# Patient Record
Sex: FEMALE | Race: BLACK OR AFRICAN AMERICAN | Employment: UNEMPLOYED | URBAN - METROPOLITAN AREA
[De-identification: names, ages, dates, MRNs, and addresses within clinical notes are randomized per-mention and may not be internally consistent; named-entity substitution may affect disease eponyms.]

---

## 2017-12-22 PROBLEM — F23 ACUTE PSYCHOSIS (HCC): Status: ACTIVE | Noted: 2017-12-22

## 2018-04-02 ENCOUNTER — OFFICE VISIT (OUTPATIENT)
Dept: OBGYN | Age: 43
End: 2018-04-02
Payer: MEDICAID

## 2018-04-02 VITALS
RESPIRATION RATE: 16 BRPM | TEMPERATURE: 97.9 F | HEART RATE: 94 BPM | SYSTOLIC BLOOD PRESSURE: 107 MMHG | HEIGHT: 62 IN | WEIGHT: 116 LBS | BODY MASS INDEX: 21.35 KG/M2 | DIASTOLIC BLOOD PRESSURE: 62 MMHG

## 2018-04-02 DIAGNOSIS — N92.1 MENORRHAGIA WITH IRREGULAR CYCLE: Primary | ICD-10-CM

## 2018-04-02 PROCEDURE — 99202 OFFICE O/P NEW SF 15 MIN: CPT | Performed by: OBSTETRICS & GYNECOLOGY

## 2018-04-02 PROCEDURE — 99203 OFFICE O/P NEW LOW 30 MIN: CPT | Performed by: OBSTETRICS & GYNECOLOGY

## 2018-04-17 ENCOUNTER — TELEPHONE (OUTPATIENT)
Dept: OBGYN | Age: 43
End: 2018-04-17

## 2018-05-02 ENCOUNTER — HOSPITAL ENCOUNTER (OUTPATIENT)
Dept: ULTRASOUND IMAGING | Age: 43
Discharge: HOME | End: 2018-05-04
Payer: MEDICAID

## 2018-05-02 DIAGNOSIS — N92.1 MENORRHAGIA WITH IRREGULAR CYCLE: ICD-10-CM

## 2018-05-02 PROCEDURE — 76830 TRANSVAGINAL US NON-OB: CPT

## 2018-05-02 PROCEDURE — 76856 US EXAM PELVIC COMPLETE: CPT

## 2018-05-08 ENCOUNTER — TELEPHONE (OUTPATIENT)
Dept: OBGYN | Age: 43
End: 2018-05-08

## 2018-05-22 ENCOUNTER — OFFICE VISIT (OUTPATIENT)
Dept: OBGYN | Age: 43
End: 2018-05-22
Payer: MEDICAID

## 2018-05-22 ENCOUNTER — HOSPITAL ENCOUNTER (OUTPATIENT)
Age: 43
Discharge: HOME | End: 2018-05-24
Payer: MEDICAID

## 2018-05-22 VITALS
WEIGHT: 121 LBS | DIASTOLIC BLOOD PRESSURE: 54 MMHG | HEART RATE: 100 BPM | RESPIRATION RATE: 14 BRPM | HEIGHT: 62 IN | TEMPERATURE: 98.6 F | BODY MASS INDEX: 22.26 KG/M2 | SYSTOLIC BLOOD PRESSURE: 95 MMHG

## 2018-05-22 DIAGNOSIS — D50.0 IRON DEFICIENCY ANEMIA DUE TO CHRONIC BLOOD LOSS: Primary | ICD-10-CM

## 2018-05-22 DIAGNOSIS — D25.0 INTRAMURAL AND SUBMUCOUS LEIOMYOMA OF UTERUS: ICD-10-CM

## 2018-05-22 DIAGNOSIS — D25.1 INTRAMURAL AND SUBMUCOUS LEIOMYOMA OF UTERUS: ICD-10-CM

## 2018-05-22 DIAGNOSIS — N92.1 MENORRHAGIA WITH IRREGULAR CYCLE: ICD-10-CM

## 2018-05-22 LAB
ANISOCYTOSIS: ABNORMAL
BASOPHILS ABSOLUTE: 0.01 E9/L (ref 0–0.2)
BASOPHILS RELATIVE PERCENT: 0.2 % (ref 0–2)
EOSINOPHILS ABSOLUTE: 0.09 E9/L (ref 0.05–0.5)
EOSINOPHILS RELATIVE PERCENT: 2.1 % (ref 0–6)
HCT VFR BLD CALC: 23.2 % (ref 34–48)
HEMOGLOBIN: 6.7 G/DL (ref 11.5–15.5)
HYPOCHROMIA: ABNORMAL
IMMATURE GRANULOCYTES #: 0.01 E9/L
IMMATURE GRANULOCYTES %: 0.2 % (ref 0–5)
LYMPHOCYTES ABSOLUTE: 1.8 E9/L (ref 1.5–4)
LYMPHOCYTES RELATIVE PERCENT: 41 % (ref 20–42)
MCH RBC QN AUTO: 25 PG (ref 26–35)
MCHC RBC AUTO-ENTMCNC: 28.9 % (ref 32–34.5)
MCV RBC AUTO: 86.6 FL (ref 80–99.9)
MONOCYTES ABSOLUTE: 0.4 E9/L (ref 0.1–0.95)
MONOCYTES RELATIVE PERCENT: 9.1 % (ref 2–12)
NEUTROPHILS ABSOLUTE: 2.08 E9/L (ref 1.8–7.3)
NEUTROPHILS RELATIVE PERCENT: 47.4 % (ref 43–80)
PDW BLD-RTO: 16.2 FL (ref 11.5–15)
PLATELET # BLD: 257 E9/L (ref 130–450)
PMV BLD AUTO: 11.3 FL (ref 7–12)
POIKILOCYTES: ABNORMAL
POLYCHROMASIA: ABNORMAL
RBC # BLD: 2.68 E12/L (ref 3.5–5.5)
SCHISTOCYTES: ABNORMAL
TARGET CELLS: ABNORMAL
WBC # BLD: 4.4 E9/L (ref 4.5–11.5)

## 2018-05-22 PROCEDURE — G8427 DOCREV CUR MEDS BY ELIG CLIN: HCPCS | Performed by: OBSTETRICS & GYNECOLOGY

## 2018-05-22 PROCEDURE — G8420 CALC BMI NORM PARAMETERS: HCPCS | Performed by: OBSTETRICS & GYNECOLOGY

## 2018-05-22 PROCEDURE — 36415 COLL VENOUS BLD VENIPUNCTURE: CPT

## 2018-05-22 PROCEDURE — 85025 COMPLETE CBC W/AUTO DIFF WBC: CPT

## 2018-05-22 PROCEDURE — 4004F PT TOBACCO SCREEN RCVD TLK: CPT | Performed by: OBSTETRICS & GYNECOLOGY

## 2018-05-22 PROCEDURE — 99212 OFFICE O/P EST SF 10 MIN: CPT | Performed by: OBSTETRICS & GYNECOLOGY

## 2018-05-22 RX ORDER — FERROUS SULFATE 325(65) MG
325 TABLET ORAL 2 TIMES DAILY WITH MEALS
Qty: 60 TABLET | Refills: 5 | Status: SHIPPED | OUTPATIENT
Start: 2018-05-22 | End: 2019-01-07

## 2018-06-30 ENCOUNTER — HOSPITAL ENCOUNTER (OUTPATIENT)
Dept: MRI IMAGING | Age: 43
Discharge: HOME | End: 2018-07-02
Payer: MEDICAID

## 2018-06-30 DIAGNOSIS — D25.0 INTRAMURAL AND SUBMUCOUS LEIOMYOMA OF UTERUS: ICD-10-CM

## 2018-06-30 DIAGNOSIS — D50.0 IRON DEFICIENCY ANEMIA DUE TO CHRONIC BLOOD LOSS: ICD-10-CM

## 2018-06-30 DIAGNOSIS — D25.1 INTRAMURAL AND SUBMUCOUS LEIOMYOMA OF UTERUS: ICD-10-CM

## 2018-06-30 PROCEDURE — A9579 GAD-BASE MR CONTRAST NOS,1ML: HCPCS | Performed by: RADIOLOGY

## 2018-06-30 PROCEDURE — 72197 MRI PELVIS W/O & W/DYE: CPT

## 2018-06-30 PROCEDURE — 6360000004 HC RX CONTRAST MEDICATION: Performed by: RADIOLOGY

## 2018-06-30 RX ADMIN — GADOTERIDOL 11 ML: 279.3 INJECTION, SOLUTION INTRAVENOUS at 13:15

## 2018-07-03 ENCOUNTER — TELEPHONE (OUTPATIENT)
Dept: OBGYN | Age: 43
End: 2018-07-03

## 2018-07-10 ENCOUNTER — TELEPHONE (OUTPATIENT)
Dept: OBGYN | Age: 43
End: 2018-07-10

## 2018-07-30 ENCOUNTER — OFFICE VISIT (OUTPATIENT)
Dept: OBGYN | Age: 43
End: 2018-07-30
Payer: MEDICAID

## 2018-07-30 VITALS
HEART RATE: 87 BPM | DIASTOLIC BLOOD PRESSURE: 52 MMHG | WEIGHT: 122 LBS | BODY MASS INDEX: 22.45 KG/M2 | RESPIRATION RATE: 16 BRPM | SYSTOLIC BLOOD PRESSURE: 98 MMHG | TEMPERATURE: 98.6 F | HEIGHT: 62 IN

## 2018-07-30 DIAGNOSIS — D25.1 INTRAMURAL AND SUBMUCOUS LEIOMYOMA OF UTERUS: Primary | ICD-10-CM

## 2018-07-30 DIAGNOSIS — N92.1 MENORRHAGIA WITH IRREGULAR CYCLE: ICD-10-CM

## 2018-07-30 DIAGNOSIS — D25.0 INTRAMURAL AND SUBMUCOUS LEIOMYOMA OF UTERUS: Primary | ICD-10-CM

## 2018-07-30 DIAGNOSIS — D50.0 IRON DEFICIENCY ANEMIA DUE TO CHRONIC BLOOD LOSS: ICD-10-CM

## 2018-07-30 PROCEDURE — 99212 OFFICE O/P EST SF 10 MIN: CPT | Performed by: OBSTETRICS & GYNECOLOGY

## 2018-07-30 PROCEDURE — 4004F PT TOBACCO SCREEN RCVD TLK: CPT | Performed by: OBSTETRICS & GYNECOLOGY

## 2018-07-30 PROCEDURE — G8427 DOCREV CUR MEDS BY ELIG CLIN: HCPCS | Performed by: OBSTETRICS & GYNECOLOGY

## 2018-07-30 PROCEDURE — G8420 CALC BMI NORM PARAMETERS: HCPCS | Performed by: OBSTETRICS & GYNECOLOGY

## 2018-07-30 RX ORDER — IBUPROFEN 800 MG/1
TABLET ORAL
Refills: 0 | COMMUNITY
Start: 2018-07-06 | End: 2019-04-29

## 2018-07-30 RX ORDER — CHLORHEXIDINE GLUCONATE 0.12 MG/ML
RINSE ORAL
Refills: 0 | COMMUNITY
Start: 2018-07-06 | End: 2019-04-29

## 2018-07-30 NOTE — PROGRESS NOTES
Here to review her MRI results. At least 4 fibroids were noted. I now need to refer her ti IR for possible scheduling for [place]. Discussed with the patient. Referral sent. CMP and PT (INR) ordered as well.

## 2019-01-07 ENCOUNTER — HOSPITAL ENCOUNTER (OUTPATIENT)
Age: 44
Discharge: HOME | End: 2019-01-09
Payer: MEDICAID

## 2019-01-07 ENCOUNTER — OFFICE VISIT (OUTPATIENT)
Dept: OBGYN | Age: 44
End: 2019-01-07
Payer: MEDICAID

## 2019-01-07 VITALS
WEIGHT: 127 LBS | TEMPERATURE: 98 F | HEART RATE: 84 BPM | HEIGHT: 62 IN | RESPIRATION RATE: 16 BRPM | BODY MASS INDEX: 23.37 KG/M2 | SYSTOLIC BLOOD PRESSURE: 100 MMHG | DIASTOLIC BLOOD PRESSURE: 60 MMHG

## 2019-01-07 DIAGNOSIS — D25.0 INTRAMURAL AND SUBMUCOUS LEIOMYOMA OF UTERUS: ICD-10-CM

## 2019-01-07 DIAGNOSIS — N92.1 MENORRHAGIA WITH IRREGULAR CYCLE: Primary | ICD-10-CM

## 2019-01-07 DIAGNOSIS — D50.0 IRON DEFICIENCY ANEMIA DUE TO CHRONIC BLOOD LOSS: ICD-10-CM

## 2019-01-07 DIAGNOSIS — D25.1 INTRAMURAL AND SUBMUCOUS LEIOMYOMA OF UTERUS: ICD-10-CM

## 2019-01-07 DIAGNOSIS — N92.1 MENORRHAGIA WITH IRREGULAR CYCLE: ICD-10-CM

## 2019-01-07 LAB
HCT VFR BLD CALC: 23.5 % (ref 34–48)
HEMOGLOBIN: 6.5 G/DL (ref 11.5–15.5)
MCH RBC QN AUTO: 20.5 PG (ref 26–35)
MCHC RBC AUTO-ENTMCNC: 27.7 % (ref 32–34.5)
MCV RBC AUTO: 74.1 FL (ref 80–99.9)
PDW BLD-RTO: 20.8 FL (ref 11.5–15)
PLATELET # BLD: 200 E9/L (ref 130–450)
PMV BLD AUTO: ABNORMAL FL (ref 7–12)
RBC # BLD: 3.17 E12/L (ref 3.5–5.5)
WBC # BLD: 6.2 E9/L (ref 4.5–11.5)

## 2019-01-07 PROCEDURE — 99212 OFFICE O/P EST SF 10 MIN: CPT | Performed by: OBSTETRICS & GYNECOLOGY

## 2019-01-07 PROCEDURE — G8484 FLU IMMUNIZE NO ADMIN: HCPCS | Performed by: OBSTETRICS & GYNECOLOGY

## 2019-01-07 PROCEDURE — 36415 COLL VENOUS BLD VENIPUNCTURE: CPT

## 2019-01-07 PROCEDURE — 85027 COMPLETE CBC AUTOMATED: CPT

## 2019-01-07 PROCEDURE — 99213 OFFICE O/P EST LOW 20 MIN: CPT | Performed by: OBSTETRICS & GYNECOLOGY

## 2019-01-07 PROCEDURE — G8420 CALC BMI NORM PARAMETERS: HCPCS | Performed by: OBSTETRICS & GYNECOLOGY

## 2019-01-07 PROCEDURE — 4004F PT TOBACCO SCREEN RCVD TLK: CPT | Performed by: OBSTETRICS & GYNECOLOGY

## 2019-01-07 PROCEDURE — G8427 DOCREV CUR MEDS BY ELIG CLIN: HCPCS | Performed by: OBSTETRICS & GYNECOLOGY

## 2019-01-07 RX ORDER — FERROUS SULFATE 325(65) MG
325 TABLET ORAL DAILY
Qty: 60 TABLET | Refills: 3 | Status: SHIPPED | OUTPATIENT
Start: 2019-01-07 | End: 2019-04-29 | Stop reason: SDUPTHER

## 2019-01-08 ENCOUNTER — TELEPHONE (OUTPATIENT)
Dept: OBGYN | Age: 44
End: 2019-01-08

## 2019-03-07 ENCOUNTER — HOSPITAL ENCOUNTER (EMERGENCY)
Age: 44
Discharge: HOME | End: 2019-03-07
Payer: MEDICAID

## 2019-03-07 VITALS
HEART RATE: 96 BPM | TEMPERATURE: 98 F | RESPIRATION RATE: 16 BRPM | OXYGEN SATURATION: 99 % | DIASTOLIC BLOOD PRESSURE: 75 MMHG | SYSTOLIC BLOOD PRESSURE: 108 MMHG | BODY MASS INDEX: 18.18 KG/M2 | WEIGHT: 127 LBS | HEIGHT: 70 IN

## 2019-03-07 DIAGNOSIS — Z76.0 ENCOUNTER FOR MEDICATION REFILL: Primary | ICD-10-CM

## 2019-03-07 LAB
ACETAMINOPHEN LEVEL: <5 MCG/ML (ref 10–30)
ALBUMIN SERPL-MCNC: 4.3 G/DL (ref 3.5–5.2)
ALP BLD-CCNC: 71 U/L (ref 35–104)
ALT SERPL-CCNC: 18 U/L (ref 0–32)
ANION GAP SERPL CALCULATED.3IONS-SCNC: 13 MMOL/L (ref 7–16)
AST SERPL-CCNC: 20 U/L (ref 0–31)
BACTERIA: ABNORMAL /HPF
BILIRUB SERPL-MCNC: <0.2 MG/DL (ref 0–1.2)
BILIRUBIN URINE: NEGATIVE
BLOOD, URINE: ABNORMAL
BUN BLDV-MCNC: 13 MG/DL (ref 6–20)
CALCIUM SERPL-MCNC: 9.5 MG/DL (ref 8.6–10.2)
CHLORIDE BLD-SCNC: 103 MMOL/L (ref 98–107)
CLARITY: CLEAR
CO2: 23 MMOL/L (ref 22–29)
COLOR: YELLOW
CREAT SERPL-MCNC: 0.9 MG/DL (ref 0.5–1)
EPITHELIAL CELLS, UA: ABNORMAL /HPF
ETHANOL: <10 MG/DL (ref 0–0.08)
GFR AFRICAN AMERICAN: >60
GFR NON-AFRICAN AMERICAN: >60 ML/MIN/1.73
GLUCOSE BLD-MCNC: 116 MG/DL (ref 74–99)
GLUCOSE URINE: NEGATIVE MG/DL
HCG, URINE, POC: NEGATIVE
HCT VFR BLD CALC: 32.2 % (ref 34–48)
HEMOGLOBIN: 9.6 G/DL (ref 11.5–15.5)
KETONES, URINE: NEGATIVE MG/DL
LEUKOCYTE ESTERASE, URINE: NEGATIVE
Lab: NORMAL
MCH RBC QN AUTO: 26 PG (ref 26–35)
MCHC RBC AUTO-ENTMCNC: 29.8 % (ref 32–34.5)
MCV RBC AUTO: 87.3 FL (ref 80–99.9)
NEGATIVE QC PASS/FAIL: NORMAL
NITRITE, URINE: NEGATIVE
PDW BLD-RTO: 21.9 FL (ref 11.5–15)
PH UA: 6 (ref 5–9)
PLATELET # BLD: 200 E9/L (ref 130–450)
PMV BLD AUTO: 10.9 FL (ref 7–12)
POSITIVE QC PASS/FAIL: NORMAL
POTASSIUM SERPL-SCNC: 3.7 MMOL/L (ref 3.5–5)
PROTEIN UA: ABNORMAL MG/DL
RBC # BLD: 3.69 E12/L (ref 3.5–5.5)
RBC UA: ABNORMAL /HPF (ref 0–2)
SALICYLATE, SERUM: <0.3 MG/DL (ref 0–30)
SODIUM BLD-SCNC: 139 MMOL/L (ref 132–146)
SPECIFIC GRAVITY UA: >=1.03 (ref 1–1.03)
T4 TOTAL: 5.1 MCG/DL (ref 4.5–11.7)
TOTAL PROTEIN: 7.4 G/DL (ref 6.4–8.3)
TRICYCLIC ANTIDEPRESSANTS SCREEN SERUM: NEGATIVE NG/ML
TSH SERPL DL<=0.05 MIU/L-ACNC: 1.77 UIU/ML (ref 0.27–4.2)
UROBILINOGEN, URINE: 0.2 E.U./DL
WBC # BLD: 5.2 E9/L (ref 4.5–11.5)
WBC UA: ABNORMAL /HPF (ref 0–5)

## 2019-03-07 PROCEDURE — 84443 ASSAY THYROID STIM HORMONE: CPT

## 2019-03-07 PROCEDURE — 81001 URINALYSIS AUTO W/SCOPE: CPT

## 2019-03-07 PROCEDURE — G0480 DRUG TEST DEF 1-7 CLASSES: HCPCS

## 2019-03-07 PROCEDURE — 80307 DRUG TEST PRSMV CHEM ANLYZR: CPT

## 2019-03-07 PROCEDURE — 99284 EMERGENCY DEPT VISIT MOD MDM: CPT

## 2019-03-07 PROCEDURE — 36415 COLL VENOUS BLD VENIPUNCTURE: CPT

## 2019-03-07 PROCEDURE — 80053 COMPREHEN METABOLIC PANEL: CPT

## 2019-03-07 PROCEDURE — 84436 ASSAY OF TOTAL THYROXINE: CPT

## 2019-03-07 PROCEDURE — 85027 COMPLETE CBC AUTOMATED: CPT

## 2019-03-20 ENCOUNTER — HOSPITAL ENCOUNTER (OUTPATIENT)
Dept: ULTRASOUND IMAGING | Age: 44
Discharge: HOME | End: 2019-03-22
Payer: MEDICAID

## 2019-03-20 DIAGNOSIS — D50.0 IRON DEFICIENCY ANEMIA DUE TO CHRONIC BLOOD LOSS: ICD-10-CM

## 2019-03-20 DIAGNOSIS — N92.1 MENORRHAGIA WITH IRREGULAR CYCLE: ICD-10-CM

## 2019-03-20 PROCEDURE — 76830 TRANSVAGINAL US NON-OB: CPT

## 2019-04-29 ENCOUNTER — TELEPHONE (OUTPATIENT)
Dept: OBGYN | Age: 44
End: 2019-04-29

## 2019-04-29 ENCOUNTER — HOSPITAL ENCOUNTER (EMERGENCY)
Age: 44
Discharge: HOME | End: 2019-04-29
Attending: EMERGENCY MEDICINE
Payer: MEDICAID

## 2019-04-29 VITALS
BODY MASS INDEX: 22.82 KG/M2 | HEART RATE: 60 BPM | RESPIRATION RATE: 20 BRPM | HEIGHT: 62 IN | OXYGEN SATURATION: 99 % | TEMPERATURE: 97.2 F | DIASTOLIC BLOOD PRESSURE: 60 MMHG | SYSTOLIC BLOOD PRESSURE: 92 MMHG | WEIGHT: 124 LBS

## 2019-04-29 DIAGNOSIS — N93.9 VAGINAL BLEEDING: Primary | ICD-10-CM

## 2019-04-29 DIAGNOSIS — N93.9 ABNORMAL VAGINAL BLEEDING: ICD-10-CM

## 2019-04-29 DIAGNOSIS — D50.0 IRON DEFICIENCY ANEMIA DUE TO CHRONIC BLOOD LOSS: ICD-10-CM

## 2019-04-29 DIAGNOSIS — N92.1 MENORRHAGIA WITH IRREGULAR CYCLE: ICD-10-CM

## 2019-04-29 PROBLEM — D25.1 INTRAMURAL LEIOMYOMA OF UTERUS: Chronic | Status: ACTIVE | Noted: 2019-04-29

## 2019-04-29 PROBLEM — D25.1 INTRAMURAL LEIOMYOMA OF UTERUS: Status: ACTIVE | Noted: 2019-04-29

## 2019-04-29 PROBLEM — D50.9 IRON DEFICIENCY ANEMIA: Status: ACTIVE | Noted: 2019-04-29

## 2019-04-29 PROBLEM — D64.9 ACUTE ON CHRONIC ANEMIA: Status: ACTIVE | Noted: 2019-04-29

## 2019-04-29 PROBLEM — D64.9 ACUTE ON CHRONIC ANEMIA: Chronic | Status: ACTIVE | Noted: 2019-04-29

## 2019-04-29 LAB
ANION GAP SERPL CALCULATED.3IONS-SCNC: 7 MMOL/L (ref 7–16)
APTT: 29 SEC (ref 24.5–35.1)
BACTERIA: NORMAL /HPF
BASOPHILS ABSOLUTE: 0.02 E9/L (ref 0–0.2)
BASOPHILS RELATIVE PERCENT: 0.6 % (ref 0–2)
BILIRUBIN URINE: NEGATIVE
BLOOD, URINE: ABNORMAL
BUN BLDV-MCNC: 11 MG/DL (ref 6–20)
CALCIUM SERPL-MCNC: 8.1 MG/DL (ref 8.6–10.2)
CHLORIDE BLD-SCNC: 105 MMOL/L (ref 98–107)
CLARITY: CLEAR
CO2: 26 MMOL/L (ref 22–29)
COLOR: YELLOW
CREAT SERPL-MCNC: 0.8 MG/DL (ref 0.5–1)
EOSINOPHILS ABSOLUTE: 0.16 E9/L (ref 0.05–0.5)
EOSINOPHILS RELATIVE PERCENT: 4.5 % (ref 0–6)
GFR AFRICAN AMERICAN: >60
GFR NON-AFRICAN AMERICAN: >60 ML/MIN/1.73
GLUCOSE BLD-MCNC: 99 MG/DL (ref 74–99)
GLUCOSE URINE: NEGATIVE MG/DL
HCG(URINE) PREGNANCY TEST: NEGATIVE
HCT VFR BLD CALC: 31.4 % (ref 34–48)
HEMOGLOBIN: 9.6 G/DL (ref 11.5–15.5)
IMMATURE GRANULOCYTES #: 0.01 E9/L
IMMATURE GRANULOCYTES %: 0.3 % (ref 0–5)
INR BLD: 1
KETONES, URINE: 15 MG/DL
LEUKOCYTE ESTERASE, URINE: ABNORMAL
LYMPHOCYTES ABSOLUTE: 1.64 E9/L (ref 1.5–4)
LYMPHOCYTES RELATIVE PERCENT: 45.8 % (ref 20–42)
MCH RBC QN AUTO: 28.2 PG (ref 26–35)
MCHC RBC AUTO-ENTMCNC: 30.6 % (ref 32–34.5)
MCV RBC AUTO: 92.4 FL (ref 80–99.9)
MONOCYTES ABSOLUTE: 0.34 E9/L (ref 0.1–0.95)
MONOCYTES RELATIVE PERCENT: 9.5 % (ref 2–12)
NEUTROPHILS ABSOLUTE: 1.41 E9/L (ref 1.8–7.3)
NEUTROPHILS RELATIVE PERCENT: 39.3 % (ref 43–80)
NITRITE, URINE: POSITIVE
PDW BLD-RTO: 15.5 FL (ref 11.5–15)
PH UA: 7 (ref 5–9)
PLATELET # BLD: 153 E9/L (ref 130–450)
PMV BLD AUTO: 11.7 FL (ref 7–12)
POTASSIUM REFLEX MAGNESIUM: 3.7 MMOL/L (ref 3.5–5)
PROTEIN UA: >=300 MG/DL
PROTHROMBIN TIME: 11.8 SEC (ref 9.3–12.4)
RBC # BLD: 3.4 E12/L (ref 3.5–5.5)
RBC UA: NORMAL /HPF (ref 0–2)
SODIUM BLD-SCNC: 138 MMOL/L (ref 132–146)
SPECIFIC GRAVITY UA: 1.02 (ref 1–1.03)
UROBILINOGEN, URINE: 4 E.U./DL
WBC # BLD: 3.6 E9/L (ref 4.5–11.5)
WBC UA: NORMAL /HPF (ref 0–5)

## 2019-04-29 PROCEDURE — 2580000003 HC RX 258: Performed by: EMERGENCY MEDICINE

## 2019-04-29 PROCEDURE — 80048 BASIC METABOLIC PNL TOTAL CA: CPT

## 2019-04-29 PROCEDURE — 99284 EMERGENCY DEPT VISIT MOD MDM: CPT

## 2019-04-29 PROCEDURE — 81001 URINALYSIS AUTO W/SCOPE: CPT

## 2019-04-29 PROCEDURE — 93005 ELECTROCARDIOGRAM TRACING: CPT | Performed by: INTERNAL MEDICINE

## 2019-04-29 PROCEDURE — 85730 THROMBOPLASTIN TIME PARTIAL: CPT

## 2019-04-29 PROCEDURE — 36415 COLL VENOUS BLD VENIPUNCTURE: CPT

## 2019-04-29 PROCEDURE — 81025 URINE PREGNANCY TEST: CPT

## 2019-04-29 PROCEDURE — 85610 PROTHROMBIN TIME: CPT

## 2019-04-29 PROCEDURE — 85025 COMPLETE CBC W/AUTO DIFF WBC: CPT

## 2019-04-29 RX ORDER — 0.9 % SODIUM CHLORIDE 0.9 %
1000 INTRAVENOUS SOLUTION INTRAVENOUS ONCE
Status: COMPLETED | OUTPATIENT
Start: 2019-04-29 | End: 2019-04-29

## 2019-04-29 RX ORDER — FERROUS SULFATE 325(65) MG
325 TABLET ORAL DAILY
Qty: 60 TABLET | Refills: 3 | Status: SHIPPED | OUTPATIENT
Start: 2019-04-29 | End: 2019-05-16 | Stop reason: SDUPTHER

## 2019-04-29 RX ADMIN — SODIUM CHLORIDE 1000 ML: 9 INJECTION, SOLUTION INTRAVENOUS at 15:18

## 2019-04-29 ASSESSMENT — ENCOUNTER SYMPTOMS
VOMITING: 0
COUGH: 0
NAUSEA: 0
WHEEZING: 1
EYES NEGATIVE: 1
BACK PAIN: 0
ABDOMINAL PAIN: 0
DIARRHEA: 0
CONSTIPATION: 0

## 2019-04-29 NOTE — ED PROVIDER NOTES
Mrs. Gale Michel is a 39year old female with PMH of    Past Medical History:  No date: Anxiety  No date: Asthma  No date: Bipolar disorder (HCC)  No date: Bronchitis  No date: COPD (chronic obstructive pulmonary disease) (HCC)  No date: Depression  No date: Iron deficiency anemia  No date: PTSD (post-traumatic stress disorder)  No date: Substance abuse ([facility]      Comment:  quit 7/2011  was [place] user     She came in for a C/c of vaginal bleeding. She had a prolonged history of uterine fibroids with MRI results (June 2018) showing 2 of the largest lesions one of  which measures 5.1 x 5 cm a second of which measures 4.8 x 4.7 cm. At  least 2 other smaller masses are seen compatible with fibroids. She followed Dr. Slime Hermosillo and currently being evaluated for uterine artery embolization. She was seen in Jan and supposed to follow back within 2 weeks for labs review. However, she lost to follow. She continues to complains of vaginal bleeding, moderate in severity, 8-10 pads / day, daily. . Menstrual periods are irregular. She denies losing weight. But feel easily tired and dyspnea during walking for short distances. She complains of slow memory. Review of Systems   Constitutional: Positive for activity change and fatigue. Negative for appetite change, chills and fever. HENT: Negative. Eyes: Negative. Respiratory: Positive for wheezing. Negative for cough. Cardiovascular: Negative for chest pain, palpitations and leg swelling. Gastrointestinal: Negative for abdominal pain, constipation, diarrhea, nausea and vomiting. Endocrine: Negative for polyuria. Genitourinary: Positive for menstrual problem and vaginal bleeding. Negative for difficulty urinating and dysuria. Musculoskeletal: Negative for back pain. Skin: Negative. Neurological: Positive for weakness. Negative for syncope, light-headedness and headaches. Psychiatric/Behavioral: Positive for decreased concentration. tenderness. [JA]   1557 EKG: This EKG is signed and interpreted by me.     Rate: 57  Rhythm: Sinus  Interpretation: no acute changes  Comparison: stable as compared to patient's most recent EKG      [JA]   1613 Pregnancy, Urine [TL]      ED Course User Index  [JA] Satya Junior MD  [TL] Lyudmila Holland MD       Labs  Results for orders placed or performed during the hospital encounter of 04/29/19   Pregnancy, Urine   Result Value Ref Range    HCG(Urine) Pregnancy Test NEGATIVE NEGATIVE   Urinalysis, reflex to microscopic   Result Value Ref Range    Color, UA Yellow Straw/Yellow    Clarity, UA Clear Clear    Glucose, Ur Negative Negative mg/dL    Bilirubin Urine Negative Negative    Ketones, Urine 15 (A) Negative mg/dL    Specific Gravity, UA 1.020 1.005 - 1.030    Blood, Urine LARGE (A) Negative    pH, UA 7.0 5.0 - 9.0    Protein, UA >=300 (A) Negative mg/dL    Urobilinogen, Urine 4.0 (A) <2.0 E.U./dL    Nitrite, Urine POSITIVE (A) Negative    Leukocyte Esterase, Urine MODERATE (A) Negative   CBC Auto Differential   Result Value Ref Range    WBC 3.6 (L) 4.5 - 11.5 E9/L    RBC 3.40 (L) 3.50 - 5.50 E12/L    Hemoglobin 9.6 (L) 11.5 - 15.5 g/dL    Hematocrit 31.4 (L) 34.0 - 48.0 %    MCV 92.4 80.0 - 99.9 fL    MCH 28.2 26.0 - 35.0 pg    MCHC 30.6 (L) 32.0 - 34.5 %    RDW 15.5 (H) 11.5 - 15.0 fL    Platelets 107 821 - 558 E9/L    MPV 11.7 7.0 - 12.0 fL    Neutrophils % 39.3 (L) 43.0 - 80.0 %    Immature Granulocytes % 0.3 0.0 - 5.0 %    Lymphocytes % 45.8 (H) 20.0 - 42.0 %    Monocytes % 9.5 2.0 - 12.0 %    Eosinophils % 4.5 0.0 - 6.0 %    Basophils % 0.6 0.0 - 2.0 %    Neutrophils # 1.41 (L) 1.80 - 7.30 E9/L    Immature Granulocytes # 0.01 E9/L    Lymphocytes # 1.64 1.50 - 4.00 E9/L    Monocytes # 0.34 0.10 - 0.95 E9/L    Eosinophils # 0.16 0.05 - 0.50 E9/L    Basophils # 0.02 0.00 - 0.20 K2/N   Basic Metabolic Panel w/ Reflex to MG   Result Value Ref Range    Sodium 138 132 - 146 mmol/L    Potassium reflex Magnesium 3.7 3.5 - 5.0 mmol/L    Chloride 105 98 - 107 mmol/L    CO2 26 22 - 29 mmol/L    Anion Gap 7 7 - 16 mmol/L    Glucose 99 74 - 99 mg/dL    BUN 11 6 - 20 mg/dL    CREATININE 0.8 0.5 - 1.0 mg/dL    GFR Non-African American >60 >=60 mL/min/1.73    GFR African American >60     Calcium 8.1 (L) 8.6 - 10.2 mg/dL   Protime-INR   Result Value Ref Range    Protime 11.8 9.3 - 12.4 sec    INR 1.0    APTT   Result Value Ref Range    aPTT 29.0 24.5 - 35.1 sec   Microscopic Urinalysis   Result Value Ref Range    WBC, UA 0-1 0 - 5 /HPF    RBC, UA PACKED 0 - 2 /HPF    Bacteria, UA NONE /HPF       Radiology  No results found.    --------------------------------------------- PAST HISTORY ---------------------------------------------  Past Medical History:  has a past medical history of Anxiety, Asthma, Bipolar disorder ([facility] Bronchitis, COPD (chronic obstructive pulmonary disease) ([facility]), Depression, Iron deficiency anemia, PTSD (post-traumatic stress disorder), and Substance abuse ([facility] Past Surgical History:  has a past surgical history that includes Tonsillectomy. Social History:  reports that she has been smoking cigarettes. She has a 8.00 pack-year smoking history. She has never used smokeless tobacco. She reports that she does not drink alcohol or use drugs. Family History: family history includes Diabetes in her father and mother; High Blood Pressure in her mother; Kidney Disease in her mother; Other in her mother. The patients home medications have been reviewed. Allergies: Patient has no known allergies.     -------------------------------------------------- RESULTS -------------------------------------------------  Labs:  Results for orders placed or performed during the hospital encounter of 04/29/19   Pregnancy, Urine   Result Value Ref Range    HCG(Urine) Pregnancy Test NEGATIVE NEGATIVE   Urinalysis, reflex to microscopic   Result Value Ref Range    Color, UA Yellow Straw/Yellow    Clarity, UA Clear Clear    Glucose, Ur Negative Negative mg/dL    Bilirubin Urine Negative Negative    Ketones, Urine 15 (A) Negative mg/dL    Specific Gravity, UA 1.020 1.005 - 1.030    Blood, Urine LARGE (A) Negative    pH, UA 7.0 5.0 - 9.0    Protein, UA >=300 (A) Negative mg/dL    Urobilinogen, Urine 4.0 (A) <2.0 E.U./dL    Nitrite, Urine POSITIVE (A) Negative    Leukocyte Esterase, Urine MODERATE (A) Negative   CBC Auto Differential   Result Value Ref Range    WBC 3.6 (L) 4.5 - 11.5 E9/L    RBC 3.40 (L) 3.50 - 5.50 E12/L    Hemoglobin 9.6 (L) 11.5 - 15.5 g/dL    Hematocrit 31.4 (L) 34.0 - 48.0 %    MCV 92.4 80.0 - 99.9 fL    MCH 28.2 26.0 - 35.0 pg    MCHC 30.6 (L) 32.0 - 34.5 %    RDW 15.5 (H) 11.5 - 15.0 fL    Platelets 692 376 - 018 E9/L    MPV 11.7 7.0 - 12.0 fL    Neutrophils % 39.3 (L) 43.0 - 80.0 %    Immature Granulocytes % 0.3 0.0 - 5.0 %    Lymphocytes % 45.8 (H) 20.0 - 42.0 %    Monocytes % 9.5 2.0 - 12.0 %    Eosinophils % 4.5 0.0 - 6.0 %    Basophils % 0.6 0.0 - 2.0 %    Neutrophils # 1.41 (L) 1.80 - 7.30 E9/L    Immature Granulocytes # 0.01 E9/L    Lymphocytes # 1.64 1.50 - 4.00 E9/L    Monocytes # 0.34 0.10 - 0.95 E9/L    Eosinophils # 0.16 0.05 - 0.50 E9/L    Basophils # 0.02 0.00 - 0.20 U1/A   Basic Metabolic Panel w/ Reflex to MG   Result Value Ref Range    Sodium 138 132 - 146 mmol/L    Potassium reflex Magnesium 3.7 3.5 - 5.0 mmol/L    Chloride 105 98 - 107 mmol/L    CO2 26 22 - 29 mmol/L    Anion Gap 7 7 - 16 mmol/L    Glucose 99 74 - 99 mg/dL    BUN 11 6 - 20 mg/dL    CREATININE 0.8 0.5 - 1.0 mg/dL    GFR Non-African American >60 >=60 mL/min/1.73    GFR African American >60     Calcium 8.1 (L) 8.6 - 10.2 mg/dL   Protime-INR   Result Value Ref Range    Protime 11.8 9.3 - 12.4 sec    INR 1.0    APTT   Result Value Ref Range    aPTT 29.0 24.5 - 35.1 sec   Microscopic Urinalysis   Result Value Ref Range    WBC, UA 0-1 0 - 5 /HPF    RBC, UA PACKED 0 - 2 /HPF    Bacteria, UA NONE /HPF       Radiology:  No orders to display     EKG: This EKG is signed and interpreted by me. Rate:57 bpm  Rhythm: Sinus bradycardia  Interpretation: no acute changes  Comparison: stable as compared to patient's most recent EKG    ------------------------- NURSING NOTES AND VITALS REVIEWED ---------------------------  Date / Time Roomed:  4/29/2019  1:54 PM  ED Bed Assignment:  13/13    The nursing notes within the ED encounter and vital signs as below have been reviewed. BP 98/62   Pulse 98   Temp 97.2 °F (36.2 °C) (Infrared)   Resp 20   Ht 5' 2\" (1.575 m)   Wt 124 lb (56.2 kg)   LMP  (LMP Unknown)   SpO2 97%   BMI 22.68 kg/m²   Oxygen Saturation Interpretation: Normal    ------------------------------------------ PROGRESS NOTES ------------------------------------------  2:33 PM  I have spoken with the patient and discussed todays results, in addition to providing specific details for the plan of care and counseling regarding the diagnosis and prognosis. Their questions are answered at this time and they are agreeable with the plan. I discussed at length with them reasons for immediate return here for re evaluation. They will followup with their obstetrician and primary care physician by calling their office within 1 week.      --------------------------------- ADDITIONAL PROVIDER NOTES ---------------------------------  At this time the patient is without objective evidence of an acute process requiring hospitalization or inpatient management. They have remained hemodynamically stable throughout their entire ED visit and are stable for discharge with outpatient follow-up. The plan has been discussed in detail and they are aware of the specific conditions for emergent return, as well as the importance of follow-up. Call Dr. Celina Stone. Hafsa's office, talking with the nurse in charge, no providers present at the time, but they know they patient and will schedule to see her on April 30 th, 2019 at 10 am in the

## 2019-04-29 NOTE — ED NOTES
Bed: 13  Expected date:   Expected time:   Means of arrival:   Comments:  ems     Babar Aguilra, RN  04/29/19 [id number]

## 2019-04-29 NOTE — TELEPHONE ENCOUNTER
Patient left voicemail statingshe was having excessive vaginal bleeding and wanted to know if she should go to the hospital. Attempted to call patient back, no answer. Left message to have patient call the office.

## 2019-04-30 ENCOUNTER — TELEPHONE (OUTPATIENT)
Dept: OBGYN | Age: 44
End: 2019-04-30

## 2019-04-30 LAB
EKG ATRIAL RATE: 57 BPM
EKG P AXIS: 79 DEGREES
EKG P-R INTERVAL: 128 MS
EKG Q-T INTERVAL: 470 MS
EKG QRS DURATION: 84 MS
EKG QTC CALCULATION (BAZETT): 457 MS
EKG R AXIS: 79 DEGREES
EKG T AXIS: 71 DEGREES
EKG VENTRICULAR RATE: 57 BPM

## 2019-04-30 PROCEDURE — 93010 ELECTROCARDIOGRAM REPORT: CPT | Performed by: INTERNAL MEDICINE

## 2019-04-30 NOTE — TELEPHONE ENCOUNTER
Patient called back stating she missed her appointment due to her being extremely tired and that she had a left a message on the office voicemail. There were no messages on either voicemail from the patient today. I explained to the patient that I could reschedule her but she absolutely had to keep her appointments as she is at risk for being discharged due to non-compliance.  Pt. Understood, rescheduled her for 5/8/19 @ 0930.    -cBranaristeo, 4/30/19

## 2019-04-30 NOTE — TELEPHONE ENCOUNTER
Received a call yesterday from the ED doctor regarding making an appointment for this patient at our earliest convenience. Per the ED DrTracy Patient is having abnormal bleeding and a low Hgb. Made an appointment for the patient for today, 4/30/19 @ 10am. I instructed the  To make sure patient keeps her appointment as she has meredith non-compliant. Dr. Adal Morales relayed message to the patient while still on the phone and confirmed appointment. Called patient about her no-show this morning, no answer, left a message on the voicemail.     -Arslan, 4/30/19

## 2019-04-30 NOTE — ED PROVIDER NOTES
HPI:  4/30/19, Time: 6:53 AM         Shree Callejas is a 39 y.o. female presenting to the ED for vaginal bleeding, beginning in February. Patient reports she has been using several pads per day, worsening over the last 3-4 days. She recently underwent a pelvic ultrasound last month showing uterine fibroids. She also reports some mild dizziness and sob. She denies chest pain, abdominal pain, fevers, abnormal vaginal discharge, nausea, and vomiting. She is not taking birth control or hormones. She follows with Dr. Duncan Omer. Review of Systems:   Pertinent positives and negatives are stated within HPI, all other systems reviewed and are negative.          --------------------------------------------- PAST HISTORY ---------------------------------------------  Past Medical History:  has a past medical history of Anxiety, Asthma, [facility] Bronchitis, COPD (chronic obstructive pulmonary disease) ([facility] Depression, Iron deficiency anemia, PTSD (post-traumatic stress disorder), and Substance abuse ([facility] Past Surgical History:  has a past surgical history that includes Tonsillectomy. Social History:  reports that she has been smoking cigarettes. She has a 8.00 pack-year smoking history. She has never used smokeless tobacco. She reports that she does not drink alcohol or use drugs. Family History: family history includes Diabetes in her father and mother; High Blood Pressure in her mother; Kidney Disease in her mother; Other in her mother. The patients home medications have been reviewed. Allergies: Patient has no known allergies.     -------------------------------------------------- RESULTS -------------------------------------------------  All laboratory and radiology results have been personally reviewed by myself   LABS:  Results for orders placed or performed during the hospital encounter of 04/29/19   Pregnancy, Urine   Result Value Ref Range    HCG(Urine) Pregnancy Test NEGATIVE NEGATIVE   Urinalysis, reflex to microscopic   Result Value Ref Range    Color, UA Yellow Straw/Yellow    Clarity, UA Clear Clear    Glucose, Ur Negative Negative mg/dL    Bilirubin Urine Negative Negative    Ketones, Urine 15 (A) Negative mg/dL    Specific Gravity, UA 1.020 1.005 - 1.030    Blood, Urine LARGE (A) Negative    pH, UA 7.0 5.0 - 9.0    Protein, UA >=300 (A) Negative mg/dL    Urobilinogen, Urine 4.0 (A) <2.0 E.U./dL    Nitrite, Urine POSITIVE (A) Negative    Leukocyte Esterase, Urine MODERATE (A) Negative   CBC Auto Differential   Result Value Ref Range    WBC 3.6 (L) 4.5 - 11.5 E9/L    RBC 3.40 (L) 3.50 - 5.50 E12/L    Hemoglobin 9.6 (L) 11.5 - 15.5 g/dL    Hematocrit 31.4 (L) 34.0 - 48.0 %    MCV 92.4 80.0 - 99.9 fL    MCH 28.2 26.0 - 35.0 pg    MCHC 30.6 (L) 32.0 - 34.5 %    RDW 15.5 (H) 11.5 - 15.0 fL    Platelets 206 707 - 076 E9/L    MPV 11.7 7.0 - 12.0 fL    Neutrophils % 39.3 (L) 43.0 - 80.0 %    Immature Granulocytes % 0.3 0.0 - 5.0 %    Lymphocytes % 45.8 (H) 20.0 - 42.0 %    Monocytes % 9.5 2.0 - 12.0 %    Eosinophils % 4.5 0.0 - 6.0 %    Basophils % 0.6 0.0 - 2.0 %    Neutrophils # 1.41 (L) 1.80 - 7.30 E9/L    Immature Granulocytes # 0.01 E9/L    Lymphocytes # 1.64 1.50 - 4.00 E9/L    Monocytes # 0.34 0.10 - 0.95 E9/L    Eosinophils # 0.16 0.05 - 0.50 E9/L    Basophils # 0.02 0.00 - 0.20 T2/Q   Basic Metabolic Panel w/ Reflex to MG   Result Value Ref Range    Sodium 138 132 - 146 mmol/L    Potassium reflex Magnesium 3.7 3.5 - 5.0 mmol/L    Chloride 105 98 - 107 mmol/L    CO2 26 22 - 29 mmol/L    Anion Gap 7 7 - 16 mmol/L    Glucose 99 74 - 99 mg/dL    BUN 11 6 - 20 mg/dL    CREATININE 0.8 0.5 - 1.0 mg/dL    GFR Non-African American >60 >=60 mL/min/1.73    GFR African American >60     Calcium 8.1 (L) 8.6 - 10.2 mg/dL   Protime-INR   Result Value Ref Range    Protime 11.8 9.3 - 12.4 sec    INR 1.0    APTT   Result Value Ref Range    aPTT 29.0 24.5 - 35.1 sec   Microscopic Urinalysis   Result Value Ref Range    WBC, UA 0-1 0 - 5 /HPF    RBC, UA PACKED 0 - 2 /HPF    Bacteria, UA NONE /HPF   EKG 12 Lead   Result Value Ref Range    Ventricular Rate 57 BPM    Atrial Rate 57 BPM    P-R Interval 128 ms    QRS Duration 84 ms    Q-T Interval 470 ms    QTc Calculation (Bazett) 457 ms    P Axis 79 degrees    R Axis 79 degrees    T Axis 71 degrees       RADIOLOGY:  Interpreted by Radiologist.  No orders to display       ------------------------- NURSING NOTES AND VITALS REVIEWED ---------------------------   The nursing notes within the ED encounter and vital signs as below have been reviewed. BP 92/60   Pulse 60   Temp 97.2 °F (36.2 °C) (Infrared)   Resp 20   Ht 5' 2\" (1.575 m)   Wt 124 lb (56.2 kg)   LMP  (LMP Unknown)   SpO2 99%   BMI 22.68 kg/m²   Oxygen Saturation Interpretation: Normal      ---------------------------------------------------PHYSICAL EXAM--------------------------------------      Constitutional/General: Alert and oriented x3, well appearing, non toxic in NAD  Head: Normocephalic and atraumatic  Eyes: PERRL, EOMI  Mouth: Oropharynx clear, handling secretions, no trismus  Neck: Supple, full ROM,   Pulmonary: Lungs clear to auscultation bilaterally, no wheezes, rales, or rhonchi. Not in respiratory distress  Cardiovascular:  Regular rate and rhythm, no murmurs, gallops, or rubs. 2+ distal pulses  Abdomen: Soft, mild suprapubic tenderness, non distended,   Pelvic: RN chaperone and resident physician at bedside. Moderate amount of blood in vaginal vault, no active bleeding, cervical os closed, no CMT or adnexal tenderness. Extremities: Moves all extremities x 4.  Warm and well perfused  Skin: warm and dry without rash  Neurologic: GCS 15,  Psych: Normal Affect      ------------------------------ ED COURSE/MEDICAL DECISION MAKING----------------------  Medications   0.9 % sodium chloride bolus (0 mLs Intravenous Stopped 4/29/19 8458)         ED COURSE:  ED Course as of Apr 30 0653   [facility] Apr 29, 2019   1542 Hemoglobin Quant(!): 9.6 [TL]   1546 Patient has benign abdomen. Mild suprapubic tenderness. Moderate bright blood in vaginal vault, no active bleeding, cervical os closed. No vaginal discharge. No CMT or adnexal tenderness. [JA]   1557 EKG: This EKG is signed and interpreted by me. Rate: 57  Rhythm: Sinus  Interpretation: no acute changes  Comparison: stable as compared to patient's most recent EKG      [JA]   1613 Pregnancy, Urine [TL]      ED Course User Index  [JA] Carmen Reeves MD  [TL] Karen Marcus MD       Medical Decision Making:    Patient is a 58-year-old female with known uterine fibroids presenting with vaginal bleeding. She was HDS and afebrile in the ED. She was nontoxic on exam. She had a benign abdomen. She had blood in her vaginal vault but no active bleeding or hemorrhage. Hemoglobin was stable. Coags were normal. Pregnancy was negative. Patient is having dysfunctional uterine bleeding likely secondary to her known fibroids. Plan for resident physician to call Dr. Kevon Cintron to establish follow up and discharge home. Counseling: The emergency provider has spoken with the patient and discussed todays results, in addition to providing specific details for the plan of care and counseling regarding the diagnosis and prognosis. Questions are answered at this time and they are agreeable with the plan.      --------------------------------- IMPRESSION AND DISPOSITION ---------------------------------    IMPRESSION  1. Vaginal bleeding    2. Abnormal vaginal bleeding    3. Menorrhagia with irregular cycle    4. Iron deficiency anemia due to chronic blood loss        DISPOSITION  Disposition: Discharge to home  Patient condition is stable      NOTE: This report was transcribed using voice recognition software.  Every effort was made to ensure accuracy; however, inadvertent computerized transcription errors may be present        Carmen Reeves MD  04/30/19 [id number]

## 2019-05-08 ENCOUNTER — TELEPHONE (OUTPATIENT)
Dept: OBGYN | Age: 44
End: 2019-05-08

## 2019-05-16 ENCOUNTER — OFFICE VISIT (OUTPATIENT)
Dept: OBGYN | Age: 44
End: 2019-05-16
Payer: MEDICAID

## 2019-05-16 ENCOUNTER — HOSPITAL ENCOUNTER (OUTPATIENT)
Age: 44
Discharge: HOME | End: 2019-05-18
Payer: MEDICAID

## 2019-05-16 VITALS
SYSTOLIC BLOOD PRESSURE: 97 MMHG | RESPIRATION RATE: 16 BRPM | DIASTOLIC BLOOD PRESSURE: 64 MMHG | BODY MASS INDEX: 22.45 KG/M2 | WEIGHT: 122 LBS | HEIGHT: 62 IN | HEART RATE: 97 BPM | TEMPERATURE: 98.1 F

## 2019-05-16 DIAGNOSIS — D25.0 INTRAMURAL AND SUBMUCOUS LEIOMYOMA OF UTERUS: ICD-10-CM

## 2019-05-16 DIAGNOSIS — N92.1 MENORRHAGIA WITH IRREGULAR CYCLE: Primary | ICD-10-CM

## 2019-05-16 DIAGNOSIS — D50.0 IRON DEFICIENCY ANEMIA DUE TO CHRONIC BLOOD LOSS: ICD-10-CM

## 2019-05-16 DIAGNOSIS — Z12.4 ENCOUNTER FOR PAPANICOLAOU SMEAR OF CERVIX: ICD-10-CM

## 2019-05-16 DIAGNOSIS — D25.1 INTRAMURAL AND SUBMUCOUS LEIOMYOMA OF UTERUS: ICD-10-CM

## 2019-05-16 PROCEDURE — G8427 DOCREV CUR MEDS BY ELIG CLIN: HCPCS | Performed by: OBSTETRICS & GYNECOLOGY

## 2019-05-16 PROCEDURE — 4004F PT TOBACCO SCREEN RCVD TLK: CPT | Performed by: OBSTETRICS & GYNECOLOGY

## 2019-05-16 PROCEDURE — G8420 CALC BMI NORM PARAMETERS: HCPCS | Performed by: OBSTETRICS & GYNECOLOGY

## 2019-05-16 PROCEDURE — 99213 OFFICE O/P EST LOW 20 MIN: CPT | Performed by: OBSTETRICS & GYNECOLOGY

## 2019-05-16 PROCEDURE — 88175 CYTOPATH C/V AUTO FLUID REDO: CPT

## 2019-05-16 PROCEDURE — 99212 OFFICE O/P EST SF 10 MIN: CPT | Performed by: OBSTETRICS & GYNECOLOGY

## 2019-05-16 RX ORDER — HYDROXYZINE PAMOATE 50 MG/1
50 CAPSULE ORAL NIGHTLY PRN
Status: ON HOLD | COMMUNITY
Start: 2019-02-21 | End: 2020-07-06 | Stop reason: HOSPADM

## 2019-05-16 RX ORDER — FERROUS SULFATE 325(65) MG
325 TABLET ORAL DAILY
Qty: 60 TABLET | Refills: 5 | Status: ON HOLD | OUTPATIENT
Start: 2019-05-16 | End: 2019-09-26 | Stop reason: HOSPADM

## 2019-05-16 RX ORDER — DIVALPROEX SODIUM 250 MG/1
TABLET, DELAYED RELEASE ORAL
Status: ON HOLD | COMMUNITY
Start: 2019-05-06 | End: 2019-09-26 | Stop reason: HOSPADM

## 2019-05-16 NOTE — PROGRESS NOTES
Here again today for bleeding from her fibroids. Was supposed to have a uterine artery embolization last year but did not keep that appt. Bleeding again. Even had the MRI done for IR but never kept appt. Discussed that she nneds to follow thru with IR for a [place] procedure to stop her bleeding from the fibroids. Pelvic:Cx: appears clear, TPP done, difficult to reach as it is very posterior. Uterus is irregular and I would say about 14 week size. IMP: Fibroids, Menorrhagia, anemia    Plan Referral to IR for possible [place]. MRI on the chart.

## 2019-05-16 NOTE — PROGRESS NOTES
Assisted Dr. Richardson Vitale with pelvic exam, 1 specimen collected for pap hand delivered to lab. Pt tolerated well. POC explained to patient and discharge instructions given to patient by Dr. Richardson Vitale.  Pt verbalized understanding

## 2019-05-23 ENCOUNTER — TELEPHONE (OUTPATIENT)
Dept: OBGYN | Age: 44
End: 2019-05-23

## 2019-05-23 ENCOUNTER — HOSPITAL ENCOUNTER (OUTPATIENT)
Dept: INTERVENTIONAL RADIOLOGY/VASCULAR | Age: 44
Discharge: HOME | End: 2019-05-25
Payer: MEDICAID

## 2019-05-23 DIAGNOSIS — N92.1 MENORRHAGIA WITH IRREGULAR CYCLE: ICD-10-CM

## 2019-05-23 NOTE — TELEPHONE ENCOUNTER
Received a call from Interventional Radiology regarding this patient wanting to move ahead with the procedure. Called patient to schedule an appt. With Dr. Duncan Omer for H&P on 5/31/19 2 10:45a.  Patient confirmed.    -Tasneem, 5/23/19

## 2019-05-23 NOTE — PROGRESS NOTES
Patient arrived for UFE consult with Dr. Lynn Ramehs. Patient stated the following :   [facility] [place] Feb 2018 bleeding since then  G 0 P 0  # pads on heaviest flow day: 4-5  Cycle length 5-7days    Dr Lynn Ramesh explained risks/ benefits of Embolization vs Hysterectomy. He explained UFE pain control methods and 23hr observation admission post procedure.      Patient is agreeable to proceed with Embolization and is scheduled for June 5th @ 10am.

## 2019-06-03 ENCOUNTER — OFFICE VISIT (OUTPATIENT)
Dept: OBGYN | Age: 44
End: 2019-06-03
Payer: MEDICAID

## 2019-06-03 ENCOUNTER — TELEPHONE (OUTPATIENT)
Dept: OBGYN | Age: 44
End: 2019-06-03

## 2019-06-03 VITALS
TEMPERATURE: 98.2 F | SYSTOLIC BLOOD PRESSURE: 100 MMHG | WEIGHT: 120 LBS | DIASTOLIC BLOOD PRESSURE: 60 MMHG | HEIGHT: 62 IN | HEART RATE: 80 BPM | BODY MASS INDEX: 22.08 KG/M2

## 2019-06-03 DIAGNOSIS — N92.1 MENORRHAGIA WITH IRREGULAR CYCLE: Primary | ICD-10-CM

## 2019-06-03 DIAGNOSIS — D25.0 INTRAMURAL AND SUBMUCOUS LEIOMYOMA OF UTERUS: ICD-10-CM

## 2019-06-03 DIAGNOSIS — D25.1 INTRAMURAL AND SUBMUCOUS LEIOMYOMA OF UTERUS: ICD-10-CM

## 2019-06-03 PROCEDURE — 4004F PT TOBACCO SCREEN RCVD TLK: CPT | Performed by: OBSTETRICS & GYNECOLOGY

## 2019-06-03 PROCEDURE — G8427 DOCREV CUR MEDS BY ELIG CLIN: HCPCS | Performed by: OBSTETRICS & GYNECOLOGY

## 2019-06-03 PROCEDURE — G8420 CALC BMI NORM PARAMETERS: HCPCS | Performed by: OBSTETRICS & GYNECOLOGY

## 2019-06-03 PROCEDURE — 99212 OFFICE O/P EST SF 10 MIN: CPT | Performed by: OBSTETRICS & GYNECOLOGY

## 2019-06-03 NOTE — PROGRESS NOTES
Here today for pre-op physical for her Uterine Artery embolization scheduled for Wednesday AM.       Has no complaints. Procedure reviewed with her and will have her admitted as an outpatient overnight for pain control. All questions answered for her. Upper PE is wnl all regions systems and areas. Proceed with [place] on 6/5 2019.

## 2019-06-03 NOTE — TELEPHONE ENCOUNTER
jaylin from interventional radiology called and states patient needs order placed by dr Kate Galvez for the actual procedure for the [place] in. States only has order for the referral not the procedure. Patient is scheduled for this coming wed. For the procedure. thank you.

## 2019-06-04 DIAGNOSIS — D21.9 FIBROIDS: Primary | ICD-10-CM

## 2019-06-04 RX ORDER — ONDANSETRON 2 MG/ML
4 INJECTION INTRAMUSCULAR; INTRAVENOUS EVERY 4 HOURS PRN
Status: CANCELLED | OUTPATIENT
Start: 2019-06-05

## 2019-06-04 RX ORDER — MORPHINE SULFATE 2 MG/ML
2 INJECTION, SOLUTION INTRAMUSCULAR; INTRAVENOUS
Status: CANCELLED | OUTPATIENT
Start: 2019-06-06

## 2019-06-04 RX ORDER — CEFAZOLIN SODIUM 1 G/50ML
1 SOLUTION INTRAVENOUS ONCE
Status: CANCELLED | OUTPATIENT
Start: 2019-06-05

## 2019-06-04 RX ORDER — NALOXONE HYDROCHLORIDE 0.4 MG/ML
0.4 INJECTION, SOLUTION INTRAMUSCULAR; INTRAVENOUS; SUBCUTANEOUS PRN
Status: CANCELLED | OUTPATIENT
Start: 2019-06-05

## 2019-06-04 RX ORDER — ONDANSETRON 2 MG/ML
4 INJECTION INTRAMUSCULAR; INTRAVENOUS EVERY 4 HOURS
Status: CANCELLED | OUTPATIENT
Start: 2019-06-04

## 2019-06-04 RX ORDER — MORPHINE SULFATE/0.9% NACL/PF 1 MG/ML
SYRINGE (ML) INJECTION CONTINUOUS
Status: CANCELLED | OUTPATIENT
Start: 2019-06-05

## 2019-06-04 RX ORDER — KETOROLAC TROMETHAMINE 30 MG/ML
30 INJECTION, SOLUTION INTRAMUSCULAR; INTRAVENOUS ONCE
Status: CANCELLED | OUTPATIENT
Start: 2019-06-05 | End: 2019-06-10

## 2019-06-04 RX ORDER — SODIUM CHLORIDE 0.9 % (FLUSH) 0.9 %
10 SYRINGE (ML) INJECTION PRN
Status: CANCELLED | OUTPATIENT
Start: 2019-06-05

## 2019-06-04 RX ORDER — SODIUM CHLORIDE 9 MG/ML
INJECTION, SOLUTION INTRAVENOUS CONTINUOUS
Status: CANCELLED | OUTPATIENT
Start: 2019-06-05

## 2019-06-05 ENCOUNTER — HOSPITAL ENCOUNTER (OUTPATIENT)
Dept: INTERVENTIONAL RADIOLOGY/VASCULAR | Age: 44
Setting detail: OBSERVATION
Discharge: HOME | End: 2019-06-06
Attending: OBSTETRICS & GYNECOLOGY | Admitting: OBSTETRICS & GYNECOLOGY
Payer: MEDICAID

## 2019-06-05 ENCOUNTER — ANESTHESIA (OUTPATIENT)
Dept: INTERVENTIONAL RADIOLOGY/VASCULAR | Age: 44
End: 2019-06-05

## 2019-06-05 ENCOUNTER — ANESTHESIA EVENT (OUTPATIENT)
Dept: INTERVENTIONAL RADIOLOGY/VASCULAR | Age: 44
End: 2019-06-05

## 2019-06-05 VITALS
DIASTOLIC BLOOD PRESSURE: 63 MMHG | RESPIRATION RATE: 10 BRPM | OXYGEN SATURATION: 100 % | SYSTOLIC BLOOD PRESSURE: 83 MMHG

## 2019-06-05 DIAGNOSIS — D21.9 FIBROIDS: ICD-10-CM

## 2019-06-05 PROBLEM — D25.9 UTERINE FIBROID: Status: ACTIVE | Noted: 2019-06-05

## 2019-06-05 LAB — HCG(URINE) PREGNANCY TEST: NEGATIVE

## 2019-06-05 PROCEDURE — 94770 HC ETCO2 MONITOR DAILY: CPT

## 2019-06-05 PROCEDURE — 3700000000 HC ANESTHESIA ATTENDED CARE

## 2019-06-05 PROCEDURE — 2580000003 HC RX 258: Performed by: RADIOLOGY

## 2019-06-05 PROCEDURE — 2500000003 HC RX 250 WO HCPCS: Performed by: RADIOLOGY

## 2019-06-05 PROCEDURE — 7100000001 HC PACU RECOVERY - ADDTL 15 MIN

## 2019-06-05 PROCEDURE — 36247 INS CATH ABD/L-EXT ART 3RD: CPT

## 2019-06-05 PROCEDURE — 6360000002 HC RX W HCPCS: Performed by: RADIOLOGY

## 2019-06-05 PROCEDURE — 6360000002 HC RX W HCPCS: Performed by: NURSE ANESTHETIST, CERTIFIED REGISTERED

## 2019-06-05 PROCEDURE — G0378 HOSPITAL OBSERVATION PER HR: HCPCS

## 2019-06-05 PROCEDURE — C1894 INTRO/SHEATH, NON-LASER: HCPCS

## 2019-06-05 PROCEDURE — 37243 VASC EMBOLIZE/OCCLUDE ORGAN: CPT

## 2019-06-05 PROCEDURE — 2700000000 HC OXYGEN THERAPY PER DAY

## 2019-06-05 PROCEDURE — 7100000000 HC PACU RECOVERY - FIRST 15 MIN

## 2019-06-05 PROCEDURE — 2500000003 HC RX 250 WO HCPCS: Performed by: NURSE ANESTHETIST, CERTIFIED REGISTERED

## 2019-06-05 PROCEDURE — 3700000001 HC ADD 15 MINUTES (ANESTHESIA)

## 2019-06-05 PROCEDURE — 6360000004 HC RX CONTRAST MEDICATION: Performed by: RADIOLOGY

## 2019-06-05 PROCEDURE — 81025 URINE PREGNANCY TEST: CPT

## 2019-06-05 RX ORDER — KETAMINE HYDROCHLORIDE 10 MG/ML
INJECTION, SOLUTION INTRAMUSCULAR; INTRAVENOUS PRN
Status: DISCONTINUED | OUTPATIENT
Start: 2019-06-05 | End: 2019-06-05 | Stop reason: SDUPTHER

## 2019-06-05 RX ORDER — MIDAZOLAM HYDROCHLORIDE 1 MG/ML
INJECTION INTRAMUSCULAR; INTRAVENOUS PRN
Status: DISCONTINUED | OUTPATIENT
Start: 2019-06-05 | End: 2019-06-05 | Stop reason: SDUPTHER

## 2019-06-05 RX ORDER — FENTANYL CITRATE 50 UG/ML
INJECTION, SOLUTION INTRAMUSCULAR; INTRAVENOUS PRN
Status: DISCONTINUED | OUTPATIENT
Start: 2019-06-05 | End: 2019-06-05 | Stop reason: SDUPTHER

## 2019-06-05 RX ORDER — HEPARIN SODIUM 10000 [USP'U]/ML
5000 INJECTION, SOLUTION INTRAVENOUS; SUBCUTANEOUS EVERY 5 MIN PRN
Status: COMPLETED | OUTPATIENT
Start: 2019-06-05 | End: 2019-06-05

## 2019-06-05 RX ORDER — MORPHINE SULFATE 2 MG/ML
2 INJECTION, SOLUTION INTRAMUSCULAR; INTRAVENOUS
Status: DISCONTINUED | OUTPATIENT
Start: 2019-06-06 | End: 2019-06-06 | Stop reason: HOSPADM

## 2019-06-05 RX ORDER — ONDANSETRON 2 MG/ML
4 INJECTION INTRAMUSCULAR; INTRAVENOUS EVERY 4 HOURS PRN
Status: DISCONTINUED | OUTPATIENT
Start: 2019-06-05 | End: 2019-06-06 | Stop reason: HOSPADM

## 2019-06-05 RX ORDER — ONDANSETRON 2 MG/ML
4 INJECTION INTRAMUSCULAR; INTRAVENOUS EVERY 4 HOURS
Status: DISCONTINUED | OUTPATIENT
Start: 2019-06-05 | End: 2019-06-05 | Stop reason: SDUPTHER

## 2019-06-05 RX ORDER — CEFAZOLIN SODIUM 1 G/50ML
1 SOLUTION INTRAVENOUS ONCE
Status: COMPLETED | OUTPATIENT
Start: 2019-06-05 | End: 2019-06-05

## 2019-06-05 RX ORDER — KETOROLAC TROMETHAMINE 30 MG/ML
30 INJECTION, SOLUTION INTRAMUSCULAR; INTRAVENOUS ONCE
Status: COMPLETED | OUTPATIENT
Start: 2019-06-05 | End: 2019-06-05

## 2019-06-05 RX ORDER — SODIUM CHLORIDE 9 MG/ML
INJECTION, SOLUTION INTRAVENOUS CONTINUOUS
Status: DISCONTINUED | OUTPATIENT
Start: 2019-06-05 | End: 2019-06-06 | Stop reason: HOSPADM

## 2019-06-05 RX ORDER — MORPHINE SULFATE/0.9% NACL/PF 1 MG/ML
SYRINGE (ML) INJECTION CONTINUOUS
Status: DISCONTINUED | OUTPATIENT
Start: 2019-06-05 | End: 2019-06-06 | Stop reason: ALTCHOICE

## 2019-06-05 RX ORDER — LIDOCAINE HYDROCHLORIDE 20 MG/ML
7 INJECTION, SOLUTION INFILTRATION; PERINEURAL ONCE
Status: COMPLETED | OUTPATIENT
Start: 2019-06-05 | End: 2019-06-05

## 2019-06-05 RX ORDER — PROPOFOL 10 MG/ML
INJECTION, EMULSION INTRAVENOUS CONTINUOUS PRN
Status: DISCONTINUED | OUTPATIENT
Start: 2019-06-05 | End: 2019-06-05 | Stop reason: SDUPTHER

## 2019-06-05 RX ORDER — SODIUM CHLORIDE 0.9 % (FLUSH) 0.9 %
10 SYRINGE (ML) INJECTION PRN
Status: DISCONTINUED | OUTPATIENT
Start: 2019-06-05 | End: 2019-06-06 | Stop reason: HOSPADM

## 2019-06-05 RX ORDER — NALOXONE HYDROCHLORIDE 0.4 MG/ML
0.4 INJECTION, SOLUTION INTRAMUSCULAR; INTRAVENOUS; SUBCUTANEOUS PRN
Status: DISCONTINUED | OUTPATIENT
Start: 2019-06-05 | End: 2019-06-06 | Stop reason: HOSPADM

## 2019-06-05 RX ADMIN — KETOROLAC TROMETHAMINE 30 MG: 30 INJECTION INTRAMUSCULAR; INTRAVENOUS at 16:28

## 2019-06-05 RX ADMIN — Medication 5000 UNITS: at 16:15

## 2019-06-05 RX ADMIN — Medication 5000 UNITS: at 15:55

## 2019-06-05 RX ADMIN — SODIUM CHLORIDE 75 ML/HR: 9 INJECTION, SOLUTION INTRAVENOUS at 17:00

## 2019-06-05 RX ADMIN — SODIUM CHLORIDE: 9 INJECTION, SOLUTION INTRAVENOUS at 16:08

## 2019-06-05 RX ADMIN — ONDANSETRON 4 MG: 2 INJECTION INTRAMUSCULAR; INTRAVENOUS at 18:09

## 2019-06-05 RX ADMIN — CEFAZOLIN SODIUM 1 G: 1 SOLUTION INTRAVENOUS at 15:28

## 2019-06-05 RX ADMIN — MIDAZOLAM HYDROCHLORIDE 2 MG: 1 INJECTION, SOLUTION INTRAMUSCULAR; INTRAVENOUS at 15:09

## 2019-06-05 RX ADMIN — SODIUM CHLORIDE: 9 INJECTION, SOLUTION INTRAVENOUS at 08:57

## 2019-06-05 RX ADMIN — Medication 5000 UNITS: at 16:20

## 2019-06-05 RX ADMIN — Medication 5000 UNITS: at 16:05

## 2019-06-05 RX ADMIN — Medication 5000 UNITS: at 16:00

## 2019-06-05 RX ADMIN — Medication 5000 UNITS: at 16:10

## 2019-06-05 RX ADMIN — KETAMINE HYDROCHLORIDE 20 MG: 10 INJECTION, SOLUTION INTRAMUSCULAR; INTRAVENOUS at 15:12

## 2019-06-05 RX ADMIN — IOVERSOL 80 ML: 678 INJECTION INTRA-ARTERIAL; INTRAVENOUS at 16:25

## 2019-06-05 RX ADMIN — KETAMINE HYDROCHLORIDE 30 MG: 10 INJECTION, SOLUTION INTRAMUSCULAR; INTRAVENOUS at 15:52

## 2019-06-05 RX ADMIN — MORPHINE SULFATE 30 MG: 1 INJECTION INTRAVENOUS at 18:01

## 2019-06-05 RX ADMIN — PROPOFOL 100 MCG/KG/MIN: 10 INJECTION, EMULSION INTRAVENOUS at 15:12

## 2019-06-05 RX ADMIN — FENTANYL CITRATE 50 MCG: 50 INJECTION, SOLUTION INTRAMUSCULAR; INTRAVENOUS at 15:47

## 2019-06-05 RX ADMIN — FENTANYL CITRATE 50 MCG: 50 INJECTION, SOLUTION INTRAMUSCULAR; INTRAVENOUS at 15:19

## 2019-06-05 RX ADMIN — LIDOCAINE HYDROCHLORIDE 7 ML: 20 INJECTION, SOLUTION EPIDURAL; INFILTRATION; INTRACAUDAL; PERINEURAL at 15:52

## 2019-06-05 ASSESSMENT — PULMONARY FUNCTION TESTS
PIF_VALUE: 0

## 2019-06-05 ASSESSMENT — PAIN DESCRIPTION - ONSET
ONSET: ON-GOING
ONSET: ON-GOING

## 2019-06-05 ASSESSMENT — PAIN SCALES - GENERAL
PAINLEVEL_OUTOF10: 0
PAINLEVEL_OUTOF10: 5
PAINLEVEL_OUTOF10: 4
PAINLEVEL_OUTOF10: 5

## 2019-06-05 ASSESSMENT — PAIN DESCRIPTION - ORIENTATION
ORIENTATION: MID;RIGHT
ORIENTATION: MID;RIGHT

## 2019-06-05 ASSESSMENT — PAIN DESCRIPTION - PAIN TYPE
TYPE: SURGICAL PAIN
TYPE: SURGICAL PAIN

## 2019-06-05 ASSESSMENT — PAIN DESCRIPTION - LOCATION
LOCATION: ABDOMEN
LOCATION: ABDOMEN

## 2019-06-05 ASSESSMENT — PAIN DESCRIPTION - DESCRIPTORS
DESCRIPTORS: DISCOMFORT;NAGGING
DESCRIPTORS: DISCOMFORT;NAGGING

## 2019-06-05 ASSESSMENT — PAIN DESCRIPTION - FREQUENCY
FREQUENCY: CONTINUOUS
FREQUENCY: CONTINUOUS

## 2019-06-05 ASSESSMENT — PAIN - FUNCTIONAL ASSESSMENT: PAIN_FUNCTIONAL_ASSESSMENT: 0-10

## 2019-06-05 NOTE — ANESTHESIA PRE PROCEDURE
Department of Anesthesiology  Preprocedure Note       Name:  Greg Rodriguez   Age:  39 y.o.  :  1975                                          MRN:  [id number]         Date:  2019      Surgeon: * No surgeons listed *    Procedure: IR TRANSCATH EMBOLIZATION NON CNS    Medications prior to admission:   Prior to Admission medications    Medication Sig Start Date End Date Taking?  Authorizing Provider   divalproex (DEPAKOTE) 250 MG DR tablet  19  Yes Historical Provider, MD   hydrOXYzine (VISTARIL) 25 MG capsule  19  Yes Historical Provider, MD   ferrous sulfate (MARIOLA-FRANKLYN) 325 (65 Fe) MG tablet Take 1 tablet by mouth daily 5/16/19 5/10/20 Yes Laura Roach MD   lurasidone (LATUDA) 60 MG TABS tablet Take 1 tablet by mouth nightly for 7 days  Patient taking differently: Take 120 mg by mouth nightly  3/7/19 6/5/19 Yes ERIKA Martinez   PARoxetine (PAXIL) 20 MG tablet Take 1 tablet by mouth daily  Patient taking differently: Take 40 mg by mouth daily  18  Yes Philipp Senior MD       Current medications:    Current Outpatient Medications   Medication Sig Dispense Refill    divalproex (DEPAKOTE) 250 MG DR tablet       hydrOXYzine (VISTARIL) 25 MG capsule       ferrous sulfate (MARIOLA-FRANKLYN) 325 (65 Fe) MG tablet Take 1 tablet by mouth daily 60 tablet 5    lurasidone (LATUDA) 60 MG TABS tablet Take 1 tablet by mouth nightly for 7 days (Patient taking differently: Take 120 mg by mouth nightly ) 7 tablet 0    PARoxetine (PAXIL) 20 MG tablet Take 1 tablet by mouth daily (Patient taking differently: Take 40 mg by mouth daily ) 30 tablet 0     Current Facility-Administered Medications   Medication Dose Route Frequency Provider Last Rate Last Dose    sodium chloride flush 0.9 % injection 10 mL  10 mL Intravenous PRN Tim Alvarez II, MD        0.9 % sodium chloride infusion   Intravenous Continuous Tim Alvarez II, MD 75 mL/hr at 19 0857      ceFAZolin (ANCEF) 1 g in dextrose 4 % 50 mL IVPB (duplex  1 g Intravenous Once Tim Alvarez II, MD        ondansetron [facility]) injection 4 mg  4 mg Intravenous Q4H PRN Tim Alvarez II, MD           Allergies:  No Known Allergies    Problem List:    Patient Active Problem List   Diagnosis Code    Obstructive chronic bronchitis with exacerbation (HCC) J44.1    Acute psychosis ([facility] F23    Major depressive disorder, recurrent, severe with psychotic features ([facility] F33.3    Acute on chronic anemia D64.9    Intramural leiomyoma of uterus D25.1    Abnormal vaginal bleeding N93.9    Iron deficiency anemia D50.9    Uterine fibroid D25.9       Past Medical History:        Diagnosis Date    Anxiety     Asthma     Bipolar disorder ([facility]     Bronchitis     [facility] disease) [facility]     Depression     Iron deficiency anemia     PTSD (post-traumatic stress disorder)     Schizo affective schizophrenia ([facility]     Substance abuse ([facility]     quit 7/2011  was mar[place] user        Past Surgical History:        Procedure Laterality Date    TONSILLECTOMY         Social History:    Social History     Tobacco Use    Smoking status: Current Every Day Smoker     Packs/day: 0.40     Years: 20.00     Pack years: 8.00     Types: Cigarettes    Smokeless tobacco: Never Used   Substance Use Topics    Alcohol use: No     Comment: Rarely, about once or twice a year. Quit drinking in 2011.                                  Ready to quit: Not Answered  Counseling given: Not Answered      Vital Signs (Current):   Vitals:    06/05/19 0830   BP: (!) 103/57   Pulse: 79   Resp: 16   Temp: 98.2 °F (36.8 °C)   TempSrc: Oral   SpO2: 98%   Weight: 120 lb (54.4 kg)   Height: 5' 2\" (1.575 m)                                              BP Readings from Last 3 Encounters:   06/05/19 (!) 103/57   06/03/19 100/60   05/16/19 97/64       NPO Status:   NPO since 6 am on 6/5/2019                                                                               BMI:   Wt Readings from Last 3 Encounters:   06/05/19 120 lb (54.4 kg)   06/03/19 120 lb (54.4 kg)   05/16/19 122 lb (55.3 kg)     Body mass index is 21.95 kg/m². CBC:   Lab Results   Component Value Date    WBC 3.6 04/29/2019    RBC 3.40 04/29/2019    HGB 9.6 04/29/2019    HCT 31.4 04/29/2019    MCV 92.4 04/29/2019    RDW 15.5 04/29/2019     04/29/2019       CMP:   Lab Results   Component Value Date     04/29/2019    K 3.7 04/29/2019     04/29/2019    CO2 26 04/29/2019    BUN 11 04/29/2019    CREATININE 0.8 04/29/2019    GFRAA >60 04/29/2019    LABGLOM >60 04/29/2019    GLUCOSE 99 04/29/2019    PROT 7.4 03/07/2019    CALCIUM 8.1 04/29/2019    BILITOT <0.2 03/07/2019    ALKPHOS 71 03/07/2019    AST 20 03/07/2019    ALT 18 03/07/2019       POC Tests: No results for input(s): POCGLU, POCNA, POCK, POCCL, POCBUN, POCHEMO, POCHCT in the last 72 hours.     Coags:   Lab Results   Component Value Date    PROTIME 11.8 04/29/2019    INR 1.0 04/29/2019    APTT 29.0 04/29/2019       HCG (If Applicable):   Lab Results   Component Value Date    PREGTESTUR NEGATIVE 06/05/2019        ABGs: No results found for: PHART, PO2ART, BMK4PTY, IMR8CAX, BEART, M1MJUXMJ     Type & Screen (If Applicable):  No results found for: [facility]    Anesthesia Evaluation  Patient summary reviewed and Nursing notes reviewed no history of anesthetic complications:   Airway: Mallampati: III  TM distance: <3 FB   Neck ROM: full  Mouth opening: < 3 FB Dental:          Pulmonary:   (+) COPD:  decreased breath sounds,  asthma (pt states she uses an inhaler once every couple weeks):                            Cardiovascular:Negative CV ROS        (-) past MI, dysrhythmias and  angina      Rhythm: regular  Rate: normal                    Neuro/Psych:   (+) psychiatric history:             ROS comment:   Acute psychosis (HCC)  Major depressive disorder, recurrent, severe with psychotic features ([facility]  PTSD (post-traumatic stress disorder)  Bipolar disorder ([facility]   Schizo affective schizophrenia ([facility]      GI/Hepatic/Renal: Neg GI/Hepatic/Renal ROS            Endo/Other:    (+) blood dyscrasia: anemia:., .                 Abdominal:           Vascular: negative vascular ROS. Anesthesia Plan      MAC     ASA 3     (Dr. Lennie Mccoy made aware of pt NPO status, procedure moved to 2pm today.)  Induction: intravenous. Anesthetic plan and risks discussed with patient. Use of blood products discussed with patient whom consented to blood products. Plan discussed with CRNA and attending.                   Sarah Hill DO   6/5/2019

## 2019-06-05 NOTE — PROGRESS NOTES
1450 - Patient waiting in Radiology department for UFE procedure. Allergies, home medications, H&P and fasting instructions reviewed. 1505 - Patient placed on angio table with O2 and monitoring devices, right femoral and right pedal pulses palpable and marked, care turned over to anesthesia. 1523 - Right groin prepped and draped. 1551 - Timeout done. Right femoral artery accessed and angiogram initiated by Dr Stanton Lopez.   [facility] Heart deployed. 1603 - Coils deployed x 4.  1619 - Embospheres deployed. 1620 - Coils deployed x 4.  1621 - Embospheres deployed. 1623 - Coils deployed x 4.  1625 - Completed. Sheath pulled and angio seal used to close arterial puncture. Puncture site cleansed and dry dressing applied. No bleeding, swelling or complications noted, no change in pulses. 1645 - Transported to room and report given to [employer]. Instructed to bedrest and no bending of RLE.

## 2019-06-05 NOTE — BRIEF OP NOTE
Brief Postoperative Note    Ingrid Adames  YOB: 1975  [id number]    Pre-operative Diagnosis and Procedure: utfe    Post-operative Diagnosis: Same    Anesthesia: Local    Estimated Blood Loss: < 10 cc    Surgeon: Robyn SEGURA     Complications: none    Specimen obtained: none     Findings: none     Chuck Figueredo II   6/5/2019 3:48 PM

## 2019-06-05 NOTE — PROGRESS NOTES
Nursing summary called to [facility]. Updated on morphine PCA  Patient reinstructed numerous times  To remain flat in bed with right leg straight.

## 2019-06-05 NOTE — H&P
H&P Update    Patient's History and Physical  was reviewed. The patient appears likely to able to tolerate the procedure. Risk and benefits discussed including ultimate complications, possibly death and consent obtained.     Rohit López, II

## 2019-06-06 VITALS
BODY MASS INDEX: 22.08 KG/M2 | WEIGHT: 120 LBS | DIASTOLIC BLOOD PRESSURE: 66 MMHG | HEART RATE: 61 BPM | RESPIRATION RATE: 14 BRPM | TEMPERATURE: 97.6 F | OXYGEN SATURATION: 95 % | HEIGHT: 62 IN | SYSTOLIC BLOOD PRESSURE: 128 MMHG

## 2019-06-06 PROBLEM — D25.1 FIBROIDS, INTRAMURAL: Status: ACTIVE | Noted: 2019-06-06

## 2019-06-06 PROCEDURE — 2580000003 HC RX 258: Performed by: RADIOLOGY

## 2019-06-06 PROCEDURE — 96374 THER/PROPH/DIAG INJ IV PUSH: CPT

## 2019-06-06 PROCEDURE — G0378 HOSPITAL OBSERVATION PER HR: HCPCS

## 2019-06-06 PROCEDURE — 6360000002 HC RX W HCPCS: Performed by: RADIOLOGY

## 2019-06-06 PROCEDURE — 2700000000 HC OXYGEN THERAPY PER DAY

## 2019-06-06 RX ORDER — KETOROLAC TROMETHAMINE 30 MG/ML
30 INJECTION, SOLUTION INTRAMUSCULAR; INTRAVENOUS ONCE
Status: COMPLETED | OUTPATIENT
Start: 2019-06-06 | End: 2019-06-06

## 2019-06-06 RX ORDER — IBUPROFEN 600 MG/1
600 TABLET ORAL EVERY 6 HOURS PRN
Qty: 90 TABLET | Refills: 0 | Status: ON HOLD | OUTPATIENT
Start: 2019-06-06 | End: 2019-11-21 | Stop reason: HOSPADM

## 2019-06-06 RX ADMIN — Medication 10 ML: at 10:39

## 2019-06-06 RX ADMIN — SODIUM CHLORIDE: 9 INJECTION, SOLUTION INTRAVENOUS at 06:09

## 2019-06-06 RX ADMIN — MORPHINE SULFATE 2 MG: 2 INJECTION, SOLUTION INTRAMUSCULAR; INTRAVENOUS at 10:39

## 2019-06-06 RX ADMIN — KETOROLAC TROMETHAMINE 30 MG: 30 INJECTION, SOLUTION INTRAMUSCULAR; INTRAVENOUS at 14:19

## 2019-06-06 ASSESSMENT — PAIN DESCRIPTION - ONSET: ONSET: ON-GOING

## 2019-06-06 ASSESSMENT — PAIN DESCRIPTION - PAIN TYPE: TYPE: SURGICAL PAIN

## 2019-06-06 ASSESSMENT — PAIN DESCRIPTION - FREQUENCY: FREQUENCY: CONTINUOUS

## 2019-06-06 ASSESSMENT — PAIN SCALES - GENERAL
PAINLEVEL_OUTOF10: 3
PAINLEVEL_OUTOF10: 8
PAINLEVEL_OUTOF10: 2
PAINLEVEL_OUTOF10: 4
PAINLEVEL_OUTOF10: 7

## 2019-06-06 ASSESSMENT — PAIN DESCRIPTION - ORIENTATION: ORIENTATION: MID;RIGHT

## 2019-06-06 ASSESSMENT — PAIN DESCRIPTION - DESCRIPTORS: DESCRIPTORS: DISCOMFORT

## 2019-06-06 ASSESSMENT — PAIN DESCRIPTION - LOCATION: LOCATION: ABDOMEN

## 2019-06-06 NOTE — CARE COORDINATION
Discharge order noted. Patient is POD#1 IR EMBOLIZATION TUMOR / ORGAN;  IR PATY CATH ABD PELV LOWER EXT INIT 3RD. Met with patient in room to explain role and discuss transition of care. Patient said that she lives alone in an apartment but has friends that can assist. Per ob/gyn note today, Plan discharge midday after being seen by IR. Patient said that she has no transportation home. She said her friends do not have cars. Patient said she lives very close by. Per google map, she lives a 9 minute walking distance away. Charge nurse and/or RN to ask IR if patient is able to walk home today.   Joseph Norris RN CM

## 2019-06-06 NOTE — PROGRESS NOTES
Patient ambulated in the will and stated she is ready to leave now. Explained to her I need to do her discharge paperwork and take her IV out and she can go.

## 2019-06-06 NOTE — PLAN OF CARE
Problem: Falls - Risk of:  Goal: Will remain free from falls  Description  Will remain free from falls  6/6/2019 1443 by Casi Wong RN  Outcome: Met This Shift     Problem: Falls - Risk of:  Goal: Absence of physical injury  Description  Absence of physical injury  6/6/2019 1443 by Casi Wong RN  Outcome: Met This Shift     Problem: Pain:  Goal: Pain level will decrease  Description  Pain level will decrease  6/6/2019 1443 by Casi Wong RN  Outcome: Met This Shift

## 2019-06-06 NOTE — PROGRESS NOTES
Patient states she no longer wants to go home today. After her friend came to visit her, she stated she does not feel that she is ready to leave. Explained to her that nothing medically keeps her or holds the discharge and she states she still would like to stay. Will notify Dr. Jennings Service.

## 2019-06-06 NOTE — PROGRESS NOTES
POD #1        Gyn note. S/P [facility] per Ir yseterday. Doing well. Will d/c silvestre and iv's/  Regular diet. Plan discharge midday after being seen by IR. Has a follow up appt with me in 3 weeks. Ayde Hernández

## 2019-06-07 NOTE — DISCHARGE SUMMARY
[address]                  [address] [facility], [address]                               DISCHARGE SUMMARY    PATIENT NAME: Loy Garcia                     :        1975  MED REC NO:   [id number]                            ROOM:       8403  ACCOUNT NO:   [de-identified]                           ADMIT DATE: 2019  PROVIDER:     Rodri Briscoe MD                  DISCHARGE DATE:  2019    REASON FOR ADMISSION:  Fibroids for uterine artery embolization per  Interventional Radiology. NOTE:  The patient is a 60-year-old with multiple fibroids, menorrhagia,  and anemia admitted after uterine artery embolization by Interventional  Radiology on 2019 for observation and pain control. Pain was  easily controlled and the patient tolerated well. Last pain scale was 2  to 3 out of 10. She was seen by me and Interventional Radiology. They  discharged to home in stable condition on Motrin 600 mg q.6 hours p.r.n.  pain. She is to be followed in the gynecology clinic at [facility]  in approximately three weeks. She is given the usual written and oral  discharge instructions. Motrin prescription, ibuprofen 600 was sent to  [address] [place] [address]. FINAL DIAGNOSIS:  Uterine fibroids treated with uterine artery  embolization by Interventional Radiology. CONDITION ON DISCHARGE:  Stable at this time. She is to call with any problems or concerns. She also was seen prior  to discharge by Interventional Radiology.         Parminder Troy MD    D: 2019 15:12:44       T: 2019 18:50:41     [email or web address]  Job#: [id number]     Doc#: [id number]    CC:

## 2019-06-13 ENCOUNTER — POST-OP TELEPHONE (OUTPATIENT)
Dept: INTERVENTIONAL RADIOLOGY/VASCULAR | Age: 44
End: 2019-06-13

## 2019-06-17 ENCOUNTER — OFFICE VISIT (OUTPATIENT)
Dept: OBGYN | Age: 44
End: 2019-06-17
Payer: MEDICAID

## 2019-06-17 VITALS
SYSTOLIC BLOOD PRESSURE: 102 MMHG | DIASTOLIC BLOOD PRESSURE: 60 MMHG | TEMPERATURE: 98.2 F | HEART RATE: 86 BPM | BODY MASS INDEX: 22.08 KG/M2 | RESPIRATION RATE: 16 BRPM | WEIGHT: 120 LBS | HEIGHT: 62 IN

## 2019-06-17 DIAGNOSIS — D21.9 FIBROIDS: Primary | ICD-10-CM

## 2019-06-17 PROCEDURE — G8427 DOCREV CUR MEDS BY ELIG CLIN: HCPCS | Performed by: OBSTETRICS & GYNECOLOGY

## 2019-06-17 PROCEDURE — 99212 OFFICE O/P EST SF 10 MIN: CPT | Performed by: OBSTETRICS & GYNECOLOGY

## 2019-06-17 PROCEDURE — G8420 CALC BMI NORM PARAMETERS: HCPCS | Performed by: OBSTETRICS & GYNECOLOGY

## 2019-06-17 PROCEDURE — 4004F PT TOBACCO SCREEN RCVD TLK: CPT | Performed by: OBSTETRICS & GYNECOLOGY

## 2019-06-17 NOTE — PROGRESS NOTES
This is a follow up visit after [facility] per IR at [facility]. Doing well, bowels are fine and she voices no complaints. Has not had any menses as of yet. Exam, groin looks fine, completely healed. IMP: Satisfactory PO course. PLAN: RTC in about 3 months. Please keep track of menses, onset, flows and duration.

## 2019-06-17 NOTE — PROGRESS NOTES
Follow up today from [facility]  Doing well no c/o  RTC in 3 months she is to keep track of her periods.

## 2019-09-19 ENCOUNTER — HOSPITAL ENCOUNTER (INPATIENT)
Age: 44
LOS: 7 days | Discharge: HOME | DRG: 751 | End: 2019-09-26
Attending: EMERGENCY MEDICINE | Admitting: PSYCHIATRY & NEUROLOGY
Payer: MEDICAID

## 2019-09-19 DIAGNOSIS — R45.89 THREATENING TO OTHERS: Primary | ICD-10-CM

## 2019-09-19 PROBLEM — F33.2 MAJOR DEPRESSIVE DISORDER, RECURRENT, SEVERE WITHOUT PSYCHOTIC FEATURES (HCC): Status: ACTIVE | Noted: 2019-09-19

## 2019-09-19 LAB
ACETAMINOPHEN LEVEL: <5 MCG/ML (ref 10–30)
ALBUMIN SERPL-MCNC: 4.7 G/DL (ref 3.5–5.2)
ALP BLD-CCNC: 81 U/L (ref 35–104)
ALT SERPL-CCNC: 9 U/L (ref 0–32)
AMPHETAMINE SCREEN, URINE: NOT DETECTED
ANION GAP SERPL CALCULATED.3IONS-SCNC: 15 MMOL/L (ref 7–16)
AST SERPL-CCNC: 18 U/L (ref 0–31)
BARBITURATE SCREEN URINE: NOT DETECTED
BENZODIAZEPINE SCREEN, URINE: NOT DETECTED
BILIRUB SERPL-MCNC: 0.4 MG/DL (ref 0–1.2)
BUN BLDV-MCNC: 8 MG/DL (ref 6–20)
CALCIUM SERPL-MCNC: 9.6 MG/DL (ref 8.6–10.2)
CANNABINOID SCREEN URINE: POSITIVE
CHLORIDE BLD-SCNC: 101 MMOL/L (ref 98–107)
CO2: 22 MMOL/L (ref 22–29)
COCAINE METABOLITE SCREEN URINE: NOT DETECTED
CREAT SERPL-MCNC: 0.7 MG/DL (ref 0.5–1)
ETHANOL: <10 MG/DL (ref 0–0.08)
GFR AFRICAN AMERICAN: >60
GFR NON-AFRICAN AMERICAN: >60 ML/MIN/1.73
GLUCOSE BLD-MCNC: 92 MG/DL (ref 74–99)
HCG, URINE, POC: NEGATIVE
HCT VFR BLD CALC: 36 % (ref 34–48)
HEMOGLOBIN: 11 G/DL (ref 11.5–15.5)
Lab: ABNORMAL
Lab: NORMAL
MCH RBC QN AUTO: 26.3 PG (ref 26–35)
MCHC RBC AUTO-ENTMCNC: 30.6 % (ref 32–34.5)
MCV RBC AUTO: 85.9 FL (ref 80–99.9)
METHADONE SCREEN, URINE: NOT DETECTED
NEGATIVE QC PASS/FAIL: NORMAL
OPIATE SCREEN URINE: NOT DETECTED
PDW BLD-RTO: 17.5 FL (ref 11.5–15)
PHENCYCLIDINE SCREEN URINE: NOT DETECTED
PLATELET # BLD: 333 E9/L (ref 130–450)
PMV BLD AUTO: 10.6 FL (ref 7–12)
POSITIVE QC PASS/FAIL: NORMAL
POTASSIUM SERPL-SCNC: 3.8 MMOL/L (ref 3.5–5)
PROPOXYPHENE SCREEN: NOT DETECTED
RBC # BLD: 4.19 E12/L (ref 3.5–5.5)
SALICYLATE, SERUM: <0.3 MG/DL (ref 0–30)
SODIUM BLD-SCNC: 138 MMOL/L (ref 132–146)
TOTAL PROTEIN: 8.4 G/DL (ref 6.4–8.3)
TRICYCLIC ANTIDEPRESSANTS SCREEN SERUM: NEGATIVE NG/ML
WBC # BLD: 6.2 E9/L (ref 4.5–11.5)

## 2019-09-19 PROCEDURE — 1240000000 HC EMOTIONAL WELLNESS R&B

## 2019-09-19 PROCEDURE — 36415 COLL VENOUS BLD VENIPUNCTURE: CPT

## 2019-09-19 PROCEDURE — 85027 COMPLETE CBC AUTOMATED: CPT

## 2019-09-19 PROCEDURE — G0480 DRUG TEST DEF 1-7 CLASSES: HCPCS

## 2019-09-19 PROCEDURE — 80307 DRUG TEST PRSMV CHEM ANLYZR: CPT

## 2019-09-19 PROCEDURE — 6370000000 HC RX 637 (ALT 250 FOR IP): Performed by: NURSE PRACTITIONER

## 2019-09-19 PROCEDURE — 99284 EMERGENCY DEPT VISIT MOD MDM: CPT

## 2019-09-19 PROCEDURE — 80053 COMPREHEN METABOLIC PANEL: CPT

## 2019-09-19 RX ORDER — NICOTINE 21 MG/24HR
1 PATCH, TRANSDERMAL 24 HOURS TRANSDERMAL DAILY
Status: DISCONTINUED | OUTPATIENT
Start: 2019-09-19 | End: 2019-09-19

## 2019-09-19 RX ORDER — BENZTROPINE MESYLATE 1 MG/ML
2 INJECTION INTRAMUSCULAR; INTRAVENOUS 2 TIMES DAILY PRN
Status: DISCONTINUED | OUTPATIENT
Start: 2019-09-19 | End: 2019-09-26 | Stop reason: HOSPADM

## 2019-09-19 RX ORDER — MAGNESIUM HYDROXIDE/ALUMINUM HYDROXICE/SIMETHICONE 120; 1200; 1200 MG/30ML; MG/30ML; MG/30ML
30 SUSPENSION ORAL PRN
Status: DISCONTINUED | OUTPATIENT
Start: 2019-09-19 | End: 2019-09-26 | Stop reason: HOSPADM

## 2019-09-19 RX ORDER — HYDROXYZINE PAMOATE 50 MG/1
50 CAPSULE ORAL 3 TIMES DAILY PRN
Status: DISCONTINUED | OUTPATIENT
Start: 2019-09-19 | End: 2019-09-26 | Stop reason: HOSPADM

## 2019-09-19 RX ORDER — OLANZAPINE 5 MG/1
5 TABLET ORAL EVERY 4 HOURS PRN
Status: DISCONTINUED | OUTPATIENT
Start: 2019-09-19 | End: 2019-09-26 | Stop reason: HOSPADM

## 2019-09-19 RX ORDER — OLANZAPINE 10 MG/1
10 INJECTION, POWDER, LYOPHILIZED, FOR SOLUTION INTRAMUSCULAR EVERY 4 HOURS PRN
Status: DISCONTINUED | OUTPATIENT
Start: 2019-09-19 | End: 2019-09-26 | Stop reason: HOSPADM

## 2019-09-19 RX ORDER — TRAZODONE HYDROCHLORIDE 50 MG/1
50 TABLET ORAL NIGHTLY PRN
Status: DISCONTINUED | OUTPATIENT
Start: 2019-09-19 | End: 2019-09-26 | Stop reason: HOSPADM

## 2019-09-19 RX ORDER — ACETAMINOPHEN 325 MG/1
650 TABLET ORAL EVERY 4 HOURS PRN
Status: DISCONTINUED | OUTPATIENT
Start: 2019-09-19 | End: 2019-09-26 | Stop reason: HOSPADM

## 2019-09-19 RX ADMIN — NICOTINE POLACRILEX 4 MG: 2 GUM, CHEWING BUCCAL at 21:02

## 2019-09-19 SDOH — HEALTH STABILITY: MENTAL HEALTH: HOW OFTEN DO YOU HAVE A DRINK CONTAINING ALCOHOL?: NEVER

## 2019-09-19 ASSESSMENT — SLEEP AND FATIGUE QUESTIONNAIRES
DIFFICULTY STAYING ASLEEP: YES
DIFFICULTY ARISING: NO
RESTFUL SLEEP: NO
DIFFICULTY FALLING ASLEEP: YES
DO YOU USE A SLEEP AID: YES
DO YOU HAVE DIFFICULTY SLEEPING: YES
AVERAGE NUMBER OF SLEEP HOURS: 2

## 2019-09-19 ASSESSMENT — PAIN SCALES - GENERAL: PAINLEVEL_OUTOF10: 0

## 2019-09-19 ASSESSMENT — PAIN - FUNCTIONAL ASSESSMENT: PAIN_FUNCTIONAL_ASSESSMENT: 0-10

## 2019-09-19 NOTE — ED PROVIDER NOTES
HPI:  9/19/19, Time: 1:12 PM         Earl Engle is a 39 y.o. female presenting to the ED for medication refill. Patient states that she has been without her medication for several months, stating she has been without her Depakote, Paxil and Latuda. She states that she is feeling more depressed. She denies any suicidal ideation. She states that she is having thoughts of molesting children, stating that these thoughts improve if she is on her medication. Review of Systems:   Pertinent positives and negatives are stated within HPI, all other systems reviewed and are negative.          --------------------------------------------- PAST HISTORY ---------------------------------------------  Past Medical History:  has a past medical history of Anxiety, Asthma, Bipolar disorder ([facility] Bronchitis, COPD [facility]chronic obstructive pulmonary disease) [facility] Depression, Iron deficiency anemia, PTSD (post-traumatic stress disorder), Schizo affective schizophrenia ([facility] and Substance abuse ([facility] Past Surgical History:  has a past surgical history that includes Tonsillectomy. Social History:  reports that she has been smoking cigarettes. She has a 8.00 pack-year smoking history. She has never used smokeless tobacco. She reports that she has current or past drug history. Drug: Marijuana. She reports that she does not drink alcohol. Family History: family history includes Diabetes in her father and mother; High Blood Pressure in her mother; Kidney Disease in her mother; Other in her mother. The patients home medications have been reviewed. Allergies: Patient has no known allergies.     -------------------------------------------------- RESULTS -------------------------------------------------  All laboratory and radiology results have been personally reviewed by myself   LABS:  Results for orders placed or performed during the hospital encounter of 09/19/19   CBC   Result Value Ref Range    WBC 6.2 4.5 -

## 2019-09-20 LAB
CHOLESTEROL, TOTAL: 235 MG/DL (ref 0–199)
HBA1C MFR BLD: 5.5 % (ref 4–5.6)
HDLC SERPL-MCNC: 59 MG/DL
LDL CHOLESTEROL CALCULATED: 163 MG/DL (ref 0–99)
TRIGL SERPL-MCNC: 65 MG/DL (ref 0–149)
VLDLC SERPL CALC-MCNC: 13 MG/DL

## 2019-09-20 PROCEDURE — 36415 COLL VENOUS BLD VENIPUNCTURE: CPT

## 2019-09-20 PROCEDURE — 99222 1ST HOSP IP/OBS MODERATE 55: CPT | Performed by: NURSE PRACTITIONER

## 2019-09-20 PROCEDURE — 1240000000 HC EMOTIONAL WELLNESS R&B

## 2019-09-20 PROCEDURE — 80061 LIPID PANEL: CPT

## 2019-09-20 PROCEDURE — 83036 HEMOGLOBIN GLYCOSYLATED A1C: CPT

## 2019-09-20 PROCEDURE — 6370000000 HC RX 637 (ALT 250 FOR IP): Performed by: NURSE PRACTITIONER

## 2019-09-20 RX ORDER — IBUPROFEN 600 MG/1
600 TABLET ORAL EVERY 6 HOURS PRN
Status: DISCONTINUED | OUTPATIENT
Start: 2019-09-20 | End: 2019-09-26 | Stop reason: HOSPADM

## 2019-09-20 RX ORDER — DIVALPROEX SODIUM 250 MG/1
250 TABLET, DELAYED RELEASE ORAL EVERY 12 HOURS SCHEDULED
Status: DISCONTINUED | OUTPATIENT
Start: 2019-09-20 | End: 2019-09-21

## 2019-09-20 RX ORDER — NICOTINE 21 MG/24HR
1 PATCH, TRANSDERMAL 24 HOURS TRANSDERMAL DAILY
Status: DISCONTINUED | OUTPATIENT
Start: 2019-09-20 | End: 2019-09-26 | Stop reason: HOSPADM

## 2019-09-20 RX ORDER — VENLAFAXINE HYDROCHLORIDE 37.5 MG/1
37.5 CAPSULE, EXTENDED RELEASE ORAL
Status: DISCONTINUED | OUTPATIENT
Start: 2019-09-20 | End: 2019-09-22

## 2019-09-20 RX ADMIN — DIVALPROEX SODIUM 250 MG: 250 TABLET, DELAYED RELEASE ORAL at 09:32

## 2019-09-20 RX ADMIN — VENLAFAXINE HYDROCHLORIDE 37.5 MG: 37.5 CAPSULE, EXTENDED RELEASE ORAL at 09:31

## 2019-09-20 RX ADMIN — LURASIDONE HYDROCHLORIDE 40 MG: 20 TABLET, FILM COATED ORAL at 20:15

## 2019-09-20 RX ADMIN — DIVALPROEX SODIUM 250 MG: 250 TABLET, DELAYED RELEASE ORAL at 20:14

## 2019-09-20 RX ADMIN — TRAZODONE HYDROCHLORIDE 50 MG: 50 TABLET ORAL at 20:15

## 2019-09-20 ASSESSMENT — SLEEP AND FATIGUE QUESTIONNAIRES
RESTFUL SLEEP: NO
DIFFICULTY STAYING ASLEEP: YES
AVERAGE NUMBER OF SLEEP HOURS: 2
DIFFICULTY ARISING: NO
DIFFICULTY FALLING ASLEEP: YES
DO YOU USE A SLEEP AID: YES
DO YOU HAVE DIFFICULTY SLEEPING: YES

## 2019-09-20 ASSESSMENT — LIFESTYLE VARIABLES
HISTORY_ALCOHOL_USE: YES
HISTORY_ALCOHOL_USE: YES

## 2019-09-20 ASSESSMENT — PAIN SCALES - GENERAL
PAINLEVEL_OUTOF10: 0
PAINLEVEL_OUTOF10: 0

## 2019-09-20 NOTE — H&P
PSYCHIATRIC EVALUATION  (HISTORY & PHYSICAL)     CHIEF COMPLAINT:   [x] Mood Problems [x] Anxiety Problems [x] Psychosis                    [x] Suicidal/Homicidal   [] Aggression  [] Other    HISTORY OF PRESENT ILLNESS: Rere Altman  is a 39 y.o. female who has a previous psychiatric history of depression, anxiety, and schizoaffective bipolar type presents for admission with psychosis with command AH telling patient to hurt others. Symptoms onset was years ago and is becoming severe for the last month. This presentation associates with SI, helplessness, hopelessness, poor sleep, and increased AH commanding to hurt other people. Symptoms are sporadic and usually is worsened by medication noncompliance. UDS positive for marijuana. Patient states that she has been smoking every day for the last month. Precipitating factor:  Patient has been off her medications for a couple of months and when the voices came back, she knew she needed to be back on them. Patient went to [facility] to get medications and was told she had been off of them for too long and needed hospitalization.        Precipitating Factors:     [] Family Stress   [] Recent loss/grief Stress   [] Health Stress   [] Relationship Stress    [] Legal Stress   [x] Environmental Stress    [] Occupational Stress   [] Financial Stress   [x] Substance Abuse [] Other      PAST PSYCHIATRIC HISTORY:     History of psychiatric Hospitalization:    [] Denies    [x] yes  [] Days ago     []  Weeks Ago    [] Months ago  [x] Years ago              [x] [facility]  [] [address] [place] [address]  [x] Other: [facility]       [] Once  [x] More than once    Outpatient treatment:  [x] [facility]  [] [facility]  [] [facility]              [x] [facility]  [] [employer] [facility]      [] [facility] [] [facility]      [] [facility] [] CSN  [] [facility] [] [facility]  [] Other               [] currently  [] in the past  [x] Non-Compliant    [] Denies    Previous suicide attempt: [x]Denies                [] yes  [] OD  [] Cutting  [] Hanging  [] Gun  [] Other    Previous psych medications:  [x] Was prescribed               [] Currently Taking       [] Never taken medications      PAST MEDICAL HISTORY:       Diagnosis Date    Anxiety     Asthma     Bipolar disorder ([facility]     Bronchitis     COPD (chronic obstructive pulmonary disease) (HCC)     Depression     Iron deficiency anemia     PTSD (post-traumatic stress disorder)     Schizo affective schizophrenia ([facility]     Substance abuse ([facility]     quit 7/2011  was [place] user        FAMILY PSYCHIATRIC HISTORY:  Family History   Problem Relation Age of Onset    Diabetes Mother     High Blood Pressure Mother     Kidney Disease Mother     Other Mother         mesothelioma    Diabetes Father         [] Denies      [x] Endorses    [x] Father     [] Depression  [] Anxiety  [] Bipolar  [x] Psychosis  []  Other      [x] Mother    [x] Depression  [x] Anxiety  [] Bipolar  [] Psychosis  []  Other      [] Sibling    [] Depression  [] Anxiety  [] Bipolar  [] Psychosis  []  Other      [] Grandparent    [] Depression  [] Anxiety  [] Bipolar  [] Psychosis  []  Other      SOCIAL HISTORY:     1. Living Situation:[x] Private Residence alone [] Homeless [] [address]             [] Assisted Living [] [address]  [] Shelter [] Other   2. Employment:  [] Yes  [x] No   [] Disability   [] Retired  3. Legal History: [x] No Arrest [] Arrest  [] Theft  []  Assault  [] Substances   4. History of Trama/ Abuse: [] Denies  [x] Emotional [x] Physical [] Sexual   5. Spirituality: [x] Spiritual [] Not Spiritual   6. Substance Abuse: [] Denies  [x] Drug of choice      [] Amphetamines [x] Marijuana [] Cocaine      [] Opioids  [] Alcohol  [] Benzodiazepines   [] Other: For further SH review SW note. Risk Assessment:  1. Risk Factors:   [x] Depression  [x] Anxiety  [x] Psychosis   [x] Suicidal/Homicidal Thoughts [] Suicide Attempt [x] Substance Abuse     2.  Protective Factors: [x] Controlled Environment         [x] Supportive Family []         [] [place] Support     3. Level of Risk: [] Mild [] Moderate [x] Severe      Strengths & Weaknesses:    1. Strengths: [x] Ability to communicate feelings     [x] Independent ADL's     [x] Supportive Family    [x] Current Health Status     2. Weaknesses: [x] Emotional          [] Motivational     MEDICATIONS: Current Facility-Administered Medications: acetaminophen (TYLENOL) tablet 650 mg, 650 mg, Oral, Q4H PRN  hydrOXYzine (VISTARIL) capsule 50 mg, 50 mg, Oral, TID PRN  OLANZapine (ZYPREXA) tablet 5 mg, 5 mg, Oral, Q4H PRN **OR** OLANZapine (ZYPREXA) injection 10 mg, 10 mg, Intramuscular, Q4H PRN  sterile water injection 2.1 mL, 2.1 mL, Intramuscular, Q4H PRN  traZODone (DESYREL) tablet 50 mg, 50 mg, Oral, Nightly PRN  benztropine mesylate (COGENTIN) injection 2 mg, 2 mg, Intramuscular, BID PRN  magnesium hydroxide (MILK OF MAGNESIA) 400 MG/5ML suspension 30 mL, 30 mL, Oral, Daily PRN  aluminum & magnesium hydroxide-simethicone (MAALOX) 200-200-20 MG/5ML suspension 30 mL, 30 mL, Oral, PRN  nicotine polacrilex (NICORETTE) gum 4 mg, 4 mg, Oral, PRN    Medical Review of Systems:     All other than marked systmes have been reviewed and are all negative.     Constitutional Symptoms: []  fever []  Chills  Skin Symptoms: [] rash []  Pruritus   Eye Symptoms: [] Vision unchanged []  recent vision problems[] blurred vision   Respiratory Symptoms:[] shortness of breath [] cough  Cardiovascular Symptoms:  [] chest pain   [] palpitations   Gastrointestinal Symptoms: []  abdominal pain []  nausea []  vomiting []  diarrhea  Genitourinary Symptoms: []  dysuria  []  hematuria   Musculoskeletal Symptoms: []  back pain []  muscle pain []  joint pain  Neurologic Symptoms: []  headache []  dizziness  Hematolymphoid Symptoms: [] Adenopathy [x] Bruises   [] Schimosis       VITALS: BP 95/62   Pulse 83   Temp 97.7 °F (36.5 °C) (Oral)   Resp 14   Ht 5' 2\" (1.575 m)

## 2019-09-20 NOTE — PROGRESS NOTES
4:00- 4:30 pm  Attended afternoon meet and greet. Enjoyed leisure activity. Pleasant and social with peers. Was 1 of 7 in attendance.

## 2019-09-21 PROCEDURE — 6370000000 HC RX 637 (ALT 250 FOR IP): Performed by: NURSE PRACTITIONER

## 2019-09-21 PROCEDURE — 1240000000 HC EMOTIONAL WELLNESS R&B

## 2019-09-21 PROCEDURE — 99232 SBSQ HOSP IP/OBS MODERATE 35: CPT | Performed by: NURSE PRACTITIONER

## 2019-09-21 RX ORDER — DIVALPROEX SODIUM 500 MG/1
500 TABLET, DELAYED RELEASE ORAL EVERY 12 HOURS SCHEDULED
Status: DISCONTINUED | OUTPATIENT
Start: 2019-09-21 | End: 2019-09-25

## 2019-09-21 RX ADMIN — DIVALPROEX SODIUM 250 MG: 250 TABLET, DELAYED RELEASE ORAL at 08:16

## 2019-09-21 RX ADMIN — VENLAFAXINE HYDROCHLORIDE 37.5 MG: 37.5 CAPSULE, EXTENDED RELEASE ORAL at 08:16

## 2019-09-21 RX ADMIN — DIVALPROEX SODIUM 500 MG: 500 TABLET, DELAYED RELEASE ORAL at 20:08

## 2019-09-21 RX ADMIN — LURASIDONE HYDROCHLORIDE 60 MG: 20 TABLET, FILM COATED ORAL at 20:08

## 2019-09-21 ASSESSMENT — PAIN SCALES - GENERAL
PAINLEVEL_OUTOF10: 0
PAINLEVEL_OUTOF10: 0

## 2019-09-21 NOTE — PROGRESS NOTES
[address] [place] [address]  Day 3 Interdisciplinary Treatment Plan NOTE    Review Date & Time: 09/21/19 3072    Patient was in treatment team    Admission Type:   Admission Type: Involuntary    Reason for admission:  Reason for Admission: \"voices telling me to hurt children, this scared me so i came in, this never happened before\"  Estimated Length of Stay Update:  3-5 days  Estimated Discharge Date Update: 5-8 days    PATIENT STRENGTHS:  Patient Strengths Strengths: No significant Physical Illness, Communication  Patient Strengths and Limitations:Limitations: Tendency to isolate self  Addictive Behavior:Addictive Behavior  In the past 3 months, have you felt or has someone told you that you have a problem with:  : None  Do you have a history of Chemical Use?: No  Do you have a history of Alcohol Use?: Yes  Do you have a history of Street Drug Abuse?: Yes  Histroy of Prescripton Drug Abuse?: No  Medical Problems:  Past Medical History:   Diagnosis Date    Anxiety     Asthma     B[facility] disorder ([facility]     Bronchitis     [facility]     Depression     Iron deficiency anemia     PTSD (post-traumatic stress disorder)     Schizo affective schizophrenia [facility]     Substance abuse ([facility]     quit 7/2011  was [place]ajuanna user        Risk:  Fall RiskTotal: 53  Sacha Scale Sacha Scale Score: 22  BVC Total: 0  Change in scores no.  Changes to plan of Care none    Status EXAM:   Status and Exam  Normal: (pt asleep)  Facial Expression: Sad, Flat, Worried  Affect: Blunt  Level of Consciousness: Alert  Mood:Normal: No  Mood: Depressed, Anxious  Motor Activity:Normal: Yes  Interview Behavior: Evasive  Preception: [place] to Person, [place]el Salazar to Time, [place] to Place, [place] to Situation  Attention:Normal: Yes  Attention: (c/o intrusive thoughts at times.  )  Thought Processes: Circumstantial  Thought Content:Normal: No  Thought Content: Poverty of Content  Hallucinations: None  Delusions:

## 2019-09-22 PROCEDURE — 6370000000 HC RX 637 (ALT 250 FOR IP): Performed by: NURSE PRACTITIONER

## 2019-09-22 PROCEDURE — 1240000000 HC EMOTIONAL WELLNESS R&B

## 2019-09-22 PROCEDURE — 99232 SBSQ HOSP IP/OBS MODERATE 35: CPT | Performed by: NURSE PRACTITIONER

## 2019-09-22 RX ORDER — PAROXETINE HYDROCHLORIDE 20 MG/1
20 TABLET, FILM COATED ORAL DAILY
Status: DISCONTINUED | OUTPATIENT
Start: 2019-09-22 | End: 2019-09-23

## 2019-09-22 RX ADMIN — DIVALPROEX SODIUM 500 MG: 500 TABLET, DELAYED RELEASE ORAL at 22:17

## 2019-09-22 RX ADMIN — PAROXETINE 20 MG: 20 TABLET, FILM COATED ORAL at 11:57

## 2019-09-22 RX ADMIN — DIVALPROEX SODIUM 500 MG: 500 TABLET, DELAYED RELEASE ORAL at 08:33

## 2019-09-22 RX ADMIN — LURASIDONE HYDROCHLORIDE 60 MG: 20 TABLET, FILM COATED ORAL at 22:17

## 2019-09-22 RX ADMIN — VENLAFAXINE HYDROCHLORIDE 37.5 MG: 37.5 CAPSULE, EXTENDED RELEASE ORAL at 08:33

## 2019-09-22 RX ADMIN — TRAZODONE HYDROCHLORIDE 50 MG: 50 TABLET ORAL at 22:17

## 2019-09-22 ASSESSMENT — PAIN SCALES - GENERAL: PAINLEVEL_OUTOF10: 0

## 2019-09-22 NOTE — GROUP NOTE
Group Therapy Note    Date: September 21    Group Start Time: 2000  Group End Time: 2030  Group Topic: Wrap-Up    [facility] ACUTE [facility] 1    Tom Zapata RN        Group Therapy Note    Attended and participated in Wrap-up/Goals Group     Attendees: 23           Signature:  Tom Zapata RN

## 2019-09-22 NOTE — PROGRESS NOTES
DATE OF SERVICE:     9/22/2019    Vira Rosario seen today for the purpose of continuation of care. Nursing, social work reports, laboratory studies and vital signs are reviewed. Patient chief complaint today is:             [x] Depression      [x] Anxiety        [x] Psychosis         [] Suicidal/Homicidal                         [] Delusions           [] Aggression          Subjective: Today patient states that she feels she needs her Paxil back. Still having intrusive thoughts. Depressed and flat. Slept well. Med compliant and going to groups. Sleep:  [x] Good [] Fair  [] Poor  Appetite:  [x] Good [] Fair  [] Poor    Depression:  [] Mild [] Moderate [x] Severe                [x] Constant [] Sporadic     Anxiety: [] Mild [] Moderate [x] Severe    [x] Constant [] Sporadic     Delusions: [] Mild [] Moderate [] Severe     [] Constant [] Sporadic     [] Paranoid [] Somatic [] Grandiose     Hallucinations: [] Mild [] Moderate [x] Severe     [] Constant [x] Sporadic    [x] Auditory  [] Visual [] Tactile       Suicidal: [] Constant [x] Sporadic  Homicidal: [] Constant [] Sporadic    Unscheduled Medications     [] Patient Receiving Emergency Medications \" Chemical Restraint\"   [] Requesting PRN medications for anxiety    Medical Review of Systems:     All other than marked systmes have been reviewed and are all negative.     Constitutional Symptoms: []  fever []  Chills  Skin Symptoms: [] rash []  Pruritus   Eye Symptoms: [] Vision unchanged []  recent vision problems[] blurred vision   Respiratory Symptoms:[] shortness of breath [] cough  Cardiovascular Symptoms:  [] chest pain   [] palpitations   Gastrointestinal Symptoms: []  abdominal pain []  nausea []  vomiting []  diarrhea  Genitourinary Symptoms: []  dysuria  []  hematuria   Musculoskeletal Symptoms: []  back pain []  muscle pain []  joint pain  Neurologic Symptoms: []  headache []  dizziness  Hematolymphoid Symptoms: [] Adenopathy [] Bruises   [] Flat  [x] Bizarre                     [] Heightened    [] Exaggerated       Thought Process and Association:  [] Logical [] Illogical                                        [x] Linear and Goal Directed  [] Tangential  [] Circumstantial      Thought Content:  [] Denies [x] Endorses [x] Suicidal [x] Homicidal  [] Delusional                                       [] Paranoid  [] Somatic  [] Grandiose     Perception: []  None  [x] Auditory   [] Visual  [] tactile   [] olfactory  [] Illusions          Insight: [] Intact  [x] Fair  [] Limited    Judgement:  [] Intact  [x] Fair  [] Limited       Assessment/Plan:        Patient Active Problem List   Diagnosis Code    Obstructive chronic bronchitis with exacerbation ([facility] [address] J44.1    Acute psychosis ([facility] [address] F23    Schizoaffective disorder, bipolar type ([facility] [address] F25.0    Acute on chronic anemia D64.9    Intramural leiomyoma of uterus D25.1    Abnormal vaginal bleeding N93.9    Iron deficiency anemia D50.9    Uterine fibroid D25.9    Fibroids, intramural D25.1         Plan:    []  Patient is refusing medications  [] Improving as expected   [x] Not improving as expected   [] Worsening    []  At Baseline     D/c Effexor  Restart Paxil      Reason for more than one antipsychotic:  [x] N/A  [] 3 failed monotherapy(drugs tried):  [] Cross over to a new antipsychotic  [] Taper to monotherapy from polypharmacy  [] Augmentation of Clozapine therapy due to treatment resistance to single therapy      Signed:  Ingrid Fajardo  9/22/2019  9:46 AM

## 2019-09-22 NOTE — GROUP NOTE
Group Therapy Note    Date: September 22    Group Start Time: 0745  Group End Time: 0800  Group Topic: Community Meeting    [facility] [address], RN        Group Therapy Note    Attendees: 18/24    Patient attended community meeting

## 2019-09-23 LAB
CANNABINOIDS CONF, URINE: >500 NG/ML
VALPROIC ACID LEVEL: 99 MCG/ML (ref 50–100)

## 2019-09-23 PROCEDURE — 99232 SBSQ HOSP IP/OBS MODERATE 35: CPT | Performed by: NURSE PRACTITIONER

## 2019-09-23 PROCEDURE — 6370000000 HC RX 637 (ALT 250 FOR IP): Performed by: PSYCHIATRY & NEUROLOGY

## 2019-09-23 PROCEDURE — 36415 COLL VENOUS BLD VENIPUNCTURE: CPT

## 2019-09-23 PROCEDURE — 6370000000 HC RX 637 (ALT 250 FOR IP): Performed by: NURSE PRACTITIONER

## 2019-09-23 PROCEDURE — 1240000000 HC EMOTIONAL WELLNESS R&B

## 2019-09-23 PROCEDURE — 80164 ASSAY DIPROPYLACETIC ACD TOT: CPT

## 2019-09-23 RX ADMIN — LURASIDONE HYDROCHLORIDE 60 MG: 20 TABLET, FILM COATED ORAL at 20:04

## 2019-09-23 RX ADMIN — DIVALPROEX SODIUM 500 MG: 500 TABLET, DELAYED RELEASE ORAL at 11:07

## 2019-09-23 RX ADMIN — DIVALPROEX SODIUM 500 MG: 500 TABLET, DELAYED RELEASE ORAL at 20:04

## 2019-09-23 RX ADMIN — TRAZODONE HYDROCHLORIDE 50 MG: 50 TABLET ORAL at 20:04

## 2019-09-23 RX ADMIN — LURASIDONE HYDROCHLORIDE 60 MG: 20 TABLET, FILM COATED ORAL at 11:06

## 2019-09-23 ASSESSMENT — PAIN SCALES - GENERAL
PAINLEVEL_OUTOF10: 0
PAINLEVEL_OUTOF10: 0

## 2019-09-23 NOTE — GROUP NOTE
Group Therapy Note    Date: September 23    Group Start Time: 1020  Group End Time: 1100  Group Topic: Psychoeducation    [facility]  [address], [place]        Group Therapy Note    Attendees: [address] Demi Dykes Information  Module Name: coping skills   Patient's objective: patient will be able to id positive coping skills to use on a daily basis. Status After Intervention:  Improved  Participation Level: Active Listener and Interactive  Participation Quality: Appropriate, Attentive, Sharing and Supportive  Speech:  normal   Thought Process/Content: Logical  Affective Functioning: Congruent  Mood: euthymic  Level of consciousness:  Alert, Oriented x4 and Attentive  Response to Learning: Able to verbalize/acknowledge new learning, Able to retain information and Progressing to goal  Endings: None Reported  Modes of Intervention: Education, Support, Socialization and Problem-solving  Discipline Responsible: Psychoeducational Specialist  Signature:  Tavo Meza        notes:  Quiet and reserved in group, would share when prompted.

## 2019-09-24 PROCEDURE — 1240000000 HC EMOTIONAL WELLNESS R&B

## 2019-09-24 PROCEDURE — 6370000000 HC RX 637 (ALT 250 FOR IP): Performed by: PSYCHIATRY & NEUROLOGY

## 2019-09-24 PROCEDURE — 99231 SBSQ HOSP IP/OBS SF/LOW 25: CPT | Performed by: NURSE PRACTITIONER

## 2019-09-24 PROCEDURE — 6370000000 HC RX 637 (ALT 250 FOR IP): Performed by: NURSE PRACTITIONER

## 2019-09-24 RX ADMIN — LURASIDONE HYDROCHLORIDE 60 MG: 20 TABLET, FILM COATED ORAL at 20:18

## 2019-09-24 RX ADMIN — DIVALPROEX SODIUM 500 MG: 500 TABLET, DELAYED RELEASE ORAL at 09:13

## 2019-09-24 RX ADMIN — DIVALPROEX SODIUM 500 MG: 500 TABLET, DELAYED RELEASE ORAL at 20:19

## 2019-09-24 RX ADMIN — LURASIDONE HYDROCHLORIDE 60 MG: 20 TABLET, FILM COATED ORAL at 09:13

## 2019-09-24 ASSESSMENT — PAIN SCALES - GENERAL
PAINLEVEL_OUTOF10: 0
PAINLEVEL_OUTOF10: 0

## 2019-09-24 NOTE — CARE COORDINATION
SW met with patient, she declines to have SW call any family or friend. She said she has a new  whom she hasn't met yet, she will follow at [place].

## 2019-09-25 PROCEDURE — 99232 SBSQ HOSP IP/OBS MODERATE 35: CPT | Performed by: NURSE PRACTITIONER

## 2019-09-25 PROCEDURE — 6370000000 HC RX 637 (ALT 250 FOR IP): Performed by: PSYCHIATRY & NEUROLOGY

## 2019-09-25 PROCEDURE — 6370000000 HC RX 637 (ALT 250 FOR IP): Performed by: NURSE PRACTITIONER

## 2019-09-25 PROCEDURE — 1240000000 HC EMOTIONAL WELLNESS R&B

## 2019-09-25 RX ORDER — DIVALPROEX SODIUM 500 MG/1
500 TABLET, DELAYED RELEASE ORAL 3 TIMES DAILY
Status: DISCONTINUED | OUTPATIENT
Start: 2019-09-25 | End: 2019-09-26 | Stop reason: HOSPADM

## 2019-09-25 RX ADMIN — TRAZODONE HYDROCHLORIDE 50 MG: 50 TABLET ORAL at 22:25

## 2019-09-25 RX ADMIN — LURASIDONE HYDROCHLORIDE 60 MG: 20 TABLET, FILM COATED ORAL at 09:11

## 2019-09-25 RX ADMIN — DIVALPROEX SODIUM 500 MG: 500 TABLET, DELAYED RELEASE ORAL at 22:25

## 2019-09-25 RX ADMIN — DIVALPROEX SODIUM 500 MG: 500 TABLET, DELAYED RELEASE ORAL at 09:10

## 2019-09-25 RX ADMIN — LURASIDONE HYDROCHLORIDE 60 MG: 20 TABLET, FILM COATED ORAL at 22:24

## 2019-09-25 RX ADMIN — DIVALPROEX SODIUM 500 MG: 500 TABLET, DELAYED RELEASE ORAL at 13:17

## 2019-09-25 ASSESSMENT — PAIN SCALES - GENERAL
PAINLEVEL_OUTOF10: 0
PAINLEVEL_OUTOF10: 0

## 2019-09-25 NOTE — CARE COORDINATION
Patient was out on the unit , attended group, but continues with a flat affect and sad mood. She has sparse interactions with others, as well as poor eye contact.

## 2019-09-25 NOTE — PROGRESS NOTES
Paranoid  [] Somatic  [] Grandiose    Perception: []  None  [x] Auditory   [] Visual  [] tactile   [] olfactory  [] Illusions         Insight: [] Intact  [] Fair  [x] Limited    Judgement:  [] Intact  [] Fair  [x] Limited      Assessment/Plan:        Patient Active Problem List   Diagnosis Code    Obstructive chronic bronchitis with exacerbation ([facility] [address] J44.1    Acute psychosis ([facility] [address] F23    Schizoaffective disorder, bipolar type ([facility] [address] F25.0    Acute on chronic anemia D64.9    Intramural leiomyoma of uterus D25.1    Abnormal vaginal bleeding N93.9    Iron deficiency anemia D50.9    Uterine fibroid D25.9    Fibroids, intramural D25.1         Plan:    []  Patient is refusing medications  [x] Improving as expected   [] Not improving as expected   [] Worsening    []  At Baseline     Will increase depakote to 500 mg TID    Reason for more than one antipsychotic:  [x] N/A  [] 3 failed monotherapy(drugs tried):  [] Cross over to a new antipsychotic  [] Taper to monotherapy from polypharmacy  [] Augmentation of Clozapine therapy due to treatment resistance to single therapy      Signed:  Linda Jiang  9/25/2019  8:45 AM

## 2019-09-26 VITALS
WEIGHT: 110 LBS | SYSTOLIC BLOOD PRESSURE: 92 MMHG | RESPIRATION RATE: 12 BRPM | HEART RATE: 78 BPM | BODY MASS INDEX: 20.24 KG/M2 | DIASTOLIC BLOOD PRESSURE: 60 MMHG | HEIGHT: 62 IN | TEMPERATURE: 97.6 F | OXYGEN SATURATION: 100 %

## 2019-09-26 PROCEDURE — 6370000000 HC RX 637 (ALT 250 FOR IP): Performed by: PSYCHIATRY & NEUROLOGY

## 2019-09-26 PROCEDURE — 6370000000 HC RX 637 (ALT 250 FOR IP): Performed by: NURSE PRACTITIONER

## 2019-09-26 PROCEDURE — 99238 HOSP IP/OBS DSCHRG MGMT 30/<: CPT | Performed by: NURSE PRACTITIONER

## 2019-09-26 RX ORDER — NICOTINE 21 MG/24HR
1 PATCH, TRANSDERMAL 24 HOURS TRANSDERMAL DAILY
Qty: 30 PATCH | Refills: 0 | Status: SHIPPED | OUTPATIENT
Start: 2019-09-27 | End: 2019-11-15 | Stop reason: ALTCHOICE

## 2019-09-26 RX ORDER — DIVALPROEX SODIUM 500 MG/1
500 TABLET, DELAYED RELEASE ORAL 3 TIMES DAILY
Qty: 90 TABLET | Refills: 0 | Status: ON HOLD | OUTPATIENT
Start: 2019-09-26 | End: 2019-11-21 | Stop reason: HOSPADM

## 2019-09-26 RX ADMIN — DIVALPROEX SODIUM 500 MG: 500 TABLET, DELAYED RELEASE ORAL at 08:45

## 2019-09-26 RX ADMIN — LURASIDONE HYDROCHLORIDE 60 MG: 20 TABLET, FILM COATED ORAL at 08:45

## 2019-09-26 ASSESSMENT — PAIN SCALES - GENERAL: PAINLEVEL_OUTOF10: 0

## 2019-09-26 NOTE — GROUP NOTE
Group Therapy Note    Date: September 25    Group Start Time: 2000  Group End Time: 2030  Group Topic: Wrap-Up    [facility]    Dorita Hollins RN        Group Therapy Note    Patient attended [facility] group.     Attendees: 17           Signature:  Dorita Hollins RN

## 2019-09-26 NOTE — GROUP NOTE
Group Therapy Note    Date: September 26    Group Start Time: 7772  Group End Time: 2153  Group Topic: Psychoeducation    [facility]  [address], [address]        Group Therapy Note    Attendees:8                                                   Wellness Binder Information  Module Name:  positive mindset   patient's objective:  patient will be able to id benefits to a positive attitude and ways to increase his/her own attitude. Status After Intervention:  Improved  Participation Level: Active Listener and Interactive  Participation Quality: Appropriate, Attentive and Sharing  Speech:normal   Thought Process/Content: Logical  Affective Functioning: Congruent  Mood: euthymic  Level of consciousness:  Alert, Oriented x4 and Attentive  Response to Learning: Able to verbalize/acknowledge new learning, Able to retain information and Progressing to goal  Endings: None Reported  Modes of Intervention: Education, Support, Socialization, Exploration and Problem-solving  Discipline Responsible: Psychoeducational Specialist  Signature:  MEGAN Alexander        Notes:  Pleasant and sharing in group.

## 2019-09-26 NOTE — PROGRESS NOTES
[facility]  Discharge Note    Pt discharged with followings belongings:   Dentures: None  Vision - Corrective Lenses: None  Hearing Aid: None  Jewelry: None  Body Piercings Removed: N/A  Clothing: Footwear, Pants, Shirt(1 pair shoes, 1 pair pants, 1 shirt)  Were All Patient Medications Collected?: Yes  Other Valuables: Money (Comment), Purse, Mike Si, Other (Comment)(1 purse, usb drive, 1 keys, SS card, [place] ID, birth certificate, $0.62, insurance card, [place] Direction card, misc items in purse)   Valuables sent home with yes. Valuables retrieved from safe, Security envelope number:   and returned to patient. Patient education on aftercare instructions: yes  Information faxed to n/a by n/a Patient verbalize understanding of AVS:  yes. Status EXAM upon discharge:  Status and Exam  Normal: No  Facial Expression: Avoids Gaze, Flat, Sad  Affect: Congruent  Level of Consciousness: Alert  Mood:Normal: No  Mood: Sad, Depressed  Motor Activity:Normal: No  Motor Activity: Decreased  Interview Behavior: Cooperative, Evasive  Preception: [place] to Person, Earney Pouch to Time, [place] to Place, [place] to Situation  Attention:Normal: No  Attention: Distractible  Thought Processes: (impaired)  Thought Content:Normal: No  Thought Content: Poverty of Content  Hallucinations: None  Delusions: No  Delusions:  Other(See Comment)(paranoia)  Memory:Normal: No  Memory: Poor Recent  Insight and Judgment: No  Insight and Judgment: Poor Judgment, Poor Insight  Present Suicidal Ideation: No  Present Homicidal Ideation: No      Metabolic Screening:    Lab Results   Component Value Date    LABA1C 5.5 09/20/2019       Lab Results   Component Value Date    CHOL 235 (H) 09/20/2019     Lab Results   Component Value Date    TRIG 65 09/20/2019     Lab Results   Component Value Date    HDL 59 09/20/2019     No components found for: [facility]ALUATION AND TREAT[facility] [place]  Lab Results   Component Value Date    LABVLDL 13 09/20/2019       Moustapha Burris RN

## 2019-09-26 NOTE — PLAN OF CARE
Problem: Depressive Behavior With or Without Suicide Precautions:  Goal: Able to verbalize acceptance of life and situations over which he or she has no control  Description  Able to verbalize acceptance of life and situations over which he or she has no control  9/25/2019 2013 by Alan Harrell RN  Outcome: Ongoing     Problem: Depressive Behavior With or Without Suicide Precautions:  Goal: Able to verbalize and/or display a decrease in depressive symptoms  Description  Able to verbalize and/or display a decrease in depressive symptoms  9/25/2019 2013 by Alan Harrell RN  Outcome: Ongoing     Patient isolates to room. Makes poor eye contact. Poverty of content. Presents as sad and depressed. Denies SI, HI, and hallucinations at this time. Purposeful rounding continued.

## 2019-09-26 NOTE — GROUP NOTE
Group Therapy Note    Date: September 25    Group Start Time: 1900  Group End Time: 2000  Group Topic: Healthy Living/Wellness    [facility]    Harpreet Marcos RN        Group Therapy Note    Patient attended and participated in Wellness Group.      Attendees: 17           Signature:  Harpreet Marcos RN

## 2019-09-26 NOTE — PROGRESS NOTES
CLINICAL PHARMACY NOTE: MEDS TO [address] Select Patient?: No  Total # of Prescriptions Filled: 3   The following medications were delivered to the patient:  · Latuda 60  · Nicotine 21 Patch  · Depakote   Total # of Interventions Completed: 2  Time Spent (min): 15    Additional Documentation:

## 2019-09-26 NOTE — DISCHARGE SUMMARY
Physician Discharge Summary      Physical Examination   VS/Measurements:     BP 92/60   Pulse 78   Temp 97.6 °F (36.4 °C) (Temporal)   Resp 12   Ht 5' 2\" (1.575 m)   Wt 110 lb (49.9 kg)   LMP 09/16/2019   SpO2 100%   BMI 20.12 kg/m²         Discharge Medications:              [facility]   Home Medication Instructions [id number]    Printed on:09/26/19 7919   Medication Information                      divalproex (DEPAKOTE) 500 MG DR tablet  Take 1 tablet by mouth 3 times daily             hydrOXYzine (VISTARIL) 25 MG capsule  Take 50 mg by mouth nightly as needed              ibuprofen (ADVIL;MOTRIN) 600 MG tablet  Take 1 tablet by mouth every 6 hours as needed for Pain             lurasidone (LATUDA) 60 MG TABS tablet  Take 1 tablet by mouth 2 times daily             nicotine (NICODERM CQ) 21 MG/24HR  Place 1 patch onto the skin daily                     Psychiatric: Mental Status Examination:    Cognition:      [x] Alert  [x] Awake  [x] Oriented  [x] Person  [x] Place [x] Time    [] drowsy  [] tired  [] lethargic  [] distractable       Attention/Concentration:   [x] Attentive  [] Distracted        Memory Recent and Remote: [x] Intact   [] Impaired [] Partially Impaired     Language: [] Able to recognize and name objects          [] Unable to recognize and name Objects    Fund of Knowledge:  [] Poor []  Fair  [] Good    Speech: [x] Normal  [] Soft  [] Slow  [] Fast [] Pressured            [] Loud [] Dysarthria  [] Incoherent       Appearance: [] Well Groomed  [x] Casual Dressed  [] Unkept  [] Disheveled          [] Normal weight[] Thin  [] Overweight  [] Obese           Attitude: [] Positive  [] Hostile  [] Demanding  [] Guarded  [] Defensive         [x] Cooperative  []  Uncooperative      Behavior:  [x] Normal Gait  [] Walks with Assistance  [] Xochitl Chair    [] Walks with Azul Kruger  [] In Hospital Bed  [] Sitting in Chair    Muscle-Skeletal:  [x] Normal Muscle Tone [] Muscle Atrophy       [] tried):  [] Cross over to a new antipsychotic  [] Taper to monotherapy from polypharmacy  [] Augmentation of Clozapine therapy due to treatment resistance to single therapy      Diagnosis:        Patient Active Problem List   Diagnosis Code    Obstructive chronic bronchitis with exacerbation ([facility] [address] J44.1    Acute psychosis ([facility] [address] F23    Schizoaffective disorder, bipolar type ([facility] [address] F25.0    Acute on chronic anemia D64.9    Intramural leiomyoma of uterus D25.1    Abnormal vaginal bleeding N93.9    Iron deficiency anemia D50.9    Uterine fibroid D25.9    Fibroids, intramural D25.1       Education and Follow-up:  Counseled:  [x] Patient     [] Family    [] Guardian      Signed:   Silver Atkinson   9/26/2019   9:18 AM

## 2019-11-15 ENCOUNTER — HOSPITAL ENCOUNTER (INPATIENT)
Age: 44
LOS: 6 days | Discharge: HOME | DRG: 751 | End: 2019-11-21
Attending: EMERGENCY MEDICINE | Admitting: PSYCHIATRY & NEUROLOGY
Payer: MEDICAID

## 2019-11-15 DIAGNOSIS — F32.3 CURRENT SEVERE EPISODE OF MAJOR DEPRESSIVE DISORDER WITH PSYCHOTIC FEATURES WITHOUT PRIOR EPISODE (HCC): Primary | ICD-10-CM

## 2019-11-15 PROBLEM — F32.9 MDD (MAJOR DEPRESSIVE DISORDER), SINGLE EPISODE: Status: ACTIVE | Noted: 2019-11-15

## 2019-11-15 LAB
ACETAMINOPHEN LEVEL: <5 MCG/ML (ref 10–30)
ALBUMIN SERPL-MCNC: 3.8 G/DL (ref 3.5–5.2)
ALP BLD-CCNC: 59 U/L (ref 35–104)
ALT SERPL-CCNC: 7 U/L (ref 0–32)
AMPHETAMINE SCREEN, URINE: NOT DETECTED
ANION GAP SERPL CALCULATED.3IONS-SCNC: 11 MMOL/L (ref 7–16)
AST SERPL-CCNC: 14 U/L (ref 0–31)
BACTERIA: ABNORMAL /HPF
BARBITURATE SCREEN URINE: NOT DETECTED
BASOPHILS ABSOLUTE: 0.03 E9/L (ref 0–0.2)
BASOPHILS RELATIVE PERCENT: 0.9 % (ref 0–2)
BENZODIAZEPINE SCREEN, URINE: NOT DETECTED
BILIRUB SERPL-MCNC: <0.2 MG/DL (ref 0–1.2)
BILIRUBIN URINE: ABNORMAL
BLOOD, URINE: ABNORMAL
BUN BLDV-MCNC: 16 MG/DL (ref 6–20)
CALCIUM SERPL-MCNC: 9.1 MG/DL (ref 8.6–10.2)
CANNABINOID SCREEN URINE: POSITIVE
CHLORIDE BLD-SCNC: 107 MMOL/L (ref 98–107)
CLARITY: CLEAR
CO2: 21 MMOL/L (ref 22–29)
COCAINE METABOLITE SCREEN URINE: NOT DETECTED
COLOR: ABNORMAL
CREAT SERPL-MCNC: 1 MG/DL (ref 0.5–1)
EOSINOPHILS ABSOLUTE: 0.1 E9/L (ref 0.05–0.5)
EOSINOPHILS RELATIVE PERCENT: 3 % (ref 0–6)
EPITHELIAL CELLS, UA: ABNORMAL /HPF
ETHANOL: <10 MG/DL (ref 0–0.08)
FENTANYL SCREEN, URINE: NOT DETECTED
GFR AFRICAN AMERICAN: >60
GFR NON-AFRICAN AMERICAN: >60 ML/MIN/1.73
GLUCOSE BLD-MCNC: 89 MG/DL (ref 74–99)
GLUCOSE URINE: NEGATIVE MG/DL
HCG, URINE, POC: NEGATIVE
HCT VFR BLD CALC: 35.4 % (ref 34–48)
HEMOGLOBIN: 11 G/DL (ref 11.5–15.5)
IMMATURE GRANULOCYTES #: 0 E9/L
IMMATURE GRANULOCYTES %: 0 % (ref 0–5)
KETONES, URINE: 40 MG/DL
LEUKOCYTE ESTERASE, URINE: ABNORMAL
LYMPHOCYTES ABSOLUTE: 1.43 E9/L (ref 1.5–4)
LYMPHOCYTES RELATIVE PERCENT: 43.3 % (ref 20–42)
Lab: ABNORMAL
Lab: NORMAL
MCH RBC QN AUTO: 26.6 PG (ref 26–35)
MCHC RBC AUTO-ENTMCNC: 31.1 % (ref 32–34.5)
MCV RBC AUTO: 85.5 FL (ref 80–99.9)
METHADONE SCREEN, URINE: NOT DETECTED
MONOCYTES ABSOLUTE: 0.57 E9/L (ref 0.1–0.95)
MONOCYTES RELATIVE PERCENT: 17.3 % (ref 2–12)
NEGATIVE QC PASS/FAIL: NORMAL
NEUTROPHILS ABSOLUTE: 1.17 E9/L (ref 1.8–7.3)
NEUTROPHILS RELATIVE PERCENT: 35.5 % (ref 43–80)
NITRITE, URINE: NEGATIVE
OPIATE SCREEN URINE: NOT DETECTED
OXYCODONE URINE: NOT DETECTED
PDW BLD-RTO: 18.1 FL (ref 11.5–15)
PH UA: 5.5 (ref 5–9)
PHENCYCLIDINE SCREEN URINE: NOT DETECTED
PLATELET # BLD: 178 E9/L (ref 130–450)
PMV BLD AUTO: 12 FL (ref 7–12)
POSITIVE QC PASS/FAIL: NORMAL
POTASSIUM SERPL-SCNC: 3.9 MMOL/L (ref 3.5–5)
PROTEIN UA: 100 MG/DL
RBC # BLD: 4.14 E12/L (ref 3.5–5.5)
RBC UA: ABNORMAL /HPF (ref 0–2)
SALICYLATE, SERUM: <0.3 MG/DL (ref 0–30)
SODIUM BLD-SCNC: 139 MMOL/L (ref 132–146)
SPECIFIC GRAVITY UA: >=1.03 (ref 1–1.03)
TOTAL PROTEIN: 7 G/DL (ref 6.4–8.3)
TRICYCLIC ANTIDEPRESSANTS SCREEN SERUM: NEGATIVE NG/ML
UROBILINOGEN, URINE: 1 E.U./DL
WBC # BLD: 3.3 E9/L (ref 4.5–11.5)
WBC UA: ABNORMAL /HPF (ref 0–5)

## 2019-11-15 PROCEDURE — G0480 DRUG TEST DEF 1-7 CLASSES: HCPCS

## 2019-11-15 PROCEDURE — 36415 COLL VENOUS BLD VENIPUNCTURE: CPT

## 2019-11-15 PROCEDURE — 80053 COMPREHEN METABOLIC PANEL: CPT

## 2019-11-15 PROCEDURE — 1240000000 HC EMOTIONAL WELLNESS R&B

## 2019-11-15 PROCEDURE — 81001 URINALYSIS AUTO W/SCOPE: CPT

## 2019-11-15 PROCEDURE — 6370000000 HC RX 637 (ALT 250 FOR IP): Performed by: PSYCHIATRY & NEUROLOGY

## 2019-11-15 PROCEDURE — 80307 DRUG TEST PRSMV CHEM ANLYZR: CPT

## 2019-11-15 PROCEDURE — 85025 COMPLETE CBC W/AUTO DIFF WBC: CPT

## 2019-11-15 PROCEDURE — 99285 EMERGENCY DEPT VISIT HI MDM: CPT

## 2019-11-15 RX ORDER — OLANZAPINE 10 MG/1
10 INJECTION, POWDER, LYOPHILIZED, FOR SOLUTION INTRAMUSCULAR EVERY 4 HOURS PRN
Status: DISCONTINUED | OUTPATIENT
Start: 2019-11-15 | End: 2019-11-21 | Stop reason: HOSPADM

## 2019-11-15 RX ORDER — TRAZODONE HYDROCHLORIDE 50 MG/1
50 TABLET ORAL NIGHTLY
Status: ON HOLD | COMMUNITY
End: 2019-11-21 | Stop reason: HOSPADM

## 2019-11-15 RX ORDER — OLANZAPINE 5 MG/1
5 TABLET ORAL EVERY 4 HOURS PRN
Status: DISCONTINUED | OUTPATIENT
Start: 2019-11-15 | End: 2019-11-21 | Stop reason: HOSPADM

## 2019-11-15 RX ORDER — MAGNESIUM HYDROXIDE/ALUMINUM HYDROXICE/SIMETHICONE 120; 1200; 1200 MG/30ML; MG/30ML; MG/30ML
30 SUSPENSION ORAL PRN
Status: DISCONTINUED | OUTPATIENT
Start: 2019-11-15 | End: 2019-11-21 | Stop reason: HOSPADM

## 2019-11-15 RX ORDER — NICOTINE 21 MG/24HR
1 PATCH, TRANSDERMAL 24 HOURS TRANSDERMAL DAILY
Status: DISCONTINUED | OUTPATIENT
Start: 2019-11-15 | End: 2019-11-21 | Stop reason: HOSPADM

## 2019-11-15 RX ORDER — ACETAMINOPHEN 325 MG/1
650 TABLET ORAL EVERY 4 HOURS PRN
Status: DISCONTINUED | OUTPATIENT
Start: 2019-11-15 | End: 2019-11-21 | Stop reason: HOSPADM

## 2019-11-15 RX ORDER — BENZTROPINE MESYLATE 1 MG/ML
2 INJECTION INTRAMUSCULAR; INTRAVENOUS 2 TIMES DAILY PRN
Status: DISCONTINUED | OUTPATIENT
Start: 2019-11-15 | End: 2019-11-21 | Stop reason: HOSPADM

## 2019-11-15 RX ORDER — HYDROXYZINE PAMOATE 50 MG/1
50 CAPSULE ORAL 3 TIMES DAILY PRN
Status: DISCONTINUED | OUTPATIENT
Start: 2019-11-15 | End: 2019-11-21 | Stop reason: HOSPADM

## 2019-11-15 RX ORDER — TRAZODONE HYDROCHLORIDE 50 MG/1
50 TABLET ORAL NIGHTLY PRN
Status: DISCONTINUED | OUTPATIENT
Start: 2019-11-15 | End: 2019-11-18

## 2019-11-15 RX ADMIN — TRAZODONE HYDROCHLORIDE 50 MG: 50 TABLET ORAL at 22:18

## 2019-11-15 ASSESSMENT — SLEEP AND FATIGUE QUESTIONNAIRES
DO YOU USE A SLEEP AID: YES
AVERAGE NUMBER OF SLEEP HOURS: 12
DIFFICULTY ARISING: YES
DIFFICULTY FALLING ASLEEP: YES
DIFFICULTY STAYING ASLEEP: YES
RESTFUL SLEEP: YES
SLEEP PATTERN: DIFFICULTY FALLING ASLEEP;RESTLESSNESS;EARLY AWAKENING
DO YOU HAVE DIFFICULTY SLEEPING: YES

## 2019-11-15 ASSESSMENT — LIFESTYLE VARIABLES: HISTORY_ALCOHOL_USE: NO

## 2019-11-15 ASSESSMENT — PATIENT HEALTH QUESTIONNAIRE - PHQ9: SUM OF ALL RESPONSES TO PHQ QUESTIONS 1-9: 23

## 2019-11-16 PROBLEM — F33.2 SEVERE EPISODE OF RECURRENT MAJOR DEPRESSIVE DISORDER, WITHOUT PSYCHOTIC FEATURES (HCC): Status: ACTIVE | Noted: 2019-11-16

## 2019-11-16 LAB — VALPROIC ACID LEVEL: 30 MCG/ML (ref 50–100)

## 2019-11-16 PROCEDURE — 1240000000 HC EMOTIONAL WELLNESS R&B

## 2019-11-16 PROCEDURE — 6370000000 HC RX 637 (ALT 250 FOR IP): Performed by: NURSE PRACTITIONER

## 2019-11-16 PROCEDURE — 80164 ASSAY DIPROPYLACETIC ACD TOT: CPT

## 2019-11-16 PROCEDURE — 36415 COLL VENOUS BLD VENIPUNCTURE: CPT

## 2019-11-16 PROCEDURE — 6370000000 HC RX 637 (ALT 250 FOR IP): Performed by: PSYCHIATRY & NEUROLOGY

## 2019-11-16 PROCEDURE — 99221 1ST HOSP IP/OBS SF/LOW 40: CPT | Performed by: NURSE PRACTITIONER

## 2019-11-16 RX ORDER — DIVALPROEX SODIUM 500 MG/1
500 TABLET, DELAYED RELEASE ORAL 3 TIMES DAILY
Status: DISCONTINUED | OUTPATIENT
Start: 2019-11-16 | End: 2019-11-21 | Stop reason: HOSPADM

## 2019-11-16 RX ORDER — IBUPROFEN 600 MG/1
600 TABLET ORAL EVERY 6 HOURS PRN
Status: DISCONTINUED | OUTPATIENT
Start: 2019-11-16 | End: 2019-11-21 | Stop reason: HOSPADM

## 2019-11-16 RX ORDER — TRAZODONE HYDROCHLORIDE 50 MG/1
50 TABLET ORAL NIGHTLY
Status: DISCONTINUED | OUTPATIENT
Start: 2019-11-16 | End: 2019-11-18

## 2019-11-16 RX ADMIN — DIVALPROEX SODIUM 500 MG: 500 TABLET, DELAYED RELEASE ORAL at 21:16

## 2019-11-16 RX ADMIN — TRAZODONE HYDROCHLORIDE 50 MG: 50 TABLET ORAL at 20:44

## 2019-11-16 RX ADMIN — DIVALPROEX SODIUM 500 MG: 500 TABLET, DELAYED RELEASE ORAL at 12:38

## 2019-11-16 RX ADMIN — LURASIDONE HYDROCHLORIDE 120 MG: 80 TABLET, FILM COATED ORAL at 20:43

## 2019-11-16 RX ADMIN — HYDROXYZINE PAMOATE 50 MG: 50 CAPSULE ORAL at 20:44

## 2019-11-16 RX ADMIN — DIVALPROEX SODIUM 500 MG: 500 TABLET, DELAYED RELEASE ORAL at 16:48

## 2019-11-16 ASSESSMENT — PAIN SCALES - GENERAL: PAINLEVEL_OUTOF10: 0

## 2019-11-16 ASSESSMENT — SLEEP AND FATIGUE QUESTIONNAIRES
DIFFICULTY ARISING: NO
DIFFICULTY STAYING ASLEEP: NO
SLEEP PATTERN: NORMAL
DIFFICULTY FALLING ASLEEP: NO
DO YOU HAVE DIFFICULTY SLEEPING: NO
AVERAGE NUMBER OF SLEEP HOURS: 12
DO YOU USE A SLEEP AID: NO
RESTFUL SLEEP: YES

## 2019-11-16 ASSESSMENT — PATIENT HEALTH QUESTIONNAIRE - PHQ9: SUM OF ALL RESPONSES TO PHQ QUESTIONS 1-9: 26

## 2019-11-16 ASSESSMENT — LIFESTYLE VARIABLES: HISTORY_ALCOHOL_USE: NO

## 2019-11-17 PROCEDURE — 99231 SBSQ HOSP IP/OBS SF/LOW 25: CPT | Performed by: NURSE PRACTITIONER

## 2019-11-17 PROCEDURE — 6370000000 HC RX 637 (ALT 250 FOR IP): Performed by: PSYCHIATRY & NEUROLOGY

## 2019-11-17 PROCEDURE — 1240000000 HC EMOTIONAL WELLNESS R&B

## 2019-11-17 PROCEDURE — 6370000000 HC RX 637 (ALT 250 FOR IP): Performed by: NURSE PRACTITIONER

## 2019-11-17 RX ADMIN — TRAZODONE HYDROCHLORIDE 50 MG: 50 TABLET ORAL at 20:42

## 2019-11-17 RX ADMIN — DIVALPROEX SODIUM 500 MG: 500 TABLET, DELAYED RELEASE ORAL at 14:35

## 2019-11-17 RX ADMIN — DIVALPROEX SODIUM 500 MG: 500 TABLET, DELAYED RELEASE ORAL at 20:42

## 2019-11-17 RX ADMIN — HYDROXYZINE PAMOATE 50 MG: 50 CAPSULE ORAL at 20:42

## 2019-11-17 RX ADMIN — LURASIDONE HYDROCHLORIDE 120 MG: 80 TABLET, FILM COATED ORAL at 08:57

## 2019-11-17 RX ADMIN — DIVALPROEX SODIUM 500 MG: 500 TABLET, DELAYED RELEASE ORAL at 08:57

## 2019-11-17 ASSESSMENT — PAIN SCALES - GENERAL: PAINLEVEL_OUTOF10: 0

## 2019-11-18 PROCEDURE — 6370000000 HC RX 637 (ALT 250 FOR IP): Performed by: NURSE PRACTITIONER

## 2019-11-18 PROCEDURE — 1240000000 HC EMOTIONAL WELLNESS R&B

## 2019-11-18 PROCEDURE — 6370000000 HC RX 637 (ALT 250 FOR IP): Performed by: PSYCHIATRY & NEUROLOGY

## 2019-11-18 PROCEDURE — 99232 SBSQ HOSP IP/OBS MODERATE 35: CPT | Performed by: NURSE PRACTITIONER

## 2019-11-18 RX ORDER — VENLAFAXINE HYDROCHLORIDE 37.5 MG/1
37.5 CAPSULE, EXTENDED RELEASE ORAL
Status: DISCONTINUED | OUTPATIENT
Start: 2019-11-18 | End: 2019-11-19

## 2019-11-18 RX ADMIN — HYDROXYZINE PAMOATE 50 MG: 50 CAPSULE ORAL at 12:56

## 2019-11-18 RX ADMIN — LURASIDONE HYDROCHLORIDE 120 MG: 80 TABLET, FILM COATED ORAL at 20:27

## 2019-11-18 RX ADMIN — DIVALPROEX SODIUM 500 MG: 500 TABLET, DELAYED RELEASE ORAL at 08:57

## 2019-11-18 RX ADMIN — DIVALPROEX SODIUM 500 MG: 500 TABLET, DELAYED RELEASE ORAL at 14:03

## 2019-11-18 RX ADMIN — OLANZAPINE 5 MG: 5 TABLET, FILM COATED ORAL at 21:25

## 2019-11-18 RX ADMIN — DIVALPROEX SODIUM 500 MG: 500 TABLET, DELAYED RELEASE ORAL at 20:28

## 2019-11-18 RX ADMIN — HYDROXYZINE PAMOATE 50 MG: 50 CAPSULE ORAL at 20:27

## 2019-11-18 ASSESSMENT — PAIN SCALES - GENERAL: PAINLEVEL_OUTOF10: 0

## 2019-11-19 PROCEDURE — 6370000000 HC RX 637 (ALT 250 FOR IP): Performed by: PSYCHIATRY & NEUROLOGY

## 2019-11-19 PROCEDURE — 6370000000 HC RX 637 (ALT 250 FOR IP): Performed by: NURSE PRACTITIONER

## 2019-11-19 PROCEDURE — 1240000000 HC EMOTIONAL WELLNESS R&B

## 2019-11-19 PROCEDURE — 99232 SBSQ HOSP IP/OBS MODERATE 35: CPT | Performed by: NURSE PRACTITIONER

## 2019-11-19 RX ORDER — PAROXETINE 10 MG/1
10 TABLET, FILM COATED ORAL DAILY
Status: DISCONTINUED | OUTPATIENT
Start: 2019-11-19 | End: 2019-11-20

## 2019-11-19 RX ADMIN — DIVALPROEX SODIUM 500 MG: 500 TABLET, DELAYED RELEASE ORAL at 08:23

## 2019-11-19 RX ADMIN — HYDROXYZINE PAMOATE 50 MG: 50 CAPSULE ORAL at 09:48

## 2019-11-19 RX ADMIN — LURASIDONE HYDROCHLORIDE 120 MG: 80 TABLET, FILM COATED ORAL at 20:42

## 2019-11-19 RX ADMIN — DIVALPROEX SODIUM 500 MG: 500 TABLET, DELAYED RELEASE ORAL at 14:08

## 2019-11-19 RX ADMIN — DIVALPROEX SODIUM 500 MG: 500 TABLET, DELAYED RELEASE ORAL at 20:42

## 2019-11-19 RX ADMIN — PAROXETINE 10 MG: 10 TABLET, FILM COATED ORAL at 09:48

## 2019-11-19 ASSESSMENT — PAIN SCALES - GENERAL
PAINLEVEL_OUTOF10: 0

## 2019-11-20 PROCEDURE — 99232 SBSQ HOSP IP/OBS MODERATE 35: CPT | Performed by: PSYCHIATRY & NEUROLOGY

## 2019-11-20 PROCEDURE — 6370000000 HC RX 637 (ALT 250 FOR IP): Performed by: PSYCHIATRY & NEUROLOGY

## 2019-11-20 PROCEDURE — 6370000000 HC RX 637 (ALT 250 FOR IP): Performed by: NURSE PRACTITIONER

## 2019-11-20 PROCEDURE — 1240000000 HC EMOTIONAL WELLNESS R&B

## 2019-11-20 RX ORDER — PAROXETINE HYDROCHLORIDE 20 MG/1
20 TABLET, FILM COATED ORAL DAILY
Status: DISCONTINUED | OUTPATIENT
Start: 2019-11-21 | End: 2019-11-21 | Stop reason: HOSPADM

## 2019-11-20 RX ADMIN — DIVALPROEX SODIUM 500 MG: 500 TABLET, DELAYED RELEASE ORAL at 21:16

## 2019-11-20 RX ADMIN — HYDROXYZINE PAMOATE 50 MG: 50 CAPSULE ORAL at 08:24

## 2019-11-20 RX ADMIN — DIVALPROEX SODIUM 500 MG: 500 TABLET, DELAYED RELEASE ORAL at 08:24

## 2019-11-20 RX ADMIN — LURASIDONE HYDROCHLORIDE 120 MG: 80 TABLET, FILM COATED ORAL at 21:16

## 2019-11-20 RX ADMIN — DIVALPROEX SODIUM 500 MG: 500 TABLET, DELAYED RELEASE ORAL at 14:21

## 2019-11-20 RX ADMIN — HYDROXYZINE PAMOATE 50 MG: 50 CAPSULE ORAL at 21:16

## 2019-11-20 RX ADMIN — PAROXETINE 10 MG: 10 TABLET, FILM COATED ORAL at 08:24

## 2019-11-20 ASSESSMENT — PAIN SCALES - GENERAL
PAINLEVEL_OUTOF10: 0

## 2019-11-21 VITALS
TEMPERATURE: 98.3 F | WEIGHT: 120 LBS | DIASTOLIC BLOOD PRESSURE: 72 MMHG | HEART RATE: 66 BPM | SYSTOLIC BLOOD PRESSURE: 109 MMHG | RESPIRATION RATE: 15 BRPM | OXYGEN SATURATION: 100 % | HEIGHT: 62 IN | BODY MASS INDEX: 22.08 KG/M2

## 2019-11-21 LAB — VALPROIC ACID LEVEL: 132 MCG/ML (ref 50–100)

## 2019-11-21 PROCEDURE — 80164 ASSAY DIPROPYLACETIC ACD TOT: CPT

## 2019-11-21 PROCEDURE — 36415 COLL VENOUS BLD VENIPUNCTURE: CPT

## 2019-11-21 PROCEDURE — 6370000000 HC RX 637 (ALT 250 FOR IP): Performed by: PSYCHIATRY & NEUROLOGY

## 2019-11-21 PROCEDURE — 99238 HOSP IP/OBS DSCHRG MGMT 30/<: CPT | Performed by: NURSE PRACTITIONER

## 2019-11-21 PROCEDURE — 6370000000 HC RX 637 (ALT 250 FOR IP): Performed by: NURSE PRACTITIONER

## 2019-11-21 RX ORDER — DIVALPROEX SODIUM 500 MG/1
500 TABLET, DELAYED RELEASE ORAL 3 TIMES DAILY
Qty: 90 TABLET | Refills: 0 | Status: ON HOLD
Start: 2019-11-21 | End: 2020-07-06 | Stop reason: HOSPADM

## 2019-11-21 RX ORDER — PAROXETINE HYDROCHLORIDE 20 MG/1
20 TABLET, FILM COATED ORAL DAILY
Qty: 30 TABLET | Refills: 0 | Status: ON HOLD
Start: 2019-11-21 | End: 2020-07-06 | Stop reason: HOSPADM

## 2019-11-21 RX ADMIN — DIVALPROEX SODIUM 500 MG: 500 TABLET, DELAYED RELEASE ORAL at 11:09

## 2019-11-21 RX ADMIN — PAROXETINE HYDROCHLORIDE 20 MG: 20 TABLET, FILM COATED ORAL at 11:09

## 2019-11-21 ASSESSMENT — PAIN SCALES - GENERAL: PAINLEVEL_OUTOF10: 0

## 2020-06-23 ENCOUNTER — HOSPITAL ENCOUNTER (INPATIENT)
Age: 45
LOS: 13 days | Discharge: HOME | DRG: 753 | End: 2020-07-06
Attending: EMERGENCY MEDICINE | Admitting: PSYCHIATRY & NEUROLOGY
Payer: MEDICAID

## 2020-06-23 PROBLEM — F32.9 MAJOR DEPRESSIVE DISORDER, SINGLE EPISODE, UNSPECIFIED: Status: ACTIVE | Noted: 2020-06-23

## 2020-06-23 LAB
ACETAMINOPHEN LEVEL: <5 MCG/ML (ref 10–30)
ALBUMIN SERPL-MCNC: 4.5 G/DL (ref 3.5–5.2)
ALP BLD-CCNC: 66 U/L (ref 35–104)
ALT SERPL-CCNC: 15 U/L (ref 0–32)
AMPHETAMINE SCREEN, URINE: NOT DETECTED
ANION GAP SERPL CALCULATED.3IONS-SCNC: 14 MMOL/L (ref 7–16)
AST SERPL-CCNC: 20 U/L (ref 0–31)
BACTERIA: ABNORMAL /HPF
BARBITURATE SCREEN URINE: NOT DETECTED
BASOPHILS ABSOLUTE: 0.03 E9/L (ref 0–0.2)
BASOPHILS RELATIVE PERCENT: 0.5 % (ref 0–2)
BENZODIAZEPINE SCREEN, URINE: NOT DETECTED
BILIRUB SERPL-MCNC: 0.3 MG/DL (ref 0–1.2)
BILIRUBIN URINE: NEGATIVE
BLOOD, URINE: ABNORMAL
BUN BLDV-MCNC: 8 MG/DL (ref 6–20)
CALCIUM SERPL-MCNC: 9.7 MG/DL (ref 8.6–10.2)
CANNABINOID SCREEN URINE: POSITIVE
CHLORIDE BLD-SCNC: 104 MMOL/L (ref 98–107)
CLARITY: ABNORMAL
CO2: 20 MMOL/L (ref 22–29)
COCAINE METABOLITE SCREEN URINE: NOT DETECTED
COLOR: YELLOW
CREAT SERPL-MCNC: 0.8 MG/DL (ref 0.5–1)
EOSINOPHILS ABSOLUTE: 0.15 E9/L (ref 0.05–0.5)
EOSINOPHILS RELATIVE PERCENT: 2.5 % (ref 0–6)
EPITHELIAL CELLS, UA: ABNORMAL /HPF
ETHANOL: <10 MG/DL (ref 0–0.08)
FENTANYL SCREEN, URINE: NOT DETECTED
GFR AFRICAN AMERICAN: >60
GFR NON-AFRICAN AMERICAN: >60 ML/MIN/1.73
GLUCOSE BLD-MCNC: 98 MG/DL (ref 74–99)
GLUCOSE URINE: NEGATIVE MG/DL
HCG, URINE, POC: NEGATIVE
HCT VFR BLD CALC: 41.4 % (ref 34–48)
HEMOGLOBIN: 13.2 G/DL (ref 11.5–15.5)
IMMATURE GRANULOCYTES #: 0.02 E9/L
IMMATURE GRANULOCYTES %: 0.3 % (ref 0–5)
KETONES, URINE: NEGATIVE MG/DL
LEUKOCYTE ESTERASE, URINE: NEGATIVE
LYMPHOCYTES ABSOLUTE: 2.67 E9/L (ref 1.5–4)
LYMPHOCYTES RELATIVE PERCENT: 43.8 % (ref 20–42)
Lab: ABNORMAL
Lab: NORMAL
MCH RBC QN AUTO: 29.3 PG (ref 26–35)
MCHC RBC AUTO-ENTMCNC: 31.9 % (ref 32–34.5)
MCV RBC AUTO: 92 FL (ref 80–99.9)
METHADONE SCREEN, URINE: NOT DETECTED
MONOCYTES ABSOLUTE: 0.54 E9/L (ref 0.1–0.95)
MONOCYTES RELATIVE PERCENT: 8.9 % (ref 2–12)
NEGATIVE QC PASS/FAIL: NORMAL
NEUTROPHILS ABSOLUTE: 2.69 E9/L (ref 1.8–7.3)
NEUTROPHILS RELATIVE PERCENT: 44 % (ref 43–80)
NITRITE, URINE: NEGATIVE
OPIATE SCREEN URINE: NOT DETECTED
OXYCODONE URINE: NOT DETECTED
PDW BLD-RTO: 17 FL (ref 11.5–15)
PH UA: 6.5 (ref 5–9)
PHENCYCLIDINE SCREEN URINE: NOT DETECTED
PLATELET # BLD: 187 E9/L (ref 130–450)
PMV BLD AUTO: 11.4 FL (ref 7–12)
POSITIVE QC PASS/FAIL: NORMAL
POTASSIUM SERPL-SCNC: 4.2 MMOL/L (ref 3.5–5)
PROTEIN UA: NEGATIVE MG/DL
RBC # BLD: 4.5 E12/L (ref 3.5–5.5)
RBC UA: >20 /HPF (ref 0–2)
SALICYLATE, SERUM: <0.3 MG/DL (ref 0–30)
SARS-COV-2, NAAT: NOT DETECTED
SODIUM BLD-SCNC: 138 MMOL/L (ref 132–146)
SPECIFIC GRAVITY UA: 1.02 (ref 1–1.03)
TOTAL PROTEIN: 8.3 G/DL (ref 6.4–8.3)
TRICYCLIC ANTIDEPRESSANTS SCREEN SERUM: NEGATIVE NG/ML
UROBILINOGEN, URINE: 0.2 E.U./DL
VALPROIC ACID LEVEL: 66 MCG/ML (ref 50–100)
WBC # BLD: 6.1 E9/L (ref 4.5–11.5)
WBC UA: ABNORMAL /HPF (ref 0–5)

## 2020-06-23 PROCEDURE — 6370000000 HC RX 637 (ALT 250 FOR IP): Performed by: PSYCHIATRY & NEUROLOGY

## 2020-06-23 PROCEDURE — 85025 COMPLETE CBC W/AUTO DIFF WBC: CPT

## 2020-06-23 PROCEDURE — 80053 COMPREHEN METABOLIC PANEL: CPT

## 2020-06-23 PROCEDURE — 80307 DRUG TEST PRSMV CHEM ANLYZR: CPT

## 2020-06-23 PROCEDURE — U0002 COVID-19 LAB TEST NON-CDC: HCPCS

## 2020-06-23 PROCEDURE — G0480 DRUG TEST DEF 1-7 CLASSES: HCPCS

## 2020-06-23 PROCEDURE — 81001 URINALYSIS AUTO W/SCOPE: CPT

## 2020-06-23 PROCEDURE — 80164 ASSAY DIPROPYLACETIC ACD TOT: CPT

## 2020-06-23 PROCEDURE — 99284 EMERGENCY DEPT VISIT MOD MDM: CPT

## 2020-06-23 PROCEDURE — 1240000000 HC EMOTIONAL WELLNESS R&B

## 2020-06-23 PROCEDURE — 6370000000 HC RX 637 (ALT 250 FOR IP): Performed by: EMERGENCY MEDICINE

## 2020-06-23 RX ORDER — TRAZODONE HYDROCHLORIDE 50 MG/1
50 TABLET ORAL NIGHTLY PRN
Status: DISCONTINUED | OUTPATIENT
Start: 2020-06-23 | End: 2020-07-06 | Stop reason: HOSPADM

## 2020-06-23 RX ORDER — ACETAMINOPHEN 325 MG/1
650 TABLET ORAL EVERY 6 HOURS PRN
Status: DISCONTINUED | OUTPATIENT
Start: 2020-06-23 | End: 2020-07-06 | Stop reason: HOSPADM

## 2020-06-23 RX ORDER — LORAZEPAM 1 MG/1
2 TABLET ORAL ONCE
Status: COMPLETED | OUTPATIENT
Start: 2020-06-23 | End: 2020-06-23

## 2020-06-23 RX ORDER — HALOPERIDOL 5 MG/ML
5 INJECTION INTRAMUSCULAR EVERY 6 HOURS PRN
Status: DISCONTINUED | OUTPATIENT
Start: 2020-06-23 | End: 2020-07-06 | Stop reason: HOSPADM

## 2020-06-23 RX ORDER — HALOPERIDOL 5 MG
5 TABLET ORAL EVERY 6 HOURS PRN
Status: DISCONTINUED | OUTPATIENT
Start: 2020-06-23 | End: 2020-07-06 | Stop reason: HOSPADM

## 2020-06-23 RX ORDER — HYDROXYZINE PAMOATE 50 MG/1
50 CAPSULE ORAL 3 TIMES DAILY PRN
Status: DISCONTINUED | OUTPATIENT
Start: 2020-06-23 | End: 2020-07-06 | Stop reason: HOSPADM

## 2020-06-23 RX ORDER — MAGNESIUM HYDROXIDE/ALUMINUM HYDROXICE/SIMETHICONE 120; 1200; 1200 MG/30ML; MG/30ML; MG/30ML
30 SUSPENSION ORAL PRN
Status: DISCONTINUED | OUTPATIENT
Start: 2020-06-23 | End: 2020-07-06 | Stop reason: HOSPADM

## 2020-06-23 RX ORDER — NICOTINE 21 MG/24HR
1 PATCH, TRANSDERMAL 24 HOURS TRANSDERMAL DAILY
Status: DISCONTINUED | OUTPATIENT
Start: 2020-06-23 | End: 2020-07-06 | Stop reason: HOSPADM

## 2020-06-23 RX ADMIN — TRAZODONE HYDROCHLORIDE 50 MG: 50 TABLET ORAL at 20:55

## 2020-06-23 RX ADMIN — HYDROXYZINE PAMOATE 50 MG: 50 CAPSULE ORAL at 20:55

## 2020-06-23 RX ADMIN — LORAZEPAM 2 MG: 1 TABLET ORAL at 15:11

## 2020-06-23 ASSESSMENT — SLEEP AND FATIGUE QUESTIONNAIRES
RESTFUL SLEEP: NO
DO YOU HAVE DIFFICULTY SLEEPING: YES
AVERAGE NUMBER OF SLEEP HOURS: 3
DO YOU USE A SLEEP AID: NO
DIFFICULTY STAYING ASLEEP: YES
SLEEP PATTERN: INSOMNIA
DIFFICULTY ARISING: YES
DIFFICULTY FALLING ASLEEP: YES

## 2020-06-23 ASSESSMENT — LIFESTYLE VARIABLES: HISTORY_ALCOHOL_USE: COMMENT

## 2020-06-23 ASSESSMENT — PATIENT HEALTH QUESTIONNAIRE - PHQ9: SUM OF ALL RESPONSES TO PHQ QUESTIONS 1-9: 11

## 2020-06-23 ASSESSMENT — PAIN SCALES - GENERAL: PAINLEVEL_OUTOF10: 0

## 2020-06-23 NOTE — ED PROVIDER NOTES
-------------------------------------------------  All laboratory and imaging studies were reviewed by myself. LABS: reviewed and interpreted by me  No results found for this visit on 06/23/20. RADIOLOGY:  Interpreted by Radiologist.  No orders to display              ------------------------- NURSING NOTES AND VITALS REVIEWED ---------------------------   The nursing notes within the ED encounter and vital signs as below have been reviewed. /65   Pulse 75   Temp 98.2 °F (36.8 °C) (Oral)   Resp 20   Ht 5' 4\" (1.626 m)   Wt 120 lb (54.4 kg)   SpO2 98%   BMI 20.60 kg/m²   Oxygen Saturation Interpretation: Normal            ---------------------------------------------------PHYSICAL EXAM--------------------------------------      Constitutional/General: Alert and oriented x3   Head: Normocephalic, atraumatic  Eyes: PERRL, EOMI  Mouth: Oropharynx clear, handling secretions, no trismus  Neck: Supple, full ROM, no meningeal signs  Pulmonary: Lungs clear to auscultation bilaterally, no wheezes, rales, or rhonchi. Not in respiratory distress  Cardiovascular:  Regular rate and rhythm, no murmurs, gallops, or rubs. 2+ distal pulses  Abdomen: Soft, non tender, non distended, +BS, no rebound, guarding, or rigidity. No pulsatile masses appreciated  Extremities: Moves all extremities x 4. Warm and well perfused, no clubbing, cyanosis, or edema. Capillary refill <3 seconds  Skin: warm and dry without rash  Neurologic: GCS 15, no focal deficits  Psych: anxious Affect      ------------------------------------------ PROGRESS NOTES ------------------------------------------     Medical decision making:  Patient is medically stable for mental health evaluation    Consultations:   Behavioral Health    Re-Evaluations:        Counseling:    The emergency provider has spoken with thepatient and discussed todays results, in addition to providing specific details for the plan of care and counseling regarding the diagnosis and prognosis. Questions are answered at this time and they are agreeable with the plan.         --------------------------------- IMPRESSION AND DISPOSITION ---------------------------------    IMPRESSION  1. Anxiety state    2.  Panic attack        DISPOSITION  Disposition: as per consultant  Patient condition is stable           Manuel Vaughn MD  06/23/20 9575

## 2020-06-23 NOTE — ED NOTES
Pt arrived to the ER by EMS from [facility]. Pt reports that she has been off of her meds and now has anxiety. I met with pt who reports that she stopped taking her psych meds 2 weeks ago because she started drinking beer and smoking marijuana. Pt denies SI, was hesitant about denying HI, and denies hallucinations. Pt appears to be anxious, somewhat paranoid, mood is stable, calm,cooperative, alert, oriented x's 3, shares avoidant eye contact, has clear speech, thoughts appear to be unstable and distant. [place] services reports that pt has anxiety, depression and bi-polar. Pt is having issues with her neighbor and believes that the neighbor is listening to her talk, knows what is in her head and what she is thinking. She believes that the neighbors can hear her thoughts. She reported that she is depressed, fatigued, has anxiety, trouble concentrating, feeling hopeless, with low self esteem. Pt is only sleeping 4-5 hours a night. Pt is in need of [place] [address] services, to get back on her meds, to get her paranoid delusional thoughts controlled .      [place], [place]  06/23/20 2204

## 2020-06-23 NOTE — ED NOTES
Called lab for COVID swab status. ED tech transport swab to lab approx 1521. Tech states swab is not in process yet.      Shani Carney RN  06/23/20 0771

## 2020-06-24 PROBLEM — F31.64 BIPOLAR AFFECTIVE DISORDER, MIXED, SEVERE, WITH PSYCHOTIC BEHAVIOR (HCC): Status: ACTIVE | Noted: 2020-06-24

## 2020-06-24 PROBLEM — F12.10 CANNABIS ABUSE: Status: ACTIVE | Noted: 2020-06-24

## 2020-06-24 PROCEDURE — 6370000000 HC RX 637 (ALT 250 FOR IP): Performed by: NURSE PRACTITIONER

## 2020-06-24 PROCEDURE — 6370000000 HC RX 637 (ALT 250 FOR IP): Performed by: PSYCHIATRY & NEUROLOGY

## 2020-06-24 PROCEDURE — 1240000000 HC EMOTIONAL WELLNESS R&B

## 2020-06-24 PROCEDURE — 99222 1ST HOSP IP/OBS MODERATE 55: CPT | Performed by: NURSE PRACTITIONER

## 2020-06-24 RX ORDER — DIVALPROEX SODIUM 500 MG/1
500 TABLET, DELAYED RELEASE ORAL 3 TIMES DAILY
Status: DISCONTINUED | OUTPATIENT
Start: 2020-06-24 | End: 2020-06-30

## 2020-06-24 RX ORDER — TRAZODONE HYDROCHLORIDE 50 MG/1
50 TABLET ORAL NIGHTLY PRN
COMMUNITY
End: 2020-11-11 | Stop reason: SDUPTHER

## 2020-06-24 RX ORDER — RISPERIDONE 1 MG/1
1 TABLET, FILM COATED ORAL 2 TIMES DAILY
Status: DISCONTINUED | OUTPATIENT
Start: 2020-06-24 | End: 2020-06-25

## 2020-06-24 RX ADMIN — DIVALPROEX SODIUM 500 MG: 250 TABLET, DELAYED RELEASE ORAL at 14:01

## 2020-06-24 RX ADMIN — RISPERIDONE 1 MG: 1 TABLET, FILM COATED ORAL at 21:01

## 2020-06-24 RX ADMIN — DIVALPROEX SODIUM 500 MG: 250 TABLET, DELAYED RELEASE ORAL at 21:01

## 2020-06-24 RX ADMIN — TRAZODONE HYDROCHLORIDE 50 MG: 50 TABLET ORAL at 21:01

## 2020-06-24 ASSESSMENT — PAIN SCALES - GENERAL: PAINLEVEL_OUTOF10: 0

## 2020-06-24 ASSESSMENT — SLEEP AND FATIGUE QUESTIONNAIRES
RESTFUL SLEEP: NO
DIFFICULTY FALLING ASLEEP: YES
DIFFICULTY STAYING ASLEEP: YES
DIFFICULTY ARISING: YES
DO YOU USE A SLEEP AID: NO
DO YOU HAVE DIFFICULTY SLEEPING: YES
SLEEP PATTERN: INSOMNIA
AVERAGE NUMBER OF SLEEP HOURS: 3

## 2020-06-24 NOTE — GROUP NOTE
Group Therapy Note    Date: 6/24/2020    Group Start Time: 0115  Group End Time: 0155  Group Topic: Cognitive Skills    [facility] [place]        Group Therapy Note    Attendees: 9       Patient's Goal:  To identify grounding techniques and ways to use coping skills    Notes:  Pt participated and made positive connections during group    Status After Intervention:  Improved    Participation Level:  Active Listener and Interactive    Participation Quality: Appropriate, Attentive and Sharing      Speech:  normal      Thought Process/Content: Logical      Affective Functioning: Congruent      Mood: euthymic      Level of consciousness:  Alert and Oriented x4      Response to Learning: Able to verbalize current knowledge/experience and Able to verbalize/acknowledge new learning      Endings: None Reported    Modes of Intervention: Support, Socialization and Exploration      Discipline Responsible: /Counselor      Signature:  ABIODUN Aburto

## 2020-06-24 NOTE — H&P
Department of Psychiatry  History and Physical - Adult     CHIEF COMPLAINT:  \" My neighbors are listening to me all the time 24 hours a day. \"    Patient was seen after discussing with the treatment team and reviewing the chart    CIRCUMSTANCES OF ADMISSION: Presented to the ED after being sent in by her outpatient agency who reports the patient believes that her neighbor is listening to her talk and knows what is in her head what she is thinking. HISTORY OF PRESENT ILLNESS:      The patient is a 38 y.o. female with significant past history of asthma and COPD presented to the ED after patient was sent in to be evaluated by her outpatient agency as patient was making paranoid statements about her neighbors. In the ED her urine tox was positive for cannabis her hCG was negative she was medically cleared admitted to [address] [place] [address] psychiatric and for further psychiatric assessment stabilization treatment. I went to evaluate the patient along with Dr. Tiara Collado today. Patient is lying in her bed she is very difficult to arouse and not able to obtain a full history from the patient. She does state that she has not been taking her Latuda for several weeks. She states that she has neighbors who are listening to her 24 hours a day. She admits to paranoia about these neighbors believing that they can hear her thoughts. She denies SI/HI intent or plan. She is very drowsy she is guarded not offering much conversation at this time. She reports that she has a history with noncompliance with medication and is agreeable to start Risperdal and plan for the Risperdal Consta injection. Unable to obtain any history at this time patient was last psychiatrically hospitalized here at [facility] and prescribed Depakote and Latuda.       Past Medical History:        Diagnosis Date    Anxiety     Asthma     [facility]     Bronchitis     COPD [facility]chronic obstructive pulmonary disease) [facility]     Depression     Iron deficiency anemia     PTSD (post-traumatic stress disorder)     Schizo affective schizophrenia ([facility]     [facility]     quit 7/2011  was marajuanna user        Medications Prior to Admission:   Medications Prior to Admission: divalproex (DEPAKOTE) 500 MG DR tablet, Take 1 tablet by mouth 3 times daily  PARoxetine (PAXIL) 20 MG tablet, Take 1 tablet by mouth daily  lurasidone (LATUDA) 120 MG tablet, Take 1 tablet by mouth daily  hydrOXYzine (VISTARIL) 50 MG capsule, Take 50 mg by mouth nightly as needed     Past Surgical History:        Procedure Laterality Date    TONSILLECTOMY         Allergies:   Patient has no known allergies. Family History  Family History   Problem Relation Age of Onset    Diabetes Mother     High Blood Pressure Mother     Kidney Disease Mother     Other Mother         mesothelioma    Diabetes Father              EXAMINATION:    REVIEW OF SYSTEMS:    ROS:  [x] All negative/unchanged except if checked.  Explain positive(checked items) below:  [] Constitutional  [] Eyes  [] Ear/Nose/Mouth/Throat  [] Respiratory  [] CV  [] GI  []   [] Musculoskeletal  [] Skin/Breast  [] Neurological  [] Endocrine  [] Heme/Lymph  [] Allergic/Immunologic    Explanation:     Vitals:  /72   Pulse 73   Temp 98.2 °F (36.8 °C)   Resp 16   Ht 5' 4\" (1.626 m)   Wt 130 lb (59 kg)   SpO2 98%   BMI 22.31 kg/m²      Physical Examination:   Head: x  Atraumatic: x normocephalic  Skin and Mucosa        Moist x  Dry   Pale  x Normal   Neck:  Thyroid  Palpable   x  Not palpable   venus distention   adenopathy   Chest: x Clear   Rhonchi     Wheezing   CV:  xS1   xS2    xNo murmer   Abdomen:  x  Soft    Tender    Viceromegaly   Extremities:  x No Edema     Edema     Cranial Nerves Examination:   CN II:   xPupils are reactive to light  Pupils are non reactive to light  CN III, IV, VI:  xNo eye deviation    No diplopia or ptosis   CN V:    xFacial Sensation is intact     Facial Sensation is not patient's questions were answered to the patient's satisfaction. I discussed with the patient the risk, benefit, alternative and common side effects for the proposed medication treatment. The patient is consenting to this treatment. Collateral Information:  Will obtain collateral information from the family or friends. Will obtain medical records as appropriate from out patient providers  Will consult the hospitalist for a physical exam to rule out any co-morbid physical condition. Home medication Reconciled   Patient seen and plan discussed with Dr. Gissel Martin we will continue the Depakote 5 mg 3 times daily for mood stabilization  We will start Risperdal 1 mg twice daily for psychosis and plan for the Risperdal Consta injection    New Medications started during this admission :        Prn Haldol 5mg and Vistaril 50mg q6hr for extreme agitation. Trazodone as ordered for insomnia  Vistaril as ordered for anxiety      Psychotherapy:   Encourage participation in milieu and group therapy  Individual therapy as needed        Behavioral Services  Medicare Certification      Admission Day 1  I certify that this patient's inpatient psychiatric hospital admission is medically necessary for:     (1) treatment which could reasonably be expected to improve this patient's condition, or     (2) diagnostic study or its equivalent.        Electronically signed by CHARO Rabago CNP on 7/14/6577 at 7:53 AM

## 2020-06-24 NOTE — CARE COORDINATION
Biopsychosocial Assessment Note    Social work met with patient to complete the biopsychosocial assessment and CSSR-S. Pt was cooperative and friendly throughout the assessment process    Mental Status Exam: Pt was alert and oriented x4     Chief Complaint: SW presented to the ED after hearing voices and feeling like her neighbors can hear what she is thinking    Patient Report: SW met with pt to complete assessment pt was lethargic throughout the assessment process but was able to answer questions. Pt reported challenges with depression and reports she lives alone. Pt did not identify additional family support and reports she does not have family contact. Pt currently denies HI, denies SI, and denies hallucinations. Gender  [] Male [x] Female [] Transgender  [] Other    Sexual Orientation    [x] Heterosexual [] Homosexual [] Bisexual [] Other    Suicidal Ideation  [] Reports [x] Denies    Homicidal Ideation  [] Reports [x] Denies      Hallucinations/Delusions (Specify type)  [] Reports [x] Denies     Substance Use/Alcohol Use/Addiction  [] Reports [x] Denies     Trauma History  [] Reports [x] Denies     Collateral Contact (MYKEL signed)  Name:  Pt did not identify anyone for collateral  Relationship:  Number:     Collateral Information:     Access to Weapons:     Follow up provider: Pt treats with [facility]    Plan for discharge (where they live can they return):

## 2020-06-24 NOTE — PLAN OF CARE
Problem: Altered Mood, Psychotic Behavior:  Goal: Able to verbalize reality based thinking  Description: Able to verbalize reality based thinking  Outcome: Ongoing  Goal: Ability to interact with others will improve  Description: Ability to interact with others will improve  Outcome: Ongoing              Patient has been sleeping mos to f morning. Awaken for lunch. Flat and depressed. Reports she has been off her medication for 2 weeks and \"I have been going thru some stuff\". Hilaria Jyothi were saying I wanted to through a brick at someone\". Flat and depressed. Denies paranoia. Denies suicidal and homicidal thoughts. Denies hallucinations. Will continue to observe and support.

## 2020-06-24 NOTE — PLAN OF CARE
[facility]  Initial Interdisciplinary Treatment Plan NOTE    Review Date & Time:  6/24/2020    [address]    Patient was not in treatment team    Admission Type:   Admission Type: Voluntary    Reason for admission:  Reason for Admission: \"to get back on my meds\"      Estimated Length of Stay Update:   5 to 7 days  Estimated Discharge Date Update:  6/26/2020    PATIENT STRENGTHS:  Patient Strengths Strengths: Communication  Patient Strengths and Limitations:Limitations: Difficult relationships / poor social skills, Difficulty problem solving/relies on others to help solve problems, Tendency to isolate self  Addictive Behavior:Addictive Behavior  In the past 3 months, have you felt or has someone told you that you have a problem with:  : None  Do you have a history of Chemical Use?: No  Do you have a history of Alcohol Use?: Comment(occasional 24 oz beer )  Do you have a history of Street Drug Abuse?: Yes(smoking cannabis 2 joints day past 2wks)  Histroy of Prescripton Drug Abuse?: No  Medical Problems:  Past Medical History:   Diagnosis Date    Anxiety     Asthma     [facility]     Bronchitis     COPD (chronic obstructive pulmonary disease) ([facility]     Depression     Iron deficiency anemia     PTSD (post-traumatic stress disorder)     Schizo affective schizophrenia ([facility]     Substance abuse ([facility]     quit 7/2011  was [place] user        EDUCATION:   Learner Progress Toward Treatment Goals: Reviewed results and recommendations of this team    Method: Individual    Outcome: Needs reinforcement    PATIENT GOALS:  none    PLAN/TREATMENT RECOMMENDATIONS UPDATE: encourage daily goals and groups    GOALS UPDATE:   Time frame for Short-Term Goals:  By discharge     Tuan Quarles RN

## 2020-06-24 NOTE — PROGRESS NOTES
`[facility] [place]  Admission Note   Admitted 38 y/oA/A female a/o x4 stopped her meds 2 weeks ago and was using cannabis daily and occasional 24 oz beer having paranoid thoughts of her neighbor reading her mind and she came here to get back on her meds denies any suicidal thoughts or plans attends [facility] services for psych meds oriented to unit and room support given     Admission Type:   Admission Type: Voluntary    Reason for admission:  Reason for Admission: \"to get back on my meds\"    PATIENT STRENGTHS:  Strengths: Communication    Patient Strengths and Limitations:  Limitations: Difficult relationships / poor social skills, Difficulty problem solving/relies on others to help solve problems, Tendency to isolate self    Addictive Behavior:   Addictive Behavior  In the past 3 months, have you felt or has someone told you that you have a problem with:  : None  Do you have a history of Chemical Use?: No  Do you have a history of Alcohol Use?: Comment(occasional 24 oz beer )  Do you have a history of Street Drug Abuse?: Yes(smoking cannabis 2 joints day past 2wks)  Histroy of Prescripton Drug Abuse?: No    Medical Problems:   Past Medical History:   Diagnosis Date    Anxiety     Asthma     B[facility] disorder ([facility]     Bronchitis     COPD (chronic obstructive pulmonary disease) ([facility]     Depression     Iron deficiency anemia     PTSD (post-traumatic stress disorder)     Schizo affective schizophrenia ([facility]     Substance abuse ([facility]     quit 7/2011  was [place] user        Status EXAM:  Status and Exam  Normal: Yes  Facial Expression: Avoids Gaze  Affect: Appropriate  Level of Consciousness: Alert  Mood:Normal: No  Mood: Depressed, Anxious, Sad  Motor Activity:Normal: Yes  Interview Behavior: Cooperative  Attention:Normal: Yes  Thought Processes: Blocking  Thought Content:Normal: No  Thought Content: Paranoia, Delusions  Hallucinations: None  Delusions: Yes(thoughts that neighbor can hear her

## 2020-06-25 PROCEDURE — 1240000000 HC EMOTIONAL WELLNESS R&B

## 2020-06-25 PROCEDURE — 99232 SBSQ HOSP IP/OBS MODERATE 35: CPT | Performed by: NURSE PRACTITIONER

## 2020-06-25 PROCEDURE — 6370000000 HC RX 637 (ALT 250 FOR IP): Performed by: PSYCHIATRY & NEUROLOGY

## 2020-06-25 PROCEDURE — 6370000000 HC RX 637 (ALT 250 FOR IP): Performed by: NURSE PRACTITIONER

## 2020-06-25 RX ORDER — RISPERIDONE 2 MG/1
2 TABLET, FILM COATED ORAL NIGHTLY
Status: DISCONTINUED | OUTPATIENT
Start: 2020-06-25 | End: 2020-06-27

## 2020-06-25 RX ORDER — RISPERIDONE 1 MG/1
1 TABLET, FILM COATED ORAL DAILY
Status: DISCONTINUED | OUTPATIENT
Start: 2020-06-26 | End: 2020-07-06 | Stop reason: HOSPADM

## 2020-06-25 RX ADMIN — DIVALPROEX SODIUM 500 MG: 250 TABLET, DELAYED RELEASE ORAL at 20:17

## 2020-06-25 RX ADMIN — DIVALPROEX SODIUM 500 MG: 250 TABLET, DELAYED RELEASE ORAL at 14:28

## 2020-06-25 RX ADMIN — TRAZODONE HYDROCHLORIDE 50 MG: 50 TABLET ORAL at 20:17

## 2020-06-25 RX ADMIN — HYDROXYZINE PAMOATE 50 MG: 50 CAPSULE ORAL at 20:17

## 2020-06-25 RX ADMIN — RISPERIDONE 1 MG: 1 TABLET, FILM COATED ORAL at 08:52

## 2020-06-25 RX ADMIN — DIVALPROEX SODIUM 500 MG: 250 TABLET, DELAYED RELEASE ORAL at 08:52

## 2020-06-25 ASSESSMENT — PAIN SCALES - GENERAL
PAINLEVEL_OUTOF10: 0
PAINLEVEL_OUTOF10: 0

## 2020-06-25 NOTE — PROGRESS NOTES
Awake in room denies any paranoid thoughts affect constricted isolating compliant with meds support given

## 2020-06-25 NOTE — PLAN OF CARE
Problem: Altered Mood, Psychotic Behavior:  Goal: Able to verbalize reality based thinking  Description: Able to verbalize reality based thinking  Outcome: Ongoing  Goal: Ability to interact with others will improve  Description: Ability to interact with others will improve  Outcome: Ongoing           Pleasant and cooperative. Flat and depressed. Attending groups and medication compliant. Denies suicidal and homicidal thoughts. Denies hallucinations. Will continue to observe and support.

## 2020-06-25 NOTE — PROGRESS NOTES
Every Day Smoker     Packs/day: 0.50     Years: 20.00     Pack years: 10.00     Types: Cigarettes    Smokeless tobacco: Never Used   Substance and Sexual Activity    Alcohol use: Yes     Frequency: Never     Comment: a beer once or twice a month    Drug use: Yes     Types: Marijuana     Comment: 2-3x/month    Sexual activity: Not Currently   Lifestyle    Physical activity     Days per week: Not on file     Minutes per session: Not on file    Stress: Not on file   Relationships    Social connections     Talks on phone: Not on file     Gets together: Not on file     Attends Caodaism service: Not on file     Active member of club or organization: Not on file     Attends meetings of clubs or organizations: Not on file     Relationship status: Not on file    Intimate partner violence     Fear of current or ex partner: Not on file     Emotionally abused: Not on file     Physically abused: Not on file     Forced sexual activity: Not on file   Other Topics Concern    Not on file   Social History Narrative    Not on file           ROS:  [x] All negative/unchanged except if checked.  Explain positive(checked items) below:  [] Constitutional  [] Eyes  [] Ear/Nose/Mouth/Throat  [] Respiratory  [] CV  [] GI  []   [] Musculoskeletal  [] Skin/Breast  [] Neurological  [] Endocrine  [] Heme/Lymph  [] Allergic/Immunologic    Explanation:     MEDICATIONS:    Current Facility-Administered Medications:     divalproex (DEPAKOTE) DR tablet 500 mg, 500 mg, Oral, TID, CHARO Acharya CNP, 950 mg at 06/25/20 1428    risperiDONE (RISPERDAL) tablet 1 mg, 1 mg, Oral, BID, CHARO Acharya CNP, 1 mg at 06/25/20 1956    acetaminophen (TYLENOL) tablet 650 mg, 650 mg, Oral, Q6H PRN, Lamont Rosen MD    hydrOXYzine (VISTARIL) capsule 50 mg, 50 mg, Oral, TID PRN, Lamont Rosen MD, 50 mg at 06/23/20 2055    haloperidol lactate (HALDOL) injection 5 mg, 5 mg, Intramuscular, Q6H PRN **OR** haloperidol (HALDOL) tablet 5 mg, 5 mg, Oral, Q6H PRN, Lamont Rosen MD    traZODone (DESYREL) tablet 50 mg, 50 mg, Oral, Nightly PRN, Lamont Rosen MD, 50 mg at 06/24/20 2101    magnesium hydroxide (MILK OF MAGNESIA) 400 MG/5ML suspension 30 mL, 30 mL, Oral, Daily PRN, Lamont Rosen MD    aluminum & magnesium hydroxide-simethicone (MAALOX) 200-200-20 MG/5ML suspension 30 mL, 30 mL, Oral, PRN, Lamont Rosen MD    nicotine (NICODERM CQ) 21 MG/24HR 1 patch, 1 patch, Transdermal, Daily, Lamont Rosen MD, 1 patch at 06/25/20 8374      Examination:  BP 99/64   Pulse 99   Temp 98.4 °F (36.9 °C) (Oral)   Resp 16   Ht 5' 4\" (1.626 m)   Wt 130 lb (59 kg)   SpO2 98%   BMI 22.31 kg/m²   Gait - steady  Medication side effects(SE): Denies    Mental Status Examination:    Level of consciousness:  within normal limits   Appearance:  fair grooming and fair hygiene  Behavior/Motor:  no abnormalities noted  Attitude toward examiner:  cooperative  Speech:  spontaneous, normal rate and normal volume   Mood: euthymic   Affect: Flat and blunted  Thought processes:  linear   Thought content: Paranoid delusions, auditory hallucinations  Cognition:  oriented to person, place, and time   Concentration intact  Insight Limited  Judgement Limited    ASSESSMENT:   Patient symptoms are:  [] Well controlled  [] Improving  [] Worsening  [x] No change      Diagnosis:   Principal Problem:    Bipolar affective disorder, mixed, severe, with psychotic behavior ([facility]  Active Problems:    Cannabis abuse  Resolved Problems:    * No resolved hospital problems.  *      LABS:    Recent Labs     06/23/20  1503   WBC 6.1   HGB 13.2        Recent Labs     06/23/20  1503      K 4.2      CO2 20*   BUN 8   CREATININE 0.8   GLUCOSE 98     Recent Labs     06/23/20  1503   BILITOT 0.3   ALKPHOS 66   AST 20   ALT 15     Lab Results   Component Value Date    LABAMPH NOT DETECTED 06/23/2020    BARBSCNU NOT DETECTED 06/23/2020    LABBENZ NOT DETECTED 06/23/2020    LABMETH NOT DETECTED 06/23/2020    OPIATESCREENURINE NOT DETECTED 06/23/2020    PHENCYCLIDINESCREENURINE NOT DETECTED 06/23/2020    ETOH <10 06/23/2020     Lab Results   Component Value Date    TSH 1.770 03/07/2019     No results found for: LITHIUM  Lab Results   Component Value Date    VALPROATE 66 06/23/2020           Treatment Plan:  Reviewed current Medications with the patient. Risks, benefits, side effects, drug-to-drug interactions and alternatives to treatment were discussed. Collateral information:   CD evaluation  Encourage patient to attend group and other milieu activities.   Discharge planning discussed with the patient and treatment team.    Continue Depakote 500 mg twice daily for mood stabilization  Increase Risperdal 1 mg daily and 2 mg at bedtime for psychosis    PSYCHOTHERAPY/COUNSELING:  [x] Therapeutic interview  [x] Supportive  [] CBT  [] Ongoing  [] Other    [x] Patient continues to need, on a daily basis, active treatment furnished directly by or requiring the supervision of inpatient psychiatric personnel      Anticipated Length of stay: 5 to 7 days based on stability            Electronically signed by CHARO Schreiber CNP on 4/89/1991 at 6:20 PM

## 2020-06-25 NOTE — CARE COORDINATION
JERZY spoke to CHRISTAL who reported pt does not always take her medication as prescribed    She recommends pt be put on an injection    Sabrina Diaz reports no safety concerns and reports no access to weapons.  Sabrina Diaz reports she has been increasingly paranoid and when that subsides she reports no additional concerns

## 2020-06-26 PROCEDURE — 6370000000 HC RX 637 (ALT 250 FOR IP): Performed by: NURSE PRACTITIONER

## 2020-06-26 PROCEDURE — 6370000000 HC RX 637 (ALT 250 FOR IP): Performed by: PSYCHIATRY & NEUROLOGY

## 2020-06-26 PROCEDURE — 99232 SBSQ HOSP IP/OBS MODERATE 35: CPT | Performed by: NURSE PRACTITIONER

## 2020-06-26 PROCEDURE — 1240000000 HC EMOTIONAL WELLNESS R&B

## 2020-06-26 RX ADMIN — RISPERIDONE 2 MG: 2 TABLET, FILM COATED ORAL at 21:22

## 2020-06-26 RX ADMIN — DIVALPROEX SODIUM 500 MG: 250 TABLET, DELAYED RELEASE ORAL at 21:22

## 2020-06-26 RX ADMIN — DIVALPROEX SODIUM 500 MG: 250 TABLET, DELAYED RELEASE ORAL at 14:12

## 2020-06-26 RX ADMIN — DIVALPROEX SODIUM 500 MG: 250 TABLET, DELAYED RELEASE ORAL at 09:17

## 2020-06-26 RX ADMIN — TRAZODONE HYDROCHLORIDE 50 MG: 50 TABLET ORAL at 21:22

## 2020-06-26 ASSESSMENT — PAIN SCALES - GENERAL
PAINLEVEL_OUTOF10: 0

## 2020-06-26 NOTE — PROGRESS NOTES
Denies SI/HI/AV hallucinations. Admits to \"intrusive\" thoughts, and stated that the risperdal is making it worse. Emotional support provided. Pleasant and cooperative. Social with peers. Will continue to monitor and provide support.

## 2020-06-26 NOTE — PROGRESS NOTES
BEHAVIORAL HEALTH FOLLOW-UP NOTE     6/26/2020     Patient was seen and examined in person, Chart reviewed   Patient's case discussed with staff/team    Chief Complaint: \" I do not need to be here I need the police. \"    Interim History: Patient seen in treatment team continues to believe that her neighbors talking to her all night and reports an intrusive thoughts last night of \"molesting her friends grandchildren. \"  She is flat and blunted. Believe the police need to be involved due to her neighbors continuously harassing her. Limited insight and judgment to hospitalization need for treatment.       Appetite:   [x] Normal/Unchanged  [] Increased  [] Decreased      Sleep:       [x] Normal/Unchanged  [] Fair       [] Poor              Energy:    [x] Normal/Unchanged  [] Increased  [] Decreased        SI [] Present  [x] Absent    HI  []Present  [x] Absent     Aggression:  [] yes  [x] no    Patient is [x] able  [] unable to CONTRACT FOR SAFETY     PAST MEDICAL/PSYCHIATRIC HISTORY:   Past Medical History:   Diagnosis Date    Anxiety     Asthma     [facility]     Bronchitis     [facility]     Depression     Iron deficiency anemia     PTSD (post-traumatic stress disorder)     Schizo affective schizophrenia [facility]     Substance abuse ([facility]     quit 7/2011  was [place] user        FAMILY/SOCIAL HISTORY:  Family History   Problem Relation Age of Onset    Diabetes Mother     High Blood Pressure Mother     Kidney Disease Mother     Other Mother         mesothelioma    Diabetes Father      Social History     Socioeconomic History    Marital status: Single     Spouse name: Not on file    Number of children: 0    Years of education: GED    Highest education level: Not on file   Occupational History    Not on file   Social Needs    Financial resource strain: Not on file    Food insecurity     Worry: Not on file     Inability: Not on file   Bills Transportation needs     Medical: Not on file     Non-medical: Not on file   Tobacco Use    Smoking status: Current Every Day Smoker     Packs/day: 0.50     Years: 20.00     Pack years: 10.00     Types: Cigarettes    Smokeless tobacco: Never Used   Substance and Sexual Activity    Alcohol use: Yes     Frequency: Never     Comment: a beer once or twice a month    Drug use: Yes     Types: Marijuana     Comment: 2-3x/month    Sexual activity: Not Currently   Lifestyle    Physical activity     Days per week: Not on file     Minutes per session: Not on file    Stress: Not on file   Relationships    Social connections     Talks on phone: Not on file     Gets together: Not on file     Attends Anglican service: Not on file     Active member of club or organization: Not on file     Attends meetings of clubs or organizations: Not on file     Relationship status: Not on file    Intimate partner violence     Fear of current or ex partner: Not on file     Emotionally abused: Not on file     Physically abused: Not on file     Forced sexual activity: Not on file   Other Topics Concern    Not on file   Social History Narrative    Not on file           ROS:  [x] All negative/unchanged except if checked.  Explain positive(checked items) below:  [] Constitutional  [] Eyes  [] Ear/Nose/Mouth/Throat  [] Respiratory  [] CV  [] GI  []   [] Musculoskeletal  [] Skin/Breast  [] Neurological  [] Endocrine  [] Heme/Lymph  [] Allergic/Immunologic    Explanation:     MEDICATIONS:    Current Facility-Administered Medications:     risperiDONE (RISPERDAL) tablet 1 mg, 1 mg, Oral, Daily, CHARO Elam CNP    risperiDONE (RISPERDAL) tablet 2 mg, 2 mg, Oral, Nightly, CHARO Elam CNP    divalproex (DEPAKOTE) DR tablet 500 mg, 500 mg, Oral, TID, CHARO Motta CNP, 542 mg at 06/26/20 0917    acetaminophen (TYLENOL) tablet 650 mg, 650 mg, Oral, Q6H PRN, Lonnie Reza MD    hydrOXYzine (VISTARIL) capsule 50 mg, 50 mg, Oral, TID PRN, Richa Todd MD, 50 mg at 06/25/20 2017    haloperidol lactate (HALDOL) injection 5 mg, 5 mg, Intramuscular, Q6H PRN **OR** haloperidol (HALDOL) tablet 5 mg, 5 mg, Oral, Q6H PRN, Richa Todd MD    traZODone (DESYREL) tablet 50 mg, 50 mg, Oral, Nightly PRN, Richa Todd MD, 50 mg at 06/25/20 2017    magnesium hydroxide (MILK OF MAGNESIA) 400 MG/5ML suspension 30 mL, 30 mL, Oral, Daily PRN, Richa Todd MD    aluminum & magnesium hydroxide-simethicone (MAALOX) 200-200-20 MG/5ML suspension 30 mL, 30 mL, Oral, PRN, Richa Todd MD    nicotine (NICODERM CQ) 21 MG/24HR 1 patch, 1 patch, Transdermal, Daily, Richa Todd MD, 1 patch at 06/26/20 9201      Examination:  BP 91/60   Pulse 77   Temp 98.7 °F (37.1 °C) (Oral)   Resp 14   Ht 5' 4\" (1.626 m)   Wt 130 lb (59 kg)   SpO2 98%   BMI 22.31 kg/m²   Gait - steady  Medication side effects(SE): Denies    Mental Status Examination:    Level of consciousness:  within normal limits   Appearance:  fair grooming and fair hygiene  Behavior/Motor:  no abnormalities noted  Attitude toward examiner:  cooperative  Speech:  spontaneous, normal rate and normal volume   Mood: euthymic   Affect: Flat and blunted  Thought processes:  linear   Thought content: Paranoid delusions, auditory hallucinations  Cognition:  oriented to person, place, and time   Concentration intact  Insight Limited  Judgement Limited    ASSESSMENT:   Patient symptoms are:  [] Well controlled  [] Improving  [] Worsening  [x] No change      Diagnosis:   Principal Problem:    Bipolar affective disorder, mixed, severe, with psychotic behavior ([facility]  Active Problems:    Cannabis abuse  Resolved Problems:    * No resolved hospital problems.  *      LABS:    Recent Labs     06/23/20  1503   WBC 6.1   HGB 13.2        Recent Labs     06/23/20  1503      K 4.2      CO2 20*   BUN 8   CREATININE 0.8   GLUCOSE 98     Recent Labs 06/23/20  1503   BILITOT 0.3   ALKPHOS 66   AST 20   ALT 15     Lab Results   Component Value Date    LABAMPH NOT DETECTED 06/23/2020    BARBSCNU NOT DETECTED 06/23/2020    LABBENZ NOT DETECTED 06/23/2020    LABMETH NOT DETECTED 06/23/2020    OPIATESCREENURINE NOT DETECTED 06/23/2020    PHENCYCLIDINESCREENURINE NOT DETECTED 06/23/2020    ETOH <10 06/23/2020     Lab Results   Component Value Date    TSH 1.770 03/07/2019     No results found for: LITHIUM  Lab Results   Component Value Date    VALPROATE 66 06/23/2020           Treatment Plan:  Reviewed current Medications with the patient. Risks, benefits, side effects, drug-to-drug interactions and alternatives to treatment were discussed. Collateral information:   CD evaluation  Encourage patient to attend group and other milieu activities.   Discharge planning discussed with the patient and treatment team.    Continue Depakote 500 mg twice daily for mood stabilization  Increase Risperdal 1 mg daily and 3 mg at bedtime for psychosis    PSYCHOTHERAPY/COUNSELING:  [x] Therapeutic interview  [x] Supportive  [] CBT  [] Ongoing  [] Other    [x] Patient continues to need, on a daily basis, active treatment furnished directly by or requiring the supervision of inpatient psychiatric personnel      Anticipated Length of stay: 5 to 7 days based on stability            Electronically signed by CHARO Thornton CNP on 6/01/0250 at 9:31 AM

## 2020-06-26 NOTE — PROGRESS NOTES
Pt attended community meeting. Attempted to have patient identify goal for the day but patient declined to share gaol not acknowledging when staff was speaking to her.

## 2020-06-26 NOTE — PLAN OF CARE
Problem: Altered Mood, Psychotic Behavior:  Goal: Able to verbalize reality based thinking  Description: Able to verbalize reality based thinking  Outcome: Ongoing  Goal: Ability to interact with others will improve  Description: Ability to interact with others will improve  Outcome: Ongoing   pt denies si/hi. Pt c/o auditory hallucinations of her neighbors talking to her all night pt states they kept her up all night talking to her. Pt states she does not need to be in the hospital what she needs is to call the police and get them involved with her neighbors harassing her. Pt c/o intrusive thoughts of \"molesting her friends grandchildren. Pt has a flat affect with delayed responses. Pt takes med's and attends select groups.

## 2020-06-26 NOTE — PROGRESS NOTES
Other(See Comment)  Memory:Normal: Yes  Insight and Judgment: No  Insight and Judgment: Poor Judgment, Poor Insight  Present Suicidal Ideation: No  Present Homicidal Ideation: No    Daily Assessment Last Entry:   Daily Sleep (WDL): Within Defined Limits         Patient Currently in Pain: Other (comment)(YOUNG pt. sleeping)  Daily Nutrition (WDL): Within Defined Limits    Patient Monitoring:  Frequency of Checks: 4 times per hour, close    Psychiatric Symptoms:   Depression Symptoms  Depression Symptoms: Impaired concentration, Sleep disturbance  Anxiety Symptoms  Anxiety Symptoms: Generalized  Ksenia Symptoms  Ksenia Symptoms: Poor judgment, Less need to sleep     Psychosis Symptoms  Hallucination Type: No problems reported or observed. Delusion Type: No problems reported or observed.   Describe Delusion: (the neighbor can read her thoughts)    Suicide Risk CSSR-S:  1) Within the past month, have you wished you were dead or wished you could go to sleep and not wake up? : No  2) Have you actually had any thoughts of killing yourself? : No  6) Have you ever done anything, started to do anything, or prepared to do anything to end your life?: No  Change in Result none Change in Plan of care none      EDUCATION:   Learner Progress Toward Treatment Goals: Reviewed group plan and strategies    Method: Small group    Outcome: Needs reinforcement    PATIENT GOALS: no goal    PLAN/TREATMENT RECOMMENDATIONS UPDATE: 06/29/2020    GOALS UPDATE:   Time frame for Short-Term Goals: 3-5 days        Lucretia Diaz RN

## 2020-06-26 NOTE — GROUP NOTE
Group Therapy Note    Date: 6/25/2020    Group Start Time: 2030  Group End Time: 2110  Group Topic: Healthy Living/Wellness    [facility] [address], RN        Group Therapy Note    Attendees: 11/20

## 2020-06-27 PROCEDURE — 1240000000 HC EMOTIONAL WELLNESS R&B

## 2020-06-27 PROCEDURE — 6370000000 HC RX 637 (ALT 250 FOR IP): Performed by: NURSE PRACTITIONER

## 2020-06-27 PROCEDURE — 6370000000 HC RX 637 (ALT 250 FOR IP): Performed by: PSYCHIATRY & NEUROLOGY

## 2020-06-27 PROCEDURE — 99232 SBSQ HOSP IP/OBS MODERATE 35: CPT | Performed by: NURSE PRACTITIONER

## 2020-06-27 RX ADMIN — RISPERIDONE 3 MG: 2 TABLET, FILM COATED ORAL at 20:39

## 2020-06-27 RX ADMIN — DIVALPROEX SODIUM 500 MG: 250 TABLET, DELAYED RELEASE ORAL at 20:39

## 2020-06-27 RX ADMIN — DIVALPROEX SODIUM 500 MG: 250 TABLET, DELAYED RELEASE ORAL at 13:57

## 2020-06-27 RX ADMIN — TRAZODONE HYDROCHLORIDE 50 MG: 50 TABLET ORAL at 20:39

## 2020-06-27 RX ADMIN — DIVALPROEX SODIUM 500 MG: 250 TABLET, DELAYED RELEASE ORAL at 08:31

## 2020-06-27 RX ADMIN — RISPERIDONE 1 MG: 1 TABLET, FILM COATED ORAL at 08:31

## 2020-06-27 RX ADMIN — HYDROXYZINE PAMOATE 50 MG: 50 CAPSULE ORAL at 20:39

## 2020-06-27 ASSESSMENT — PAIN SCALES - GENERAL
PAINLEVEL_OUTOF10: 0

## 2020-06-27 NOTE — PLAN OF CARE
Patient at beginning of shift was in her room,alert,awake,oriented. Denies thoughts of harm to self or others, denies hearing voices or seeing shadows . Verbalizes anxiety is 8 out of 10 do to hellava night last night, \"I kept thinking about the people that bother me outside of here and I didn't have my nicotine patch. \" My depression is 6 out of 10 being back here and not at home ,negative thoughts and negative energy. \"I just need the doctor to put me back on my regular medications (Paxil,Depakote,latuda, vistaril and trazodone) so I can get out of here. Sleep is not good and do not feel rested upon rising. After discharge I'm going to stay on my medications and follow up at [facility]) and build my self esteem. Patient compliant with all medications except refused am Risperdal attends groups. Safety rounds continue.

## 2020-06-27 NOTE — PROGRESS NOTES
Patient resting quiet to self at this time, respirations are even and unlabored, no signs or symptoms of distress or discomfort. PRN medications given this shift for sleep. Staff will continue to conduct environmental rounds to ensure the safety of everyone on the unit. Staff will provide support and interventions as requested or required.

## 2020-06-27 NOTE — PLAN OF CARE
Problem: Altered Mood, Psychotic Behavior:  Goal: Able to verbalize reality based thinking  Description: Able to verbalize reality based thinking  Outcome: Ongoing  Goal: Ability to interact with others will improve  Description: Ability to interact with others will improve  Outcome: Ongoing               Patient constricted and flat. Depressed. Believes that the voices are real with the neighbors. Denies suicidal and homicidal thoughts. Will continue to observe and support.

## 2020-06-27 NOTE — PROGRESS NOTES
Not on file   Occupational History    Not on file   Social Needs    Financial resource strain: Not on file    Food insecurity     Worry: Not on file     Inability: Not on file    Transportation needs     Medical: Not on file     Non-medical: Not on file   Tobacco Use    Smoking status: Current Every Day Smoker     Packs/day: 0.50     Years: 20.00     Pack years: 10.00     Types: Cigarettes    Smokeless tobacco: Never Used   Substance and Sexual Activity    Alcohol use: Yes     Frequency: Never     Comment: a beer once or twice a month    Drug use: Yes     Types: Marijuana     Comment: 2-3x/month    Sexual activity: Not Currently   Lifestyle    Physical activity     Days per week: Not on file     Minutes per session: Not on file    Stress: Not on file   Relationships    Social connections     Talks on phone: Not on file     Gets together: Not on file     Attends Druze service: Not on file     Active member of club or organization: Not on file     Attends meetings of clubs or organizations: Not on file     Relationship status: Not on file    Intimate partner violence     Fear of current or ex partner: Not on file     Emotionally abused: Not on file     Physically abused: Not on file     Forced sexual activity: Not on file   Other Topics Concern    Not on file   Social History Narrative    Not on file           ROS:  [x] All negative/unchanged except if checked.  Explain positive(checked items) below:  [] Constitutional  [] Eyes  [] Ear/Nose/Mouth/Throat  [] Respiratory  [] CV  [] GI  []   [] Musculoskeletal  [] Skin/Breast  [] Neurological  [] Endocrine  [] Heme/Lymph  [] Allergic/Immunologic    Explanation:     MEDICATIONS:    Current Facility-Administered Medications:     risperiDONE (RISPERDAL) tablet 3 mg, 3 mg, Oral, Nightly, CHARO Elam CNP    risperiDONE (RISPERDAL) tablet 1 mg, 1 mg, Oral, Daily, CHARO Motta CNP, 1 mg at 06/27/20 0831    divalproex (DEPAKOTE)  tablet 500 mg, 500 mg, Oral, TID, Isa Middleton, APRN - CNP, 446 mg at 06/27/20 1357    acetaminophen (TYLENOL) tablet 650 mg, 650 mg, Oral, Q6H PRN, Lisa Magana MD    hydrOXYzine (VISTARIL) capsule 50 mg, 50 mg, Oral, TID PRN, Lisa Magana MD, 50 mg at 06/25/20 2017    haloperidol lactate (HALDOL) injection 5 mg, 5 mg, Intramuscular, Q6H PRN **OR** haloperidol (HALDOL) tablet 5 mg, 5 mg, Oral, Q6H PRN, Lisa Magana MD    traZODone (DESYREL) tablet 50 mg, 50 mg, Oral, Nightly PRN, Lisa Magana MD, 50 mg at 06/26/20 2122    magnesium hydroxide (MILK OF MAGNESIA) 400 MG/5ML suspension 30 mL, 30 mL, Oral, Daily PRN, Lisa Magana MD    aluminum & magnesium hydroxide-simethicone (MAALOX) 200-200-20 MG/5ML suspension 30 mL, 30 mL, Oral, PRN, Lisa Magana MD    nicotine (NICODERM CQ) 21 MG/24HR 1 patch, 1 patch, Transdermal, Daily, Lisa Magana MD, 1 patch at 06/27/20 0578      Examination:  BP (!) 98/57   Pulse 73   Temp 98.8 °F (37.1 °C)   Resp 15   Ht 5' 4\" (1.626 m)   Wt 130 lb (59 kg)   SpO2 98%   BMI 22.31 kg/m²   Gait - steady  Medication side effects(SE): Denies    Mental Status Examination:    Level of consciousness:  within normal limits   Appearance:  fair grooming and fair hygiene  Behavior/Motor:  no abnormalities noted  Attitude toward examiner:  cooperative  Speech:  spontaneous, normal rate and normal volume   Mood: euthymic   Affect: Flat and blunted  Thought processes:  linear   Thought content: Paranoid delusions, auditory hallucinations  Cognition:  oriented to person, place, and time   Concentration intact  Insight Limited  Judgement Limited    ASSESSMENT:   Patient symptoms are:  [] Well controlled  [] Improving  [] Worsening  [x] No change      Diagnosis:   Principal Problem:    Bipolar affective disorder, mixed, severe, with psychotic behavior ([facility]  Active Problems:    Cannabis abuse  Resolved Problems:    * No resolved hospital problems.

## 2020-06-28 PROCEDURE — 6370000000 HC RX 637 (ALT 250 FOR IP): Performed by: PSYCHIATRY & NEUROLOGY

## 2020-06-28 PROCEDURE — 1240000000 HC EMOTIONAL WELLNESS R&B

## 2020-06-28 PROCEDURE — 99232 SBSQ HOSP IP/OBS MODERATE 35: CPT | Performed by: NURSE PRACTITIONER

## 2020-06-28 PROCEDURE — 6370000000 HC RX 637 (ALT 250 FOR IP): Performed by: NURSE PRACTITIONER

## 2020-06-28 RX ADMIN — DIVALPROEX SODIUM 500 MG: 250 TABLET, DELAYED RELEASE ORAL at 21:03

## 2020-06-28 RX ADMIN — DIVALPROEX SODIUM 500 MG: 250 TABLET, DELAYED RELEASE ORAL at 14:09

## 2020-06-28 RX ADMIN — RISPERIDONE 3 MG: 2 TABLET, FILM COATED ORAL at 21:03

## 2020-06-28 RX ADMIN — RISPERIDONE 1 MG: 1 TABLET, FILM COATED ORAL at 08:41

## 2020-06-28 RX ADMIN — TRAZODONE HYDROCHLORIDE 50 MG: 50 TABLET ORAL at 21:03

## 2020-06-28 RX ADMIN — DIVALPROEX SODIUM 500 MG: 250 TABLET, DELAYED RELEASE ORAL at 08:41

## 2020-06-28 RX ADMIN — HYDROXYZINE PAMOATE 50 MG: 50 CAPSULE ORAL at 21:03

## 2020-06-28 ASSESSMENT — PAIN SCALES - GENERAL
PAINLEVEL_OUTOF10: 0

## 2020-06-28 NOTE — PLAN OF CARE
Isolative to room with exception of meals. Denies thoughts of harm to self or others. Denies hearing voices or seeing shadows . Verbalizes that her anxiety is 7 to 8 out 10 do to thoughts going through my mind good and bad. I decided to get them out of my head and detox & not be bothered. Depression 4-5 out of 10 verbalizes the desire to go home and have a cigarette. .  States her appetite has improved since yesterday. States sleeps like a baby. States after discharge plans to catch up on everything that's been left go . Compliant with medications and attends groups. Safety rounds continues. Safety rounds continue.

## 2020-06-28 NOTE — PROGRESS NOTES
BEHAVIORAL HEALTH FOLLOW-UP NOTE     6/28/2020     Patient was seen and examined in person, Chart reviewed   Patient's case discussed with staff/team    Chief Complaint: Bizarre intrusive thoughts    Interim History: Patient presents with poor eye contact and a blunt affect. Patient is taking her medications and going to some groups. Patient did not discuss any of her intrusive thoughts regarding her father Blessing Kras vidal and doing homosexual acts \". Patient did however report that she is hearing her neighbors voice. Some individual name Arlen Sotelo is reading her mind telepathically. Patient looked at me and stated \"do you know Monster Packer is talking \"which seem to be in reference to a person or place. When I informed her that I did not know what she was talking about she was completely appalled. Patient then stated that Arlen Sotelo has been harassing her\" she is in my head she knows what I am thinking before I think it which is causing me to have poor sleep \". Patient also states \"other patients are talking to her as well \". Patient states that the reason why Arlen Sotelo is talking to the other patients telepathically is because she is admitted here and that when she is discharged Arlen Sotelo will start talking to her neighbors telepathically through her.     Appetite:   [x] Normal/Unchanged  [] Increased  [] Decreased      Sleep:       [x] Normal/Unchanged  [] Fair       [] Poor              Energy:    [x] Normal/Unchanged  [] Increased  [] Decreased        SI [] Present  [x] Absent    HI  []Present  [x] Absent     Aggression:  [] yes  [x] no    Patient is [x] able  [] unable to CONTRACT FOR SAFETY     PAST MEDICAL/PSYCHIATRIC HISTORY:   Past Medical History:   Diagnosis Date    Anxiety     Asthma     [facility]     Bronchitis     COPD (chronic obstructive pulmonary disease) (H[facility])     Depression     Iron deficiency anemia     PTSD (post-traumatic stress disorder)     [facility]izo affective schizophrenia ([facility]     [facility]     quit 7/2011  was [place] user        FAMILY/SOCIAL HISTORY:  Family History   Problem Relation Age of Onset    Diabetes Mother     High Blood Pressure Mother     Kidney Disease Mother     Other Mother         mesothelioma    Diabetes Father      Social History     Socioeconomic History    Marital status: Single     Spouse name: Not on file    Number of children: 0    Years of education: GED    Highest education level: Not on file   Occupational History    Not on file   Social Needs    Financial resource strain: Not on file    Food insecurity     Worry: Not on file     Inability: Not on file   [employer] needs     Medical: Not on file     Non-medical: Not on file   Tobacco Use    Smoking status: Current Every Day Smoker     Packs/day: 0.50     Years: 20.00     Pack years: 10.00     Types: Cigarettes    Smokeless tobacco: Never Used   Substance and Sexual Activity    Alcohol use: Yes     Frequency: Never     Comment: a beer once or twice a month    Drug use: Yes     Types: Marijuana     Comment: 2-3x/month    Sexual activity: Not Currently   Lifestyle    Physical activity     Days per week: Not on file     Minutes per session: Not on file    Stress: Not on file   Relationships    Social connections     Talks on phone: Not on file     Gets together: Not on file     Attends Mormonism service: Not on file     Active member of club or organization: Not on file     Attends meetings of clubs or organizations: Not on file     Relationship status: Not on file    Intimate partner violence     Fear of current or ex partner: Not on file     Emotionally abused: Not on file     Physically abused: Not on file     Forced sexual activity: Not on file   Other Topics Concern    Not on file   Social History Narrative    Not on file           ROS:  [x] All negative/unchanged except if checked.  Explain positive(checked items) below:  [] Constitutional  [] Eyes  [] Ear/Nose/Mouth/Throat  [] Respiratory  [] CV  [] GI  []   [] Musculoskeletal  [] Skin/Breast  [] Neurological  [] Endocrine  [] Heme/Lymph  [] Allergic/Immunologic    Explanation:     MEDICATIONS:    Current Facility-Administered Medications:     risperiDONE (RISPERDAL) tablet 3 mg, 3 mg, Oral, Nightly, Isa Middleton, APRN - CNP, 3 mg at 06/27/20 2039    risperiDONE (RISPERDAL) tablet 1 mg, 1 mg, Oral, Daily, Rollene Day, APRN - CNP, 1 mg at 06/28/20 0841    divalproex (DEPAKOTE) DR tablet 500 mg, 500 mg, Oral, TID, Rollene Day, APRN - CNP, 683 mg at 06/28/20 0841    acetaminophen (TYLENOL) tablet 650 mg, 650 mg, Oral, Q6H PRN, Lisa Magana MD    hydrOXYzine (VISTARIL) capsule 50 mg, 50 mg, Oral, TID PRN, Lisa Magana MD, 50 mg at 06/27/20 2039    haloperidol lactate (HALDOL) injection 5 mg, 5 mg, Intramuscular, Q6H PRN **OR** haloperidol (HALDOL) tablet 5 mg, 5 mg, Oral, Q6H PRN, Lisa Magana MD    traZODone (DESYREL) tablet 50 mg, 50 mg, Oral, Nightly PRN, Lisa Magana MD, 50 mg at 06/27/20 2039    magnesium hydroxide (MILK OF MAGNESIA) 400 MG/5ML suspension 30 mL, 30 mL, Oral, Daily PRN, Lisa Magana MD    aluminum & magnesium hydroxide-simethicone (MAALOX) 200-200-20 MG/5ML suspension 30 mL, 30 mL, Oral, PRN, Lisa Magana MD    nicotine (NICODERM CQ) 21 MG/24HR 1 patch, 1 patch, Transdermal, Daily, Lisa Magana MD, 1 patch at 06/28/20 2036      Examination:  /63   Pulse 68   Temp 98.4 °F (36.9 °C)   Resp 16   Ht 5' 4\" (1.626 m)   Wt 130 lb (59 kg)   SpO2 98%   BMI 22.31 kg/m²   Gait - steady  Medication side effects(SE): Denies    Mental Status Examination:    Level of consciousness:  within normal limits   Appearance:  fair grooming and fair hygiene  Behavior/Motor:  no abnormalities noted  Attitude toward examiner:  cooperative  Speech:  spontaneous, normal rate and normal volume   Mood: euthymic   Affect: Flat and blunted  Thought processes: stay: 5 to 7 days based on stability            Electronically signed by CHARO Montilla CNP on 6/28/2020 at 12:54 PM

## 2020-06-29 PROCEDURE — 6370000000 HC RX 637 (ALT 250 FOR IP): Performed by: NURSE PRACTITIONER

## 2020-06-29 PROCEDURE — 1240000000 HC EMOTIONAL WELLNESS R&B

## 2020-06-29 PROCEDURE — 6370000000 HC RX 637 (ALT 250 FOR IP): Performed by: PSYCHIATRY & NEUROLOGY

## 2020-06-29 PROCEDURE — 99232 SBSQ HOSP IP/OBS MODERATE 35: CPT | Performed by: NURSE PRACTITIONER

## 2020-06-29 RX ADMIN — DIVALPROEX SODIUM 500 MG: 250 TABLET, DELAYED RELEASE ORAL at 13:58

## 2020-06-29 RX ADMIN — DIVALPROEX SODIUM 500 MG: 250 TABLET, DELAYED RELEASE ORAL at 20:39

## 2020-06-29 RX ADMIN — RISPERIDONE 3 MG: 2 TABLET, FILM COATED ORAL at 20:33

## 2020-06-29 RX ADMIN — HYDROXYZINE PAMOATE 50 MG: 50 CAPSULE ORAL at 20:39

## 2020-06-29 RX ADMIN — TRAZODONE HYDROCHLORIDE 50 MG: 50 TABLET ORAL at 20:33

## 2020-06-29 RX ADMIN — DIVALPROEX SODIUM 500 MG: 250 TABLET, DELAYED RELEASE ORAL at 08:57

## 2020-06-29 RX ADMIN — RISPERIDONE 1 MG: 1 TABLET, FILM COATED ORAL at 08:57

## 2020-06-29 ASSESSMENT — PAIN SCALES - GENERAL
PAINLEVEL_OUTOF10: 0
PAINLEVEL_OUTOF10: 0

## 2020-06-29 NOTE — PLAN OF CARE
Problem: Altered Mood, Psychotic Behavior:  Goal: Able to verbalize reality based thinking  Description: Able to verbalize reality based thinking  Outcome: Ongoing  Goal: Ability to interact with others will improve  Description: Ability to interact with others will improve  Outcome: Ongoing     Dede Enriquez denies SI, HI, or any Hallucinations at this time. Patient states she had a bad night of sleep and blamed it on drinking coffee States Trazodone did not help last night. Her Goal is to be at peace. Patient takes her meds and goes to group. Will continue to monitor.

## 2020-06-29 NOTE — PROGRESS NOTES
Patient resting quietly with eyes closed, respirations easy no distress noted or observed. Safety rounds continue.

## 2020-06-29 NOTE — PROGRESS NOTES
BEHAVIORAL HEALTH FOLLOW-UP NOTE     6/29/2020     Patient was seen and examined in person, Chart reviewed   Patient's case discussed with staff/team    Chief Complaint: \" I need to go to the police. \"    Interim History: Patient seen in her room continues to believe that her neighbors are harassing her and performing defamation of character. \"  She continues to report hearing the voices. She states she feels tired and anxious about being in the hospital.  Explained to her that we want to continue adjust the medication to help the voices go away she reports the voices are \"real.\"  She states she plans to go the police when she leaves here because her neighbors continue to harass her and her doing defamation of character.   She does deny SI/HI intent or plan      Appetite:   [x] Normal/Unchanged  [] Increased  [] Decreased      Sleep:       [x] Normal/Unchanged  [] Fair       [] Poor              Energy:    [x] Normal/Unchanged  [] Increased  [] Decreased        SI [] Present  [x] Absent    HI  []Present  [x] Absent     Aggression:  [] yes  [x] no    Patient is [x] able  [] unable to CONTRACT FOR SAFETY     PAST MEDICAL/PSYCHIATRIC HISTORY:   Past Medical History:   Diagnosis Date    Anxiety     Asthma     [facility] disorder ([facility]     Bronchitis     COPD (chronic obstructive pulmonary disease) (HCC)     Depression     Iron deficiency anemia     PTSD (post-traumatic stress disorder)     Schizo affective schizophrenia ([facility]     [facility]     quit 7/2011  was [place] user        FAMILY/SOCIAL HISTORY:  Family History   Problem Relation Age of Onset    Diabetes Mother     High Blood Pressure Mother     Kidney Disease Mother     Other Mother         mesothelioma    Diabetes Father      Social History     Socioeconomic History    Marital status: Single     Spouse name: Not on file    Number of children: 0    Years of education: GED    Highest education level: Not on file   Occupational History results for input(s): WBC, HGB, PLT in the last 72 hours. No results for input(s): NA, K, CL, CO2, BUN, CREATININE, GLUCOSE in the last 72 hours. No results for input(s): BILITOT, ALKPHOS, AST, ALT in the last 72 hours. Lab Results   Component Value Date    LABAMPH NOT DETECTED 06/23/2020    BARBSCNU NOT DETECTED 06/23/2020    LABBENZ NOT DETECTED 06/23/2020    LABMETH NOT DETECTED 06/23/2020    OPIATESCREENURINE NOT DETECTED 06/23/2020    PHENCYCLIDINESCREENURINE NOT DETECTED 06/23/2020    ETOH <10 06/23/2020     Lab Results   Component Value Date    TSH 1.770 03/07/2019     No results found for: LITHIUM  Lab Results   Component Value Date    VALPROATE 66 06/23/2020           Treatment Plan:  Reviewed current Medications with the patient. Risks, benefits, side effects, drug-to-drug interactions and alternatives to treatment were discussed. Collateral information:   CD evaluation  Encourage patient to attend group and other milieu activities.   Discharge planning discussed with the patient and treatment team.    Continue Depakote 500 mg twice daily for mood stabilization VPA level 6/30/2020  Continue Risperdal 1 mg daily and 3 mg at bedtime for psychosis    PSYCHOTHERAPY/COUNSELING:  [x] Therapeutic interview  [x] Supportive  [] CBT  [] Ongoing  [] Other    [x] Patient continues to need, on a daily basis, active treatment furnished directly by or requiring the supervision of inpatient psychiatric personnel      Anticipated Length of stay: 5 to 7 days based on stability            Electronically signed by CHARO Ramos CNP on 4/30/4714 at 5:11 PM

## 2020-06-29 NOTE — PLAN OF CARE
Interacting well w peers and staff. Guarded somewhat in how much she discloses. Staying to herself much of the afternoon in her room. Speaks of hearing \"neighbors' voices\" which are real to her saying derogatory things intending \"defamation of character. \"  Ate well for supper. Interacting somewhat w peers during meal time and dinner movie. Declined attending the afternoon group offering though.

## 2020-06-29 NOTE — PROGRESS NOTES
Patient resting quiet to self at this time, respirations are even and unlabored, no signs or symptoms of distress or discomfort. PRN medications given this shift to help relax and sleep. Staff will continue to conduct environmental rounds to ensure the safety of everyone on the unit. Staff will provide support and interventions as requested or required.

## 2020-06-29 NOTE — PLAN OF CARE
Patient was isolative to her room. Denies thoughts of harm to her self or others, not seeing shadows. Verbalizes still hears her neighbor Raza Cooney telling the people here that she like to suck big d-cks. Anxiety is 4-5 out of 10 do to just wanting to go home. Denies depression. Her appetite is good. States that her sleep last night was not good at all. States that she feels medications are working. Compliant with medications, but did not attend groups . Safety rounds continue.

## 2020-06-30 LAB
AMMONIA: 84 UMOL/L (ref 11–51)
VALPROIC ACID LEVEL: 126 MCG/ML (ref 50–100)

## 2020-06-30 PROCEDURE — 36415 COLL VENOUS BLD VENIPUNCTURE: CPT

## 2020-06-30 PROCEDURE — 80164 ASSAY DIPROPYLACETIC ACD TOT: CPT

## 2020-06-30 PROCEDURE — 6370000000 HC RX 637 (ALT 250 FOR IP): Performed by: PSYCHIATRY & NEUROLOGY

## 2020-06-30 PROCEDURE — 6370000000 HC RX 637 (ALT 250 FOR IP): Performed by: NURSE PRACTITIONER

## 2020-06-30 PROCEDURE — 82140 ASSAY OF AMMONIA: CPT

## 2020-06-30 PROCEDURE — 99232 SBSQ HOSP IP/OBS MODERATE 35: CPT | Performed by: NURSE PRACTITIONER

## 2020-06-30 PROCEDURE — 1240000000 HC EMOTIONAL WELLNESS R&B

## 2020-06-30 RX ORDER — DIVALPROEX SODIUM 250 MG/1
250 TABLET, DELAYED RELEASE ORAL EVERY 12 HOURS SCHEDULED
Status: DISCONTINUED | OUTPATIENT
Start: 2020-07-01 | End: 2020-07-06 | Stop reason: HOSPADM

## 2020-06-30 RX ORDER — DIVALPROEX SODIUM 250 MG/1
500 TABLET, DELAYED RELEASE ORAL EVERY 12 HOURS SCHEDULED
Status: DISCONTINUED | OUTPATIENT
Start: 2020-07-01 | End: 2020-06-30

## 2020-06-30 RX ADMIN — RISPERIDONE 1 MG: 1 TABLET, FILM COATED ORAL at 08:40

## 2020-06-30 RX ADMIN — DIVALPROEX SODIUM 500 MG: 250 TABLET, DELAYED RELEASE ORAL at 15:16

## 2020-06-30 RX ADMIN — HYDROXYZINE PAMOATE 50 MG: 50 CAPSULE ORAL at 20:47

## 2020-06-30 RX ADMIN — RISPERIDONE 3 MG: 2 TABLET, FILM COATED ORAL at 20:47

## 2020-06-30 RX ADMIN — DIVALPROEX SODIUM 500 MG: 250 TABLET, DELAYED RELEASE ORAL at 08:39

## 2020-06-30 RX ADMIN — TRAZODONE HYDROCHLORIDE 50 MG: 50 TABLET ORAL at 20:47

## 2020-06-30 ASSESSMENT — PAIN SCALES - GENERAL: PAINLEVEL_OUTOF10: 0

## 2020-06-30 NOTE — CARE COORDINATION
Pt continues to be friendly when interacting with this . Pt out on the unit being friendly with other pts.  will continue to monitor this pt's behavior and mood.

## 2020-06-30 NOTE — PROGRESS NOTES
UP FOR MEALS,RETURNS TO ROOM. QUIET AND KEEPS TO SELF. TAKES MEDICATIONS. GROUPS ENCOURAGED. DENIES THOUGHTS OF HARM TO SELF OR OTHERS. DENIES HALLUCINATIONS. PT. VERBALIZED IS GOING TO REPORT NEIGHBORS\"AS SOON AS I GET HOME\".

## 2020-06-30 NOTE — PLAN OF CARE
No  Thought Content: Preoccupations, Delusions  Hallucinations: None  Delusions: Yes  Delusions: Obsessions, Other(See Comment)(PARANOIA)  Memory:Normal: No  Memory: (IMPAIRED)  Insight and Judgment: No  Insight and Judgment: Poor Judgment, Poor Insight  Present Suicidal Ideation: No  Present Homicidal Ideation: No    Daily Assessment Last Entry:   Daily Sleep (WDL): Within Defined Limits         Patient Currently in Pain: No  Daily Nutrition (WDL): Within Defined Limits    Patient Monitoring:  Frequency of Checks: 4 times per hour, close    Psychiatric Symptoms:   Depression Symptoms  Depression Symptoms: Impaired concentration, Sleep disturbance, Feelings of helplessness  Anxiety Symptoms  Anxiety Symptoms: Generalized  Ksenia Symptoms  Ksenia Symptoms: Poor judgment     Psychosis Symptoms  Hallucination Type: No problems reported or observed. Delusion Type: Paranoid, Persecutory  Describe Delusion: (the neighbor can read her thoughts)    Suicide Risk CSSR-S:  1) Within the past month, have you wished you were dead or wished you could go to sleep and not wake up? : No  2) Have you actually had any thoughts of killing yourself? : No  6) Have you ever done anything, started to do anything, or prepared to do anything to end your life?: No  Change in Result: PT. DENIES SUICIDAL IDEATION, DENIES THOUGHTS OF SELF HARM.  Change in Plan of care: CONTINUE TO ASSESS FOR ANY INCREASE IN RISK OF SELF HARM OR PRESENCE OF SUICIDAL IDEATION    EDUCATION:   Learner Progress Toward Treatment Goals: Reviewed results and recommendations of this team    Method: Small group    Outcome: Needs reinforcement    PATIENT GOALS: \"REPORT WHAT'S GOING ON AT HOME\"    PLAN/TREATMENT RECOMMENDATIONS UPDATE:  ASSESS FOR PSYCHOSIS, SUPPORTIVE CARE, ASSESS FOR COMPLIANCE AND RESPONSE TO MEDICATIONS, ENCOURAGE GROUPS AND ASSESS FOR COMPLIANCE TO SAME AND ABILITY TO STAY ON TRACK, DISCHARGE PLANNING AND FOLLOW UP    GOALS UPDATE:  Time frame for Short-Term Goals: 10 DAYS      Leyda Robin RN

## 2020-06-30 NOTE — PROGRESS NOTES
BEHAVIORAL HEALTH FOLLOW-UP NOTE     6/30/2020     Patient was seen and examined in person, Chart reviewed   Patient's case discussed with staff/team    Chief Complaint: \" My neighbors mostly me alone\"    Interim History: Patient is paranoid about her neighbors believing that they are harassing her and are derogatory. She does not offer much conversation. Continues report that she is going to report the neighbor's the police were constantly harassing her even while she is here on the unit. She states that she hears them harassing her at night and all during the day.       Appetite:   [x] Normal/Unchanged  [] Increased  [] Decreased      Sleep:       [x] Normal/Unchanged  [] Fair       [] Poor              Energy:    [x] Normal/Unchanged  [] Increased  [] Decreased        SI [] Present  [x] Absent    HI  []Present  [x] Absent     Aggression:  [] yes  [x] no    Patient is [x] able  [] unable to CONTRACT FOR SAFETY     PAST MEDICAL/PSYCHIATRIC HISTORY:   Past Medical History:   Diagnosis Date    Anxiety     Asthma     [facility] disorder ([facility]     Bronchitis     [facility]     Depression     Iron deficiency anemia     PTSD (post-traumatic stress disorder)     Schizo affective schizophrenia ([facility]     [facility] ([facility]     quit 7/2011  was [place] user        FAMILY/SOCIAL HISTORY:  Family History   Problem Relation Age of Onset    Diabetes Mother     High Blood Pressure Mother     Kidney Disease Mother     Other Mother         mesothelioma    Diabetes Father      Social History     Socioeconomic History    Marital status: Single     Spouse name: Not on file    Number of children: 0    Years of education: GED    Highest education level: Not on file   Occupational History    Not on file   Social Needs    Financial resource strain: Not on file    Food insecurity     Worry: Not on file     Inability: Not on file    Transportation needs     Medical: Not on file     Non-medical: Not on file   Tobacco Use    Smoking status: Current Every Day Smoker     Packs/day: 0.50     Years: 20.00     Pack years: 10.00     Types: Cigarettes    Smokeless tobacco: Never Used   Substance and Sexual Activity    Alcohol use: Yes     Frequency: Never     Comment: a beer once or twice a month    Drug use: Yes     Types: Marijuana     Comment: 2-3x/month    Sexual activity: Not Currently   Lifestyle    Physical activity     Days per week: Not on file     Minutes per session: Not on file    Stress: Not on file   Relationships    Social connections     Talks on phone: Not on file     Gets together: Not on file     Attends [employer] service: Not on file     Active member of club or organization: Not on file     Attends meetings of clubs or organizations: Not on file     Relationship status: Not on file    Intimate partner violence     Fear of current or ex partner: Not on file     Emotionally abused: Not on file     Physically abused: Not on file     Forced sexual activity: Not on file   Other Topics Concern    Not on file   Social History Narrative    Not on file           ROS:  [x] All negative/unchanged except if checked.  Explain positive(checked items) below:  [] Constitutional  [] Eyes  [] Ear/Nose/Mouth/Throat  [] Respiratory  [] CV  [] GI  []   [] Musculoskeletal  [] Skin/Breast  [] Neurological  [] Endocrine  [] Heme/Lymph  [] Allergic/Immunologic    Explanation:     MEDICATIONS:    Current Facility-Administered Medications:     risperiDONE (RISPERDAL) tablet 3 mg, 3 mg, Oral, Nightly, CHARO Elam CNP, 3 mg at 06/29/20 2033    risperiDONE (RISPERDAL) tablet 1 mg, 1 mg, Oral, Daily, CHARO Bateman CNP, 1 mg at 06/30/20 0840    divalproex (DEPAKOTE) DR tablet 500 mg, 500 mg, Oral, TID, CHARO Bateman - CNP, 598 mg at 06/30/20 0839    acetaminophen (TYLENOL) tablet 650 mg, 650 mg, Oral, Q6H PRN, Teddy Lacy MD    hydrOXYzine (VISTARIL) capsule 50 mg, 50 mg, Oral, TID PRN, Christiano Esparza MD, 50 mg at 06/29/20 2039    haloperidol lactate (HALDOL) injection 5 mg, 5 mg, Intramuscular, Q6H PRN **OR** haloperidol (HALDOL) tablet 5 mg, 5 mg, Oral, Q6H PRN, Christiano Esparza MD    traZODone (DESYREL) tablet 50 mg, 50 mg, Oral, Nightly PRN, Christiano Esparza MD, 50 mg at 06/29/20 2033    magnesium hydroxide (MILK OF MAGNESIA) 400 MG/5ML suspension 30 mL, 30 mL, Oral, Daily PRN, Christiano Esparza MD    aluminum & magnesium hydroxide-simethicone (MAALOX) 200-200-20 MG/5ML suspension 30 mL, 30 mL, Oral, PRN, Christiano Esparza MD    nicotine (NICODERM CQ) 21 MG/24HR 1 patch, 1 patch, Transdermal, Daily, Christiano Esparza MD, 1 patch at 06/30/20 0840      Examination:  BP (!) 93/55   Pulse 70   Temp 96.6 °F (35.9 °C)   Resp 16   Ht 5' 4\" (1.626 m)   Wt 130 lb (59 kg)   SpO2 99%   BMI 22.31 kg/m²   Gait - steady  Medication side effects(SE): Denies    Mental Status Examination:    Level of consciousness:  within normal limits   Appearance:  fair grooming and fair hygiene  Behavior/Motor:  no abnormalities noted  Attitude toward examiner:  cooperative  Speech:  spontaneous, normal rate and normal volume   Mood: euthymic   Affect: Flat and blunted  Thought processes:  linear   Thought content: Paranoid delusions, auditory hallucinations  Cognition:  oriented to person, place, and time   Concentration intact  Insight Limited  Judgement Limited    ASSESSMENT:   Patient symptoms are:  [] Well controlled  [] Improving  [] Worsening  [x] No change      Diagnosis:   Principal Problem:    Bipolar affective disorder, mixed, severe, with psychotic behavior ([facility]  Active Problems:    Cannabis abuse  Resolved Problems:    * No resolved hospital problems. *      LABS:    No results for input(s): WBC, HGB, PLT in the last 72 hours. No results for input(s): NA, K, CL, CO2, BUN, CREATININE, GLUCOSE in the last 72 hours.   No results for input(s): BILITOT, ALKPHOS, AST, ALT in the last 72 hours. Lab Results   Component Value Date    LABAMPH NOT DETECTED 06/23/2020    BARBSCNU NOT DETECTED 06/23/2020    LABBENZ NOT DETECTED 06/23/2020    LABMETH NOT DETECTED 06/23/2020    OPIATESCREENURINE NOT DETECTED 06/23/2020    PHENCYCLIDINESCREENURINE NOT DETECTED 06/23/2020    ETOH <10 06/23/2020     Lab Results   Component Value Date    TSH 1.770 03/07/2019     No results found for: LITHIUM  Lab Results   Component Value Date    VALPROATE 126 (H) 06/30/2020           Treatment Plan:  Reviewed current Medications with the patient. Risks, benefits, side effects, drug-to-drug interactions and alternatives to treatment were discussed. Collateral information:   CD evaluation  Encourage patient to attend group and other milieu activities.   Discharge planning discussed with the patient and treatment team.    Discussed with Dr. Sara Cruz  VPA level 126  Lower Depakote 250 mg twice daily for mood stabilization starting tomorrow   Repeat VPA level tomorrow  Ammonia level today  continue Risperdal 1 mg daily and 3 mg at bedtime for psychosis    PSYCHOTHERAPY/COUNSELING:  [x] Therapeutic interview  [x] Supportive  [] CBT  [] Ongoing  [] Other    [x] Patient continues to need, on a daily basis, active treatment furnished directly by or requiring the supervision of inpatient psychiatric personnel      Anticipated Length of stay: 5 to 7 days based on stability            Electronically signed by CHARO Lemus CNP on 6/90/6895 at 11:12 AM

## 2020-06-30 NOTE — PLAN OF CARE
Problem: Altered Mood, Psychotic Behavior:  Goal: Ability to interact with others will improve  Description: Ability to interact with others will improve  6/70/[id number] by Tanmay Ervin RN  Outcome: Not Met This Shift  6/29/2020 1810 by Irma Mendoza RN  Outcome: Ongoing  6/29/2020 1708 by Viktoria Valle RN  Outcome: Ongoing   Patient currently in bed at this time . Patient denies SI,HI and hallucinations. Patient rates anxiety 8/10 states that she wants to go home. Patient is states that she is not getting enough sleep states her trazodone needs to be increased. Patient voices no other concerns at this time denies any pain .  Will continue to monitor and observe

## 2020-07-01 LAB — VALPROIC ACID LEVEL: 82 MCG/ML (ref 50–100)

## 2020-07-01 PROCEDURE — 6370000000 HC RX 637 (ALT 250 FOR IP): Performed by: PSYCHIATRY & NEUROLOGY

## 2020-07-01 PROCEDURE — 6370000000 HC RX 637 (ALT 250 FOR IP): Performed by: NURSE PRACTITIONER

## 2020-07-01 PROCEDURE — 36415 COLL VENOUS BLD VENIPUNCTURE: CPT

## 2020-07-01 PROCEDURE — 80164 ASSAY DIPROPYLACETIC ACD TOT: CPT

## 2020-07-01 PROCEDURE — 1240000000 HC EMOTIONAL WELLNESS R&B

## 2020-07-01 PROCEDURE — 99232 SBSQ HOSP IP/OBS MODERATE 35: CPT | Performed by: NURSE PRACTITIONER

## 2020-07-01 RX ORDER — LACTULOSE 10 G/15ML
20 SOLUTION ORAL 3 TIMES DAILY
Status: DISCONTINUED | OUTPATIENT
Start: 2020-07-01 | End: 2020-07-02

## 2020-07-01 RX ADMIN — RISPERIDONE 3 MG: 2 TABLET, FILM COATED ORAL at 20:53

## 2020-07-01 RX ADMIN — LACTULOSE 20 G: 20 SOLUTION ORAL at 08:40

## 2020-07-01 RX ADMIN — RISPERIDONE 1 MG: 1 TABLET, FILM COATED ORAL at 08:40

## 2020-07-01 RX ADMIN — DIVALPROEX SODIUM 250 MG: 250 TABLET, DELAYED RELEASE ORAL at 20:53

## 2020-07-01 RX ADMIN — DIVALPROEX SODIUM 250 MG: 250 TABLET, DELAYED RELEASE ORAL at 11:33

## 2020-07-01 RX ADMIN — LACTULOSE 20 G: 20 SOLUTION ORAL at 20:53

## 2020-07-01 RX ADMIN — HYDROXYZINE PAMOATE 50 MG: 50 CAPSULE ORAL at 20:53

## 2020-07-01 RX ADMIN — TRAZODONE HYDROCHLORIDE 50 MG: 50 TABLET ORAL at 20:53

## 2020-07-01 RX ADMIN — LACTULOSE 20 G: 20 SOLUTION ORAL at 14:22

## 2020-07-01 ASSESSMENT — PAIN SCALES - GENERAL
PAINLEVEL_OUTOF10: 0
PAINLEVEL_OUTOF10: 0

## 2020-07-01 NOTE — PROGRESS NOTES
file     Inability: Not on file    Transportation needs     Medical: Not on file     Non-medical: Not on file   Tobacco Use    Smoking status: Current Every Day Smoker     Packs/day: 0.50     Years: 20.00     Pack years: 10.00     Types: Cigarettes    Smokeless tobacco: Never Used   Substance and Sexual Activity    Alcohol use: Yes     Frequency: Never     Comment: a beer once or twice a month    Drug use: Yes     Types: Marijuana     Comment: 2-3x/month    Sexual activity: Not Currently   Lifestyle    Physical activity     Days per week: Not on file     Minutes per session: Not on file    Stress: Not on file   Relationships    Social connections     Talks on phone: Not on file     Gets together: Not on file     Attends [employer] service: Not on file     Active member of club or organization: Not on file     Attends meetings of clubs or organizations: Not on file     Relationship status: Not on file    Intimate partner violence     Fear of current or ex partner: Not on file     Emotionally abused: Not on file     Physically abused: Not on file     Forced sexual activity: Not on file   Other Topics Concern    Not on file   Social History Narrative    Not on file           ROS:  [x] All negative/unchanged except if checked.  Explain positive(checked items) below:  [] Constitutional  [] Eyes  [] Ear/Nose/Mouth/Throat  [] Respiratory  [] CV  [] GI  []   [] Musculoskeletal  [] Skin/Breast  [] Neurological  [] Endocrine  [] Heme/Lymph  [] Allergic/Immunologic    Explanation:     MEDICATIONS:    Current Facility-Administered Medications:     lactulose (CHRONULAC) 10 GM/15ML solution 20 g, 20 g, Oral, TID, HCARO Elam CNP, 20 g at 07/01/20 0840    divalproex (DEPAKOTE) DR tablet 250 mg, 250 mg, Oral, 2 times per day, Clemetine CHARO Thomas CNP, Stopped at 07/01/20 0804    risperiDONE (RISPERDAL) tablet 3 mg, 3 mg, Oral, Nightly, CHARO Coronado CNP, 3 mg at 06/30/20 2047    risperiDONE (RISPERDAL) tablet 1 mg, 1 mg, Oral, Daily, Cloteal Nate Middleton, APRN - CNP, 1 mg at 07/01/20 0840    acetaminophen (TYLENOL) tablet 650 mg, 650 mg, Oral, Q6H PRN, Satish Dorsey MD    hydrOXYzine (VISTARIL) capsule 50 mg, 50 mg, Oral, TID PRN, Satish Dorsey MD, 50 mg at 06/30/20 2047    haloperidol lactate (HALDOL) injection 5 mg, 5 mg, Intramuscular, Q6H PRN **OR** haloperidol (HALDOL) tablet 5 mg, 5 mg, Oral, Q6H PRN, Satish Dorsey MD    traZODone (DESYREL) tablet 50 mg, 50 mg, Oral, Nightly PRN, Satish Dorsey MD, 50 mg at 06/30/20 2047    magnesium hydroxide (MILK OF MAGNESIA) 400 MG/5ML suspension 30 mL, 30 mL, Oral, Daily PRN, Satish Dorsey MD    aluminum & magnesium hydroxide-simethicone (MAALOX) 200-200-20 MG/5ML suspension 30 mL, 30 mL, Oral, PRN, Satish Dorsey MD    nicotine (NICODERM CQ) 21 MG/24HR 1 patch, 1 patch, Transdermal, Daily, Satish Dorsey MD, 1 patch at 07/01/20 0841      Examination:  /61   Pulse 86   Temp 97.3 °F (36.3 °C) (Temporal)   Resp 14   Ht 5' 4\" (1.626 m)   Wt 130 lb (59 kg)   SpO2 99%   BMI 22.31 kg/m²   Gait - steady  Medication side effects(SE): Denies    Mental Status Examination:    Level of consciousness:  within normal limits   Appearance:  fair grooming and fair hygiene  Behavior/Motor:  no abnormalities noted  Attitude toward examiner:  cooperative  Speech:  spontaneous, normal rate and normal volume   Mood: euthymic   Affect: Flat and blunted  Thought processes:  linear   Thought content: Paranoid delusions, auditory hallucinations  Cognition:  oriented to person, place, and time   Concentration intact  Insight Limited  Judgement Limited    ASSESSMENT:   Patient symptoms are:  [] Well controlled  [] Improving  [] Worsening  [x] No change      Diagnosis:   Principal Problem:    Bipolar affective disorder, mixed, severe, with psychotic behavior ([facility]  Active Problems:    Cannabis abuse  Resolved Problems:    * No resolved hospital problems. *      LABS:    No results for input(s): WBC, HGB, PLT in the last 72 hours. No results for input(s): NA, K, CL, CO2, BUN, CREATININE, GLUCOSE in the last 72 hours. No results for input(s): BILITOT, ALKPHOS, AST, ALT in the last 72 hours. Lab Results   Component Value Date    LABAMPH NOT DETECTED 06/23/2020    BARBSCNU NOT DETECTED 06/23/2020    LABBENZ NOT DETECTED 06/23/2020    LABMETH NOT DETECTED 06/23/2020    OPIATESCREENURINE NOT DETECTED 06/23/2020    PHENCYCLIDINESCREENURINE NOT DETECTED 06/23/2020    ETOH <10 06/23/2020     Lab Results   Component Value Date    TSH 1.770 03/07/2019     No results found for: LITHIUM  Lab Results   Component Value Date    VALPROATE 82 07/01/2020           Treatment Plan:  Reviewed current Medications with the patient. Risks, benefits, side effects, drug-to-drug interactions and alternatives to treatment were discussed. Collateral information:   CD evaluation  Encourage patient to attend group and other milieu activities.   Discharge planning discussed with the patient and treatment team.    Discussed with Dr. Jacquelin Omer  Repeat VPA level 82  Continue r Depakote 250 mg twice daily for mood stabilization starting tomorrow Start lactulose 20 mg 3 times daily for hyperammonemia  continue Risperdal 1 mg daily and 3 mg at bedtime for psychosis    PSYCHOTHERAPY/COUNSELING:  [x] Therapeutic interview  [x] Supportive  [] CBT  [] Ongoing  [] Other    [x] Patient continues to need, on a daily basis, active treatment furnished directly by or requiring the supervision of inpatient psychiatric personnel      Anticipated Length of stay: 5 to 7 days based on stability            Electronically signed by CHARO Fairchild CNP on 4/5/5307 at 11:11 AM

## 2020-07-01 NOTE — PROGRESS NOTES
Group Therapy Note     Date: 7/1/2020     Group Start Time: 0115  Group End Time: 0155  Group Topic: Cognitive Skills     [facility]    JEFRY Washington LSW           Group Therapy Note     Attendees: 15           Patient's Goal:  Pt will be able to identify negative communication patterns they engage in and want to eliminate and be able to identify positive characteristics of communication involving \"I\" statements     Notes: Pt active in class discussion.      Status After Intervention:  Improved     Participation Level:  Active Listener and Interactive     Participation Quality: Appropriate, Attentive, Sharing and Supportive        Speech:  normal        Thought Process/Content: Logical        Affective Functioning: Blunted        Mood: depressed        Level of consciousness:  Alert, Oriented x4 and Attentive        Response to Learning: Able to verbalize current knowledge/experience, Able to verbalize/acknowledge new learning and Progressing to goal        Endings: None Reported     Modes of Intervention: Education, Support, Socialization and Problem-solving        Discipline Responsible: /Counselor        Signature:  JEFRY Washington LSW

## 2020-07-01 NOTE — PLAN OF CARE
Problem: Altered Mood, Psychotic Behavior:  Goal: Ability to interact with others will improve  Description: Ability to interact with others will improve  7/1/2020 1722 by Jaylyn Florez RN  Outcome: Ongoing     Problem: Altered Mood, Psychotic Behavior:  Goal: Able to verbalize reality based thinking  Description: Able to verbalize reality based thinking  7/1/2020 1722 by Jaylyn Florez RN  Outcome: Not Met This Shift

## 2020-07-01 NOTE — PROGRESS NOTES
PT. REPORTS HEARS VOICES THAT \"TELL ME THAT MY NEIGHBORS ARE TALKING ABOUT ME, DEMEANING ME\". \"I JUST HEARD THEM RIGHT BEFORE YOU  SAT DOWN\". PT. REPORTS HAS BEEN HAVING THESE SYMPTOMS FOR 5 YEARS, \" AS LONG AS THEY HAVE BEEN HARASSING ME\". PT. REPORTS ONE PERSON IS A NEIGHBOR IN PT.'S APARTMENT AND THE OTHER IS A WOMEN  THIS PT. SHARED HOUSING WITH AT [facility] 5 YEARS AGO. \"I MAY HAVE TO REPORT IT, OR MAYBE IT WILL JUST STOP\". PT. DENIES SUICIDAL IDEATION AND HOMICIDAL IDEATIONS. PT IS PLEASANT ON APPROACH,DENIES FEELING PARANOID AROUND PEERS. ATTENDED SELCT GROUPS AND TAKES MEDICATIONS.

## 2020-07-01 NOTE — PLAN OF CARE
Problem: Altered Mood, Psychotic Behavior:  Goal: Able to verbalize reality based thinking  Description: Able to verbalize reality based thinking  Outcome: Ongoing  NO VOICED DELUSIONS ON A.M. ASSESSMENT. Goal: Ability to interact with others will improve  Description: Ability to interact with others will improve  Outcome: Ongoing  UP FOR MEALS, RETURNS TO ROOM. GROUPS ENCOURAGED.

## 2020-07-01 NOTE — GROUP NOTE
Date: 7/1/2020    Group Start Time: 1005  Group End Time: 4178  Group Topic: Psychoeducation    [facility]  [address], [address]                                                                             Group Therapy Note    Date: 7/1/2020  Type of Group: Psychoeducation    Wellness Binder Information  Module Name:  Self Care     Patient's Goal:  patient will be able to identify key components of what his/her self looks like. Notes: pleasant and sharing in group able to participate appropriately. Status After Intervention:  Improved    Participation Level:  Active Listener and Interactive    Participation Quality: Appropriate, Attentive, Sharing, and Supportive      Speech:  normal     Thought Process/Content: Logical      Affective Functioning: Congruent      Mood: euthymic      Level of consciousness:  Alert, Oriented x4, and Attentive      Response to Learning: Able to verbalize/acknowledge new learning, Able to retain information, and Progressing to goal      Endings: None Reported    Modes of Intervention: Education, Support, Socialization, Exploration, and Problem-solving      Discipline Responsible: Psychoeducational Specialist      Signature:  Kayode Ruiz

## 2020-07-01 NOTE — PROGRESS NOTES
Isolative to room except for meals. States she still hears voices of Nicky Gannon and Karen Brandon . Denies voices telling her to harm self or others.

## 2020-07-01 NOTE — GROUP NOTE
Group Therapy Note    Date: 7/1/2020    Group Start Time: 1120  Group End Time: 0523  Group Topic: Cognitive Skills    [facility]    SKYLAR Grossman        Group Therapy Note    Attendees: 17         Patient's Goal:  Pt will be able to identify negative communication patterns they engage in and want to eliminate and be able to identify positive characteristics of communication re: listening and self expression skills. Notes: Pt active in class discussion. Status After Intervention:  Improved    Participation Level:  Active Listener and Interactive    Participation Quality: Appropriate, Attentive, Sharing and Supportive      Speech:  normal      Thought Process/Content: Logical      Affective Functioning: Blunted      Mood: depressed      Level of consciousness:  Alert, Oriented x4 and Attentive      Response to Learning: Able to verbalize current knowledge/experience, Able to verbalize/acknowledge new learning and Progressing to goal      Endings: None Reported    Modes of Intervention: Education, Support, Socialization and Problem-solving      Discipline Responsible: /Counselor      Signature:  SKYLAR Graham

## 2020-07-02 LAB — AMMONIA: 29 UMOL/L (ref 11–51)

## 2020-07-02 PROCEDURE — 36415 COLL VENOUS BLD VENIPUNCTURE: CPT

## 2020-07-02 PROCEDURE — 1240000000 HC EMOTIONAL WELLNESS R&B

## 2020-07-02 PROCEDURE — 6360000002 HC RX W HCPCS: Performed by: NURSE PRACTITIONER

## 2020-07-02 PROCEDURE — 99232 SBSQ HOSP IP/OBS MODERATE 35: CPT | Performed by: NURSE PRACTITIONER

## 2020-07-02 PROCEDURE — 6370000000 HC RX 637 (ALT 250 FOR IP): Performed by: NURSE PRACTITIONER

## 2020-07-02 PROCEDURE — 82140 ASSAY OF AMMONIA: CPT

## 2020-07-02 PROCEDURE — 6370000000 HC RX 637 (ALT 250 FOR IP): Performed by: PSYCHIATRY & NEUROLOGY

## 2020-07-02 RX ADMIN — DIVALPROEX SODIUM 250 MG: 250 TABLET, DELAYED RELEASE ORAL at 21:01

## 2020-07-02 RX ADMIN — HYDROXYZINE PAMOATE 50 MG: 50 CAPSULE ORAL at 21:01

## 2020-07-02 RX ADMIN — LACTULOSE 20 G: 20 SOLUTION ORAL at 09:21

## 2020-07-02 RX ADMIN — LACTULOSE 20 G: 20 SOLUTION ORAL at 13:38

## 2020-07-02 RX ADMIN — RISPERIDONE 3 MG: 2 TABLET, FILM COATED ORAL at 21:01

## 2020-07-02 RX ADMIN — RISPERIDONE 1 MG: 1 TABLET, FILM COATED ORAL at 09:21

## 2020-07-02 RX ADMIN — DIVALPROEX SODIUM 250 MG: 250 TABLET, DELAYED RELEASE ORAL at 09:21

## 2020-07-02 RX ADMIN — TRAZODONE HYDROCHLORIDE 50 MG: 50 TABLET ORAL at 21:01

## 2020-07-02 RX ADMIN — RISPERIDONE 12.5 MG: KIT at 17:06

## 2020-07-02 ASSESSMENT — PAIN SCALES - GENERAL
PAINLEVEL_OUTOF10: 0

## 2020-07-02 NOTE — CARE COORDINATION
Sally called Usama to see if she could speak with this patient's . Line continues to ring, and does not  to a voicemail.

## 2020-07-02 NOTE — PLAN OF CARE
Problem: Altered Mood, Psychotic Behavior:  Goal: Able to verbalize reality based thinking  Description: Able to verbalize reality based thinking  Outcome: Ongoing   NO VOICED DELUSIONS ON A.M. ASSESSMENT. Goal: Ability to interact with others will improve  Description: Ability to interact with others will improve  Outcome: Ongoing  QUIET, KEEPS TO SELF. UP ON UNIT,THEN RETURNED TO BED.

## 2020-07-02 NOTE — PROGRESS NOTES
Up on unit for meals, returns to bed. Pt. remains medication compliant and has been in good control. No unit problems. Pt. Is for potential discharge tomorrow. Groups were encouraged.

## 2020-07-03 LAB
EKG ATRIAL RATE: 81 BPM
EKG P AXIS: 73 DEGREES
EKG P-R INTERVAL: 118 MS
EKG Q-T INTERVAL: 368 MS
EKG QRS DURATION: 82 MS
EKG QTC CALCULATION (BAZETT): 427 MS
EKG R AXIS: 82 DEGREES
EKG T AXIS: 58 DEGREES
EKG VENTRICULAR RATE: 81 BPM

## 2020-07-03 PROCEDURE — 1240000000 HC EMOTIONAL WELLNESS R&B

## 2020-07-03 PROCEDURE — 6370000000 HC RX 637 (ALT 250 FOR IP): Performed by: PSYCHIATRY & NEUROLOGY

## 2020-07-03 PROCEDURE — 6370000000 HC RX 637 (ALT 250 FOR IP): Performed by: NURSE PRACTITIONER

## 2020-07-03 PROCEDURE — 99232 SBSQ HOSP IP/OBS MODERATE 35: CPT | Performed by: NURSE PRACTITIONER

## 2020-07-03 PROCEDURE — 93005 ELECTROCARDIOGRAM TRACING: CPT | Performed by: NURSE PRACTITIONER

## 2020-07-03 PROCEDURE — 93010 ELECTROCARDIOGRAM REPORT: CPT | Performed by: INTERNAL MEDICINE

## 2020-07-03 RX ADMIN — DIVALPROEX SODIUM 250 MG: 250 TABLET, DELAYED RELEASE ORAL at 09:31

## 2020-07-03 RX ADMIN — RISPERIDONE 3 MG: 2 TABLET, FILM COATED ORAL at 20:59

## 2020-07-03 RX ADMIN — HYDROXYZINE PAMOATE 50 MG: 50 CAPSULE ORAL at 21:00

## 2020-07-03 RX ADMIN — DIVALPROEX SODIUM 250 MG: 250 TABLET, DELAYED RELEASE ORAL at 21:00

## 2020-07-03 RX ADMIN — RISPERIDONE 1 MG: 1 TABLET, FILM COATED ORAL at 09:31

## 2020-07-03 RX ADMIN — TRAZODONE HYDROCHLORIDE 50 MG: 50 TABLET ORAL at 21:00

## 2020-07-03 ASSESSMENT — PAIN SCALES - GENERAL: PAINLEVEL_OUTOF10: 0

## 2020-07-03 NOTE — PLAN OF CARE
Problem: Altered Mood, Psychotic Behavior:  Goal: Able to verbalize reality based thinking  Description: Able to verbalize reality based thinking  7/2/2020 2007 by Irma Dover RN  Outcome: Ongoing     Problem: Altered Mood, Psychotic Behavior:  Goal: Ability to interact with others will improve  Description: Ability to interact with others will improve  7/2/2020 2007 by Irma Dover RN  Outcome: Ongoing

## 2020-07-03 NOTE — CARE COORDINATION
SW spoke with pt about benefits of Crisis Unit and pt agreed to discharge to crisis unit. SW spoke with staff at crisis unit and they requested an EKG and recent progress notes on pt. SW faxed the requested additional information.

## 2020-07-03 NOTE — PROGRESS NOTES
BEHAVIORAL HEALTH FOLLOW-UP NOTE     7/2/2020     Patient was seen and examined in person, Chart reviewed   Patient's case discussed with staff/team    Chief Complaint: \" My neighbors are still harassing me\"    Interim History:   Pt seen in her room. Her affect is brighter today. She continues to report she hears her neighbors harassing her and that this nev er goes away. Denies SI/HI intent or plan.    Appetite:   [x] Normal/Unchanged  [] Increased  [] Decreased      Sleep:       [x] Normal/Unchanged  [] Fair       [] Poor              Energy:    [x] Normal/Unchanged  [] Increased  [] Decreased        SI [] Present  [x] Absent    HI  []Present  [x] Absent     Aggression:  [] yes  [x] no    Patient is [x] able  [] unable to CONTRACT FOR SAFETY     PAST MEDICAL/PSYCHIATRIC HISTORY:   Past Medical History:   Diagnosis Date    Anxiety     Asthma     [facility]     Bronchitis     [facility]     Depression     Iron deficiency anemia     PTSD (post-traumatic stress disorder)     Schizo affective schizophrenia ([facility]     Substance abuse ([facility]     quit 7/2011  was [place] user        FAMILY/SOCIAL HISTORY:  Family History   Problem Relation Age of Onset    Diabetes Mother     High Blood Pressure Mother     Kidney Disease Mother     Other Mother         mesothelioma    Diabetes Father      Social History     Socioeconomic History    Marital status: Single     Spouse name: Not on file    Number of children: 0    Years of education: GED    Highest education level: Not on file   Occupational History    Not on file   Social Needs    Financial resource strain: Not on file    Food insecurity     Worry: Not on file     Inability: Not on file   [employer] needs     Medical: Not on file     Non-medical: Not on file   Tobacco Use    Smoking status: Current Every Day Smoker     Packs/day: 0.50     Years: 20.00     Pack years: 10.00     Types: Cigarettes  Smokeless tobacco: Never Used   Substance and Sexual Activity    Alcohol use: Yes     Frequency: Never     Comment: a beer once or twice a month    Drug use: Yes     Types: Marijuana     Comment: 2-3x/month    Sexual activity: Not Currently   Lifestyle    Physical activity     Days per week: Not on file     Minutes per session: Not on file    Stress: Not on file   Relationships    Social connections     Talks on phone: Not on file     Gets together: Not on file     Attends Pentecostalism service: Not on file     Active member of club or organization: Not on file     Attends meetings of clubs or organizations: Not on file     Relationship status: Not on file    Intimate partner violence     Fear of current or ex partner: Not on file     Emotionally abused: Not on file     Physically abused: Not on file     Forced sexual activity: Not on file   Other Topics Concern    Not on file   Social History Narrative    Not on file           ROS:  [x] All negative/unchanged except if checked.  Explain positive(checked items) below:  [] Constitutional  [] Eyes  [] Ear/Nose/Mouth/Throat  [] Respiratory  [] CV  [] GI  []   [] Musculoskeletal  [] Skin/Breast  [] Neurological  [] Endocrine  [] Heme/Lymph  [] Allergic/Immunologic    Explanation:     MEDICATIONS:    Current Facility-Administered Medications:     risperiDONE microspheres ER (RISPERDAL CONSTA) injection 12.5 mg, 12.5 mg, Intramuscular, Q14 Days, CHARO Elam - CNP, 77.9 mg at 07/02/20 1706    divalproex (DEPAKOTE) DR tablet 250 mg, 250 mg, Oral, 2 times per day, CHARO Borja - CNP, 601 mg at 07/02/20 2101    risperiDONE (RISPERDAL) tablet 3 mg, 3 mg, Oral, Nightly, CHARO Marie - CNP, 3 mg at 07/02/20 2101    risperiDONE (RISPERDAL) tablet 1 mg, 1 mg, Oral, Daily, CHARO Marie - CNP, 1 mg at 07/02/20 5174    acetaminophen (TYLENOL) tablet 650 mg, 650 mg, Oral, Q6H PRN, Satish Dorsey MD    hydrOXYzine (VISTARIL) capsule 50 mg, 50 mg, Oral, TID PRN, Janiya Calzada MD, 50 mg at 07/02/20 2101    haloperidol lactate (HALDOL) injection 5 mg, 5 mg, Intramuscular, Q6H PRN **OR** haloperidol (HALDOL) tablet 5 mg, 5 mg, Oral, Q6H PRN, Janiya Calzada MD    traZODone (DESYREL) tablet 50 mg, 50 mg, Oral, Nightly PRN, Janiya Calzada MD, 50 mg at 07/02/20 2101    magnesium hydroxide (MILK OF MAGNESIA) 400 MG/5ML suspension 30 mL, 30 mL, Oral, Daily PRN, Janiya Calzada MD    aluminum & magnesium hydroxide-simethicone (MAALOX) 200-200-20 MG/5ML suspension 30 mL, 30 mL, Oral, PRN, Janiya Calzada MD    nicotine (NICODERM CQ) 21 MG/24HR 1 patch, 1 patch, Transdermal, Daily, Janiya Calzada MD, 1 patch at 07/02/20 4847      Examination:  BP (!) 91/52   Pulse 89   Temp 98.9 °F (37.2 °C) (Temporal)   Resp 16   Ht 5' 4\" (1.626 m)   Wt 130 lb (59 kg)   SpO2 100%   BMI 22.31 kg/m²   Gait - steady  Medication side effects(SE): Denies    Mental Status Examination:    Level of consciousness:  within normal limits   Appearance:  fair grooming and fair hygiene  Behavior/Motor:  no abnormalities noted  Attitude toward examiner:  cooperative  Speech:  spontaneous, normal rate and normal volume   Mood: euthymic   Affect: Flat and blunted  Thought processes:  linear   Thought content: Paranoid delusions, auditory hallucinations  Cognition:  oriented to person, place, and time   Concentration intact  Insight Limited  Judgement Limited    ASSESSMENT:   Patient symptoms are:  [] Well controlled  [] Improving  [] Worsening  [x] No change      Diagnosis:   Principal Problem:    Bipolar affective disorder, mixed, severe, with psychotic behavior ([facility]  Active Problems:    Cannabis abuse  Resolved Problems:    * No resolved hospital problems. *      LABS:    No results for input(s): WBC, HGB, PLT in the last 72 hours. No results for input(s): NA, K, CL, CO2, BUN, CREATININE, GLUCOSE in the last 72 hours.   No results for input(s): BILITOT, ALKPHOS, AST, ALT in the last 72 hours. Lab Results   Component Value Date    LABAMPH NOT DETECTED 06/23/2020    BARBSCNU NOT DETECTED 06/23/2020    LABBENZ NOT DETECTED 06/23/2020    LABMETH NOT DETECTED 06/23/2020    OPIATESCREENURINE NOT DETECTED 06/23/2020    PHENCYCLIDINESCREENURINE NOT DETECTED 06/23/2020    ETOH <10 06/23/2020     Lab Results   Component Value Date    TSH 1.770 03/07/2019     No results found for: LITHIUM  Lab Results   Component Value Date    VALPROATE 82 07/01/2020           Treatment Plan:  Reviewed current Medications with the patient. Risks, benefits, side effects, drug-to-drug interactions and alternatives to treatment were discussed. Collateral information:   CD evaluation  Encourage patient to attend group and other milieu activities.   Discharge planning discussed with the patient and treatment team.    Discussed with Dr. Escobar Saeed  Repeat VPA level 82  Continue  Depakote 250 mg twice daily for mood stabilization starting tomorrow  Risperdal 1 mg daily and 3 mg at bedtime for psychosis  Risperdal Consta 12.5 mg IM q 2 weeks     PSYCHOTHERAPY/COUNSELING:  [x] Therapeutic interview  [x] Supportive  [] CBT  [] Ongoing  [] Other    [x] Patient continues to need, on a daily basis, active treatment furnished directly by or requiring the supervision of inpatient psychiatric personnel      Anticipated Length of stay: 5 to 7 days based on stability            Electronically signed by CHARO Jamil CNP on 6/3/7360 at 10:07 PM

## 2020-07-03 NOTE — GROUP NOTE
Date: 7/3/2020    Group Start Time: 1020  Group End Time: 1100  Group Topic: Psychoeducation    [facility]    [place], [address]                                                                        Group Therapy Note    Date: 7/3/2020  Type of Group: Psychoeducation    Wellness Binder Information  Module Name: unhealthy vs healthy relationships     Patient's Goal:  patient will be able to identify hartman concepts to healthy relationships. Notes: pleasant and sharing in group able to participate appropriately. Status After Intervention:  Improved    Participation Level:  Active Listener and Interactive    Participation Quality: Appropriate, Attentive, and Sharing      Speech:  normal       Thought Process/Content: Logical      Affective Functioning: Congruent      Mood: euthymic      Level of consciousness:  Alert and Oriented x4      Response to Learning: Able to verbalize/acknowledge new learning, Able to retain information, and Progressing to goal      Endings: None Reported    Modes of Intervention: Education, Support, Socialization, Exploration, and Problem-solving      Discipline Responsible: Psychoeducational Specialist      Signature:  Jevon Fajardo

## 2020-07-03 NOTE — PROGRESS NOTES
Pt unable to discharge to CRU due to the pharmacy is closed and unable to provide discharge medications   S.W. and N.P. aware

## 2020-07-03 NOTE — PLAN OF CARE
Denies SI/HI   denies hallucinations  mood is irritable  out on the unit but stays to self  States she is discharged today when asked if she was willing to go the Crisis Unit she states \"I'm not going\"  Refused any further conversation   Cooperative with meds   Will continue to monitor

## 2020-07-03 NOTE — GROUP NOTE
Group Therapy Note    Date: 7/3/2020    Group Start Time: 1320  Group End Time: 6234  Group Topic: Recovery    [facility]    SKYLAR Grossman        Group Therapy Note    Attendees: 12         Patient's Goal:  Pt will be able to identify unhealthy coping skills they engage in and consequences of using them. Pt will be able to identify healthy coping skills they will use and address any barriers to using healthy coping strategies. Notes:  Pt came to group but sat alone away from others and was not engaged in group discussion . Status After Intervention:  Improved    Participation Level:  Active Listener and Interactive    Participation Quality: Appropriate and Attentive      Speech:  normal      Thought Process/Content: Logical      Affective Functioning: Blunted      Mood: depressed      Level of consciousness:  Alert, Oriented x4 and Attentive      Response to Learning: Able to verbalize current knowledge/experience, Able to verbalize/acknowledge new learning and Progressing to goal      Endings: None Reported    Modes of Intervention: Education, Support, Socialization and Problem-solving      Discipline Responsible: /Counselor      Signature:  SKYLAR Calderon

## 2020-07-03 NOTE — PROGRESS NOTES
BEHAVIORAL HEALTH FOLLOW-UP NOTE     7/3/2020     Patient was seen and examined in person, Chart reviewed   Patient's case discussed with staff/team    Chief Complaint: \" My neighbors are still harassing me\"    Interim History:   Pt seen in her room. Her affect is brighter today. She continues to report she hears her neighbors harassing her and that this nev er goes away. Denies SI/HI intent or plan. Patient assessed by myself and Dr. Ashlyn Kimball today and was explained that if she was willing to go to the crisis center for further stabilization we would try to facilitate that for her. Patient vehemently denied wanting to go and shows poor insight and judgment.   Appetite:   [x] Normal/Unchanged  [] Increased  [] Decreased      Sleep:       [x] Normal/Unchanged  [] Fair       [] Poor              Energy:    [x] Normal/Unchanged  [] Increased  [] Decreased        SI [] Present  [x] Absent    HI  []Present  [x] Absent     Aggression:  [] yes  [x] no    Patient is [x] able  [] unable to CONTRACT FOR SAFETY     PAST MEDICAL/PSYCHIATRIC HISTORY:   Past Medical History:   Diagnosis Date    Anxiety     Asthma     [facility] ([facility]     Bronchitis     [facility]) ([facility])     Depression     Iron deficiency anemia     PTSD (post-traumatic stress disorder)     [facility]izo affective [facility]     [facility]     quit 7/2011  was [place] user        FAMILY/SOCIAL HISTORY:  Family History   Problem Relation Age of Onset    Diabetes Mother     High Blood Pressure Mother     Kidney Disease Mother     Other Mother         mesothelioma    Diabetes Father      Social History     Socioeconomic History    Marital status: Single     Spouse name: Not on file    Number of children: 0    Years of education: GED    Highest education level: Not on file   Occupational History    Not on file   Social Needs    Financial resource strain: Not on file    Food insecurity Worry: Not on file     Inability: Not on file    Transportation needs     Medical: Not on file     Non-medical: Not on file   Tobacco Use    Smoking status: Current Every Day Smoker     Packs/day: 0.50     Years: 20.00     Pack years: 10.00     Types: Cigarettes    Smokeless tobacco: Never Used   Substance and Sexual Activity    Alcohol use: Yes     Frequency: Never     Comment: a beer once or twice a month    Drug use: Yes     Types: Marijuana     Comment: 2-3x/month    Sexual activity: Not Currently   Lifestyle    Physical activity     Days per week: Not on file     Minutes per session: Not on file    Stress: Not on file   Relationships    Social connections     Talks on phone: Not on file     Gets together: Not on file     Attends Jewish service: Not on file     Active member of club or organization: Not on file     Attends meetings of clubs or organizations: Not on file     Relationship status: Not on file    Intimate partner violence     Fear of current or ex partner: Not on file     Emotionally abused: Not on file     Physically abused: Not on file     Forced sexual activity: Not on file   Other Topics Concern    Not on file   Social History Narrative    Not on file           ROS:  [x] All negative/unchanged except if checked.  Explain positive(checked items) below:  [] Constitutional  [] Eyes  [] Ear/Nose/Mouth/Throat  [] Respiratory  [] CV  [] GI  []   [] Musculoskeletal  [] Skin/Breast  [] Neurological  [] Endocrine  [] Heme/Lymph  [] Allergic/Immunologic    Explanation:     MEDICATIONS:    Current Facility-Administered Medications:     risperiDONE microspheres ER (RISPERDAL CONSTA) injection 12.5 mg, 12.5 mg, Intramuscular, Q14 Days, CHARO Elam CNP, 18.4 mg at 07/02/20 1706    divalproex (DEPAKOTE) DR tablet 250 mg, 250 mg, Oral, 2 times per day, Cle[place]ine CHARO Thomas CNP, 974 mg at 07/03/20 0931    risperiDONE (RISPERDAL) tablet 3 mg, 3 mg, Oral, Nightly, Betzy Middleton abuse  Resolved Problems:    * No resolved hospital problems. *      LABS:    No results for input(s): WBC, HGB, PLT in the last 72 hours. No results for input(s): NA, K, CL, CO2, BUN, CREATININE, GLUCOSE in the last 72 hours. No results for input(s): BILITOT, ALKPHOS, AST, ALT in the last 72 hours. Lab Results   Component Value Date    LABAMPH NOT DETECTED 06/23/2020    BARBSCNU NOT DETECTED 06/23/2020    LABBENZ NOT DETECTED 06/23/2020    LABMETH NOT DETECTED 06/23/2020    OPIATESCREENURINE NOT DETECTED 06/23/2020    PHENCYCLIDINESCREENURINE NOT DETECTED 06/23/2020    ETOH <10 06/23/2020     Lab Results   Component Value Date    TSH 1.770 03/07/2019     No results found for: LITHIUM  Lab Results   Component Value Date    VALPROATE 82 07/01/2020           Treatment Plan:  Reviewed current Medications with the patient. Risks, benefits, side effects, drug-to-drug interactions and alternatives to treatment were discussed. Collateral information:   CD evaluation  Encourage patient to attend group and other milieu activities.   Discharge planning discussed with the patient and treatment team.    Discussed with Dr. Janie Nathan  Repeat VPA level 82  Continue  Depakote 250 mg twice daily for mood stabilization starting tomorrow  Risperdal 1 mg daily and 3 mg at bedtime for psychosis  Risperdal Consta 12.5 mg IM q 2 weeks     PSYCHOTHERAPY/COUNSELING:  [x] Therapeutic interview  [x] Supportive  [] CBT  [] Ongoing  [] Other    [x] Patient continues to need, on a daily basis, active treatment furnished directly by or requiring the supervision of inpatient psychiatric personnel      Anticipated Length of stay: 5 to 7 days based on stability            Electronically signed by CHARO Stafford CNP on 7/3/2020 at 3:45 PM

## 2020-07-04 PROCEDURE — 6370000000 HC RX 637 (ALT 250 FOR IP): Performed by: PSYCHIATRY & NEUROLOGY

## 2020-07-04 PROCEDURE — 99232 SBSQ HOSP IP/OBS MODERATE 35: CPT | Performed by: NURSE PRACTITIONER

## 2020-07-04 PROCEDURE — 6370000000 HC RX 637 (ALT 250 FOR IP): Performed by: NURSE PRACTITIONER

## 2020-07-04 PROCEDURE — 1240000000 HC EMOTIONAL WELLNESS R&B

## 2020-07-04 RX ADMIN — RISPERIDONE 1 MG: 1 TABLET, FILM COATED ORAL at 08:42

## 2020-07-04 RX ADMIN — TRAZODONE HYDROCHLORIDE 50 MG: 50 TABLET ORAL at 20:29

## 2020-07-04 RX ADMIN — DIVALPROEX SODIUM 250 MG: 250 TABLET, DELAYED RELEASE ORAL at 08:42

## 2020-07-04 RX ADMIN — DIVALPROEX SODIUM 250 MG: 250 TABLET, DELAYED RELEASE ORAL at 20:29

## 2020-07-04 RX ADMIN — RISPERIDONE 3 MG: 2 TABLET, FILM COATED ORAL at 20:29

## 2020-07-04 ASSESSMENT — PAIN SCALES - GENERAL
PAINLEVEL_OUTOF10: 0

## 2020-07-04 NOTE — PROGRESS NOTES
abuse  Resolved Problems:    * No resolved hospital problems. *      LABS:    No results for input(s): WBC, HGB, PLT in the last 72 hours. No results for input(s): NA, K, CL, CO2, BUN, CREATININE, GLUCOSE in the last 72 hours. No results for input(s): BILITOT, ALKPHOS, AST, ALT in the last 72 hours. Lab Results   Component Value Date    LABAMPH NOT DETECTED 06/23/2020    BARBSCNU NOT DETECTED 06/23/2020    LABBENZ NOT DETECTED 06/23/2020    LABMETH NOT DETECTED 06/23/2020    OPIATESCREENURINE NOT DETECTED 06/23/2020    PHENCYCLIDINESCREENURINE NOT DETECTED 06/23/2020    ETOH <10 06/23/2020     Lab Results   Component Value Date    TSH 1.770 03/07/2019     No results found for: LITHIUM  Lab Results   Component Value Date    VALPROATE 82 07/01/2020           Treatment Plan:  Reviewed current Medications with the patient. Risks, benefits, side effects, drug-to-drug interactions and alternatives to treatment were discussed. Collateral information:   CD evaluation  Encourage patient to attend group and other milieu activities.   Discharge planning discussed with the patient and treatment team.    Discussed with Dr. Rohtih Conroy  Repeat VPA level 82  Continue  Depakote 250 mg twice daily for mood stabilization starting tomorrow    Risperdal 1 mg daily and 3 mg at bedtime for psychosis  Risperdal Consta 12.5 mg IM q 2 weeks     PSYCHOTHERAPY/COUNSELING:  [x] Therapeutic interview  [x] Supportive  [] CBT  [] Ongoing  [] Other    [x] Patient continues to need, on a daily basis, active treatment furnished directly by or requiring the supervision of inpatient psychiatric personnel      Anticipated Length of stay: 5 to 7 days based on stability            Electronically signed by CHARO Polanco CNP on 3/6/5424 at 2:23 PM

## 2020-07-04 NOTE — PROGRESS NOTES
Pt. Denies SI, HI, and hallucinations this shift. Pt. Denies physical complaints currently. Pt. Is isolative with depressed mood and affect, pt. Is isolative to room and evasive when questioned.

## 2020-07-04 NOTE — PLAN OF CARE
Patient was out on unit, and socialized a little with select peers. Denies thoughts of harm to self or others,also denies seeing shadows and at first denies hearing voices. Later stating \"I still hear my neighbor Carlita Russo) talking to various people being critical of me, but can't tell exactly what she is saying. States I was suppose to go crisis unit today but they didn't have her medications ready for her. So will be discharged tomorrow. States feels medications are working and slept good last night. \"My appetite is good. \"  Compliant with medications and attends groups. Safety rounds continue.

## 2020-07-05 PROCEDURE — 99232 SBSQ HOSP IP/OBS MODERATE 35: CPT | Performed by: NURSE PRACTITIONER

## 2020-07-05 PROCEDURE — 6370000000 HC RX 637 (ALT 250 FOR IP): Performed by: NURSE PRACTITIONER

## 2020-07-05 PROCEDURE — 6370000000 HC RX 637 (ALT 250 FOR IP): Performed by: PSYCHIATRY & NEUROLOGY

## 2020-07-05 PROCEDURE — 1240000000 HC EMOTIONAL WELLNESS R&B

## 2020-07-05 RX ADMIN — TRAZODONE HYDROCHLORIDE 50 MG: 50 TABLET ORAL at 20:08

## 2020-07-05 RX ADMIN — RISPERIDONE 3 MG: 2 TABLET, FILM COATED ORAL at 20:08

## 2020-07-05 RX ADMIN — DIVALPROEX SODIUM 250 MG: 250 TABLET, DELAYED RELEASE ORAL at 20:08

## 2020-07-05 RX ADMIN — DIVALPROEX SODIUM 250 MG: 250 TABLET, DELAYED RELEASE ORAL at 09:03

## 2020-07-05 RX ADMIN — RISPERIDONE 1 MG: 1 TABLET, FILM COATED ORAL at 09:03

## 2020-07-05 ASSESSMENT — PAIN SCALES - GENERAL: PAINLEVEL_OUTOF10: 0

## 2020-07-05 NOTE — PROGRESS NOTES
Patient flat, sad, and depressed. Patient denies suicidal ideation, denies hallucinations, denies homicidal ideation. Patient is compliant with medications and was encouraged to attend groups. Patient appetite is good and behavior is in control.

## 2020-07-05 NOTE — PLAN OF CARE
Patient resting quietly with eyes closed,respirations easy no distress noted. Safety rounds continue.

## 2020-07-05 NOTE — PLAN OF CARE
Patient was isolative to room. Did approach nurse desk and explained to this nurse that she had to many cups of coffee last night (5) and did not make to bathroom. Was to embarrassed to tell daylight. This nurse Clorox mattress complete linen change and patient got a shower. Patient denies thoughts of harm to self and others . Denies hearing neighbors voice today or seeing any shadows. \"Medications have helped voices subside. \" No depression or anxiety. Verbalizes appetite is good and sleep is also good. Compliant with medications and attends select groups. Safety rounds continue.

## 2020-07-05 NOTE — PROGRESS NOTES
BEHAVIORAL HEALTH FOLLOW-UP NOTE     7/5/2020     Patient was seen and examined in person, Chart reviewed   Patient's case discussed with staff/team    Chief Complaint: \" I do not hear my neighbors today\"    Interim History:   Patient in her room affect remains blunted but she states her mood is good. Staff reports her appetite is been good she vehemently denies suicidal homicidal ideations intent or plan she denies any auditory or visual hallucinations. She eats well and sleeping well there is no neurovegetative signs of depression. There are no overt or covert signs of psychosis. She is a medication compliant.     Appetite:   [x] Normal/Unchanged  [] Increased  [] Decreased      Sleep:       [x] Normal/Unchanged  [] Fair       [] Poor              Energy:    [x] Normal/Unchanged  [] Increased  [] Decreased        SI [] Present  [x] Absent    HI  []Present  [x] Absent     Aggression:  [] yes  [x] no    Patient is [x] able  [] unable to CONTRACT FOR SAFETY     PAST MEDICAL/PSYCHIATRIC HISTORY:   Past Medical History:   Diagnosis Date    Anxiety     Asthma     [facility]     Bronchitis     [facility]chronic obstructive [facility]) [facility]     Depression     Iron deficiency anemia     PTSD (post-traumatic stress disorder)     [facility] affective [facility]     Substance abuse ([facility]     quit 7/2011  was [place] user        FAMILY/SOCIAL HISTORY:  Family History   Problem Relation Age of Onset    Diabetes Mother     High Blood Pressure Mother     Kidney Disease Mother     Other Mother         mesothelioma    Diabetes Father      Social History     Socioeconomic History    Marital status: Single     Spouse name: Not on file    Number of children: 0    Years of education: GED    Highest education level: Not on file   Occupational History    Not on file   Social Needs    Financial resource strain: Not on file    Food insecurity     Worry: Not on file     Inability: Not on file  Transportation needs     Medical: Not on file     Non-medical: Not on file   Tobacco Use    Smoking status: Current Every Day Smoker     Packs/day: 0.50     Years: 20.00     Pack years: 10.00     Types: Cigarettes    Smokeless tobacco: Never Used   Substance and Sexual Activity    Alcohol use: Yes     Frequency: Never     Comment: a beer once or twice a month    Drug use: Yes     Types: Marijuana     Comment: 2-3x/month    Sexual activity: Not Currently   Lifestyle    Physical activity     Days per week: Not on file     Minutes per session: Not on file    Stress: Not on file   Relationships    Social connections     Talks on phone: Not on file     Gets together: Not on file     Attends [employer] service: Not on file     Active member of club or organization: Not on file     Attends meetings of clubs or organizations: Not on file     Relationship status: Not on file    Intimate partner violence     Fear of current or ex partner: Not on file     Emotionally abused: Not on file     Physically abused: Not on file     Forced sexual activity: Not on file   Other Topics Concern    Not on file   Social History Narrative    Not on file           ROS:  [x] All negative/unchanged except if checked.  Explain positive(checked items) below:  [] Constitutional  [] Eyes  [] Ear/Nose/Mouth/Throat  [] Respiratory  [] CV  [] GI  []   [] Musculoskeletal  [] Skin/Breast  [] Neurological  [] Endocrine  [] Heme/Lymph  [] Allergic/Immunologic    Explanation:     MEDICATIONS:    Current Facility-Administered Medications:     risperiDONE microspheres ER (RISPERDAL CONSTA) injection 12.5 mg, 12.5 mg, Intramuscular, Q14 Days, CHARO Elam CNP, 07.1 mg at 07/02/20 1706    divalproex (DEPAKOTE) DR tablet 250 mg, 250 mg, Oral, 2 times per day, CHARO Hernandez CNP, 898 mg at 07/05/20 3107    risperiDONE (RISPERDAL) tablet 3 mg, 3 mg, Oral, Nightly, CHARO Gaston CNP, 3 mg at 07/04/20 2029   Cannabis abuse  Resolved Problems:    * No resolved hospital problems. *      LABS:    No results for input(s): WBC, HGB, PLT in the last 72 hours. No results for input(s): NA, K, CL, CO2, BUN, CREATININE, GLUCOSE in the last 72 hours. No results for input(s): BILITOT, ALKPHOS, AST, ALT in the last 72 hours. Lab Results   Component Value Date    LABAMPH NOT DETECTED 06/23/2020    BARBSCNU NOT DETECTED 06/23/2020    LABBENZ NOT DETECTED 06/23/2020    LABMETH NOT DETECTED 06/23/2020    OPIATESCREENURINE NOT DETECTED 06/23/2020    PHENCYCLIDINESCREENURINE NOT DETECTED 06/23/2020    ETOH <10 06/23/2020     Lab Results   Component Value Date    TSH 1.770 03/07/2019     No results found for: LITHIUM  Lab Results   Component Value Date    VALPROATE 82 07/01/2020           Treatment Plan:  Reviewed current Medications with the patient. Risks, benefits, side effects, drug-to-drug interactions and alternatives to treatment were discussed. Collateral information:   CD evaluation  Encourage patient to attend group and other milieu activities.   Discharge planning discussed with the patient and treatment team.    Discussed with Dr. Janie Nathan  Repeat VPA level 82  Continue  Depakote 250 mg twice daily for mood stabilization starting tomorrow    Risperdal 1 mg daily and 3 mg at bedtime for psychosis  Risperdal Consta 12.5 mg IM q 2 weeks     PSYCHOTHERAPY/COUNSELING:  [x] Therapeutic interview  [x] Supportive  [] CBT  [] Ongoing  [] Other    [x] Patient continues to need, on a daily basis, active treatment furnished directly by or requiring the supervision of inpatient psychiatric personnel      Anticipated Length of stay: 5 to 7 days based on stability            Electronically signed by CHARO Benton CNP on 3/6/5508 at 3:04 PM

## 2020-07-06 VITALS
TEMPERATURE: 98.2 F | RESPIRATION RATE: 15 BRPM | HEIGHT: 64 IN | HEART RATE: 72 BPM | DIASTOLIC BLOOD PRESSURE: 58 MMHG | BODY MASS INDEX: 22.2 KG/M2 | OXYGEN SATURATION: 99 % | SYSTOLIC BLOOD PRESSURE: 115 MMHG | WEIGHT: 130 LBS

## 2020-07-06 PROCEDURE — 99239 HOSP IP/OBS DSCHRG MGMT >30: CPT | Performed by: NURSE PRACTITIONER

## 2020-07-06 PROCEDURE — 6370000000 HC RX 637 (ALT 250 FOR IP): Performed by: PSYCHIATRY & NEUROLOGY

## 2020-07-06 PROCEDURE — 6370000000 HC RX 637 (ALT 250 FOR IP): Performed by: NURSE PRACTITIONER

## 2020-07-06 RX ORDER — DIVALPROEX SODIUM 250 MG/1
250 TABLET, DELAYED RELEASE ORAL EVERY 12 HOURS SCHEDULED
Qty: 28 TABLET | Refills: 0 | Status: ON HOLD | OUTPATIENT
Start: 2020-07-06 | End: 2021-04-06 | Stop reason: HOSPADM

## 2020-07-06 RX ORDER — RISPERIDONE 1 MG/1
1 TABLET, FILM COATED ORAL DAILY
Qty: 14 TABLET | Refills: 0 | Status: SHIPPED | OUTPATIENT
Start: 2020-07-07 | End: 2020-11-11 | Stop reason: SDUPTHER

## 2020-07-06 RX ORDER — RISPERIDONE 3 MG/1
3 TABLET, FILM COATED ORAL NIGHTLY
Qty: 14 TABLET | Refills: 0 | Status: SHIPPED | OUTPATIENT
Start: 2020-07-06 | End: 2020-11-11

## 2020-07-06 RX ADMIN — RISPERIDONE 1 MG: 1 TABLET, FILM COATED ORAL at 08:59

## 2020-07-06 RX ADMIN — DIVALPROEX SODIUM 250 MG: 250 TABLET, DELAYED RELEASE ORAL at 08:59

## 2020-07-06 ASSESSMENT — PAIN SCALES - GENERAL: PAINLEVEL_OUTOF10: 0

## 2020-07-06 NOTE — BH NOTE
[facility]  Discharge Note    Pt discharged with followings belongings:   Dentures: None  Vision - Corrective Lenses: None  Hearing Aid: None  Jewelry: None  Body Piercings Removed: N/A  Clothing: Footwear, Pants, Shirt, Sweater, Other (Comment)(sweater, scarf, jeans, t shirt, pr slippers)  Were All Patient Medications Collected?: Not Applicable  Other Valuables: Purse, Cell phone, Other (Comment)(purse, cellphone, cord, ID, CCard)   Valuables sent home with. Patient education on aftercare instructions: yes. Patient verbalize understanding of AVS:  Yes.   Status EXAM upon discharge:  Status and Exam  Normal: Yes  Facial Expression: Worried, Flat  Affect: Appropriate  Level of Consciousness: Alert  Mood:Normal: No  Mood: Anxious  Motor Activity:Normal: No  Motor Activity: Decreased  Interview Behavior: Cooperative  Preception: [place] to Person, Nohemy Marrow to Time, [place] to Place, [place] to Situation  Attention:Normal: No  Attention: Distractible  Thought Processes: Circumstantial  Thought Content:Normal: Yes  Thought Content: Poverty of Content  Hallucinations: None  Delusions: No  Delusions: Obsessions  Memory:Normal: Yes  Memory: Poor Recent  Insight and Judgment: No  Insight and Judgment: Poor Insight, Unmotivated  Present Suicidal Ideation: No  Present Homicidal Ideation: No      Metabolic Screening:    Lab Results   Component Value Date    LABA1C 5.5 09/20/2019       Lab Results   Component Value Date    CHOL 235 (H) 09/20/2019     Lab Results   Component Value Date    TRIG 65 09/20/2019     Lab Results   Component Value Date    HDL 59 09/20/2019     No components found for: [facility] [place]  Lab Results   Component Value Date    LABVLDL 13 09/20/2019       Arsen Fisher RN

## 2020-07-06 NOTE — BH NOTE
Pt is stable and alert. Pt denies suicidal or homicidal ideations. Pt denies hallucinations. Pt thought processes are clear. Pt cooperative. No other complications.

## 2020-07-06 NOTE — GROUP NOTE
Date: 7/6/2020    Group Start Time: 1010  Group End Time: 1050  Group Topic: Psychoeducation    [facility]  [address] [address], [address]                                                                        Group Therapy Note    Date: 7/6/2020    Type of Group: Psychoeducation    Wellness Binder Information  Module Name:  self esteem   Patient's Goal:  patient will be able to id what activities one can do to increase his/her self esteem and self image. Notes: pleasant and engaged in group, willing to share when prompted. Status After Intervention:  Improved  Participation Level:  Active Listener and Interactive  Participation Quality: Appropriate, Attentive, Sharing, and Supportive  Speech: normal   Thought Process/Content: Logical  Affective Functioning: Congruent  Mood: euthymic  Level of consciousness:  Alert, Oriented x4, and Attentive  Response to Learning: Able to verbalize/acknowledge new learning, Able to retain information, and Progressing to goal  Endings: None Reported  Modes of Intervention: Education, Support, Socialization, Exploration, and Problem-solving  Discipline Responsible: Psychoeducational Specialist  Signature:  Zoila Ruvalcaba

## 2020-07-06 NOTE — GROUP NOTE
Group Therapy Note    Date: 7/6/2020    Group Start Time: 1120  Group End Time: 1200  Group Topic: Cognitive Skills    [facility]    SKYLAR Grossman        Group Therapy Note    Attendees: 18         Patient's Goal: Pt will be able to identify components of effective communication. Notes:  Pt active listener. Status After Intervention:  Improved    Participation Level:  Active listener and minimal    Participation Quality: Appropriate, Attentive and Supportive      Speech:  normal      Thought Process/Content: Logical      Affective Functioning: Blunted      Mood: depressed      Level of consciousness:  Alert, Oriented x4 and Attentive      Response to Learning: Able to verbalize current knowledge/experience, Able to verbalize/acknowledge new learning and Progressing to goal      Endings: None Reported    Modes of Intervention: Education, Support, Socialization and Problem-solving      Discipline Responsible: /Counselor      Signature:  SKYLAR Franco

## 2020-07-06 NOTE — PROGRESS NOTES
CLINICAL PHARMACY NOTE: MEDS TO [address] Select Patient?: No  Total # of Prescriptions Filled: 3   The following medications were delivered to the patient:  · Risperidone 1  · Depakote   · Risperidone 3  Total # of Interventions Completed: 2  Time Spent (min): 30    Additional Documentation:

## 2020-07-06 NOTE — BH NOTE
Pt is stable and cooperative, pleasant in demeanor. Pt denies suicidal / homicidal ideations. Pt denies hallucinations. Pt reports goal, \"to relax'. will follow and monitor.

## 2020-07-08 NOTE — DISCHARGE SUMMARY
DISCHARGE SUMMARY      Patient ID:  Meseret Woods  [id number]  11 y.o.  1975    Admit date: 6/23/2020    Discharge date and time: 7/6/20    Admitting Physician: Mayda Hernandez MD     Discharge Physician: Dr Charlene Jamil MD    Admission Diagnoses: Major depressive disorder, single episode, unspecified [F32.9]    Admission Condition: poor    Discharged Condition: stable    Admission Circumstance: Presented to the ED after being sent in by her outpatient agency who reports the patient believes that her neighbor is listening to her talk and knows what is in her head what she is thinking.     PAST MEDICAL/PSYCHIATRIC HISTORY:   Past Medical History:   Diagnosis Date    Anxiety     Asthma     [facility] disorder ([facility]     Bronchitis     [facility] ([facility]     Depression     Iron deficiency anemia     PTSD (post-traumatic stress disorder)     [facility] ([facility]     Substance abuse ([facility]     quit 7/2011  was [place] user        FAMILY/SOCIAL HISTORY:  Family History   Problem Relation Age of Onset    Diabetes Mother     High Blood Pressure Mother     Kidney Disease Mother     Other Mother         mesothelioma    Diabetes Father      Social History     Socioeconomic History    Marital status: Single     Spouse name: Not on file    Number of children: 0    Years of education: GED    Highest education level: Not on file   Occupational History    Not on file   Social Needs    Financial resource strain: Not on file    Food insecurity     Worry: Not on file     Inability: Not on file   [employer] needs     Medical: Not on file     Non-medical: Not on file   Tobacco Use    Smoking status: Current Every Day Smoker     Packs/day: 0.50     Years: 20.00     Pack years: 10.00     Types: Cigarettes    Smokeless tobacco: Never Used   Substance and Sexual Activity    Alcohol use: Yes     Frequency: Never     Comment: a beer once or twice a month    Drug use: at bedtime was given a Risperdal Consta injection 12.5 mg IM every 2 weeks on 7/2/2020 her next injection be due 7/16/2020. She was also started on Depakote the dose optimized to 150 mg twice daily a valproic acid level was checked prior to discharge but be therapeutic at 82. Medical instrument significant patient continued to improve on the floor. She is her coming out of her room she was attending group she was socializing with peers. She never made any suicidal or homicidal statements or gestures on the unit. Treatment team felt the patient to the maximum benefit from her hospitalization. She was set up with outpatient mental health agency for outpatient follow-up services. At the time of discharge patient not shown impulsive behavior. She was eating well and sleeping well there are no neurovegetative signs of depression. She vehemently denied any suicidal homicidal ideations intent or plan. She denied any auditory visualizations or no overt or covert signs psychosis. She was appreciative the help that she received here. This patient no longer meets criteria for inpatient level of treatment. No AVH or paranoid thoughts  No Hopeless or worthless feeling  No active SI/HI  Appetite:  [x] Normal  [] Increased  [] Decreased    Sleep:       [x] Normal  [] Fair       [] Poor            Energy:    [x] Normal  [] Increased  [] Decreased     SI [] Present  [x] Absent  HI  []Present  [x] Absent   Aggression:  [] yes  [x] no  Patient is [x] able  [] unable to CONTRACT FOR SAFETY   Medication side effects(SE):  [x] None(Psych. Meds.) [] Other      Mental Status Examination on discharge:    Level of consciousness:  within normal limits   Appearance:  well-appearing  Behavior/Motor:  no abnormalities noted  Attitude toward examiner:  attentive and good eye contact  Speech:  spontaneous, normal rate and normal volume   Mood: \" My mood is good. \"  Affect: Appropriate pleasant  Thought processes: Linear without advised to call the outpatient provider, visit the nearest ED or call 911 if symptoms are not manageable. Patient's family member was contacted prior to the discharge. Medication List      START taking these medications    * risperiDONE 3 MG tablet  Commonly known as:  RISPERDAL  Take 1 tablet by mouth nightly for 14 days     * risperiDONE 1 MG tablet  Commonly known as:  RISPERDAL  Take 1 tablet by mouth daily for 14 days     risperiDONE microspheres ER 12.5 MG injection  Commonly known as:  RISPERDAL CONSTA  Inject 2 mLs into the muscle every 14 days for 1 dose Risperdal Consta 12.5 mg IM q 2 weeks, last dose given 7/2/20, next injection is due 7/16/20  Start taking on:  July 16, 2020         * This list has 2 medication(s) that are the same as other medications prescribed for you. Read the directions carefully, and ask your doctor or other care provider to review them with you.             CHANGE how you take these medications    divalproex 250 MG DR tablet  Commonly known as:  DEPAKOTE  Take 1 tablet by mouth every 12 hours for 14 days  What changed:    · medication strength  · how much to take  · when to take this        CONTINUE taking these medications    traZODone 50 MG tablet  Commonly known as:  DESYREL        STOP taking these medications    hydrOXYzine 50 MG capsule  Commonly known as:  VISTARIL     Latuda 80 MG Tabs tablet  Generic drug:  lurasidone     PARoxetine 20 MG tablet  Commonly known as:  PAXIL           Where to Get Your Medications      These medications were sent to [facility] [address] [place] [address]    Phone:  [phone]   · divalproex 250 MG DR tablet  · risperiDONE 1 MG tablet  · risperiDONE 3 MG tablet     Information about where to get these medications is not yet available    Ask your nurse or doctor about these medications  · risperiDONE microspheres ER 12.5 MG injection Patient is counseled if she continues to abuse drugs or alcohol she could act out impulsively causing serious harm to herself or others even though may be unintentional.  She demonstrated understanding of this and has the capacity understand this    Patient is counseled she must be compliant with all medications all outpatient follow-up appointments    She is discharged home in stable condition    TIME SPEND - 35 MINUTES TO COMPLETE THE EVALUATION, DISCHARGE SUMMARY, MEDICATION RECONCILIATION AND FOLLOW UP CARE     Signed:  Kaley Vasquez  6/5/5862  0:34 PM

## 2020-11-11 PROBLEM — J44.9 COPD (CHRONIC OBSTRUCTIVE PULMONARY DISEASE) (HCC): Status: ACTIVE | Noted: 2020-11-11

## 2020-11-12 PROBLEM — N93.9 ABNORMAL VAGINAL BLEEDING: Status: RESOLVED | Noted: 2019-04-29 | Resolved: 2020-11-12

## 2020-11-12 PROBLEM — F23 ACUTE PSYCHOSIS (HCC): Status: RESOLVED | Noted: 2017-12-22 | Resolved: 2020-11-12

## 2020-11-12 PROBLEM — F33.41 RECURRENT MAJOR DEPRESSIVE DISORDER, IN PARTIAL REMISSION (HCC): Status: ACTIVE | Noted: 2020-11-12

## 2020-11-12 PROBLEM — F33.1 MODERATE EPISODE OF RECURRENT MAJOR DEPRESSIVE DISORDER (HCC): Status: RESOLVED | Noted: 2020-11-12 | Resolved: 2020-11-12

## 2020-11-12 PROBLEM — D64.9 ACUTE ON CHRONIC ANEMIA: Chronic | Status: RESOLVED | Noted: 2019-04-29 | Resolved: 2020-11-12

## 2020-11-12 PROBLEM — Z72.0 TOBACCO ABUSE: Status: ACTIVE | Noted: 2020-11-12

## 2020-11-12 PROBLEM — F33.2 SEVERE EPISODE OF RECURRENT MAJOR DEPRESSIVE DISORDER, WITHOUT PSYCHOTIC FEATURES (HCC): Status: RESOLVED | Noted: 2019-11-16 | Resolved: 2020-11-12

## 2020-11-12 PROBLEM — F33.1 MODERATE EPISODE OF RECURRENT MAJOR DEPRESSIVE DISORDER (HCC): Status: ACTIVE | Noted: 2020-11-12

## 2021-03-26 ENCOUNTER — HOSPITAL ENCOUNTER (INPATIENT)
Age: 46
LOS: 11 days | Discharge: OTHER INSTITUTION | DRG: 753 | End: 2021-04-06
Attending: EMERGENCY MEDICINE | Admitting: PSYCHIATRY & NEUROLOGY
Payer: COMMERCIAL

## 2021-03-26 DIAGNOSIS — F32.2 SEVERE DEPRESSION (HCC): Primary | ICD-10-CM

## 2021-03-26 PROBLEM — F32.9 MAJOR DEPRESSIVE DISORDER, SINGLE EPISODE: Status: ACTIVE | Noted: 2021-03-26

## 2021-03-26 LAB
ACETAMINOPHEN LEVEL: <5 MCG/ML (ref 10–30)
ALBUMIN SERPL-MCNC: 4.1 G/DL (ref 3.5–5.2)
ALP BLD-CCNC: 78 U/L (ref 35–104)
ALT SERPL-CCNC: 16 U/L (ref 0–32)
AMPHETAMINE SCREEN, URINE: NOT DETECTED
ANION GAP SERPL CALCULATED.3IONS-SCNC: 11 MMOL/L (ref 7–16)
AST SERPL-CCNC: 16 U/L (ref 0–31)
BARBITURATE SCREEN URINE: NOT DETECTED
BASOPHILS ABSOLUTE: 0.03 E9/L (ref 0–0.2)
BASOPHILS RELATIVE PERCENT: 0.6 % (ref 0–2)
BENZODIAZEPINE SCREEN, URINE: NOT DETECTED
BILIRUB SERPL-MCNC: 0.3 MG/DL (ref 0–1.2)
BILIRUBIN URINE: NEGATIVE
BLOOD, URINE: NEGATIVE
BUN BLDV-MCNC: 4 MG/DL (ref 6–20)
CALCIUM SERPL-MCNC: 9.5 MG/DL (ref 8.6–10.2)
CANNABINOID SCREEN URINE: POSITIVE
CHLORIDE BLD-SCNC: 101 MMOL/L (ref 98–107)
CLARITY: CLEAR
CO2: 23 MMOL/L (ref 22–29)
COCAINE METABOLITE SCREEN URINE: NOT DETECTED
COLOR: YELLOW
CREAT SERPL-MCNC: 0.7 MG/DL (ref 0.5–1)
EKG ATRIAL RATE: 78 BPM
EKG P AXIS: 76 DEGREES
EKG P-R INTERVAL: 118 MS
EKG Q-T INTERVAL: 390 MS
EKG QRS DURATION: 80 MS
EKG QTC CALCULATION (BAZETT): 444 MS
EKG R AXIS: 80 DEGREES
EKG T AXIS: 76 DEGREES
EKG VENTRICULAR RATE: 78 BPM
EOSINOPHILS ABSOLUTE: 0.05 E9/L (ref 0.05–0.5)
EOSINOPHILS RELATIVE PERCENT: 0.9 % (ref 0–6)
ETHANOL: <10 MG/DL (ref 0–0.08)
FENTANYL SCREEN, URINE: NOT DETECTED
GFR AFRICAN AMERICAN: >60
GFR NON-AFRICAN AMERICAN: >60 ML/MIN/1.73
GLUCOSE BLD-MCNC: 85 MG/DL (ref 74–99)
GLUCOSE URINE: NEGATIVE MG/DL
HCG, URINE, POC: NEGATIVE
HCT VFR BLD CALC: 43.5 % (ref 34–48)
HEMOGLOBIN: 14.1 G/DL (ref 11.5–15.5)
IMMATURE GRANULOCYTES #: 0.01 E9/L
IMMATURE GRANULOCYTES %: 0.2 % (ref 0–5)
KETONES, URINE: NEGATIVE MG/DL
LEUKOCYTE ESTERASE, URINE: NEGATIVE
LYMPHOCYTES ABSOLUTE: 1.84 E9/L (ref 1.5–4)
LYMPHOCYTES RELATIVE PERCENT: 34.8 % (ref 20–42)
Lab: ABNORMAL
Lab: NORMAL
MCH RBC QN AUTO: 29.4 PG (ref 26–35)
MCHC RBC AUTO-ENTMCNC: 32.4 % (ref 32–34.5)
MCV RBC AUTO: 90.6 FL (ref 80–99.9)
METHADONE SCREEN, URINE: NOT DETECTED
MONOCYTES ABSOLUTE: 0.44 E9/L (ref 0.1–0.95)
MONOCYTES RELATIVE PERCENT: 8.3 % (ref 2–12)
NEGATIVE QC PASS/FAIL: NORMAL
NEUTROPHILS ABSOLUTE: 2.91 E9/L (ref 1.8–7.3)
NEUTROPHILS RELATIVE PERCENT: 55.2 % (ref 43–80)
NITRITE, URINE: NEGATIVE
OPIATE SCREEN URINE: NOT DETECTED
OXYCODONE URINE: NOT DETECTED
PDW BLD-RTO: 13.4 FL (ref 11.5–15)
PH UA: 7 (ref 5–9)
PHENCYCLIDINE SCREEN URINE: NOT DETECTED
PLATELET # BLD: 205 E9/L (ref 130–450)
PMV BLD AUTO: 10.9 FL (ref 7–12)
POSITIVE QC PASS/FAIL: NORMAL
POTASSIUM SERPL-SCNC: 3.8 MMOL/L (ref 3.5–5)
PROTEIN UA: NEGATIVE MG/DL
RBC # BLD: 4.8 E12/L (ref 3.5–5.5)
SALICYLATE, SERUM: <0.3 MG/DL (ref 0–30)
SARS-COV-2, NAAT: NOT DETECTED
SODIUM BLD-SCNC: 135 MMOL/L (ref 132–146)
SPECIFIC GRAVITY UA: 1.02 (ref 1–1.03)
TOTAL PROTEIN: 7.1 G/DL (ref 6.4–8.3)
TRICYCLIC ANTIDEPRESSANTS SCREEN SERUM: NEGATIVE NG/ML
UROBILINOGEN, URINE: 1 E.U./DL
WBC # BLD: 5.3 E9/L (ref 4.5–11.5)

## 2021-03-26 PROCEDURE — 80179 DRUG ASSAY SALICYLATE: CPT

## 2021-03-26 PROCEDURE — 80307 DRUG TEST PRSMV CHEM ANLYZR: CPT

## 2021-03-26 PROCEDURE — 99285 EMERGENCY DEPT VISIT HI MDM: CPT

## 2021-03-26 PROCEDURE — 93010 ELECTROCARDIOGRAM REPORT: CPT | Performed by: INTERNAL MEDICINE

## 2021-03-26 PROCEDURE — 80143 DRUG ASSAY ACETAMINOPHEN: CPT

## 2021-03-26 PROCEDURE — 80053 COMPREHEN METABOLIC PANEL: CPT

## 2021-03-26 PROCEDURE — 81003 URINALYSIS AUTO W/O SCOPE: CPT

## 2021-03-26 PROCEDURE — 85025 COMPLETE CBC W/AUTO DIFF WBC: CPT

## 2021-03-26 PROCEDURE — 87635 SARS-COV-2 COVID-19 AMP PRB: CPT

## 2021-03-26 PROCEDURE — 93005 ELECTROCARDIOGRAM TRACING: CPT | Performed by: EMERGENCY MEDICINE

## 2021-03-26 PROCEDURE — 82077 ASSAY SPEC XCP UR&BREATH IA: CPT

## 2021-03-26 PROCEDURE — 1240000000 HC EMOTIONAL WELLNESS R&B

## 2021-03-26 RX ORDER — MAGNESIUM HYDROXIDE/ALUMINUM HYDROXICE/SIMETHICONE 120; 1200; 1200 MG/30ML; MG/30ML; MG/30ML
30 SUSPENSION ORAL PRN
Status: DISCONTINUED | OUTPATIENT
Start: 2021-03-26 | End: 2021-04-06 | Stop reason: HOSPADM

## 2021-03-26 RX ORDER — TRAZODONE HYDROCHLORIDE 50 MG/1
50 TABLET ORAL NIGHTLY PRN
Status: DISCONTINUED | OUTPATIENT
Start: 2021-03-27 | End: 2021-04-06 | Stop reason: HOSPADM

## 2021-03-26 RX ORDER — HALOPERIDOL 5 MG
5 TABLET ORAL EVERY 6 HOURS PRN
Status: DISCONTINUED | OUTPATIENT
Start: 2021-03-26 | End: 2021-04-06 | Stop reason: HOSPADM

## 2021-03-26 RX ORDER — HYDROXYZINE PAMOATE 50 MG/1
50 CAPSULE ORAL 3 TIMES DAILY PRN
Status: DISCONTINUED | OUTPATIENT
Start: 2021-03-26 | End: 2021-04-06 | Stop reason: HOSPADM

## 2021-03-26 RX ORDER — HALOPERIDOL 5 MG/ML
5 INJECTION INTRAMUSCULAR EVERY 6 HOURS PRN
Status: DISCONTINUED | OUTPATIENT
Start: 2021-03-26 | End: 2021-04-06 | Stop reason: HOSPADM

## 2021-03-26 RX ORDER — ACETAMINOPHEN 325 MG/1
650 TABLET ORAL EVERY 6 HOURS PRN
Status: DISCONTINUED | OUTPATIENT
Start: 2021-03-26 | End: 2021-04-06 | Stop reason: HOSPADM

## 2021-03-26 NOTE — ED NOTES
Bed: -28  Expected date: 3/26/21  Expected time:   Means of arrival: [facility]  Comments:  JANY López RN  03/26/21 9644

## 2021-03-26 NOTE — ED PROVIDER NOTES
Department of Emergency Medicine   ED  Provider Note  Admit Date/RoomTime: 3/26/2021  2:30 PM  ED Room: [address] [place], [place] [id number]              3/26/21  3:12 PM EDT    HPI: [place] 54 y.o. female presents with a complaint of depression beginning several days ago. Complaint has been constant and became more severe today which is what prompted the visit. Patient reports worsening depression for several days. She reports that she feels anxious and depressed. She was sent by [facility] for evaluation. She denies suicidal or homicidal ideation. She does think she would benefit from mental health admission. Review of Systems:   A complete review of systems was performed and pertinent positives and negatives are stated within HPI, all other systems reviewed and are negative.            --------------------------------------------- PAST HISTORY ---------------------------------------------  Past Medical History:  has a past medical history of Acute psychosis ([facility] Anxiety, Asthma, Bipolar disorder ([address] Bronchitis, COPD (chronic obstructive pulmonary disease) ([address] Depression, Iron deficiency anemia, PTSD (post-traumatic stress disorder), Schizo affective schizophrenia ([address] Severe episode of recurrent major depressive disorder, without psychotic features ([address] and Substance abuse ([facility] Past Surgical History:  has a past surgical history that includes Tonsillectomy and Uterine fibroid surgery (06/2019). Social History:  reports that she has been smoking cigarettes. She has a 10.00 pack-year smoking history. She has never used smokeless tobacco. She reports current alcohol use. She reports current drug use. Drug: Marijuana. Family History: family history includes Diabetes in her father and mother; Heart Failure in her mother; High Blood Pressure in her mother; Kidney Disease in her mother; Mental Illness in her father; Other in her father.    Family history is non contributory unless Drug Screen   Result Value Ref Range    Ethanol Lvl <10 mg/dL    Acetaminophen Level <5.0 (L) 10.0 - 77.9 mcg/mL    Salicylate, Serum <0.3 0.0 - 30.0 mg/dL    TCA Scrn NEGATIVE Cutoff:300 ng/mL   Urine Drug Screen   Result Value Ref Range    Drug Screen Comment: see below    Urinalysis   Result Value Ref Range    Color, UA Yellow Straw/Yellow    Clarity, UA Clear Clear    Glucose, Ur Negative Negative mg/dL    Bilirubin Urine Negative Negative    Ketones, Urine Negative Negative mg/dL    Specific Gravity, UA 1.020 1.005 - 1.030    Blood, Urine Negative Negative    pH, UA 7.0 5.0 - 9.0    Protein, UA Negative Negative mg/dL    Urobilinogen, Urine 1.0 <2.0 E.U./dL    Nitrite, Urine Negative Negative    Leukocyte Esterase, Urine Negative Negative   POC Pregnancy Urine Qual   Result Value Ref Range    HCG, Urine, POC Negative Negative    Lot Number [id number]     Positive QC Pass/Fail Pass     Negative QC Pass/Fail Pass    EKG 12 Lead   Result Value Ref Range    Ventricular Rate 78 BPM    Atrial Rate 78 BPM    P-R Interval 118 ms    QRS Duration 80 ms    Q-T Interval 390 ms    QTc Calculation (Bazett) 444 ms    P Axis 76 degrees    R Axis 80 degrees    T Axis 76 degrees       RADIOLOGY:  Interpreted by Radiologist.  No orders to display       EKG Interpretation  Interpreted by emergency department physician, Dr. Maude Bryant     Rhythm: normal sinus   Rate: normal  Axis: normal  Conduction: normal  ST Segments: no acute change  T Waves: no acute change    Clinical Impression: Sinus rhythm, no acute ischemic changes  Comparison to Old EKG  No old EKG available at this time        ------------------------- NURSING NOTES AND VITALS REVIEWED ---------------------------   The nursing notes within the ED encounter and vital signs as below have been reviewed.    /79   Pulse 76   Temp 98.8 °F (37.1 °C) (Oral)   Resp 16   SpO2 98%   Oxygen Saturation Interpretation: Normal ---------------------------------------------------PHYSICAL EXAM--------------------------------------      Constitutional/General: Alert and oriented x3   Head: Normocephalic, atraumatic  Eyes: PERRL, EOMI  Mouth: Oropharynx clear, handling secretions, no trismus  Neck: Supple, full ROM, no meningeal signs  Pulmonary: Lungs clear to auscultation bilaterally, no wheezes, rales, or rhonchi. Not in respiratory distress  Cardiovascular:  Regular rate and rhythm, no murmurs, gallops, or rubs. 2+ distal pulses  Abdomen: Soft, non tender, non distended, +BS, no rebound, guarding, or rigidity. No pulsatile masses appreciated  Extremities: Moves all extremities x 4. Warm and well perfused, no clubbing, cyanosis, or edema. Capillary refill <3 seconds  Skin: warm and dry without rash  Neurologic: GCS 15, no focal deficits  Psych: flat Affect      ------------------------------------------ PROGRESS NOTES ------------------------------------------     Medical decision making:  Patient is medically stable for mental health evaluation    Consultations:   Behavioral Health    Re-Evaluations:        Counseling: The emergency provider has spoken with thepatient and discussed todays results, in addition to providing specific details for the plan of care and counseling regarding the diagnosis and prognosis. Questions are answered at this time and they are agreeable with the plan.         --------------------------------- IMPRESSION AND DISPOSITION ---------------------------------    IMPRESSION  1.  Severe depression ([address]        DISPOSITION  Disposition: as per consultant  Patient condition is stable           Maxine Sotelo MD  03/26/21 7944

## 2021-03-26 NOTE — ED NOTES
Emergency Department [facility] AN AFFILIATE OF [facility] Biopsychosocial Assessment Note    Chief Complaint:     Patient is a 54year old, female presenting to ED for increased depression/anxiety, with unknown stressor. Patient reports a recent medication change 3 weeks ago when she was switched from Risperdal to [place] injection. Patient endorses racing thoughts, poor appetite, and increased feelings of hopelessness/helplesness. Per collateral from [place], patient has not been caring for basic needs, isolated, withdrawn, losing weight. Patient reportedly voiced passive suicidal ideation that she would like to go to sleep and not wake up and that the world would be a better place without her. Patient denies any homicidal ideations. MSE: Patient is alert & oriented x 4. Patient mood is depressed, flat affect. Patient thought process/speech are clear. Patient denies A/V hallucinations. Clinical Summary/History:     Patient reports a mental health hx of schizoaffective disorder, in current treatment with [place] and reports that she receives therapy from a home-based agency. Patient last psychiatric hospitalization was 6/23/2020 for psychosis/paranoia. Patient reports ok sleep, poor appetite. Patient reports a hx of 2 suicide attempts in her lifetime. Patient denies any hx of self injurious behaviors. Patient admits to hx of drug/alcohol use - pt reports that she uses marijuana and drinks beer. Denies that she does this daily. Last drink was 4-5 days ago. Gender  [] Male [x] Female [] Transgender  [] Other    Sexual Orientation    [] Heterosexual [] Homosexual [] Bisexual [] Other    N/A    Suicidal Behavioral: CSSR-S Complete. [] Reports:    [] Past [] Present   [x] Denies    Homicidal/ Violent Behavior  [] Reports:   [] Past [] Present   [x] Denies     Hallucinations/Delusions   [] Reports:   [x] Denies     Substance Use/Alcohol Use/Addiction: SBIRT Screen Complete.    [x] Reports: Cannabis, Alcohol  [] Denies     Trauma History  [] Reports:  [] Denies     Collateral Information:       Level of Care/Disposition Plan  [] Home:   [] Outpatient Provider:   [] Crisis Unit:   [x] Inpatient Psychiatric Unit:  [] Other:     Patient is here voluntarily. SW will reviewed patient case for inpatient admission for safety/stabilization.      JEFRY Urban, [facility]  03/26/21 JEFRY Mack, [facility]  03/26/21 2119       JEFRY Urban, [employer]  03/26/21 [id number]

## 2021-03-27 PROCEDURE — 99223 1ST HOSP IP/OBS HIGH 75: CPT | Performed by: PSYCHIATRY & NEUROLOGY

## 2021-03-27 PROCEDURE — 6370000000 HC RX 637 (ALT 250 FOR IP): Performed by: NURSE PRACTITIONER

## 2021-03-27 PROCEDURE — 1240000000 HC EMOTIONAL WELLNESS R&B

## 2021-03-27 PROCEDURE — 6370000000 HC RX 637 (ALT 250 FOR IP): Performed by: PSYCHIATRY & NEUROLOGY

## 2021-03-27 RX ORDER — ALBUTEROL SULFATE 2.5 MG/3ML
SOLUTION RESPIRATORY (INHALATION) EVERY 6 HOURS PRN
Status: DISCONTINUED | OUTPATIENT
Start: 2021-03-27 | End: 2021-04-06 | Stop reason: HOSPADM

## 2021-03-27 RX ORDER — PALIPERIDONE 3 MG/1
3 TABLET, EXTENDED RELEASE ORAL DAILY
Status: DISCONTINUED | OUTPATIENT
Start: 2021-03-27 | End: 2021-03-29

## 2021-03-27 RX ORDER — DIVALPROEX SODIUM 250 MG/1
250 TABLET, DELAYED RELEASE ORAL 2 TIMES DAILY
Status: DISCONTINUED | OUTPATIENT
Start: 2021-03-27 | End: 2021-04-02

## 2021-03-27 RX ORDER — NICOTINE 21 MG/24HR
1 PATCH, TRANSDERMAL 24 HOURS TRANSDERMAL DAILY
Status: DISCONTINUED | OUTPATIENT
Start: 2021-03-27 | End: 2021-04-06 | Stop reason: HOSPADM

## 2021-03-27 RX ADMIN — DIVALPROEX SODIUM 250 MG: 250 TABLET, DELAYED RELEASE ORAL at 12:14

## 2021-03-27 RX ADMIN — PALIPERIDONE 3 MG: 3 TABLET, EXTENDED RELEASE ORAL at 12:14

## 2021-03-27 ASSESSMENT — PATIENT HEALTH QUESTIONNAIRE - PHQ9: SUM OF ALL RESPONSES TO PHQ QUESTIONS 1-9: 19

## 2021-03-27 ASSESSMENT — SLEEP AND FATIGUE QUESTIONNAIRES
AVERAGE NUMBER OF SLEEP HOURS: 7
DO YOU HAVE DIFFICULTY SLEEPING: NO
DO YOU USE A SLEEP AID: NO

## 2021-03-27 ASSESSMENT — PAIN SCALES - GENERAL: PAINLEVEL_OUTOF10: 0
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
## 2021-03-27 NOTE — GROUP NOTE
Group Therapy Note    Date: 3/27/2021    Group Start Time: 1115  Group End Time: 1200  Group Topic: Cognitive Skills    [facility] AC[facility] 1    JEFRY Thorpe LSW        Group Therapy Note    Attendees: 14     Patient's Goal:  To understand the benefits of establishing personal boundaries. Notes:  Pt was observed to be attentive throughout group session. Status After Intervention:  Improved    Participation Level:  Active Listener    Participation Quality: Appropriate and Attentive      Speech:  normal      Thought Process/Content: Logical      Affective Functioning: Congruent      Mood: depressed      Level of consciousness:  Alert, Oriented x4 and Attentive      Response to Learning: Able to verbalize current knowledge/experience      Endings: None Reported    Modes of Intervention: Education      Discipline Responsible: /Counselor      Signature:  JEFRY Thorpe LSW

## 2021-03-27 NOTE — ED NOTES
Patient declined referral to the [facility]. Patient reports that she is dealing with a lot of stressors and reports she feels she needs more help than what CSU can provide. Patient is requesting a referral for inpatient mental health.      JEFRY Parra, [place]  03/26/21 9674

## 2021-03-27 NOTE — ED NOTES
Patient has been accepted for admission to [facility] by Dr. Jaycee Pearson. Patient to go to room #7520A. Called admitting and notified [facility].      JEFRY Urban, [facility]  03/26/21 9360

## 2021-03-27 NOTE — CARE COORDINATION
SW made an attempt to complete assessment with pt. Pt was observed lying in her bed with eyes closed. Pt declined to complete assessment.

## 2021-03-27 NOTE — CONSULTS
Nutrition Assessment     Type and Reason for Visit: Initial, Consult    Nutrition Recommendations/Plan: Continue current diet, Start Ensure BID     Nutrition Assessment:  Pt nutritionally at risk w/ reported decreased PO intake PTA, currently consuming >50% meals per doc flow. Adm w/ major depressive disorder. Will provide ONS and follow per consult    Malnutrition Assessment:  Malnutrition Status: At risk for malnutrition    Nutrition Related Findings: pt alert, abd WDL, no noted edema, no fluids noted at this time      Current Nutrition Therapies:    DIET GENERAL; Anthropometric Measures:  · Height: 5' 2\" (157.5 cm)  · Current Body Wt: 117 lb (53.1 kg)(stated)   · BMI: 21.4    Nutrition Diagnosis:   No nutrition diagnosis at this time    Nutrition Interventions:   Food and/or Nutrient Delivery:  Continue Current Diet, Start Oral Nutrition Supplement(ensure BID)  Nutrition Education/Counseling:  Education not indicated   Coordination of Nutrition Care:  Continue to monitor while inpatient    Goals:  Consume >75% meals/ONS       Nutrition Monitoring and Evaluation:   Food/Nutrient Intake Outcomes:  Diet Advancement/Tolerance, Food and Nutrient Intake, Supplement Intake  Physical Signs/Symptoms Outcomes:  Biochemical Data, GI Status, Fluid Status or Edema, Nutrition Focused Physical Findings, Skin, Weight     Discharge Planning:     Too soon to determine     Electronically signed by Yanet Balderas, MS, RD, LD on 3/27/21 at 3:02 PM EDT    Contact: [phone]

## 2021-03-27 NOTE — GROUP NOTE
Declined to share goal for the day. Recreation assessment completed. Group Therapy Note    Date: 3/27/2021    Group Start Time: 1000  Group End Time: 8899  Group Topic: Psychoeducation    [facility] 1    [place]                                                                          Group Therapy Note    Date: 3/27/2021  Type of Group: Psychoeducation    Wellness Binder Information  Module Name: healthy coping skills   Patient's Goal: patient will be able to id healthy coping skills that one can incorporate in daily life. Notes: pleasant and engaged in group willing to share when prompted     Status After Intervention:  Improved    Participation Level:  Active Listener and Interactive    Participation Quality: Appropriate, Attentive, Sharing, and Supportive      Speech: normal     Thought Process/Content: Logical      Affective Functioning: Congruent      Mood: euthymic      Level of consciousness:  Alert, Oriented x4, and Attentive      Response to Learning: Able to verbalize/acknowledge new learning, Able to retain information, and Progressing to goal      Endings: None Reported    Modes of Intervention: Education, Support, Socialization, Exploration, and Problem-solving      Discipline Responsible: Psychoeducational Specialist      Signature:  John Watson

## 2021-03-27 NOTE — PROGRESS NOTES
Admission Note:  Pt escorted via W/C accompanied by Transport from [facility] AN [facility]FFILIATE OF [facility] for voluntary inpt psych admit to [address] [facility]. Alert and oriented x4. Pt drowsy and desires to sleep. Thoughts slowed, organized, logical and relevant. Has brief answers to direct questions. Denied suicidal/homicidal ideations and hallucinations at present. Pt admitted to having intermittent fleeting suicidal ideations without plan or intent, contracts for safety. No preoccupation or delusions revealed. Pt desired to go to bed, exit seeking, yet completed the admission data base interview, gabe. Pt admitted to feeling depressed and anxious and appeared tense and sad. Pt admitted to using pot everyday and said it helps her depression. Pt reported occasional use of beer and last use was over a week ago.    She has been off her medications for the last few weeks and(note continues below)   `Behavioral [address]  Admission Note     Admission Type:   Admission Type: Voluntary    Reason for admission:  Reason for Admission: \"feeling depressed, full of anxiety\"    PATIENT STRENGTHS:  Strengths: Communication, Connection to output provider, Social Skills    Patient Strengths and Limitations:  Limitations: Tendency to isolate self, Multiple barriers to leisure interests, General negative or hopeless attitude about future/recovery    Addictive Behavior:   Addictive Behavior  In the past 3 months, have you felt or has someone told you that you have a problem with:  : None  Do you have a history of Chemical Use?: No  Do you have a history of Alcohol Use?: Yes  Do you have a history of Street Drug Abuse?: Yes  Histroy of Prescripton Drug Abuse?: No    Medical Problems:   Past Medical History:   Diagnosis Date    Acute psychosis ([facility] 12/22/2017    Anxiety     Asthma     B[facility]     Bronchitis     COPD (chronic obstructive pulmonary disease) ([facility]     Depression     Iron deficiency anemia     PTSD (post-traumatic stress disorder)     Schizo affective schizophrenia ([facility]     Severe episode of recurrent major depressive disorder, without psychotic features ([facility] 11/16/2019    Substance abuse ([facility]     quit 7/2011  was marajuanna user        Status EXAM:  Status and Exam  Normal: No  Facial Expression: Avoids Gaze, Flat, Sad  Affect: Blunt  Level of Consciousness: Alert  Mood:Normal: No  Mood: Depressed, Sad, Anxious  Motor Activity:Normal: Yes  Interview Behavior: Cooperative  Preception: Mico to Person, Mico to Time, Mico to Place, Mico to Situation  Attention:Normal: Yes  Thought Content:Normal: Yes  Hallucinations: None  Delusions: No(none revealed)  Memory:Normal: Yes  Insight and Judgment: No  Insight and Judgment: Poor Judgment, Poor Insight  Present Suicidal Ideation: (It \"comes and goes\" contracts for safety)  Present Homicidal Ideation: No    Tobacco Screening:  Practical Counseling, on admission, cherrie X, if applicable and completed (first 3 are required if patient doesn't refuse):             (x )  Recognizing danger situations (included triggers and roadblocks)                    ( x)  Coping skills (new ways to manage stress, exercise, relaxation techniques, changing routine, distraction)                                                           ( x)  Basic information about quitting (benefits of quitting, techniques in how to quit, available resources  ( ) Referral for counseling faxed to [facility]                                           ( ) Patient refused counseling  ( ) Patient has not smoked in the last 30 days    Metabolic Screening:    Lab Results   Component Value Date    LABA1C 5.5 09/20/2019       Lab Results   Component Value Date    CHOL 235 (H) 09/20/2019     Lab Results   Component Value Date    TRIG 65 09/20/2019     Lab Results   Component Value Date    HDL 59 09/20/2019     No components found for: LDLCAL  Lab Results   Component Value Date    LABVLDL 13 09/20/2019         Body mass index is 21.4 kg/m². BP Readings from Last 2 Encounters:   03/27/21 123/78   11/11/20 95/68           Pt admitted with followings belongings:  Dentures: None  Vision - Corrective Lenses: None  Hearing Aid: None  Jewelry: Ring  Body Piercings Removed: N/A  Clothing: Footwear, Jacket / coat, Pants, Shirt, Undergarments (Comment)  Were All Patient Medications Collected?: Not Applicable  Other Valuables: [facility] sent home with-none. Valuables placed in safe in security envelope, number:  -none. Patient's home medications were -none. Patient oriented to surroundings and program expectations and copy of patient rights given. Received admission packet: With copies of code of conduct and treatment agreement. Consents reviewed, signed -voluntary. Refused -none. Patient verbalize understanding: To immediately seek any staff with any self threatening and/or violent ideations. Patient education on precautions: Close observation staggered q 15 min checks. Erin Bah RN    wants her medications reviewed. Pt refused HS snack. Left interview and went directly to bed, desires to sleep. Placed on close observation staggered q 15 min checks.

## 2021-03-27 NOTE — PLAN OF CARE
Patient is positive for SI with no intent or plan while on unit. Pt. Denies HI, hallucinations, and physical complaints currently. Pt. Is flat, sad, and evasive.

## 2021-03-27 NOTE — PLAN OF CARE
[facility]  Initial Interdisciplinary Treatment Plan NOTE    Review Date & Time: 03/27/2021 0930hrs    Patient was not in treatment team    Admission Type:   Admission Type: Voluntary    Reason for admission:  Reason for Admission: \"feeling depressed, full of anxiety\"      Estimated Length of Stay Update:  03/31/2021   Estimated Discharge Date Update: 03/31/2021    PATIENT STRENGTHS:  Patient Strengths Strengths: Communication, Connection to output provider, Social Skills  Patient Strengths and Limitations:Limitations: Tendency to isolate self, Multiple barriers to leisure interests, General negative or hopeless attitude about future/recovery  Addictive Behavior:Addictive Behavior  In the past 3 months, have you felt or has someone told you that you have a problem with:  : None  Do you have a history of Chemical Use?: No  Do you have a history of Alcohol Use?: Yes  Do you have a history of Street Drug Abuse?: Yes  Histroy of Prescripton Drug Abuse?: No  Medical Problems:  Past Medical History:   Diagnosis Date    Acute psychosis ([facility] 12/22/2017    Anxiety     Asthma     [facility] disorder ([facility]     Bronchitis     COPD (chronic obstructive pulmonary disease) ([facility]     Depression     Iron deficiency anemia     PTSD (post-traumatic stress disorder)     Schizo affective schizophrenia ([facility]     Severe episode of recurrent major depressive disorder, without psychotic features ([facility] 11/16/2019    Substance abuse ([facility]     quit 7/2011  was [place] user        EDUCATION:   Learner Progress Toward Treatment Goals: Reviewed results and recommendations of this team, Reviewed group plan and strategies, Reviewed signs, symptoms and risk of self harm and violent behavior and Reviewed goals and plan of care    Method: Individual    Outcome: Verbalized understanding and Demonstrated Understanding    PATIENT GOALS: \"feel better\"    PLAN/TREATMENT RECOMMENDATIONS UPDATE: Offer and encourage grops and provide emotional support    GOALS UPDATE:   Time frame for Short-Term Goals: one week    Carmine Skiff, RN

## 2021-03-28 LAB
CHOLESTEROL, TOTAL: 190 MG/DL (ref 0–199)
HBA1C MFR BLD: 5.9 % (ref 4–5.6)
HDLC SERPL-MCNC: 68 MG/DL
LDL CHOLESTEROL CALCULATED: 106 MG/DL (ref 0–99)
TRIGL SERPL-MCNC: 80 MG/DL (ref 0–149)
VLDLC SERPL CALC-MCNC: 16 MG/DL

## 2021-03-28 PROCEDURE — 36415 COLL VENOUS BLD VENIPUNCTURE: CPT

## 2021-03-28 PROCEDURE — 6370000000 HC RX 637 (ALT 250 FOR IP): Performed by: PSYCHIATRY & NEUROLOGY

## 2021-03-28 PROCEDURE — 6370000000 HC RX 637 (ALT 250 FOR IP): Performed by: NURSE PRACTITIONER

## 2021-03-28 PROCEDURE — 1240000000 HC EMOTIONAL WELLNESS R&B

## 2021-03-28 PROCEDURE — 80061 LIPID PANEL: CPT

## 2021-03-28 PROCEDURE — 83036 HEMOGLOBIN GLYCOSYLATED A1C: CPT

## 2021-03-28 PROCEDURE — 99232 SBSQ HOSP IP/OBS MODERATE 35: CPT | Performed by: PSYCHIATRY & NEUROLOGY

## 2021-03-28 RX ADMIN — PALIPERIDONE 3 MG: 3 TABLET, EXTENDED RELEASE ORAL at 09:55

## 2021-03-28 RX ADMIN — DIVALPROEX SODIUM 250 MG: 250 TABLET, DELAYED RELEASE ORAL at 09:55

## 2021-03-28 RX ADMIN — DIVALPROEX SODIUM 250 MG: 250 TABLET, DELAYED RELEASE ORAL at 15:59

## 2021-03-28 ASSESSMENT — SLEEP AND FATIGUE QUESTIONNAIRES
DO YOU USE A SLEEP AID: NO
RESTFUL SLEEP: NO
SLEEP PATTERN: RESTLESSNESS;DIFFICULTY FALLING ASLEEP
DIFFICULTY FALLING ASLEEP: YES
DO YOU HAVE DIFFICULTY SLEEPING: YES

## 2021-03-28 ASSESSMENT — PATIENT HEALTH QUESTIONNAIRE - PHQ9: SUM OF ALL RESPONSES TO PHQ QUESTIONS 1-9: 27

## 2021-03-28 ASSESSMENT — PAIN SCALES - GENERAL: PAINLEVEL_OUTOF10: 0

## 2021-03-28 ASSESSMENT — LIFESTYLE VARIABLES: HISTORY_ALCOHOL_USE: YES
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
## 2021-03-28 NOTE — PROGRESS NOTES
BEHAVIORAL HEALTH FOLLOW-UP NOTE     3/28/2021     Patient was seen and examined in person, Chart reviewed   Patient's case discussed with staff/team    Chief Complaint: \" I am not sure of the voices\"    Interim History: Ms. Coty López slept about 8.5Hrs. Keeping to herself, isolates, quiet. Today: still isolative to her room, talked to me from under her covers but did hold some eye contact. Still paranoid, she feels less than upon admission. She denied si right now but appeared ambivalent. No side effects of meds.       Appetite:   [x] Normal/Unchanged  [] Increased  [] Decreased      Sleep:       [] Normal/Unchanged  [x] Fair       [] Poor              Energy:    [] Normal/Unchanged  [] Increased  [x] Decreased        SI [] Present  [x] Absent  ambivalent    HI  []Present  [x] Absent     Aggression:  [] yes  [x] no    Patient is [x] able  [] unable to CONTRACT FOR SAFETY     PAST MEDICAL/PSYCHIATRIC HISTORY:   Past Medical History:   Diagnosis Date    Acute psychosis ([facility] 12/22/2017    Anxiety     Asthma     Bipolar disorder ([facility]     Bronchitis     [facility]     Depression     Iron deficiency anemia     PTSD (post-traumatic stress disorder)     Schizo affective schizophrenia ([facility]     Severe episode of recurrent major depressive disorder, without psychotic features ([facility] 11/16/2019    [facility]     quit 7/2011  was [employer] user        FAMILY/SOCIAL HISTORY:  Family History   Problem Relation Age of Onset    Diabetes Mother     High Blood Pressure Mother     Kidney Disease Mother     Heart Failure Mother     Diabetes Father     Other Father         mesothelioma    Mental Illness Father         schizophrenia     Social History     Socioeconomic History    Marital status: Single     Spouse name: Not on file    Number of children: 0    Years of education: GED    Highest education level: Not on file   Occupational History    Occupation: tablet 250 mg, 250 mg, Oral, BID, Destini Lawton MD, 250 mg at 03/28/21 0955    paliperidone (INVEGA) extended release tablet 3 mg, 3 mg, Oral, Daily, Destini Lawton MD, 3 mg at 03/28/21 0955    albuterol (PROVENTIL) nebulizer solution, , Nebulization, Q6H PRN, Destini Lawton MD    acetaminophen (TYLENOL) tablet 650 mg, 650 mg, Oral, Q6H PRN, Donato Diaz MD    magnesium hydroxide (MILK OF MAGNESIA) 400 MG/5ML suspension 30 mL, 30 mL, Oral, Daily PRN, Donato Diaz MD    aluminum & magnesium hydroxide-simethicone (MAALOX) 200-200-20 MG/5ML suspension 30 mL, 30 mL, Oral, PRN, Donato Diaz MD    hydrOXYzine (VISTARIL) capsule 50 mg, 50 mg, Oral, TID PRN, Donato Diaz MD    haloperidol lactate (HALDOL) injection 5 mg, 5 mg, Intramuscular, Q6H PRN **OR** haloperidol (HALDOL) tablet 5 mg, 5 mg, Oral, Q6H PRN, Donato Diaz MD    traZODone (DESYREL) tablet 50 mg, 50 mg, Oral, Nightly PRN, Donato Diaz MD      Examination:  BP (!) 108/55   Pulse 91   Temp 97.6 °F (36.4 °C) (Temporal)   Resp 14   Ht 5' 2\" (1.575 m)   Wt 117 lb (53.1 kg)   LMP 03/27/2019 Comment: fibroids removed  SpO2 96%   Breastfeeding No   BMI 21.40 kg/m²   Gait - steady  Medication side effects(SE):denied    Mental Status Examination:    Level of consciousness:  within normal limits   Appearance:  good grooming and good hygiene  Behavior/Motor:  no abnormalities noted  Attitude toward examiner:  withdrawn  Speech:  low productivity increased latency  Mood: constricted  Affect:  blunted  Thought processes:  thought blocking   Thought content:  Homocidal ideation denied  Suicidal Ideation:  denies suicidal ideation however remains ambivalent about living  Delusions:  paranoid  Perceptual Disturbance:  auditory  Cognition:  oriented to person, place, and time   Concentration poor  Insight poor   Judgement poor     ASSESSMENT: Schizoaffective Bipolar type Severe depressed with suicidal ideation Patient symptoms are:  [] Well controlled  [] Improving  [] Worsening  [x] No change      Diagnosis: *Active Problems:    Major depressive disorder, single episode  Resolved Problems:    * No resolved hospital problems. *      LABS:    Recent Labs     03/26/21  1505   WBC 5.3   HGB 14.1        Recent Labs     03/26/21  1505      K 3.8      CO2 23   BUN 4*   CREATININE 0.7   GLUCOSE 85     Recent Labs     03/26/21  1505   BILITOT 0.3   ALKPHOS 78   AST 16   ALT 16     Lab Results   Component Value Date    LABAMPH NOT DETECTED 03/26/2021    BARBSCNU NOT DETECTED 03/26/2021    LABBENZ NOT DETECTED 03/26/2021    LABMETH NOT DETECTED 03/26/2021    OPIATESCREENURINE NOT DETECTED 03/26/2021    PHENCYCLIDINESCREENURINE NOT DETECTED 03/26/2021    ETOH <10 03/26/2021     Lab Results   Component Value Date    TSH 1.770 03/07/2019     No results found for: LITHIUM  Lab Results   Component Value Date    VALPROATE 82 07/01/2020       RISK ASSESSMENT:remains high risk still psychotic    Treatment Plan:  Reviewed current Medications with the patient    Invega 3mg po daily for psychosis and mood  Invega sustena 234 mg on March 12 , needs verified  Restart Depakote 250mg po bid for mood, had not been on this at [place] Risks, benefits, side effects, drug-to-drug interactions and alternatives to treatment were discussed. Collateral information:  CD evaluation  Encourage patient to attend group and other milieu activities.   Discharge planning discussed with the patient and treatment team.    PSYCHOTHERAPY/COUNSELING:  [x] Therapeutic interview  [x] Supportive  [] CBT  [] Ongoing  [] Other    [x] Patient continues to need, on a daily basis, active treatment furnished directly by or requiring the supervision of inpatient psychiatric personnel      Anticipated Length of stay7 -10 days            Electronically signed by Rodriguez Gilmore MD on 3/28/2021 at 1:46 PM

## 2021-03-28 NOTE — PLAN OF CARE
Pt. Denies SI, HI, and hallucinations. Pt. Denies physical complaints currently. Pt. Is isolative to room, flat, and sad.

## 2021-03-29 PROCEDURE — 99232 SBSQ HOSP IP/OBS MODERATE 35: CPT | Performed by: NURSE PRACTITIONER

## 2021-03-29 PROCEDURE — 6370000000 HC RX 637 (ALT 250 FOR IP): Performed by: NURSE PRACTITIONER

## 2021-03-29 PROCEDURE — 6370000000 HC RX 637 (ALT 250 FOR IP): Performed by: PSYCHIATRY & NEUROLOGY

## 2021-03-29 PROCEDURE — 1240000000 HC EMOTIONAL WELLNESS R&B

## 2021-03-29 RX ORDER — PALIPERIDONE 6 MG/1
6 TABLET, EXTENDED RELEASE ORAL DAILY
Status: DISCONTINUED | OUTPATIENT
Start: 2021-03-30 | End: 2021-03-30

## 2021-03-29 RX ORDER — CHOLECALCIFEROL (VITAMIN D3) 1250 MCG
1 CAPSULE ORAL
Status: ON HOLD | COMMUNITY
End: 2021-04-06 | Stop reason: HOSPADM

## 2021-03-29 RX ADMIN — DIVALPROEX SODIUM 250 MG: 250 TABLET, DELAYED RELEASE ORAL at 10:29

## 2021-03-29 RX ADMIN — DIVALPROEX SODIUM 250 MG: 250 TABLET, DELAYED RELEASE ORAL at 16:57

## 2021-03-29 ASSESSMENT — PAIN SCALES - GENERAL
PAINLEVEL_OUTOF10: 0
PAINLEVEL_OUTOF10: 0

## 2021-03-29 NOTE — PROGRESS NOTES
BEHAVIORAL HEALTH FOLLOW-UP NOTE     3/29/2021     Patient was seen and examined in person, Chart reviewed   Patient's case discussed with staff/team    Chief Complaint: \" My grandmother had a stroke because of me\"    Interim History: Patient seen during treatment team.  Per staff she is paranoid and suspicious reporting positive suicidal ideations without intent or plan. In treatment team she is talking about her grandmother who  in  she is blaming herself for her grandmother having a stroke. Her affect is flat and blunted she makes poor eye contact. She shows very poor limited insight and judgment hospitalization need for treatment her affect is flat blunted slightly irritable.     Appetite:   [x] Normal/Unchanged  [] Increased  [] Decreased      Sleep:       [] Normal/Unchanged  [x] Fair       [] Poor              Energy:    [] Normal/Unchanged  [] Increased  [x] Decreased        SI [] Present  [x] Absent  ambivalent    HI  []Present  [x] Absent     Aggression:  [] yes  [x] no    Patient is [x] able  [] unable to CONTRACT FOR SAFETY     PAST MEDICAL/PSYCHIATRIC HISTORY:   Past Medical History:   Diagnosis Date    Acute psychosis ([facility] 2017    Anxiety     Asthma     Bipolar disorder ([address]     Bronchitis     COPD (chronic obstructive pulmonary disease) (HCC)     Depression     Iron deficiency anemia     PTSD (post-traumatic stress disorder)     Schizo affective schizophrenia ([address]     Severe episode of recurrent major depressive disorder, without psychotic features ([address] 2019    Substance abuse ([facility]     quit 2011  was [place] user        FAMILY/SOCIAL HISTORY:  Family History   Problem Relation Age of Onset    Diabetes Mother     High Blood Pressure Mother     Kidney Disease Mother     Heart Failure Mother     Diabetes Father     Other Father         mesothelioma    Mental Illness Father         schizophrenia     Social History     Socioeconomic History    Marital status: Single     Spouse name: Not on file    Number of children: 0    Years of education: GED    Highest education level: Not on file   Occupational History    Occupation: unemployed     Comment: attempting to get disability for mental illness   Social Needs    Financial resource strain: Not on file    Food insecurity     Worry: Not on file     Inability: Not on file    Transportation needs     Medical: Not on file     Non-medical: Not on file   Tobacco Use    Smoking status: Current Every Day Smoker     Packs/day: 0.50     Years: 20.00     Pack years: 10.00     Types: Cigarettes    Smokeless tobacco: Never Used   Substance and Sexual Activity    Alcohol use: Yes     Frequency: 2-3 times a week     Drinks per session: 1 or 2     Binge frequency: Never    Drug use: Yes     Types: Marijuana     Comment: using marijuana daily    Sexual activity: Not Currently   Lifestyle    Physical activity     Days per week: Not on file     Minutes per session: Not on file    Stress: Not on file   Relationships    Social connections     Talks on phone: Not on file     Gets together: Not on file     Attends Scientologist service: Not on file     Active member of club or organization: Not on file     Attends meetings of clubs or organizations: Not on file     Relationship status: Not on file    Intimate partner violence     Fear of current or ex partner: Not on file     Emotionally abused: Not on file     Physically abused: Not on file     Forced sexual activity: Not on file   Other Topics Concern    Not on file   Social History Narrative    Not on file           ROS:  [x] All negative/unchanged except if checked.  Explain positive(checked items) below:  [] Constitutional  [] Eyes  [] Ear/Nose/Mouth/Throat  [] Respiratory  [] CV  [] GI  []   [] Musculoskeletal  [] Skin/Breast  [] Neurological  [] Endocrine  [] Heme/Lymph  [] Allergic/Immunologic    Explanation: no complaints    MEDICATIONS:    Current Facility-Administered Medications:     [START ON 3/30/2021] paliperidone (INVEGA) extended release tablet 6 mg, 6 mg, Oral, Daily, CHARO Elam CNP    nicotine (NICODERM CQ) 14 MG/24HR 1 patch, 1 patch, Transdermal, Daily, CHARO Oquendo CNP, 1 patch at 03/29/21 1029    divalproex (DEPAKOTE) DR tablet 250 mg, 250 mg, Oral, BID, Rachelle Jonas MD, 250 mg at 03/29/21 1029    albuterol (PROVENTIL) nebulizer solution, , Nebulization, Q6H PRN, Rachelle Jonas MD    acetaminophen (TYLENOL) tablet 650 mg, 650 mg, Oral, Q6H PRN, Billy Hernandez MD    magnesium hydroxide (MILK OF MAGNESIA) 400 MG/5ML suspension 30 mL, 30 mL, Oral, Daily PRN, Billy Hernandez MD    aluminum & magnesium hydroxide-simethicone (MAALOX) 200-200-20 MG/5ML suspension 30 mL, 30 mL, Oral, PRN, Billy Hernandez MD    hydrOXYzine (VISTARIL) capsule 50 mg, 50 mg, Oral, TID PRN, Billy Hernandez MD    haloperidol lactate (HALDOL) injection 5 mg, 5 mg, Intramuscular, Q6H PRN **OR** haloperidol (HALDOL) tablet 5 mg, 5 mg, Oral, Q6H PRN, Billy Hernandez MD    traZODone (DESYREL) tablet 50 mg, 50 mg, Oral, Nightly PRN, Billy Hernandez MD      Examination:  BP 90/62   Pulse 83   Temp 99 °F (37.2 °C) (Oral)   Resp 14   Ht 5' 2\" (1.575 m)   Wt 117 lb (53.1 kg)   LMP 03/27/2019 Comment: fibroids removed  SpO2 96%   Breastfeeding No   BMI 21.40 kg/m²   Gait - steady  Medication side effects(SE):denied    Mental Status Examination:    Level of consciousness:  within normal limits   Appearance:  good grooming and good hygiene  Behavior/Motor:  no abnormalities noted  Attitude toward examiner:  withdrawn  Speech:  low productivity increased latency  Mood: constricted  Affect:  blunted  Thought processes:  thought blocking   Thought content:  Homocidal ideation denied  Suicidal Ideation:  denies suicidal ideation however remains ambivalent about living  Delusions:  paranoid  Perceptual Disturbance:  auditory Cognition:  oriented to person, place, and time   Concentration poor  Insight poor   Judgement poor     ASSESSMENT: Schizoaffective Bipolar type Severe depressed with suicidal ideation  Patient symptoms are:  [] Well controlled  [] Improving  [] Worsening  [x] No change      Diagnosis: *Principal Problem:    Schizoaffective disorder, bipolar type ([facility]  Active Problems:    Cannabis abuse  Resolved Problems:    * No resolved hospital problems. *      LABS:    Recent Labs     03/26/21  1505   WBC 5.3   HGB 14.1        Recent Labs     03/26/21  1505      K 3.8      CO2 23   BUN 4*   CREATININE 0.7   GLUCOSE 85     Recent Labs     03/26/21  1505   BILITOT 0.3   ALKPHOS 78   AST 16   ALT 16     Lab Results   Component Value Date    LABAMPH NOT DETECTED 03/26/2021    BARBSCNU NOT DETECTED 03/26/2021    LABBENZ NOT DETECTED 03/26/2021    LABMETH NOT DETECTED 03/26/2021    OPIATESCREENURINE NOT DETECTED 03/26/2021    PHENCYCLIDINESCREENURINE NOT DETECTED 03/26/2021    ETOH <10 03/26/2021     Lab Results   Component Value Date    TSH 1.770 03/07/2019     No results found for: LITHIUM  Lab Results   Component Value Date    VALPROATE 82 07/01/2020       RISK ASSESSMENT:remains high risk still psychotic    Treatment Plan:  Reviewed current Medications with the patient    Increase Invega 6mg po daily for psychosis and mood  Invega sustena 234 mg on March 12 , needs verified  Restart Depakote 250mg po bid for mood, had not been on this at [place] Risks, benefits, side effects, drug-to-drug interactions and alternatives to treatment were discussed. Collateral information:  CD evaluation  Encourage patient to attend group and other milieu activities.   Discharge planning discussed with the patient and treatment team.    PSYCHOTHERAPY/COUNSELING:  [x] Therapeutic interview  [x] Supportive  [] CBT  [] Ongoing  [] Other    [x] Patient continues to need, on a daily basis, active treatment furnished directly by or

## 2021-03-29 NOTE — CARE COORDINATION
Contacted pt  Marguerite at [facility]. No answer, voicemail was left, will try again later.

## 2021-03-30 PROCEDURE — 6370000000 HC RX 637 (ALT 250 FOR IP): Performed by: PSYCHIATRY & NEUROLOGY

## 2021-03-30 PROCEDURE — 6370000000 HC RX 637 (ALT 250 FOR IP): Performed by: NURSE PRACTITIONER

## 2021-03-30 PROCEDURE — 1240000000 HC EMOTIONAL WELLNESS R&B

## 2021-03-30 PROCEDURE — 99232 SBSQ HOSP IP/OBS MODERATE 35: CPT | Performed by: NURSE PRACTITIONER

## 2021-03-30 RX ORDER — PALIPERIDONE 3 MG/1
3 TABLET, EXTENDED RELEASE ORAL DAILY
Status: DISCONTINUED | OUTPATIENT
Start: 2021-03-31 | End: 2021-04-06 | Stop reason: HOSPADM

## 2021-03-30 RX ADMIN — DIVALPROEX SODIUM 250 MG: 250 TABLET, DELAYED RELEASE ORAL at 09:35

## 2021-03-30 RX ADMIN — PALIPERIDONE 6 MG: 6 TABLET, EXTENDED RELEASE ORAL at 09:35

## 2021-03-30 RX ADMIN — DIVALPROEX SODIUM 250 MG: 250 TABLET, DELAYED RELEASE ORAL at 17:07

## 2021-03-30 NOTE — PLAN OF CARE
Patient denies suicidal ideation, homicidal ideations and AVH. Patient rates anxiety and depression 7 out of 10. Patient appears flat, sad, depressed, isolative to room, paranoid and poor eye contact. Presents calm and cooperative during assessment. Patient is out on the unit and is social with peers. Medications taken without issue. No complaints or concerns verbalized at this time. No unit problems reported. Will continue to observe and support.     Problem: Depressive Behavior With or Without Suicide Precautions:  Goal: Able to verbalize and/or display a decrease in depressive symptoms  Description: Able to verbalize and/or display a decrease in depressive symptoms  Outcome: Ongoing     Problem: Depressive Behavior With or Without Suicide Precautions:  Goal: Absence of self-harm  Description: Absence of self-harm  Outcome: Ongoing

## 2021-03-30 NOTE — BH NOTE
Pt is cooperative and without distress. Pt denies homicidal ideations. Pt denies hallucinations. Pt states that she is having suicidal ideations without a plan. Pt has a flat affect, is quiet, poverty of content, appears depressed, empty. No other distress observed. Will follow and monitor.

## 2021-03-30 NOTE — PROGRESS NOTES
BEHAVIORAL HEALTH FOLLOW-UP NOTE     3/30/2021     Patient was seen and examined in person, Chart reviewed   Patient's case discussed with staff/team    Chief Complaint: \" I do still having some suicidal thoughts\"    Interim History: Patient seen in her room states she still having some suicidal thoughts. She is flat and blunted not attending groups and socializing with peers. She offers very little conversation.   Denies homicidal ideations intent or plan denies any auditory or visual hallucinations poor limited insight and judgment to hospitalization need for treatment          Appetite:   [x] Normal/Unchanged  [] Increased  [] Decreased      Sleep:       [] Normal/Unchanged  [x] Fair       [] Poor              Energy:    [] Normal/Unchanged  [] Increased  [x] Decreased        SI [x] Present  [] Absent    HI  []Present  [x] Absent     Aggression:  [] yes  [x] no    Patient is [x] able  [] unable to CONTRACT FOR SAFETY     PAST MEDICAL/PSYCHIATRIC HISTORY:   Past Medical History:   Diagnosis Date    Acute psychosis ([facility] 12/22/2017    Anxiety     Asthma     Bipolar disorder ([facility]     Bronchitis     [facility]ch[facility] obstructive [facility] disease) [facility]     Depression     Iron deficiency anemia     PTSD (post-traumatic stress disorder)     [facility] affective schizophrenia ([facility]     Severe episode of recurrent major depressive disorder, without psychotic features ([facility] 11/16/2019    Substance abuse ([facility]     quit 7/2011  was [employer] user        FAMILY/SOCIAL HISTORY:  Family History   Problem Relation Age of Onset    Diabetes Mother     High Blood Pressure Mother     Kidney Disease Mother     Heart Failure Mother     Diabetes Father     Other Father         mesothelioma    Mental Illness Father         schizophrenia     Social History     Socioeconomic History    Marital status: Single     Spouse name: Not on file    Number of children: 0    Years of education: GED    Highest education level: Not on file   Occupational History    Occupation: unemployed     Comment: attempting to get disability for mental illness   Social Needs    Financial resource strain: Not on file    Food insecurity     Worry: Not on file     Inability: Not on file   [employer] needs     Medical: Not on file     Non-medical: Not on file   Tobacco Use    Smoking status: Current Every Day Smoker     Packs/day: 0.50     Years: 20.00     Pack years: 10.00     Types: Cigarettes    Smokeless tobacco: Never Used   Substance and Sexual Activity    Alcohol use: Yes     Frequency: 2-3 times a week     Drinks per session: 1 or 2     Binge frequency: Never    Drug use: Yes     Types: Marijuana     Comment: using marijuana daily    Sexual activity: Not Currently   Lifestyle    Physical activity     Days per week: Not on file     Minutes per session: Not on file    Stress: Not on file   Relationships    Social connections     Talks on phone: Not on file     Gets together: Not on file     Attends Holiness service: Not on file     Active member of club or organization: Not on file     Attends meetings of clubs or organizations: Not on file     Relationship status: Not on file    Intimate partner violence     Fear of current or ex partner: Not on file     Emotionally abused: Not on file     Physically abused: Not on file     Forced sexual activity: Not on file   Other Topics Concern    Not on file   Social History Narrative    Not on file           ROS:  [x] All negative/unchanged except if checked.  Explain positive(checked items) below:  [] Constitutional  [] Eyes  [] Ear/Nose/Mouth/Throat  [] Respiratory  [] CV  [] GI  []   [] Musculoskeletal  [] Skin/Breast  [] Neurological  [] Endocrine  [] Heme/Lymph  [] Allergic/Immunologic    Explanation: no complaints    MEDICATIONS:    Current Facility-Administered Medications:     paliperidone (INVEGA) extended release tablet 6 mg, 6 mg, Oral, Daily, CHARO Elam CNP, 6 mg at 03/30/21 0935    nicotine (NICODERM CQ) 14 MG/24HR 1 patch, 1 patch, Transdermal, Daily, Sugaranuedy Marsh, APRN - CNP, 1 patch at 03/30/21 0935    divalproex (DEPAKOTE) DR tablet 250 mg, 250 mg, Oral, BID, Sabino Biswas MD, 250 mg at 03/30/21 0935    albuterol (PROVENTIL) nebulizer solution, , Nebulization, Q6H PRN, Sabino Biswas MD    acetaminophen (TYLENOL) tablet 650 mg, 650 mg, Oral, Q6H PRN, Kb Ronquillo MD    magnesium hydroxide (MILK OF MAGNESIA) 400 MG/5ML suspension 30 mL, 30 mL, Oral, Daily PRN, Kb Ronquillo MD    aluminum & magnesium hydroxide-simethicone (MAALOX) 200-200-20 MG/5ML suspension 30 mL, 30 mL, Oral, PRN, Kb Ronquillo MD    hydrOXYzine (VISTARIL) capsule 50 mg, 50 mg, Oral, TID PRN, Kb Ronquillo MD    haloperidol lactate (HALDOL) injection 5 mg, 5 mg, Intramuscular, Q6H PRN **OR** haloperidol (HALDOL) tablet 5 mg, 5 mg, Oral, Q6H PRN, Kb Ronquillo MD    traZODone (DESYREL) tablet 50 mg, 50 mg, Oral, Nightly PRN, Kb Ronquillo MD      Examination:  BP (!) 100/55   Pulse 88   Temp 98.2 °F (36.8 °C) (Temporal)   Resp 14   Ht 5' 2\" (1.575 m)   Wt 117 lb (53.1 kg)   LMP 03/27/2019 Comment: fibroids removed  SpO2 96%   Breastfeeding No   BMI 21.40 kg/m²   Gait - steady  Medication side effects(SE):denied    Mental Status Examination:    Level of consciousness:  within normal limits   Appearance:  good grooming and good hygiene  Behavior/Motor:  no abnormalities noted  Attitude toward examiner:  withdrawn  Speech:  low productivity increased latency  Mood: constricted  Affect:  blunted  Thought processes:  thought blocking   Thought content:  Homocidal ideation denied  Suicidal Ideation:  denies suicidal ideation however remains ambivalent about living  Delusions:  paranoid  Perceptual Disturbance:  auditory  Cognition:  oriented to person, place, and time   Concentration poor  Insight poor   Judgement poor     ASSESSMENT: Schizoaffective Bipolar type Severe depressed with suicidal ideation  Patient symptoms are:  [] Well controlled  [] Improving  [] Worsening  [x] No change      Diagnosis: *Principal Problem:    Schizoaffective disorder, bipolar type ([address]  Active Problems:    Cannabis abuse  Resolved Problems:    * No resolved hospital problems. *      LABS:    No results for input(s): WBC, HGB, PLT in the last 72 hours. No results for input(s): NA, K, CL, CO2, BUN, CREATININE, GLUCOSE in the last 72 hours. No results for input(s): BILITOT, ALKPHOS, AST, ALT in the last 72 hours. Lab Results   Component Value Date    LABAMPH NOT DETECTED 03/26/2021    BARBSCNU NOT DETECTED 03/26/2021    LABBENZ NOT DETECTED 03/26/2021    LABMETH NOT DETECTED 03/26/2021    OPIATESCREENURINE NOT DETECTED 03/26/2021    PHENCYCLIDINESCREENURINE NOT DETECTED 03/26/2021    ETOH <10 03/26/2021     Lab Results   Component Value Date    TSH 1.770 03/07/2019     No results found for: LITHIUM  Lab Results   Component Value Date    VALPROATE 82 07/01/2020       RISK ASSESSMENT:remains high risk still psychotic    Treatment Plan:  Reviewed current Medications with the patient    Decrease Invega3 mg po daily for psychosis and mood as patient is tired flat blunted  Invega sustena 234 mg on March 12 , needs verified  Restart Depakote 250mg po bid for mood, had not been on this at [place] Risks, benefits, side effects, drug-to-drug interactions and alternatives to treatment were discussed. Collateral information:  CD evaluation  Encourage patient to attend group and other milieu activities.   Discharge planning discussed with the patient and treatment team.    PSYCHOTHERAPY/COUNSELING:  [x] Therapeutic interview  [x] Supportive  [] CBT  [] Ongoing  [] Other    [x] Patient continues to need, on a daily basis, active treatment furnished directly by or requiring the supervision of inpatient psychiatric personnel      Anticipated Length of stay7 -10 days            Electronically signed by CHARO Pairsh CNP on 7/27/1691 at 11:33 AM

## 2021-03-30 NOTE — PLAN OF CARE
Problem: Depressive Behavior With or Without Suicide Precautions:  Goal: Able to verbalize and/or display a decrease in depressive symptoms  Description: Able to verbalize and/or display a decrease in depressive symptoms  3/30/2021 0914 by Josy Davis RN  Outcome: Ongoing  3/29/2021 2028 by Rod Goel RN  Outcome: Ongoing  Goal: Absence of self-harm  Description: Absence of self-harm  3/30/2021 0914 by Josy Davis RN  Outcome: Ongoing  3/29/2021 2028 by Rod Goel RN  Outcome: Ongoing     Problem: Substance Abuse:  Goal: Patient specific goal  Description: Patient specific goal  Outcome: Ongoing

## 2021-03-30 NOTE — PROGRESS NOTES
Awake in bed affect flat and sad c/o suicidal thoughts no plan feels medications are starting to help isolative encouraged participate in milieu emotional support given

## 2021-03-30 NOTE — PROGRESS NOTES
Whitney Franco denies SI,HI, or any Hallucinations at this time. She rates depression 5/10 and anxiety 7/10 stating \"I can not talk about why I feel that way just yet\". Emotional support given. She appears flat, sad, and depressed. She is very quiet and evasive. Groups are offered and encouraged. She takes her meds and attends groups. Will continue to monitor and offer support.

## 2021-03-31 PROCEDURE — 6370000000 HC RX 637 (ALT 250 FOR IP): Performed by: NURSE PRACTITIONER

## 2021-03-31 PROCEDURE — 6370000000 HC RX 637 (ALT 250 FOR IP): Performed by: PSYCHIATRY & NEUROLOGY

## 2021-03-31 PROCEDURE — 1240000000 HC EMOTIONAL WELLNESS R&B

## 2021-03-31 PROCEDURE — 99232 SBSQ HOSP IP/OBS MODERATE 35: CPT | Performed by: NURSE PRACTITIONER

## 2021-03-31 RX ADMIN — DIVALPROEX SODIUM 250 MG: 250 TABLET, DELAYED RELEASE ORAL at 16:19

## 2021-03-31 RX ADMIN — DIVALPROEX SODIUM 250 MG: 250 TABLET, DELAYED RELEASE ORAL at 09:31

## 2021-03-31 RX ADMIN — PALIPERIDONE 3 MG: 3 TABLET, EXTENDED RELEASE ORAL at 09:31

## 2021-03-31 NOTE — PROGRESS NOTES
BEHAVIORAL HEALTH FOLLOW-UP NOTE     3/31/2021     Patient was seen and examined in person, Chart reviewed   Patient's case discussed with staff/team    Chief Complaint: \" I am okay\"    Interim History: Patient seen in her room. She is flat blunted isolate. She is attending groups and socialize with peers. She shows no insight and judgment to hospitalization need for treatment. She is currently denying SI/HI intent or plan but she has been making passive suicidal statements. She denies any auditory visualizations no overt overt signs psychosis.   Appetite:   [x] Normal/Unchanged  [] Increased  [] Decreased      Sleep:       [] Normal/Unchanged  [x] Fair       [] Poor              Energy:    [] Normal/Unchanged  [] Increased  [x] Decreased        SI [] Present  [x] Absent  ambivalent    HI  []Present  [x] Absent     Aggression:  [] yes  [x] no    Patient is [x] able  [] unable to CONTRACT FOR SAFETY     PAST MEDICAL/PSYCHIATRIC HISTORY:   Past Medical History:   Diagnosis Date    Acute psychosis ([facility] 12/22/2017    Anxiety     Asthma     Bipolar disorder ([facility]     Bronchitis     COPD (chronic obstructive pulmonary disease) ([facility])     Depression     Iron deficiency anemia     PTSD (post-traumatic stress disorder)     [facility]     Severe [facility] of recurrent major depressive [facility] psychotic features [facility] 11/16/2019    Substance abuse ([facility]     quit 7/2011  was [employer] user        FAMILY/SOCIAL HISTORY:  Family History   Problem Relation Age of Onset    Diabetes Mother     High Blood Pressure Mother     Kidney Disease Mother     Heart Failure Mother     Diabetes Father     Other Father         mesothelioma    Mental Illness Father         schizophrenia     Social History     Socioeconomic History    Marital status: Single     Spouse name: Not on file    Number of children: 0    Years of education: GED    Highest education level: Not on file Occupational History    Occupation: unemployed     Comment: attempting to get disability for mental illness   Social Needs    Financial resource strain: Not on file    Food insecurity     Worry: Not on file     Inability: Not on file   [employer] needs     Medical: Not on file     Non-medical: Not on file   Tobacco Use    Smoking status: Current Every Day Smoker     Packs/day: 0.50     Years: 20.00     Pack years: 10.00     Types: Cigarettes    Smokeless tobacco: Never Used   Substance and Sexual Activity    Alcohol use: Yes     Frequency: 2-3 times a week     Drinks per session: 1 or 2     Binge frequency: Never    Drug use: Yes     Types: Marijuana     Comment: using marijuana daily    Sexual activity: Not Currently   Lifestyle    Physical activity     Days per week: Not on file     Minutes per session: Not on file    Stress: Not on file   Relationships    Social connections     Talks on phone: Not on file     Gets together: Not on file     Attends Anabaptism service: Not on file     Active member of club or organization: Not on file     Attends meetings of clubs or organizations: Not on file     Relationship status: Not on file    Intimate partner violence     Fear of current or ex partner: Not on file     Emotionally abused: Not on file     Physically abused: Not on file     Forced sexual activity: Not on file   Other Topics Concern    Not on file   Social History Narrative    Not on file           ROS:  [x] All negative/unchanged except if checked.  Explain positive(checked items) below:  [] Constitutional  [] Eyes  [] Ear/Nose/Mouth/Throat  [] Respiratory  [] CV  [] GI  []   [] Musculoskeletal  [] Skin/Breast  [] Neurological  [] Endocrine  [] Heme/Lymph  [] Allergic/Immunologic    Explanation: no complaints    MEDICATIONS:    Current Facility-Administered Medications:     paliperidone (INVEGA) extended release tablet 3 mg, 3 mg, Oral, Daily, CHARO Silva CNP, 3 mg at 03/31/21 0391    nicotine (NICODERM CQ) 14 MG/24HR 1 patch, 1 patch, Transdermal, Daily, Janessa Middleton, APRN - CNP, 1 patch at 03/31/21 0931    divalproex (DEPAKOTE) DR tablet 250 mg, 250 mg, Oral, BID, Kendra Nicole MD, 250 mg at 03/31/21 0931    albuterol (PROVENTIL) nebulizer solution, , Nebulization, Q6H PRN, Kendra Nicole MD    acetaminophen (TYLENOL) tablet 650 mg, 650 mg, Oral, Q6H PRN, Russell Padilla MD    magnesium hydroxide (MILK OF MAGNESIA) 400 MG/5ML suspension 30 mL, 30 mL, Oral, Daily PRN, Russell Padilla MD    aluminum & magnesium hydroxide-simethicone (MAALOX) 200-200-20 MG/5ML suspension 30 mL, 30 mL, Oral, PRN, Russell Padilla MD    hydrOXYzine (VISTARIL) capsule 50 mg, 50 mg, Oral, TID PRN, Russell Padilla MD    haloperidol lactate (HALDOL) injection 5 mg, 5 mg, Intramuscular, Q6H PRN **OR** haloperidol (HALDOL) tablet 5 mg, 5 mg, Oral, Q6H PRN, Russell Padilla MD    traZODone (DESYREL) tablet 50 mg, 50 mg, Oral, Nightly PRN, Russell Padilla MD      Examination:  BP (!) 90/53   Pulse 90   Temp 98.7 °F (37.1 °C) (Temporal)   Resp 14   Ht 5' 2\" (1.575 m)   Wt 117 lb (53.1 kg)   LMP 03/27/2019 Comment: fibroids removed  SpO2 96%   Breastfeeding No   BMI 21.40 kg/m²   Gait - steady  Medication side effects(SE):denied    Mental Status Examination:    Level of consciousness:  within normal limits   Appearance:  good grooming and good hygiene  Behavior/Motor:  no abnormalities noted  Attitude toward examiner:  withdrawn  Speech:  low productivity increased latency  Mood: constricted  Affect:  blunted  Thought processes:  thought blocking   Thought content:  Homocidal ideation denied  Suicidal Ideation:  denies suicidal ideation however remains ambivalent about living  Delusions:  paranoid  Perceptual Disturbance:  auditory  Cognition:  oriented to person, place, and time   Concentration poor  Insight poor   Judgement poor     ASSESSMENT: Schizoaffective Bipolar type Severe depressed with suicidal ideation  Patient symptoms are:  [] Well controlled  [] Improving  [] Worsening  [x] No change      Diagnosis: *Principal Problem:    Schizoaffective disorder, bipolar type ([facility]  Active Problems:    Cannabis abuse  Resolved Problems:    * No resolved hospital problems. *      LABS:    No results for input(s): WBC, HGB, PLT in the last 72 hours. No results for input(s): NA, K, CL, CO2, BUN, CREATININE, GLUCOSE in the last 72 hours. No results for input(s): BILITOT, ALKPHOS, AST, ALT in the last 72 hours. Lab Results   Component Value Date    LABAMPH NOT DETECTED 03/26/2021    BARBSCNU NOT DETECTED 03/26/2021    LABBENZ NOT DETECTED 03/26/2021    LABMETH NOT DETECTED 03/26/2021    OPIATESCREENURINE NOT DETECTED 03/26/2021    PHENCYCLIDINESCREENURINE NOT DETECTED 03/26/2021    ETOH <10 03/26/2021     Lab Results   Component Value Date    TSH 1.770 03/07/2019     No results found for: LITHIUM  Lab Results   Component Value Date    VALPROATE 82 07/01/2020       RISK ASSESSMENT:remains high risk still psychotic    Treatment Plan:  Reviewed current Medications with the patient    Continue Invega 3 mg po daily for psychosis and mood  Invega sustena 234 mg on March 12 , needs verified  Restart Depakote 250mg po bid for mood, had not been on this at [place] Risks, benefits, side effects, drug-to-drug interactions and alternatives to treatment were discussed. Collateral information:  CD evaluation  Encourage patient to attend group and other milieu activities.   Discharge planning discussed with the patient and treatment team.    PSYCHOTHERAPY/COUNSELING:  [x] Therapeutic interview  [x] Supportive  [] CBT  [] Ongoing  [] Other    [x] Patient continues to need, on a daily basis, active treatment furnished directly by or requiring the supervision of inpatient psychiatric personnel      Anticipated Length of stay7 -10 days            Electronically signed by CHARO Milner CNP on

## 2021-03-31 NOTE — PLAN OF CARE
Problem: Depressive Behavior With or Without Suicide Precautions:  Goal: Able to verbalize and/or display a decrease in depressive symptoms  Description: Able to verbalize and/or display a decrease in depressive symptoms  Outcome: Met This Shift     Problem: Depressive Behavior With or Without Suicide Precautions:  Goal: Absence of self-harm  Description: Absence of self-harm  Outcome: Met This Shift     Problem: Substance Abuse:  Goal: Patient specific goal  Description: Patient specific goal  Outcome: Met This Shift

## 2021-04-01 PROCEDURE — 99232 SBSQ HOSP IP/OBS MODERATE 35: CPT | Performed by: NURSE PRACTITIONER

## 2021-04-01 PROCEDURE — 6370000000 HC RX 637 (ALT 250 FOR IP): Performed by: PSYCHIATRY & NEUROLOGY

## 2021-04-01 PROCEDURE — 1240000000 HC EMOTIONAL WELLNESS R&B

## 2021-04-01 PROCEDURE — 6370000000 HC RX 637 (ALT 250 FOR IP): Performed by: NURSE PRACTITIONER

## 2021-04-01 RX ADMIN — DIVALPROEX SODIUM 250 MG: 250 TABLET, DELAYED RELEASE ORAL at 16:35

## 2021-04-01 RX ADMIN — DIVALPROEX SODIUM 250 MG: 250 TABLET, DELAYED RELEASE ORAL at 09:41

## 2021-04-01 RX ADMIN — PALIPERIDONE 3 MG: 3 TABLET, EXTENDED RELEASE ORAL at 09:41

## 2021-04-01 ASSESSMENT — PAIN SCALES - GENERAL
PAINLEVEL_OUTOF10: 0
PAINLEVEL_OUTOF10: 0

## 2021-04-01 NOTE — PLAN OF CARE
Problem: Depressive Behavior With or Without Suicide Precautions:  Goal: Able to verbalize and/or display a decrease in depressive symptoms  Description: Able to verbalize and/or display a decrease in depressive symptoms  3/31/2021 2105 by Terry Peabody, RN  Outcome: Ongoing  3/31/2021 1344 by Scooby Del Toro RN  Outcome: Met This Shift  Goal: Absence of self-harm  Description: Absence of self-harm  3/31/2021 2105 by Terry Peabody, RN  Outcome: Met This Shift  3/31/2021 1344 by Scooby Del Toro RN  Outcome: Met This Shift     Problem: Substance Abuse:  Goal: Patient specific goal  Description: Patient specific goal  3/31/2021 2105 by Terry Peabody, RN  Outcome: Ongoing  3/31/2021 1344 by Scooby Del Toro RN  Outcome: Met This Shift     Pt denies suicidal ideations, homicidal ideations and hallucinations. Pt out on the unit after dinner. Pt isolative. Quiet. Low profile. Avoids gaze. Soft spoken. Appetite appropriate. Medication compliant. Attended morning group. Will continue to monitor.

## 2021-04-01 NOTE — PROGRESS NOTES
Pt is out on the unit, remains compliant with the 2-hour lockout. Pt BP is slightly low. Pt denies any symptoms at this time. Pt encouraged to drink fluids. Pt given a new pitcher of ice water. No behaviors. No aggression. We will continue to provide support and comfort for the patient.

## 2021-04-01 NOTE — PROGRESS NOTES
Pt denies SI HI and hallucinations. Pt appears flat, sad, guarded. Pt is evasive, blunt, soft spoken. No voiced paranoia or suspicion. Pt voiced depression and anxiety d/t family stressors but did not elaborate. Pt is compliant with 2-hour lockout, although not social when out on the unit. Pt sits in the day room if a group is going on during her lockout and does not participate. Pt is otherwise isolative and does not offer conversation. Pt is medication compliant. Pt is eating provided meals. No aggression. No behaviors. We will continue to provide support and comfort for the patient.

## 2021-04-01 NOTE — CARE COORDINATION
SW left another message for Nanda Donato, pt's  at [facility], [phone]; encouraged this CM to call back asap as pt is likely discharging tomorrow to Crisis Unit and need collateral call first.

## 2021-04-01 NOTE — PROGRESS NOTES
BEHAVIORAL HEALTH FOLLOW-UP NOTE     4/1/2021     Patient was seen and examined in person, Chart reviewed   Patient's case discussed with staff/team    Chief Complaint: \" I am okay\"    Interim History: Patient seen in her room. She continues to be isolate to her room but she has complied with staying out of her room for 2 hours after meals. She denies SI/HI intent or plan denies auditory visualizations but she is flat and blunted. She is refusing to agree to discharge to the crisis stabilization unit. She states she wants to go home on discharge.          Appetite:   [x] Normal/Unchanged  [] Increased  [] Decreased      Sleep:       [] Normal/Unchanged  [x] Fair       [] Poor              Energy:    [] Normal/Unchanged  [] Increased  [x] Decreased        SI [] Present  [x] Absent  ambivalent    HI  []Present  [x] Absent     Aggression:  [] yes  [x] no    Patient is [x] able  [] unable to CONTRACT FOR SAFETY     PAST MEDICAL/PSYCHIATRIC HISTORY:   Past Medical History:   Diagnosis Date    [facility]2017    Anxiety     Asthma     [facility]     Bronchitis     [facility]     Depression     Iron deficiency anemia     PTSD (post-traumatic stress disorder)     Schizo affective schizophrenia ([facility]     Severe episode of recurrent major depressive disorder, without psychotic features ([facility] 11/16/2019    [facility]     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        FAMILY/SOCIAL HISTORY:  Family History   Problem Relation Age of Onset    Diabetes Mother     High Blood Pressure Mother     Kidney Disease Mother     Heart Failure Mother     Diabetes Father     Other Father         mesothelioma    Mental Illness Father         schizophrenia     Social History     Socioeconomic History    Marital status: Single     Spouse name: Not on file    Number of children: 0    Years of education: GED    Highest education level: Not on file Occupational History    Occupation: unemployed     Comment: attempting to get disability for mental illness   Social Needs    Financial resource strain: Not on file    Food insecurity     Worry: Not on file     Inability: Not on file   [employer] needs     Medical: Not on file     Non-medical: Not on file   Tobacco Use    Smoking status: Current Every Day Smoker     Packs/day: 0.50     Years: 20.00     Pack years: 10.00     Types: Cigarettes    Smokeless tobacco: Never Used   Substance and Sexual Activity    Alcohol use: Yes     Frequency: 2-3 times a week     Drinks per session: 1 or 2     Binge frequency: Never    Drug use: Yes     Types: Marijuana     Comment: using marijuana daily    Sexual activity: Not Currently   Lifestyle    Physical activity     Days per week: Not on file     Minutes per session: Not on file    Stress: Not on file   Relationships    Social connections     Talks on phone: Not on file     Gets together: Not on file     Attends Oriental orthodox service: Not on file     Active member of club or organization: Not on file     Attends meetings of clubs or organizations: Not on file     Relationship status: Not on file    Intimate partner violence     Fear of current or ex partner: Not on file     Emotionally abused: Not on file     Physically abused: Not on file     Forced sexual activity: Not on file   Other Topics Concern    Not on file   Social History Narrative    Not on file           ROS:  [x] All negative/unchanged except if checked.  Explain positive(checked items) below:  [] Constitutional  [] Eyes  [] Ear/Nose/Mouth/Throat  [] Respiratory  [] CV  [] GI  []   [] Musculoskeletal  [] Skin/Breast  [] Neurological  [] Endocrine  [] Heme/Lymph  [] Allergic/Immunologic    Explanation: no complaints    MEDICATIONS:    Current Facility-Administered Medications:     paliperidone (INVEGA) extended release tablet 3 mg, 3 mg, Oral, Daily, CHARO Aj - CNP, 3 mg at 03/31/21 6168    nicotine (NICODERM CQ) 14 MG/24HR 1 patch, 1 patch, Transdermal, Daily, Liliam Bhattifredis, APRN - CNP, 1 patch at 03/31/21 0931    divalproex (DEPAKOTE) DR tablet 250 mg, 250 mg, Oral, BID, Arely Vega MD, 250 mg at 03/31/21 1619    albuterol (PROVENTIL) nebulizer solution, , Nebulization, Q6H PRN, Arely Vega MD    acetaminophen (TYLENOL) tablet 650 mg, 650 mg, Oral, Q6H PRN, Tatum Hackett MD    magnesium hydroxide (MILK OF MAGNESIA) 400 MG/5ML suspension 30 mL, 30 mL, Oral, Daily PRN, Tatum Hackett MD    aluminum & magnesium hydroxide-simethicone (MAALOX) 200-200-20 MG/5ML suspension 30 mL, 30 mL, Oral, PRN, Tatum Hackett MD    hydrOXYzine (VISTARIL) capsule 50 mg, 50 mg, Oral, TID PRN, Tatum Hackett MD    haloperidol lactate (HALDOL) injection 5 mg, 5 mg, Intramuscular, Q6H PRN **OR** haloperidol (HALDOL) tablet 5 mg, 5 mg, Oral, Q6H PRN, Tatum Hackett MD    traZODone (DESYREL) tablet 50 mg, 50 mg, Oral, Nightly PRN, Tatum Hackett MD      Examination:  BP 92/60   Pulse 80   Temp 98.3 °F (36.8 °C) (Temporal)   Resp 14   Ht 5' 2\" (1.575 m)   Wt 117 lb (53.1 kg)   LMP 03/27/2019 Comment: fibroids removed  SpO2 96%   Breastfeeding No   BMI 21.40 kg/m²   Gait - steady  Medication side effects(SE):denied    Mental Status Examination:    Level of consciousness:  within normal limits   Appearance:  good grooming and good hygiene  Behavior/Motor:  no abnormalities noted  Attitude toward examiner:  withdrawn  Speech:  low productivity increased latency  Mood: constricted  Affect:  blunted  Thought processes:  thought blocking   Thought content:  Homocidal ideation denied  Suicidal Ideation:  denies suicidal ideation however remains ambivalent about living  Delusions:  paranoid  Perceptual Disturbance:  auditory  Cognition:  oriented to person, place, and time   Concentration poor  Insight poor   Judgement poor     ASSESSMENT: Schizoaffective Bipolar type Severe depressed with suicidal ideation  Patient symptoms are:  [] Well controlled  [] Improving  [] Worsening  [x] No change      Diagnosis: *Principal Problem:    Schizoaffective disorder, bipolar type ([facility]  Active Problems:    Cannabis abuse  Resolved Problems:    * No resolved hospital problems. *      LABS:    No results for input(s): WBC, HGB, PLT in the last 72 hours. No results for input(s): NA, K, CL, CO2, BUN, CREATININE, GLUCOSE in the last 72 hours. No results for input(s): BILITOT, ALKPHOS, AST, ALT in the last 72 hours. Lab Results   Component Value Date    LABAMPH NOT DETECTED 03/26/2021    BARBSCNU NOT DETECTED 03/26/2021    LABBENZ NOT DETECTED 03/26/2021    LABMETH NOT DETECTED 03/26/2021    OPIATESCREENURINE NOT DETECTED 03/26/2021    PHENCYCLIDINESCREENURINE NOT DETECTED 03/26/2021    ETOH <10 03/26/2021     Lab Results   Component Value Date    TSH 1.770 03/07/2019     No results found for: LITHIUM  Lab Results   Component Value Date    VALPROATE 82 07/01/2020       RISK ASSESSMENT:remains high risk still psychotic    Treatment Plan:  Reviewed current Medications with the patient    Continue Invega 3 mg po daily for psychosis and mood  Invega sustena 234 mg on March 12 initiation dose did not receive a booster injection  Continue Depakote 250 mg po bid for mood, had not been on this at [facility] Risks, benefits, side effects, drug-to-drug interactions and alternatives to treatment were discussed. Collateral information:  CD evaluation  Encourage patient to attend group and other milieu activities.   Discharge planning discussed with the patient and treatment team.    PSYCHOTHERAPY/COUNSELING:  [x] Therapeutic interview  [x] Supportive  [] CBT  [] Ongoing  [] Other    [x] Patient continues to need, on a daily basis, active treatment furnished directly by or requiring the supervision of inpatient psychiatric personnel      Anticipated Length of stay7 -10 days            Electronically signed by Sarah Freeman Harvis Shone - CNP on 8/2/8109 at 9:29 AM

## 2021-04-01 NOTE — CARE COORDINATION
Sw signed MYKEL to speak with her sister [phone], Nasreen Bowie. Stephane Jamir states pt has been paranoid and is not at her baseline behavior. She states pt has not been taking her medications and when she went to her house there were pill bottles that had not been opened and states that this is typical for pt, \"She does well when she's on her medications but when she stops taking them she gets paranoid\". Nasreen Bowie is requesting pt get medication in a shot form to lessen the likelihood of repeat hospitalizations. Nasreen Bowie states there are no weapons in the house and that there are no safety concerns.

## 2021-04-01 NOTE — PLAN OF CARE
Problem: Depressive Behavior With or Without Suicide Precautions:  Goal: Able to verbalize and/or display a decrease in depressive symptoms  Description: Able to verbalize and/or display a decrease in depressive symptoms  Outcome: Met This Shift     Problem: Depressive Behavior With or Without Suicide Precautions:  Goal: Absence of self-harm  Description: Absence of self-harm  Outcome: Met This Shift

## 2021-04-02 LAB — VALPROIC ACID LEVEL: 57 MCG/ML (ref 50–100)

## 2021-04-02 PROCEDURE — 99232 SBSQ HOSP IP/OBS MODERATE 35: CPT | Performed by: NURSE PRACTITIONER

## 2021-04-02 PROCEDURE — 80164 ASSAY DIPROPYLACETIC ACD TOT: CPT

## 2021-04-02 PROCEDURE — 6370000000 HC RX 637 (ALT 250 FOR IP): Performed by: NURSE PRACTITIONER

## 2021-04-02 PROCEDURE — 1240000000 HC EMOTIONAL WELLNESS R&B

## 2021-04-02 PROCEDURE — 6370000000 HC RX 637 (ALT 250 FOR IP): Performed by: PSYCHIATRY & NEUROLOGY

## 2021-04-02 PROCEDURE — 36415 COLL VENOUS BLD VENIPUNCTURE: CPT

## 2021-04-02 RX ORDER — OXCARBAZEPINE 150 MG/1
150 TABLET, FILM COATED ORAL 2 TIMES DAILY
Status: DISCONTINUED | OUTPATIENT
Start: 2021-04-02 | End: 2021-04-03

## 2021-04-02 RX ADMIN — OXCARBAZEPINE 150 MG: 300 TABLET, FILM COATED ORAL at 10:53

## 2021-04-02 RX ADMIN — OXCARBAZEPINE 150 MG: 300 TABLET, FILM COATED ORAL at 20:53

## 2021-04-02 RX ADMIN — PALIPERIDONE 3 MG: 3 TABLET, EXTENDED RELEASE ORAL at 08:52

## 2021-04-02 RX ADMIN — DIVALPROEX SODIUM 250 MG: 250 TABLET, DELAYED RELEASE ORAL at 08:52

## 2021-04-02 ASSESSMENT — PAIN SCALES - GENERAL: PAINLEVEL_OUTOF10: 0

## 2021-04-02 NOTE — PROGRESS NOTES
BEHAVIORAL HEALTH FOLLOW-UP NOTE     4/2/2021     Patient was seen and examined in person, Chart reviewed   Patient's case discussed with staff/team    Chief Complaint: \" I am sad\"    Interim History: Patient seen during treatment team.  She is tearful and crying. She endorses fleeting suicidal ideations. She states she had her last manic episode several years ago. She agrees to switch from the Depakote to Trileptal to help improve her depression.   She denies auditory visual hallucinations        Appetite:   [x] Normal/Unchanged  [] Increased  [] Decreased      Sleep:       [] Normal/Unchanged  [x] Fair       [] Poor              Energy:    [] Normal/Unchanged  [] Increased  [x] Decreased        SI [x] Present  [] Absent      HI  []Present  [x] Absent     Aggression:  [] yes  [x] no    Patient is [x] able  [] unable to CONTRACT FOR SAFETY     PAST MEDICAL/PSYCHIATRIC HISTORY:   Past Medical History:   Diagnosis Date    [facility]    Anxiety     Asthma     Bipolar disorder ([facility]     Bronchitis     COPD (chronic obstructive pulmonary disease) (HCC)     Depression     Iron deficiency anemia     PTSD (post-traumatic stress disorder)     Schizo affective schizophrenia ([facility]     Severe episode of recurrent major depressive disorder, without psychotic features ([facility] 11/16/2019    Sub[facility] ([facility]     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        FAMILY/SOCIAL HISTORY:  Family History   Problem Relation Age of Onset    Diabetes Mother     High Blood Pressure Mother     Kidney Disease Mother     Heart Failure Mother     Diabetes Father     Other Father         mesothelioma    Mental Illness Father         schizophrenia     Social History     Socioeconomic History    Marital status: Single     Spouse name: Not on file    Number of children: 0    Years of education: GED    Highest education level: Not on file   Occupational History    Occupation: unemployed     Comment: attempting to get disability for mental illness   Social Needs    Financial resource strain: Not on file    Food insecurity     Worry: Not on file     Inability: Not on file    Transportation needs     Medical: Not on file     Non-medical: Not on file   Tobacco Use    Smoking status: Current Every Day Smoker     Packs/day: 0.50     Years: 20.00     Pack years: 10.00     Types: Cigarettes    Smokeless tobacco: Never Used   Substance and Sexual Activity    Alcohol use: Yes     Frequency: 2-3 times a week     Drinks per session: 1 or 2     Binge frequency: Never    Drug use: Yes     Types: Marijuana     Comment: using marijuana daily    Sexual activity: Not Currently   Lifestyle    Physical activity     Days per week: Not on file     Minutes per session: Not on file    Stress: Not on file   Relationships    Social connections     Talks on phone: Not on file     Gets together: Not on file     Attends Sabianism service: Not on file     Active member of club or organization: Not on file     Attends meetings of clubs or organizations: Not on file     Relationship status: Not on file    Intimate partner violence     Fear of current or ex partner: Not on file     Emotionally abused: Not on file     Physically abused: Not on file     Forced sexual activity: Not on file   Other Topics Concern    Not on file   Social History Narrative    Not on file           ROS:  [x] All negative/unchanged except if checked.  Explain positive(checked items) below:  [] Constitutional  [] Eyes  [] Ear/Nose/Mouth/Throat  [] Respiratory  [] CV  [] GI  []   [] Musculoskeletal  [] Skin/Breast  [] Neurological  [] Endocrine  [] Heme/Lymph  [] Allergic/Immunologic    Explanation: no complaints    MEDICATIONS:    Current Facility-Administered Medications:     OXcarbazepine (TRILEPTAL) tablet 150 mg, 150 mg, Oral, BID, Betzy Middleton, APRN - CNP, 388 mg at 04/02/21 1053    paliperidone (INVEGA) extended release tablet 3 mg, 3 mg, Oral, by CHARO Sheppard - CNP on 5/8/3910 at 3:34 PM

## 2021-04-02 NOTE — PLAN OF CARE
PT. HAS BEEN OUT OF ROOM FOR SEVERAL HOURS WITHOUT ROOM LOCK OUT. PT. REMAINS FLAT, SAD, DEPRESSED WITH REPORTED FLEETING SUICIDAL IDEATIONS. PT. DENIES SUICIDE PLAN, HALLUCINATIONS. NO VOICED DELUSIONS. ATTENDED TREATMENT TEAM. PT. REMAINS MEDICATION COMPLAINT. GROUPS WERE ENCOURAGED. [facility] Interdisciplinary Treatment Plan Note     Review Date & Time: 4/2/21 1000    Patient was in treatment team.    Admission Type:   Admission Type: Voluntary    Reason for admission:  Reason for Admission: \"feeling depressed, full of anxiety\"    Estimated Length of Stay Update:   10 DAYS   Estimated Discharge Date Update: MONDAY    PATIENT STRENGTHS:  Patient Strengths:Strengths: Communication  Patient Strengths and Limitations:Limitations: Tendency to isolate self, Hopeless about future  Addictive Behavior:Addictive Behavior  In the past 3 months, have you felt or has someone told you that you have a problem with:  : Eating (too much/too little)  Do you have a history of Chemical Use?: No  Do you have a history of Alcohol Use?: Yes  Do you have a history of Street Drug Abuse?: Yes  Histroy of Prescripton Drug Abuse?: No  Medical Problems:   Past Medical History:   Diagnosis Date    [facility] ([facility] 12/22/2017    Anxiety     Asthma     Bipolar disorder ([facility]     Bronchitis     COPD (chronic obstructive pulmonary disease) ([facility]     Depression     Iron deficiency anemia     PTSD (post-traumatic stress disorder)     Schizo affective schizophrenia ([facility]     Severe episode of recurrent major depressive disorder, without psychotic features ([facility] 11/16/2019    Substance abuse ([facility]     quit 7/2011  was marajuanna user        Risk:  Fall RiskTotal: 73  Sacha Scale Sacha Scale Score: 22  BVC Total: 0  Change in scores: NO FALLS OR SACHA RISK IDENTIFIED THIS SHIFT.  Changes to plan of Care : CONTINUE TO ASSESS OFR ANY INCREASE IN RISK OR CHANGE IN STATUS    Status EXAM:   Status and Exam Normal: No  Facial Expression: Avoids Gaze, Flat, Sad  Affect: Blunt, Congruent  Level of Consciousness: Alert  Mood:Normal: No  Mood: Depressed, Sad  Motor Activity:Normal: No  Motor Activity: Decreased  Interview Behavior: Cooperative  Preception: [place] to Person, Mabeline Pearson to Time, [place] to Place, [place] to Situation  Attention:Normal: No  Attention: Distractible  Thought Processes: Other(See comment)(slowed)  Thought Content:Normal: No  Thought Content: Poverty of Content  Hallucinations: None  Delusions: No  Memory:Normal: Yes  Memory: Poor Recent, Poor Remote  Insight and Judgment: No  Insight and Judgment: Unmotivated, Poor Insight, Poor Judgment  Present Suicidal Ideation: Yes  Present Homicidal Ideation: No    Daily Assessment Last Entry:   Daily Sleep (WDL): Within Defined Limits         Patient Currently in Pain: No  Daily Nutrition (WDL): Exceptions to WDL  Appetite Change: Decreased  Barriers to Nutrition: Other (Comment)(depression)  Level of Assistance: Needs encouragement    Patient Monitoring:  Frequency of Checks: 4 times per hour, close    Psychiatric Symptoms:   Depression Symptoms  Depression Symptoms: Isolative, Loss of interest, Change in energy level, Impaired concentration  Anxiety Symptoms  Anxiety Symptoms: Generalized  Ksenia Symptoms  Ksenia Symptoms: No problems reported or observed. Psychosis Symptoms  Hallucination Type: No problems reported or observed. Delusion Type: No problems reported or observed.     Suicide Risk CSSR-S:  1) Within the past month, have you wished you were dead or wished you could go to sleep and not wake up? : Yes  2) Have you actually had any thoughts of killing yourself? : No  6) Have you ever done anything, started to do anything, or prepared to do anything to end your life?: No  Change in Result: PT. REPORTS FLEETING SUICIDAL IDEATIONS WITH NO PLAN Change in Plan of care: CONTINUE TO [facility]:   Learner Progress Toward Treatment Goals: Reviewed results and recommendations of this team    Method: Small group    Outcome: Needs reinforcement    PATIENT GOALS: \"HAVE HOPE\"    PLAN/TREATMENT RECOMMENDATIONS UPDATE:  ADJUST MEDICATIONS, SUICIDE PRECAUTIONS, SUPPORTIVE CARE, CONTINUE LOCK OUT, ENCOURAGE GROUPS, ASSESS RESPONSE TO MEDICATIONS, DISCHARGE PLANNING AND FOLLOW UP    GOALS UPDATE:  Time frame for Short-Term Goals:  10 DAYS      Rosi Avila RN

## 2021-04-02 NOTE — PLAN OF CARE
Problem: Depressive Behavior With or Without Suicide Precautions:  Goal: Able to verbalize and/or display a decrease in depressive symptoms  Description: Able to verbalize and/or display a decrease in depressive symptoms  Outcome: Ongoing  Pt. Reports fleeting suicidal ideations with no plan or intent at this time. Goal: Absence of self-harm  Description: Absence of self-harm  Outcome: Met This Shift  No self harm behaviors reported or observed.

## 2021-04-03 PROCEDURE — 1240000000 HC EMOTIONAL WELLNESS R&B

## 2021-04-03 PROCEDURE — 99232 SBSQ HOSP IP/OBS MODERATE 35: CPT | Performed by: NURSE PRACTITIONER

## 2021-04-03 PROCEDURE — 6370000000 HC RX 637 (ALT 250 FOR IP): Performed by: NURSE PRACTITIONER

## 2021-04-03 RX ORDER — OXCARBAZEPINE 300 MG/1
300 TABLET, FILM COATED ORAL 2 TIMES DAILY
Status: DISCONTINUED | OUTPATIENT
Start: 2021-04-03 | End: 2021-04-06 | Stop reason: HOSPADM

## 2021-04-03 RX ADMIN — OXCARBAZEPINE 300 MG: 300 TABLET, FILM COATED ORAL at 22:02

## 2021-04-03 RX ADMIN — OXCARBAZEPINE 150 MG: 300 TABLET, FILM COATED ORAL at 09:36

## 2021-04-03 RX ADMIN — PALIPERIDONE 3 MG: 3 TABLET, EXTENDED RELEASE ORAL at 09:36

## 2021-04-03 NOTE — PLAN OF CARE
Denies SI/HI  denies hallucinations  no eye contact  evasive  isolative to room except for 2 hr lock out after meals  attending group  cooperative with meds   will continue to monitor

## 2021-04-03 NOTE — PROGRESS NOTES
Comment: attempting to get disability for mental illness   Social Needs    Financial resource strain: Not on file    Food insecurity     Worry: Not on file     Inability: Not on file    Transportation needs     Medical: Not on file     Non-medical: Not on file   Tobacco Use    Smoking status: Current Every Day Smoker     Packs/day: 0.50     Years: 20.00     Pack years: 10.00     Types: Cigarettes    Smokeless tobacco: Never Used   Substance and Sexual Activity    Alcohol use: Yes     Frequency: 2-3 times a week     Drinks per session: 1 or 2     Binge frequency: Never    Drug use: Yes     Types: Marijuana     Comment: using marijuana daily    Sexual activity: Not Currently   Lifestyle    Physical activity     Days per week: Not on file     Minutes per session: Not on file    Stress: Not on file   Relationships    Social connections     Talks on phone: Not on file     Gets together: Not on file     Attends [employer] service: Not on file     Active member of club or organization: Not on file     Attends meetings of clubs or organizations: Not on file     Relationship status: Not on file    Intimate partner violence     Fear of current or ex partner: Not on file     Emotionally abused: Not on file     Physically abused: Not on file     Forced sexual activity: Not on file   Other Topics Concern    Not on file   Social History Narrative    Not on file           ROS:  [x] All negative/unchanged except if checked.  Explain positive(checked items) below:  [] Constitutional  [] Eyes  [] Ear/Nose/Mouth/Throat  [] Respiratory  [] CV  [] GI  []   [] Musculoskeletal  [] Skin/Breast  [] Neurological  [] Endocrine  [] Heme/Lymph  [] Allergic/Immunologic    Explanation: no complaints    MEDICATIONS:    Current Facility-Administered Medications:     OXcarbazepine (TRILEPTAL) tablet 150 mg, 150 mg, Oral, BID, Alicia Middleton, APRN - CNP, 594 mg at 04/02/21 2053    paliperidone (INVEGA) extended release tablet 3 mg, 3 mg, Oral, Daily, Margo Middleton, APRN - CNP, 3 mg at 04/02/21 0852    nicotine (NICODERM CQ) 14 MG/24HR 1 patch, 1 patch, Transdermal, Daily, Ely Chang, APRN - CNP, 1 patch at 04/02/21 0851    albuterol (PROVENTIL) nebulizer solution, , Nebulization, Q6H PRN, Nate Wild MD    acetaminophen (TYLENOL) tablet 650 mg, 650 mg, Oral, Q6H PRN, Tomer Read MD    magnesium hydroxide (MILK OF MAGNESIA) 400 MG/5ML suspension 30 mL, 30 mL, Oral, Daily PRN, Tomer Read MD    aluminum & magnesium hydroxide-simethicone (MAALOX) 200-200-20 MG/5ML suspension 30 mL, 30 mL, Oral, PRN, Toemr Read MD    hydrOXYzine (VISTARIL) capsule 50 mg, 50 mg, Oral, TID PRN, Tomer Read MD    haloperidol lactate (HALDOL) injection 5 mg, 5 mg, Intramuscular, Q6H PRN **OR** haloperidol (HALDOL) tablet 5 mg, 5 mg, Oral, Q6H PRN, Tomer Read MD    traZODone (DESYREL) tablet 50 mg, 50 mg, Oral, Nightly PRN, Tomer Read MD      Examination:  BP 93/64   Pulse 74   Temp 98.2 °F (36.8 °C)   Resp 14   Ht 5' 2\" (1.575 m)   Wt 117 lb (53.1 kg)   LMP 03/27/2019 Comment: fibroids removed  SpO2 98%   Breastfeeding No   BMI 21.40 kg/m²   Gait - steady  Medication side effects(SE):denied    Mental Status Examination:    Level of consciousness:  within normal limits   Appearance:  good grooming and good hygiene  Behavior/Motor:  no abnormalities noted  Attitude toward examiner:  withdrawn  Speech:  low productivity increased latency  Mood: constricted  Affect:  blunted tearful crying  Thought processes:  thought blocking   Thought content:  Homocidal ideation denied  Suicidal Ideation:  denies suicidal ideation however remains ambivalent about living  Delusions: Denies any delusions  Perceptual Disturbance: Denies   cognition:  oriented to person, place, and time   Concentration poor  Insight poor   Judgement poor     ASSESSMENT: Schizoaffective Bipolar type Severe depressed with suicidal ideation Patient symptoms are:  [] Well controlled  [] Improving  [] Worsening  [x] No change      Diagnosis: *Principal Problem:    Schizoaffective disorder, bipolar type ([facility] [address]  Active Problems:    Cannabis abuse  Resolved Problems:    * No resolved hospital problems. *      LABS:    No results for input(s): WBC, HGB, PLT in the last 72 hours. No results for input(s): NA, K, CL, CO2, BUN, CREATININE, GLUCOSE in the last 72 hours. No results for input(s): BILITOT, ALKPHOS, AST, ALT in the last 72 hours. Lab Results   Component Value Date    LABAMPH NOT DETECTED 03/26/2021    BARBSCNU NOT DETECTED 03/26/2021    LABBENZ NOT DETECTED 03/26/2021    LABMETH NOT DETECTED 03/26/2021    OPIATESCREENURINE NOT DETECTED 03/26/2021    PHENCYCLIDINESCREENURINE NOT DETECTED 03/26/2021    ETOH <10 03/26/2021     Lab Results   Component Value Date    TSH 1.770 03/07/2019     No results found for: LITHIUM  Lab Results   Component Value Date    VALPROATE 57 04/02/2021       RISK ASSESSMENT:remains high risk still psychotic    Treatment Plan:  Reviewed current Medications with the patient    Continue Invega 3 mg po daily for psychosis and mood  Invega sustena 234 mg on March 12 initiation dose did not receive a booster injection  Increase Trileptal 300 mg twice daily   [place] Risks, benefits, side effects, drug-to-drug interactions and alternatives to treatment were discussed. Collateral information:  CD evaluation  Encourage patient to attend group and other milieu activities.   Discharge planning discussed with the patient and treatment team.    PSYCHOTHERAPY/COUNSELING:  [x] Therapeutic interview  [x] Supportive  [] CBT  [] Ongoing  [] Other    [x] Patient continues to need, on a daily basis, active treatment furnished directly by or requiring the supervision of inpatient psychiatric personnel            Behavioral Services  Medicare Certification Upon Admission    I certify that this patient's inpatient psychiatric hospital admission is medically necessary for:    [x] (1) Treatment which could reasonably be expected to improve this patient's condition,       [] (2) Or for diagnostic study;     AND     [x](2) The inpatient psychiatric services are provided while the individual is under the care of a physician and are included in the individualized plan of care.     Estimated length of stay/service 3 to 7 days based on stability    Plan for post-hospital care outpatient psychiatric and counseling services  Electronically signed by CHARO Gar CNP on 1/9/2859 at 12:33 PM                Electronically signed by CHARO Gar CNP on 1/2/5707 at 9:07 AM

## 2021-04-04 PROCEDURE — 99232 SBSQ HOSP IP/OBS MODERATE 35: CPT | Performed by: NURSE PRACTITIONER

## 2021-04-04 PROCEDURE — 6370000000 HC RX 637 (ALT 250 FOR IP): Performed by: NURSE PRACTITIONER

## 2021-04-04 PROCEDURE — 1240000000 HC EMOTIONAL WELLNESS R&B

## 2021-04-04 RX ADMIN — PALIPERIDONE 3 MG: 3 TABLET, EXTENDED RELEASE ORAL at 09:05

## 2021-04-04 RX ADMIN — OXCARBAZEPINE 300 MG: 300 TABLET, FILM COATED ORAL at 21:15

## 2021-04-04 RX ADMIN — OXCARBAZEPINE 300 MG: 300 TABLET, FILM COATED ORAL at 09:06

## 2021-04-04 ASSESSMENT — PAIN SCALES - GENERAL: PAINLEVEL_OUTOF10: 0

## 2021-04-04 NOTE — PLAN OF CARE
Problem: Depressive Behavior With or Without Suicide Precautions:  Goal: Able to verbalize and/or display a decrease in depressive symptoms  Description: Able to verbalize and/or display a decrease in depressive symptoms  4/3/2021 2208 by Lewis Machado RN  Outcome: Ongoing     Problem: Depressive Behavior With or Without Suicide Precautions:  Goal: Absence of self-harm  Description: Absence of self-harm  4/3/2021 2208 by Lewis Machado, RN  Outcome: Met This Shift     Problem: Substance Abuse:  Goal: Patient specific goal  Description: Patient specific goal  4/3/2021 2208 by Lewis Machado, RN  Outcome: Met This Shift

## 2021-04-04 NOTE — PROGRESS NOTES
APRN - CNP, 300 mg at 04/04/21 0906    paliperidone (INVEGA) extended release tablet 3 mg, 3 mg, Oral, Daily, Hunter Middleton, APRN - CNP, 3 mg at 04/04/21 0905    nicotine (NICODERM CQ) 14 MG/24HR 1 patch, 1 patch, Transdermal, Daily, Ericka Leslie, APRN - CNP, 1 patch at 04/04/21 0905    albuterol (PROVENTIL) nebulizer solution, , Nebulization, Q6H PRN, Adin Lundberg MD    acetaminophen (TYLENOL) tablet 650 mg, 650 mg, Oral, Q6H PRN, Lyndon Murray MD    magnesium hydroxide (MILK OF MAGNESIA) 400 MG/5ML suspension 30 mL, 30 mL, Oral, Daily PRN, Lyndon Murray MD    aluminum & magnesium hydroxide-simethicone (MAALOX) 200-200-20 MG/5ML suspension 30 mL, 30 mL, Oral, PRN, Lyndon Murray MD    hydrOXYzine (VISTARIL) capsule 50 mg, 50 mg, Oral, TID PRN, Lyndon Murray MD    haloperidol lactate (HALDOL) injection 5 mg, 5 mg, Intramuscular, Q6H PRN **OR** haloperidol (HALDOL) tablet 5 mg, 5 mg, Oral, Q6H PRN, Lyndon Murray MD    traZODone (DESYREL) tablet 50 mg, 50 mg, Oral, Nightly PRN, Lyndon Murray MD      Examination:  BP (!) 91/54   Pulse 81   Temp 97.9 °F (36.6 °C)   Resp 15   Ht 5' 2\" (1.575 m)   Wt 117 lb (53.1 kg)   LMP 03/27/2019 Comment: fibroids removed  SpO2 98%   Breastfeeding No   BMI 21.40 kg/m²   Gait - steady  Medication side effects(SE):denied    Mental Status Examination:    Level of consciousness:  within normal limits   Appearance:  good grooming and good hygiene  Behavior/Motor:  no abnormalities noted  Attitude toward examiner:  withdrawn  Speech:  low productivity increased latency  Mood: constricted  Affect:  blunted tearful crying  Thought processes:  thought blocking   Thought content:  Homocidal ideation denied  Suicidal Ideation:  denies suicidal ideation however remains ambivalent about living  Delusions: Denies any delusions  Perceptual Disturbance: Denies   cognition:  oriented to person, place, and time   Concentration poor  Insight poor Judgement poor     ASSESSMENT: Schizoaffective Bipolar type Severe depressed with suicidal ideation  Patient symptoms are:  [] Well controlled  [] Improving  [] Worsening  [x] No change      Diagnosis: *Principal Problem:    Schizoaffective disorder, bipolar type ([facility] [address]  Active Problems:    Cannabis abuse  Resolved Problems:    * No resolved hospital problems. *      LABS:    No results for input(s): WBC, HGB, PLT in the last 72 hours. No results for input(s): NA, K, CL, CO2, BUN, CREATININE, GLUCOSE in the last 72 hours. No results for input(s): BILITOT, ALKPHOS, AST, ALT in the last 72 hours. Lab Results   Component Value Date    LABAMPH NOT DETECTED 03/26/2021    BARBSCNU NOT DETECTED 03/26/2021    LABBENZ NOT DETECTED 03/26/2021    LABMETH NOT DETECTED 03/26/2021    OPIATESCREENURINE NOT DETECTED 03/26/2021    PHENCYCLIDINESCREENURINE NOT DETECTED 03/26/2021    ETOH <10 03/26/2021     Lab Results   Component Value Date    TSH 1.770 03/07/2019     No results found for: LITHIUM  Lab Results   Component Value Date    VALPROATE 57 04/02/2021       RISK ASSESSMENT:remains high risk still psychotic    Treatment Plan:  Reviewed current Medications with the patient    Continue Invega 3 mg po daily for psychosis and mood  Invega sustena 234 mg on March 12 initiation dose did not receive a booster injection  Increase Trileptal 300 mg twice daily   [place] Risks, benefits, side effects, drug-to-drug interactions and alternatives to treatment were discussed. Collateral information:  CD evaluation  Encourage patient to attend group and other milieu activities.   Discharge planning discussed with the patient and treatment team.    PSYCHOTHERAPY/COUNSELING:  [x] Therapeutic interview  [x] Supportive  [] CBT  [] Ongoing  [] Other    [x] Patient continues to need, on a daily basis, active treatment furnished directly by or requiring the supervision of inpatient psychiatric personnel          Electronically signed by Carlos Resendiz

## 2021-04-05 PROCEDURE — 6370000000 HC RX 637 (ALT 250 FOR IP): Performed by: NURSE PRACTITIONER

## 2021-04-05 PROCEDURE — 99232 SBSQ HOSP IP/OBS MODERATE 35: CPT | Performed by: NURSE PRACTITIONER

## 2021-04-05 PROCEDURE — 1240000000 HC EMOTIONAL WELLNESS R&B

## 2021-04-05 RX ADMIN — PALIPERIDONE 3 MG: 3 TABLET, EXTENDED RELEASE ORAL at 09:17

## 2021-04-05 RX ADMIN — OXCARBAZEPINE 300 MG: 300 TABLET, FILM COATED ORAL at 09:17

## 2021-04-05 RX ADMIN — OXCARBAZEPINE 300 MG: 300 TABLET, FILM COATED ORAL at 21:22

## 2021-04-05 NOTE — PLAN OF CARE
Problem: Depressive Behavior With or Without Suicide Precautions:  Goal: Able to verbalize and/or display a decrease in depressive symptoms  Description: Able to verbalize and/or display a decrease in depressive symptoms  Outcome: Ongoing  Goal: Absence of self-harm  Description: Absence of self-harm  Outcome: Met This Shift     Problem: Substance Abuse:  Goal: Patient specific goal  Description: Patient specific goal  Outcome: Ongoing

## 2021-04-05 NOTE — CARE COORDINATION
Contacted pt sister Jaswinder Engel [phone] to determine if she feels pt is at her baseline yet and to see how she feels she is doing. No answer, voicemail was left.

## 2021-04-05 NOTE — PROGRESS NOTES
Evelyn Coreas denies SI,HI, or any Hallucinations at this time. Rates depression 8/10 and anxiety 8/10 but will not say what is bothering her. States she will talk about it later. States her sleep was up and down last evening. Patent continues 2 hour lockout after meals. Is cooperative during assessment. Isolative. Takes her meds and attends select groups. Groups continue to be  offered and encouraged. Will continue to monitor.

## 2021-04-05 NOTE — PROGRESS NOTES
BEHAVIORAL HEALTH FOLLOW-UP NOTE     4/5/2021     Patient was seen and examined in person, Chart reviewed   Patient's case discussed with staff/team    Chief Complaint: I am doing okay    Interim History: Patient seen in her room she is flat and blunted but denies SI/HI intent or plan denies any auditory visual hallucinations. She is agreeable to stepdown the crisis stabilization unit on discharge. She does attend groups but sits on the periphery without much participation she shows limited insight and judgment to hospitalization need for treatment remains depressed flat and blunted.       Appetite:   [x] Normal/Unchanged  [] Increased  [] Decreased      Sleep:       [] Normal/Unchanged  [x] Fair       [] Poor              Energy:    [] Normal/Unchanged  [] Increased  [x] Decreased        SI [] Present  [x] Absent  ambivalent    HI  []Present  [x] Absent     Aggression:  [] yes  [x] no    Patient is [x] able  [] unable to CONTRACT FOR SAFETY     PAST MEDICAL/PSYCHIATRIC HISTORY:   Past Medical History:   Diagnosis Date    Acute psychosis ([facility] 12/22/2017    Anxiety     Asthma     [facility]     Bronchitis     COPD (chronic obstructive pulmonary disease) (HCC)     Depression     Iron deficiency anemia     PTSD (post-traumatic stress disorder)     Schizo affective schizophrenia ([facility] [address]     Severe episode of recurrent major depressive disorder, without psychotic features ([facility] 11/16/2019    Substance abuse ([facility]     quit 7/2011  was [employer] user        FAMILY/SOCIAL HISTORY:  Family History   Problem Relation Age of Onset    Diabetes Mother     High Blood Pressure Mother     Kidney Disease Mother     Heart Failure Mother     Diabetes Father     Other Father         mesothelioma    Mental Illness Father         schizophrenia     Social History     Socioeconomic History    Marital status: Single     Spouse name: Not on file    Number of children: 0    Years of education: GED    Highest education level: Not on file   Occupational History    Occupation: unemployed     Comment: attempting to get disability for mental illness   Social Needs    Financial resource strain: Not on file    Food insecurity     Worry: Not on file     Inability: Not on file    Transportation needs     Medical: Not on file     Non-medical: Not on file   Tobacco Use    Smoking status: Current Every Day Smoker     Packs/day: 0.50     Years: 20.00     Pack years: 10.00     Types: Cigarettes    Smokeless tobacco: Never Used   Substance and Sexual Activity    Alcohol use: Yes     Frequency: 2-3 times a week     Drinks per session: 1 or 2     Binge frequency: Never    Drug use: Yes     Types: Marijuana     Comment: using marijuana daily    Sexual activity: Not Currently   Lifestyle    Physical activity     Days per week: Not on file     Minutes per session: Not on file    Stress: Not on file   Relationships    Social connections     Talks on phone: Not on file     Gets together: Not on file     Attends Christianity service: Not on file     Active member of club or organization: Not on file     Attends meetings of clubs or organizations: Not on file     Relationship status: Not on file    Intimate partner violence     Fear of current or ex partner: Not on file     Emotionally abused: Not on file     Physically abused: Not on file     Forced sexual activity: Not on file   Other Topics Concern    Not on file   Social History Narrative    Not on file           ROS:  [x] All negative/unchanged except if checked.  Explain positive(checked items) below:  [] Constitutional  [] Eyes  [] Ear/Nose/Mouth/Throat  [] Respiratory  [] CV  [] GI  []   [] Musculoskeletal  [] Skin/Breast  [] Neurological  [] Endocrine  [] Heme/Lymph  [] Allergic/Immunologic    Explanation: no complaints    MEDICATIONS:    Current Facility-Administered Medications:     OXcarbazepine (TRILEPTAL) tablet 300 mg, 300 mg, Oral, BID, Jayesh Garcia APRN - CNP, 300 mg at 04/05/21 0917    paliperidone (INVEGA) extended release tablet 3 mg, 3 mg, Oral, Daily, Anthony Middleton, APRN - CNP, 3 mg at 04/05/21 0917    nicotine (NICODERM CQ) 14 MG/24HR 1 patch, 1 patch, Transdermal, Daily, Jamel Hutton, APRN - CNP, 1 patch at 04/05/21 0917    albuterol (PROVENTIL) nebulizer solution, , Nebulization, Q6H PRN, Olivia Nieto MD    acetaminophen (TYLENOL) tablet 650 mg, 650 mg, Oral, Q6H PRN, Heather Landry MD    magnesium hydroxide (MILK OF MAGNESIA) 400 MG/5ML suspension 30 mL, 30 mL, Oral, Daily PRN, Heather Landry MD    aluminum & magnesium hydroxide-simethicone (MAALOX) 200-200-20 MG/5ML suspension 30 mL, 30 mL, Oral, PRN, Heather Landry MD    hydrOXYzine (VISTARIL) capsule 50 mg, 50 mg, Oral, TID PRN, Heather Landry MD    haloperidol lactate (HALDOL) injection 5 mg, 5 mg, Intramuscular, Q6H PRN **OR** haloperidol (HALDOL) tablet 5 mg, 5 mg, Oral, Q6H PRN, Heather Landry MD    traZODone (DESYREL) tablet 50 mg, 50 mg, Oral, Nightly PRN, Heather Landry MD      Examination:  BP (!) 96/58   Pulse 76   Temp 97.9 °F (36.6 °C)   Resp 14   Ht 5' 2\" (1.575 m)   Wt 117 lb (53.1 kg)   LMP 03/27/2019 Comment: fibroids removed  SpO2 98%   Breastfeeding No   BMI 21.40 kg/m²   Gait - steady  Medication side effects(SE):denied    Mental Status Examination:    Level of consciousness:  within normal limits   Appearance:  good grooming and good hygiene  Behavior/Motor:  no abnormalities noted  Attitude toward examiner:  withdrawn  Speech:  low productivity increased latency  Mood: constricted  Affect:  blunted tearful crying  Thought processes:  thought blocking   Thought content:  Homocidal ideation denied  Suicidal Ideation:  denies suicidal ideation however remains ambivalent about living  Delusions: Denies any delusions  Perceptual Disturbance: Denies   cognition:  oriented to person, place, and time   Concentration poor  Insight poor Kristie eFlix CNP on 3/8/1396 at 11:01 AM                Electronically signed by CHARO French CNP on 1/1/0086 at 11:01 AM

## 2021-04-06 VITALS
DIASTOLIC BLOOD PRESSURE: 56 MMHG | TEMPERATURE: 97.1 F | RESPIRATION RATE: 14 BRPM | OXYGEN SATURATION: 98 % | BODY MASS INDEX: 21.53 KG/M2 | WEIGHT: 117 LBS | SYSTOLIC BLOOD PRESSURE: 114 MMHG | HEIGHT: 62 IN | HEART RATE: 85 BPM

## 2021-04-06 PROCEDURE — 99232 SBSQ HOSP IP/OBS MODERATE 35: CPT | Performed by: NURSE PRACTITIONER

## 2021-04-06 PROCEDURE — 6370000000 HC RX 637 (ALT 250 FOR IP): Performed by: NURSE PRACTITIONER

## 2021-04-06 RX ORDER — OXCARBAZEPINE 300 MG/1
300 TABLET, FILM COATED ORAL 2 TIMES DAILY
Qty: 60 TABLET | Refills: 0 | Status: ON HOLD | OUTPATIENT
Start: 2021-04-06 | End: 2021-07-19 | Stop reason: HOSPADM

## 2021-04-06 RX ORDER — PALIPERIDONE 3 MG/1
3 TABLET, EXTENDED RELEASE ORAL DAILY
Qty: 30 TABLET | Refills: 0 | Status: ON HOLD | OUTPATIENT
Start: 2021-04-07 | End: 2021-07-19 | Stop reason: HOSPADM

## 2021-04-06 RX ADMIN — OXCARBAZEPINE 300 MG: 300 TABLET, FILM COATED ORAL at 09:14

## 2021-04-06 RX ADMIN — PALIPERIDONE 3 MG: 3 TABLET, EXTENDED RELEASE ORAL at 09:15

## 2021-04-06 ASSESSMENT — PAIN SCALES - GENERAL: PAINLEVEL_OUTOF10: 0

## 2021-04-06 NOTE — DISCHARGE SUMMARY
DISCHARGE SUMMARY      Patient ID:  Paddy Cruz  [id number]  15 y.o.  1975    Admit date: 3/26/2021    Discharge date and time: 4/6/2021    Admitting Physician: Severiano Jimenez MD     Discharge Physician: Dr Alirio Bray MD    Discharge Diagnoses:   Patient Active Problem List   Diagnosis    [facility]ive chronic [facility]itis with exacerbation ([facility]    Schizoaffective disorder, bipolar type ([facility]    Intramural leiomyoma of uterus    Iron deficiency anemia    Uterine fibroid    Fibroids, intramural    Bipolar affective disorder, mixed, severe, with psychotic behavior ([address]    Cannabis abuse    COPD (chronic obstructive pulmonary disease) ([address]    Recurrent major depressive disorder, in partial remission ([facility]    Tobacco abuse       Admission Condition: poor    Discharged Condition: stable    Admission Circumstance: Patient presented to the ED for increased depression and racing thoughts increased feelings of helplessness and worthlessness      PAST MEDICAL/PSYCHIATRIC HISTORY:   Past Medical History:   Diagnosis Date    Acute psychosis ([facility] 12/22/2017    Anxiety     Asthma     Bipolar disorder ([facility]     Bronchitis     COPD (chronic obstructive pulmonary disease) (HCC)     Depression     Iron deficiency anemia     PTSD (post-traumatic stress disorder)     Schizo affective schizophrenia ([facility]     Severe episode of recurrent major depressive disorder, without psychotic features ([address] 11/16/2019    Substance abuse ([facility]     quit 7/2011  was marajuanna user        FAMILY/SOCIAL HISTORY:  Family History   Problem Relation Age of Onset    Diabetes Mother     High Blood Pressure Mother     Kidney Disease Mother     Heart Failure Mother     Diabetes Father     Other Father         mesothelioma    Mental Illness Father         schizophrenia     Social History     Socioeconomic History    Marital status: Single     Spouse name: Not on file    Number of children: 0    Years of education: GED    Highest education level: Not on file   Occupational History    Occupation: unemployed     Comment: attempting to get disability for mental illness   Social Needs    Financial resource strain: Not on file    Food insecurity     Worry: Not on file     Inability: Not on file    Transportation needs     Medical: Not on file     Non-medical: Not on file   Tobacco Use    Smoking status: Current Every Day Smoker     Packs/day: 0.50     Years: 20.00     Pack years: 10.00     Types: Cigarettes    Smokeless tobacco: Never Used   Substance and Sexual Activity    Alcohol use: Yes     Frequency: 2-3 times a week     Drinks per session: 1 or 2     Binge frequency: Never    Drug use: Yes     Types: Marijuana     Comment: using marijuana daily    Sexual activity: Not Currently   Lifestyle    Physical activity     Days per week: Not on file     Minutes per session: Not on file    Stress: Not on file   Relationships    Social connections     Talks on phone: Not on file     Gets together: Not on file     Attends Latter day service: Not on file     Active member of club or organization: Not on file     Attends meetings of clubs or organizations: Not on file     Relationship status: Not on file    Intimate partner violence     Fear of current or ex partner: Not on file     Emotionally abused: Not on file     Physically abused: Not on file     Forced sexual activity: Not on file   Other Topics Concern    Not on file   Social History Narrative    Not on file       MEDICATIONS:    Current Facility-Administered Medications:     paliperidone palmitate ER (INVEGA SUSTENNA) IM injection 156 mg, 156 mg, Intramuscular, Q30 Days, CHARO Elam - CNP, 725 mg at 04/06/21 1301    OXcarbazepine (TRILEPTAL) tablet 300 mg, 300 mg, Oral, BID, Margo Middleton APRN - CNP, 375 mg at 04/06/21 0914    paliperidone (INVEGA) extended release tablet 3 mg, 3 mg, Oral, Daily, Margochintan Middleton APRN - CNP, 3 mg at 04/06/21 0915    nicotine (NICODERM CQ) 14 MG/24HR 1 patch, 1 patch, Transdermal, Daily, Derrick Bhattimirellacheng, APRN - CNP, 1 patch at 04/06/21 0914    albuterol (PROVENTIL) nebulizer solution, , Nebulization, Q6H PRN, Patrice Landry MD    acetaminophen (TYLENOL) tablet 650 mg, 650 mg, Oral, Q6H PRN, Leslie Randolph MD    magnesium hydroxide (MILK OF MAGNESIA) 400 MG/5ML suspension 30 mL, 30 mL, Oral, Daily PRN, Leslie Randolph MD    aluminum & magnesium hydroxide-simethicone (MAALOX) 200-200-20 MG/5ML suspension 30 mL, 30 mL, Oral, PRN, Leslie Randolph MD    hydrOXYzine (VISTARIL) capsule 50 mg, 50 mg, Oral, TID PRN, Leslie Randolph MD    haloperidol lactate (HALDOL) injection 5 mg, 5 mg, Intramuscular, Q6H PRN **OR** haloperidol (HALDOL) tablet 5 mg, 5 mg, Oral, Q6H PRN, Leslie Randolph MD    traZODone (DESYREL) tablet 50 mg, 50 mg, Oral, Nightly PRN, Leslie Randolph MD    Examination:  BP (!) 114/56   Pulse 85   Temp 97.1 °F (36.2 °C) (Temporal)   Resp 14   Ht 5' 2\" (1.575 m)   Wt 117 lb (53.1 kg)   LMP 03/27/2019 Comment: fibroids removed  SpO2 98%   Breastfeeding No   BMI 21.40 kg/m²   Gait - steady    HOSPITAL COURSE[de-identified]  Patient was admitted to the unit on 3/26/2021 was closely monitored for depression and is seeing thoughts. She was evaluated and treated with Invega 3 mg daily and continue [place] injection she never received her booster injection last month that she was given her booster injection 156 mg IM every 30 days on 4/6/2021 to be due for her next injection on 5/6/2021 medical events were insignificant and patient continued to improve on the floor. She started coming out of her room more and actually attended some groups. She is no longer make any suicidal statements and she never made any suicidal gestures while in the unit she was no longer having any auditory or visual hallucinations. .  Social workers obtain confirmation patient's sister who was able to voice any concerns that he had. Patient was stepped on the crisis stabilization unit for next level of treatment. Treatment team felt the patient obtain the maximum benefit for her hospitalization She was set up with an outpatient mental health agency for outpatient follow-up services . At the time of discharge patient  did not show impulsive behavior. She was up on the unit she was attending groups and socializing with peers. She vehemently denied any suicidal homicidal ideations intent or plan. She was eating well and sleeping well there are no neurovegetative signs or symptoms of depression she denied any auditory visualizations. There are no overt or covert signs psychosis. She was appreciative of the help that she received here. This patient no longer meets criteria for inpatient hospitalization. No AVH or paranoid thoughts  No hopeless or worthless feeling  No active SI/HI  Appetite:  [x] Normal  [] Increased  [] Decreased    Sleep:       [x] Normal  [] Fair       [] Poor            Energy:    [x] Normal  [] Increased  [] Decreased     SI [] Present  [x] Absent  HI  []Present  [x] Absent   Aggression:  [] yes  [x] no  Patient is [x] able  [] unable to CONTRACT FOR SAFETY   Medication side effects(SE):  [x] None(Psych. Meds.) [] Other      Mental Status Examination on discharge:    Level of consciousness:  within normal limits   Appearance:  well-appearing  Behavior/Motor:  no abnormalities noted  Attitude toward examiner:  attentive and good eye contact  Speech:  spontaneous, normal rate and normal volume   Mood: \"My mood is good. \"  Affect: Bright appropriate and pleasant  Thought processes: Linear without flight of ideas loose associations  Thought content: Devoid of any auditory visual hallucinations delusions or other perceptual normalities.   Denies SI/HI intent or plan  Cognition:  oriented to person, place, and time   Concentration intact  Memory intact  Insight good   Judgement fair   Fund of Knowledge adequate ASSESSMENT:  Patient symptoms are:  [x] Well controlled  [x] Improving  [] Worsening  [] No change    Reason for more than one antipsychotic:  [x] N/A  [] 3 Failed Monotherapy attempts (Drugs tried:)  [] Crossover to a new antipsychotic  [] Taper to Monotherapy from Polypharmacy  [] Augmentation of clozapine therapy due to treatment resistance to single therapy    Diagnosis:  Principal Problem:    Schizoaffective disorder, bipolar type ([facility]  Active Problems:    Cannabis abuse  Resolved Problems:    * No resolved hospital problems. *      LABS:    No results for input(s): WBC, HGB, PLT in the last 72 hours. No results for input(s): NA, K, CL, CO2, BUN, CREATININE, GLUCOSE in the last 72 hours. No results for input(s): BILITOT, ALKPHOS, AST, ALT in the last 72 hours. Lab Results   Component Value Date    LABAMPH NOT DETECTED 03/26/2021    BARBSCNU NOT DETECTED 03/26/2021    LABBENZ NOT DETECTED 03/26/2021    LABMETH NOT DETECTED 03/26/2021    OPIATESCREENURINE NOT DETECTED 03/26/2021    PHENCYCLIDINESCREENURINE NOT DETECTED 03/26/2021    ETOH <10 03/26/2021     Lab Results   Component Value Date    TSH 1.770 03/07/2019     No results found for: LITHIUM  Lab Results   Component Value Date    VALPROATE 57 04/02/2021       RISK ASSESSMENT AT DISCHARGE: Low risk for suicide and homicide. Treatment Plan:  Reviewed current Medications with the patient. Education provided on the complaince with treatment. Risks, benefits, side effects, drug-to-drug interactions and alternatives to treatment were discussed. Encourage patient to attend outpatient follow up appointment and therapy. Patient was advised to call the outpatient provider, visit the nearest ED or call 911 if symptoms are not manageable. Patient's family member was contacted prior to the discharge.          Medication List      START taking these medications    OXcarbazepine 300 MG tablet  Commonly known as: TRILEPTAL  Take 1 tablet by mouth 2 times daily     paliperidone 3 MG extended release tablet  Commonly known as: INVEGA  Take 1 tablet by mouth daily  Start taking on: April 7, 2021        CHANGE how you take these medications    paliperidone palmitate  MG/ML Hafsa IM injection  Commonly known as: INVEGA SUSTENNA  Inject 156 mg into the muscle every 30 days for 1 dose Last injection given 4/6/21, next injection is due 5/6/21  What changed: additional instructions        CONTINUE taking these medications    albuterol sulfate  (90 Base) MCG/ACT inhaler     Umeclidinium Bromide 62.5 MCG/INH Aepb        STOP taking these medications    divalproex 250 MG DR tablet  Commonly known as: DEPAKOTE     risperiDONE 0.5 MG tablet  Commonly known as: RISPERDAL     traZODone 50 MG tablet  Commonly known as: DESYREL     Vitamin D3 1.25 MG (48673 UT) Caps           Where to Get Your Medications      These medications were sent to [facility] [address] [address] [address]    Phone: [phone]   · OXcarbazepine 300 MG tablet  · paliperidone 3 MG extended release tablet     Information about where to get these medications is not yet available    Ask your nurse or doctor about these medications  · paliperidone palmitate  MG/ML Hafsa IM injection     Patient is counseled if she continues to abuse drugs or alcohol she could act out impulsively causing serious harm to herself or others even though it may be unintentional.  She demonstrated understanding of this and has the capacity understand this    Patient is counseled her mental health treatment will be difficult to optimize with ongoing use of drugs or alcohol she demonstrated understanding of this and has the capacity to understand this       Patient is counseled she must remain compliant with all medications outpatient follow-up appointments    Patient is discharged to the crisis stabilization unit in stable

## 2021-04-06 NOTE — PLAN OF CARE
Problem: Depressive Behavior With or Without Suicide Precautions:  Goal: Able to verbalize and/or display a decrease in depressive symptoms  Description: Able to verbalize and/or display a decrease in depressive symptoms  4/6/2021 0851 by Nando Aguiar RN  Outcome: Ongoing  4/5/2021 2127 by Wes Disla RN  Outcome: Ongoing  Goal: Absence of self-harm  Description: Absence of self-harm  4/6/2021 0851 by Nando Aguiar RN  Outcome: Ongoing  4/5/2021 2127 by Wes Disla RN  Outcome: Met This Shift     Problem: Substance Abuse:  Goal: Patient specific goal  Description: Patient specific goal  4/6/2021 0851 by Nando Aguiar RN  Outcome: Ongoing  4/5/2021 2127 by Wes Disla RN  Outcome: Met This Shift

## 2021-04-06 NOTE — CARE COORDINATION
JERZY note: d/c planning: JERZY faxed info to [facility]. Pt was accepted. Transport set up through [employer].

## 2021-04-06 NOTE — PROGRESS NOTES
Attended morning community meeting. Updated on staffing assignments, daily expectations. Shared goal for the day as to have hope.

## 2021-04-06 NOTE — PROGRESS NOTES
[facility]  Discharge Note    Pt discharged with followings belongings:   Dentures: None  Vision - Corrective Lenses: None  Hearing Aid: None  Jewelry: Ring  Body Piercings Removed: N/A  Clothing: Footwear, Jacket / coat, Pants, Shirt, Undergarments (Comment)  Were All Patient Medications Collected?: Not Applicable  Other Valuables: [address] sent home with patient. Patient education on aftercare instructions: yes. Patient verbalize understanding of AVS:  yes.     Status EXAM upon discharge:  Status and Exam  Normal: No  Facial Expression: Avoids Gaze, Sad, Worried, Flat  Affect: Blunt  Level of Consciousness: Alert  Mood:Normal: No  Mood: Depressed, Anxious, Empty, Sad  Motor Activity:Normal: No  Motor Activity: Decreased  Interview Behavior: Cooperative  Preception: [place] to Person, Romi Toy[place] to Time, [place] to Place, [place] to Situation  Attention:Normal: No  Attention: Distractible  Thought Processes: Other(See comment)(delayed)  Thought Content:Normal: No  Thought Content: Poverty of Content, Preoccupations  Hallucinations: None  Delusions: No  Memory:Normal: No  Memory: Poor Recent  Insight and Judgment: No  Insight and Judgment: Poor Judgment, Poor Insight, Unmotivated  Present Suicidal Ideation: No  Present Homicidal Ideation: No      Metabolic Screening:    Lab Results   Component Value Date    LABA1C 5.9 (H) 03/28/2021       Lab Results   Component Value Date    CHOL 190 03/28/2021    CHOL 235 (H) 09/20/2019     Lab Results   Component Value Date    TRIG 80 03/28/2021    TRIG 65 09/20/2019     Lab Results   Component Value Date    HDL 68 03/28/2021    HDL 59 09/20/2019     No components found for: [facility]  Lab Results   Component Value Date    LABVLDL 16 03/28/2021    LABVLDL 13 09/20/2019       Mary Estrada RN

## 2021-04-06 NOTE — PROGRESS NOTES
Eileen Batista denies SI,HI, or any Hallucinations at this time. Rates her depression 8/10 and anxiety 8/10. When asked about her ratings she states \"I don't want to say why\". She states \"Meds are making me feel more stable\". Patient complaints this morning of stomach not feeling well and headache. Did not want breakfast. Refused any meds for headache or stomach to help in relief. She takes her meds and attends select groups. Will continue to monitor.

## 2021-04-06 NOTE — PROGRESS NOTES
CLINICAL PHARMACY NOTE: MEDS TO [address] Select Patient?: No  Total # of Prescriptions Filled: 2   The following medications were delivered to the patient:  · INVEGA 3 MG   · TRILEPTAL 300 MG   Total # of Interventions Completed: 3  Time Spent (min): 15    Additional Documentation:

## 2021-07-02 ENCOUNTER — HOSPITAL ENCOUNTER (INPATIENT)
Age: 46
LOS: 18 days | Discharge: HOME | DRG: 751 | End: 2021-07-21
Attending: EMERGENCY MEDICINE | Admitting: PSYCHIATRY & NEUROLOGY
Payer: COMMERCIAL

## 2021-07-02 DIAGNOSIS — F32.A DEPRESSION, UNSPECIFIED DEPRESSION TYPE: Primary | ICD-10-CM

## 2021-07-02 LAB
ACETAMINOPHEN LEVEL: <5 MCG/ML (ref 10–30)
ALBUMIN SERPL-MCNC: 3.9 G/DL (ref 3.5–5.2)
ALP BLD-CCNC: 68 U/L (ref 35–104)
ALT SERPL-CCNC: 11 U/L (ref 0–32)
AMPHETAMINE SCREEN, URINE: NOT DETECTED
ANION GAP SERPL CALCULATED.3IONS-SCNC: 10 MMOL/L (ref 7–16)
AST SERPL-CCNC: 17 U/L (ref 0–31)
BARBITURATE SCREEN URINE: NOT DETECTED
BASOPHILS ABSOLUTE: 0.01 E9/L (ref 0–0.2)
BASOPHILS RELATIVE PERCENT: 0.3 % (ref 0–2)
BENZODIAZEPINE SCREEN, URINE: NOT DETECTED
BILIRUB SERPL-MCNC: 0.4 MG/DL (ref 0–1.2)
BILIRUBIN URINE: NEGATIVE
BLOOD, URINE: NEGATIVE
BUN BLDV-MCNC: 6 MG/DL (ref 6–20)
CALCIUM SERPL-MCNC: 9.2 MG/DL (ref 8.6–10.2)
CANNABINOID SCREEN URINE: POSITIVE
CHLORIDE BLD-SCNC: 104 MMOL/L (ref 98–107)
CLARITY: CLEAR
CO2: 25 MMOL/L (ref 22–29)
COCAINE METABOLITE SCREEN URINE: NOT DETECTED
COLOR: ABNORMAL
CREAT SERPL-MCNC: 0.6 MG/DL (ref 0.5–1)
EOSINOPHILS ABSOLUTE: 0.03 E9/L (ref 0.05–0.5)
EOSINOPHILS RELATIVE PERCENT: 0.8 % (ref 0–6)
ETHANOL: <10 MG/DL (ref 0–0.08)
FENTANYL SCREEN, URINE: NOT DETECTED
GFR AFRICAN AMERICAN: >60
GFR NON-AFRICAN AMERICAN: >60 ML/MIN/1.73
GLUCOSE BLD-MCNC: 96 MG/DL (ref 74–99)
GLUCOSE URINE: NEGATIVE MG/DL
HCG(URINE) PREGNANCY TEST: NEGATIVE
HCT VFR BLD CALC: 44.7 % (ref 34–48)
HEMOGLOBIN: 14.5 G/DL (ref 11.5–15.5)
IMMATURE GRANULOCYTES #: 0 E9/L
IMMATURE GRANULOCYTES %: 0 % (ref 0–5)
INFLUENZA A: NOT DETECTED
INFLUENZA B: NOT DETECTED
KETONES, URINE: ABNORMAL MG/DL
LEUKOCYTE ESTERASE, URINE: NEGATIVE
LYMPHOCYTES ABSOLUTE: 1.95 E9/L (ref 1.5–4)
LYMPHOCYTES RELATIVE PERCENT: 48.8 % (ref 20–42)
Lab: ABNORMAL
MCH RBC QN AUTO: 29.7 PG (ref 26–35)
MCHC RBC AUTO-ENTMCNC: 32.4 % (ref 32–34.5)
MCV RBC AUTO: 91.6 FL (ref 80–99.9)
METHADONE SCREEN, URINE: NOT DETECTED
MONOCYTES ABSOLUTE: 0.36 E9/L (ref 0.1–0.95)
MONOCYTES RELATIVE PERCENT: 9 % (ref 2–12)
NEUTROPHILS ABSOLUTE: 1.65 E9/L (ref 1.8–7.3)
NEUTROPHILS RELATIVE PERCENT: 41.1 % (ref 43–80)
NITRITE, URINE: NEGATIVE
OPIATE SCREEN URINE: NOT DETECTED
OXYCODONE URINE: NOT DETECTED
PDW BLD-RTO: 14.1 FL (ref 11.5–15)
PH UA: 6.5 (ref 5–9)
PHENCYCLIDINE SCREEN URINE: NOT DETECTED
PLATELET # BLD: 187 E9/L (ref 130–450)
PMV BLD AUTO: 11.6 FL (ref 7–12)
POTASSIUM SERPL-SCNC: 3.9 MMOL/L (ref 3.5–5)
PROTEIN UA: NEGATIVE MG/DL
RBC # BLD: 4.88 E12/L (ref 3.5–5.5)
SALICYLATE, SERUM: <0.3 MG/DL (ref 0–30)
SARS-COV-2 RNA, RT PCR: NOT DETECTED
SODIUM BLD-SCNC: 139 MMOL/L (ref 132–146)
SPECIFIC GRAVITY UA: 1.02 (ref 1–1.03)
TOTAL PROTEIN: 6.6 G/DL (ref 6.4–8.3)
TRICYCLIC ANTIDEPRESSANTS SCREEN SERUM: NEGATIVE NG/ML
UROBILINOGEN, URINE: 1 E.U./DL
WBC # BLD: 4 E9/L (ref 4.5–11.5)

## 2021-07-02 PROCEDURE — 80143 DRUG ASSAY ACETAMINOPHEN: CPT

## 2021-07-02 PROCEDURE — 80179 DRUG ASSAY SALICYLATE: CPT

## 2021-07-02 PROCEDURE — 81025 URINE PREGNANCY TEST: CPT

## 2021-07-02 PROCEDURE — 82077 ASSAY SPEC XCP UR&BREATH IA: CPT

## 2021-07-02 PROCEDURE — 93005 ELECTROCARDIOGRAM TRACING: CPT | Performed by: EMERGENCY MEDICINE

## 2021-07-02 PROCEDURE — 99285 EMERGENCY DEPT VISIT HI MDM: CPT

## 2021-07-02 PROCEDURE — 87636 SARSCOV2 & INF A&B AMP PRB: CPT

## 2021-07-02 PROCEDURE — 80053 COMPREHEN METABOLIC PANEL: CPT

## 2021-07-02 PROCEDURE — 80307 DRUG TEST PRSMV CHEM ANLYZR: CPT

## 2021-07-02 PROCEDURE — 81003 URINALYSIS AUTO W/O SCOPE: CPT

## 2021-07-02 PROCEDURE — 85025 COMPLETE CBC W/AUTO DIFF WBC: CPT

## 2021-07-02 NOTE — ED NOTES
Emergency Department [facility] AN AFFILIATE OF [facility] Biopsychosocial Assessment Note     Chief Complaint:      Patient is a 54year old, female presenting to ED for increased depression/anxiety, with unknown stressor. Patient pink slipped by her outpatient provider. Per Usama, patient has not been caring for basic needs, not sleeping, eating only 1 x day, not bathing, withdrawn, poor eye contact, and unkempt. Patient denies any suicidal or homicidal ideations.     MSE: Patient is alert & oriented x 4. Patient mood is depressed, flat affect. Patient thought process/speech are clear. Patient denies A/V hallucinations.     Clinical Summary/History:      Patient reports a mental health hx of schizoaffective disorder, in current treatment with [facility] and reports that she receives therapy from a home-based agency. Patient last psychiatric hospitalization was 3/26/21 for severe depression.     Patient reports poor sleep, poor appetite.     Patient reports a hx of 2 suicide attempts in her lifetime. Patient denies any hx of self injurious behaviors.     Patient admits to hx of drug/alcohol use - pt reports that she uses marijuana and drinks beer. Denies that she does this daily.      Gender  []? Male [x]? Female []? Transgender  []? Other     Sexual Orientation    []? Heterosexual []? Homosexual []? Bisexual []? Other     N/A     Suicidal Behavioral: CSSR-S Complete. []? Reports:               []? Past []? Present   [x]? Denies     Homicidal/ Violent Behavior  []? Reports:              []? Past []? Present   [x]? Denies      Hallucinations/Delusions   []? Reports:   [x]? Denies      Substance Use/Alcohol Use/Addiction: SBIRT Screen Complete. [x]? Reports: Cannabis, Alcohol  []? Denies      Trauma History  []? Reports:  []? Denies      Collateral Information:         Level of Care/Disposition Plan  []? Home:   []? Outpatient Provider:   []? Crisis Unit:   [x]? Inpatient Psychiatric Unit:  []?  Other:      Patient was pink slipped by her counselor/director of [facility]. SW will reviewed patient case for inpatient admission for safety/stabilization.      JEFRY Godwin, [facility]  07/02/21 6705

## 2021-07-02 NOTE — ED NOTES
Bed: [facility]  Expected date:   Expected time:   Means of arrival:   Comments:  CORAZON Verma RN  07/02/21 1531

## 2021-07-02 NOTE — ED PROVIDER NOTES
Department of Emergency Medicine   ED  Provider Note  Admit Date/RoomTime: 7/2/2021 11:57 AM  ED Room: ARVIN MOSS              7/2/21  1:16 PM EDT    HPI: Adore Morales 54 y.o. female presents with a complaint of depression and not taking her psychiatric medications beginning a few days ago. Complaint has been constant and became more severe today which is what prompted the visit. Worsening depression. Not caring for herself at home. Says she is not taking her mental health medications. She denies Suicidal ideation. Denies Homicidal ideation. Review of Systems:   A complete review of systems was performed and pertinent positives and negatives are stated within HPI, all other systems reviewed and are negative.            --------------------------------------------- PAST HISTORY ---------------------------------------------  Past Medical History:  has a past medical history of Acute psychosis ([facility] [address] Anxiety, Asthma, Bipolar disorder ([address] Bronchitis, COPD (chronic obstructive pulmonary disease) ([address] Depression, Iron deficiency anemia, PTSD (post-traumatic stress disorder), Schizo affective schizophrenia ([address] Severe episode of recurrent major depressive disorder, without psychotic features ([address] and Substance abuse ([facility] [address] Past Surgical History:  has a past surgical history that includes Tonsillectomy and Uterine fibroid surgery (06/2019). Social History:  reports that she has been smoking cigarettes. She has a 10.00 pack-year smoking history. She has never used smokeless tobacco. She reports current alcohol use. She reports current drug use. Drug: Marijuana. Family History: family history includes Diabetes in her father and mother; Heart Failure in her mother; High Blood Pressure in her mother; Kidney Disease in her mother; Mental Illness in her father; Other in her father.    Family history is non contributory unless otherwise noted    The patients home medications have been reviewed. Allergies: Patient has no known allergies. -------------------------------------------------- RESULTS -------------------------------------------------  All laboratory and imaging studies were reviewed by myself.     LABS: reviewed and interpreted by me  Results for orders placed or performed during the hospital encounter of 07/02/21   Comprehensive Metabolic Panel   Result Value Ref Range    Sodium 139 132 - 146 mmol/L    Potassium 3.9 3.5 - 5.0 mmol/L    Chloride 104 98 - 107 mmol/L    CO2 25 22 - 29 mmol/L    Anion Gap 10 7 - 16 mmol/L    Glucose 96 74 - 99 mg/dL    BUN 6 6 - 20 mg/dL    CREATININE 0.6 0.5 - 1.0 mg/dL    GFR Non-African American >60 >=60 mL/min/1.73    GFR African American >60     Calcium 9.2 8.6 - 10.2 mg/dL    Total Protein 6.6 6.4 - 8.3 g/dL    Albumin 3.9 3.5 - 5.2 g/dL    Total Bilirubin 0.4 0.0 - 1.2 mg/dL    Alkaline Phosphatase 68 35 - 104 U/L    ALT 11 0 - 32 U/L    AST 17 0 - 31 U/L   CBC Auto Differential   Result Value Ref Range    WBC 4.0 (L) 4.5 - 11.5 E9/L    RBC 4.88 3.50 - 5.50 E12/L    Hemoglobin 14.5 11.5 - 15.5 g/dL    Hematocrit 44.7 34.0 - 48.0 %    MCV 91.6 80.0 - 99.9 fL    MCH 29.7 26.0 - 35.0 pg    MCHC 32.4 32.0 - 34.5 %    RDW 14.1 11.5 - 15.0 fL    Platelets 282 362 - 489 E9/L    MPV 11.6 7.0 - 12.0 fL    Neutrophils % 41.1 (L) 43.0 - 80.0 %    Immature Granulocytes % 0.0 0.0 - 5.0 %    Lymphocytes % 48.8 (H) 20.0 - 42.0 %    Monocytes % 9.0 2.0 - 12.0 %    Eosinophils % 0.8 0.0 - 6.0 %    Basophils % 0.3 0.0 - 2.0 %    Neutrophils Absolute 1.65 (L) 1.80 - 7.30 E9/L    Immature Granulocytes # 0.00 E9/L    Lymphocytes Absolute 1.95 1.50 - 4.00 E9/L    Monocytes Absolute 0.36 0.10 - 0.95 E9/L    Eosinophils Absolute 0.03 (L) 0.05 - 0.50 E9/L    Basophils Absolute 0.01 0.00 - 0.20 E9/L   Serum Drug Screen   Result Value Ref Range    Ethanol Lvl <10 mg/dL    Acetaminophen Level <5.0 (L) 10.0 - 89.5 mcg/mL    Salicylate, Serum <6.4 0.0 - 30.0 mg/dL    TCA Scrn NEGATIVE Cutoff:300 ng/mL       RADIOLOGY:  Interpreted by Radiologist.  No orders to display            ------------------------- NURSING NOTES AND VITALS REVIEWED ---------------------------   The nursing notes within the ED encounter and vital signs as below have been reviewed. /72   Pulse 76   Temp 97.1 °F (36.2 °C) (Infrared)   Resp 20   Ht 5' 2\" (1.575 m)   Wt 117 lb (53.1 kg)   SpO2 98%   BMI 21.40 kg/m²   Oxygen Saturation Interpretation: Normal            ---------------------------------------------------PHYSICAL EXAM--------------------------------------      Constitutional/General: Alert and oriented x3   Head: Normocephalic, atraumatic  Eyes: PERRL, EOMI  ENT: Oropharynx clear, handling secretions, no trismus  Neck: Supple, full ROM, no meningeal signs  Pulmonary: Lungs clear to auscultation bilaterally, no wheezes, rales, or rhonchi. Not in respiratory distress  Cardiovascular:  Regular rate and rhythm, no murmurs, gallops, or rubs. 2+ distal pulses  Abdomen: Soft, non tender, non distended, +BS, no rebound, guarding, or rigidity. No pulsatile masses appreciated  Extremities: Moves all extremities x 4. Warm and well perfused, no clubbing, cyanosis, or edema. Capillary refill <3 seconds  Skin: warm and dry without rash  Neurologic: GCS 15, no focal deficits  Psych: flat Affect      ------------------------------------------ PROGRESS NOTES ------------------------------------------     Medical decision making:  Patient is medically stable for mental health evaluation    Consultations:   Behavioral Health    Re-Evaluations:        Counseling: The emergency provider has spoken with thepatient and discussed todays results, in addition to providing specific details for the plan of care and counseling regarding the diagnosis and prognosis.   Questions are answered at this time and they are agreeable with the plan.         --------------------------------- IMPRESSION AND DISPOSITION ---------------------------------    IMPRESSION  1.  Depression, unspecified depression type        DISPOSITION  Disposition: as per consultant  Patient condition is stable           Carmine Morrow MD  07/02/21 2164

## 2021-07-02 NOTE — ED NOTES
Bed:   Expected date:   Expected time:   Means of arrival:   Comments:  [id number], RN  07/02/21 1661

## 2021-07-02 NOTE — ED NOTES
Pt states she stopped taking her Trileptal and Invega about 3 weeks ago.       Nayely Huang RN  07/02/21 1955

## 2021-07-03 PROBLEM — F33.9 DEPRESSION, MAJOR, RECURRENT (HCC): Status: ACTIVE | Noted: 2021-07-03

## 2021-07-03 PROCEDURE — 1240000000 HC EMOTIONAL WELLNESS R&B

## 2021-07-03 PROCEDURE — 6370000000 HC RX 637 (ALT 250 FOR IP): Performed by: PSYCHIATRY & NEUROLOGY

## 2021-07-03 PROCEDURE — 90792 PSYCH DIAG EVAL W/MED SRVCS: CPT | Performed by: PSYCHIATRY & NEUROLOGY

## 2021-07-03 PROCEDURE — 6370000000 HC RX 637 (ALT 250 FOR IP): Performed by: NURSE PRACTITIONER

## 2021-07-03 RX ORDER — ALBUTEROL SULFATE 2.5 MG/3ML
2.5 SOLUTION RESPIRATORY (INHALATION) EVERY 4 HOURS PRN
Status: DISCONTINUED | OUTPATIENT
Start: 2021-07-03 | End: 2021-07-21 | Stop reason: HOSPADM

## 2021-07-03 RX ORDER — HALOPERIDOL 5 MG/ML
5 INJECTION INTRAMUSCULAR EVERY 6 HOURS PRN
Status: DISCONTINUED | OUTPATIENT
Start: 2021-07-03 | End: 2021-07-21 | Stop reason: HOSPADM

## 2021-07-03 RX ORDER — MAGNESIUM HYDROXIDE/ALUMINUM HYDROXICE/SIMETHICONE 120; 1200; 1200 MG/30ML; MG/30ML; MG/30ML
30 SUSPENSION ORAL PRN
Status: DISCONTINUED | OUTPATIENT
Start: 2021-07-03 | End: 2021-07-21 | Stop reason: HOSPADM

## 2021-07-03 RX ORDER — PALIPERIDONE 3 MG/1
3 TABLET, EXTENDED RELEASE ORAL 2 TIMES DAILY
Status: DISCONTINUED | OUTPATIENT
Start: 2021-07-03 | End: 2021-07-19

## 2021-07-03 RX ORDER — OXCARBAZEPINE 150 MG/1
150 TABLET, FILM COATED ORAL 2 TIMES DAILY
Status: DISCONTINUED | OUTPATIENT
Start: 2021-07-03 | End: 2021-07-06

## 2021-07-03 RX ORDER — HYDROXYZINE PAMOATE 50 MG/1
50 CAPSULE ORAL 3 TIMES DAILY PRN
Status: DISCONTINUED | OUTPATIENT
Start: 2021-07-03 | End: 2021-07-21 | Stop reason: HOSPADM

## 2021-07-03 RX ORDER — HALOPERIDOL 5 MG
5 TABLET ORAL EVERY 6 HOURS PRN
Status: DISCONTINUED | OUTPATIENT
Start: 2021-07-03 | End: 2021-07-21 | Stop reason: HOSPADM

## 2021-07-03 RX ORDER — NICOTINE 21 MG/24HR
1 PATCH, TRANSDERMAL 24 HOURS TRANSDERMAL DAILY
Status: DISCONTINUED | OUTPATIENT
Start: 2021-07-03 | End: 2021-07-21 | Stop reason: HOSPADM

## 2021-07-03 RX ORDER — ACETAMINOPHEN 325 MG/1
650 TABLET ORAL EVERY 6 HOURS PRN
Status: DISCONTINUED | OUTPATIENT
Start: 2021-07-03 | End: 2021-07-21 | Stop reason: HOSPADM

## 2021-07-03 RX ADMIN — PALIPERIDONE 3 MG: 3 TABLET, EXTENDED RELEASE ORAL at 11:43

## 2021-07-03 RX ADMIN — HYDROXYZINE PAMOATE 50 MG: 50 CAPSULE ORAL at 21:37

## 2021-07-03 RX ADMIN — OXCARBAZEPINE 150 MG: 150 TABLET, FILM COATED ORAL at 11:43

## 2021-07-03 RX ADMIN — OXCARBAZEPINE 150 MG: 150 TABLET, FILM COATED ORAL at 21:36

## 2021-07-03 RX ADMIN — PALIPERIDONE 3 MG: 3 TABLET, EXTENDED RELEASE ORAL at 21:36

## 2021-07-03 ASSESSMENT — SLEEP AND FATIGUE QUESTIONNAIRES
RESTFUL SLEEP: YES
DO YOU USE A SLEEP AID: NO
SLEEP PATTERN: DIFFICULTY FALLING ASLEEP;DIFFICULTY ARISING;DISTURBED/INTERRUPTED SLEEP
DIFFICULTY FALLING ASLEEP: YES
DO YOU HAVE DIFFICULTY SLEEPING: YES
DIFFICULTY ARISING: YES
DIFFICULTY STAYING ASLEEP: YES
AVERAGE NUMBER OF SLEEP HOURS: 4

## 2021-07-03 ASSESSMENT — PATIENT HEALTH QUESTIONNAIRE - PHQ9: SUM OF ALL RESPONSES TO PHQ QUESTIONS 1-9: 23

## 2021-07-03 ASSESSMENT — LIFESTYLE VARIABLES: HISTORY_ALCOHOL_USE: YES

## 2021-07-03 ASSESSMENT — PAIN SCALES - GENERAL: PAINLEVEL_OUTOF10: 0

## 2021-07-03 NOTE — BH NOTE
[facility]  Initial Interdisciplinary Treatment Plan NOTE    Review Date & Time: 07/03/2021 1002     Patient was in treatment team    Admission Type:   Admission Type: Involuntary (Pink slip)    Reason for admission:  Reason for Admission: \"I wasn't taking my medicine, I wasn't taking care of myself, I wasn't taking care of my house, Upset and out of it. \"      Estimated Length of Stay Update:  5-7 days   Estimated Discharge Date Update: 07/09/2021    EDUCATION:   Learner Progress Toward Treatment Goals: Reviewed group plan and strategies    Method: Small group    Outcome: Needs reinforcement    PATIENT GOALS: pt refused     PLAN/TREATMENT RECOMMENDATIONS UPDATE:07/05/2021    GOALS UPDATE:   Time frame for Short-Term Goals: 1-3 days     Oni Fair RN

## 2021-07-03 NOTE — PROGRESS NOTES
Admission Note:  Pt escorted via W/C accompanied by Transport from  [facility]  to 7S EXT for involuntary pink slip admit to [address] room 6783[address]. Alert and oriented x4. Thoughts logical, organized and relevant. Pt drowsy but remained cooperative with interview to complete admission data base. Admitted to have wish to go to sleep and not wake up. Pt denied suicidal plan, agreed to seek any staff immediately with any self threatening and/or violent ideation. Pt reports she has been depressed and anxious since being discharged from [facility] Ext last May. Appears flat, sad and depressed. Appeared tense and anxious. Denied hallucinations. No delusions or preoccupation revealed. Pt said she stopped her medications 3 wks ago. Pt repots that she has a poor appetite and has poor energy level, has let herself and her house go unkempt. Pt refused a HS snack. Left interview and went immediately to bed. Placed on close observation staggered q 15 min checks. [facility]  Admission Note     Admission Type:   Admission Type: Involuntary (Pink slip)    Reason for admission:  Reason for Admission: \"I wasn't taking my medicine, I wasn't taking care of myself, I wasn't taking care of my house, Upset and out of it. \"    PATIENT STRENGTHS:  Strengths: Communication, Social Skills, Connection to output provider    Patient Strengths and Limitations:  Limitations: Tendency to isolate self, General negative or hopeless attitude about future/recovery    Addictive Behavior:   Addictive Behavior  In the past 3 months, have you felt or has someone told you that you have a problem with:  : None  Do you have a history of Chemical Use?: Yes  Do you have a history of Alcohol Use?: Yes  Do you have a history of Street Drug Abuse?: Yes  Histroy of Prescripton Drug Abuse?: No    Medical Problems:   Past Medical History:   Diagnosis Date    Acute psychosis ([facility] [address] 12/22/2017    Anxiety     Asthma     Bipolar disorder ([facility]     Bronchitis     COPD (chronic obstructive pulmonary disease) (HCC)     Depression     Iron deficiency anemia     PTSD (post-traumatic stress disorder)     Schizo affective schizophrenia ([facility]     Severe episode of recurrent major depressive disorder, without psychotic features ([facility] 11/16/2019    Substance abuse ([facility]     quit 7/2011  was marajuanna user        Status EXAM:  Status and Exam  Normal: No  Facial Expression: Avoids Gaze, Sad, Flat  Affect: Congruent  Level of Consciousness: Alert  Mood:Normal: No  Mood: Depressed, Anxious, Sad  Motor Activity:Normal: Yes  Interview Behavior: Cooperative  Preception: [facility] [facility] to Person, Miguel Dear to Time, [facility] [facility] to Place, [facility] to Situation  Attention:Normal: Yes  Thought Content:Normal: Yes  Hallucinations: None  Delusions: No (none revealed)  Memory:Normal: No  Memory: Poor Remote  Insight and Judgment: No  Insight and Judgment: Poor Judgment, Poor Insight, Unmotivated  Present Suicidal Ideation: No  Present Homicidal Ideation: No    Tobacco Screening:  Practical Counseling, on admission, cherrie X, if applicable and completed (first 3 are required if patient doesn't refuse):             (x )  Recognizing danger situations (included triggers and roadblocks)                    (x )  Coping skills (new ways to manage stress, exercise, relaxation techniques, changing routine, distraction)                                                           (x )  Basic information about quitting (benefits of quitting, techniques in how to quit, available resources  (x ) Referral for counseling faxed to [facility]                                           ( ) Patient refused counseling  ( ) Patient has not smoked in the last 30 days    Metabolic Screening:    Lab Results   Component Value Date    LABA1C 5.9 (H) 03/28/2021       Lab Results   Component Value Date    CHOL 190 03/28/2021    CHOL 235 (H) 09/20/2019     Lab Results   Component Value Date TRIG 80 03/28/2021    TRIG 65 09/20/2019     Lab Results   Component Value Date    HDL 68 03/28/2021    HDL 59 09/20/2019     No components found for: [facility]  Lab Results   Component Value Date    LABVLDL 16 03/28/2021    LABVLDL 13 09/20/2019         Body mass index is 21.4 kg/m². BP Readings from Last 2 Encounters:   07/03/21 119/72   04/06/21 (!) 114/56           Pt admitted with followings belongings:  Dentures: None  Vision - Corrective Lenses: None  Hearing Aid: None  Jewelry: Ring (goldtone)  Body Piercings Removed: N/A  Clothing: Pants, Shirt, Undergarments (Comment)  Were All Patient Medications Collected?: Yes (invega, trileptal)  Other Valuables: Cell phone, WIRELESS MEDCARE (cell phone cord)     Patient's home medications were . Patient oriented to surroundings and program expectations and copy of patient rights given. Received admission packet: With copies of code of conduct and treatment agreement. Consents reviewed, signed involuntary rights. Refused -none. Patient verbalize understanding: To immediately seek any staff with any self threatening and or violent ideations. Patient education on precautions: close observation staggered q 15 min checks.                   Shorty Schaefer RN

## 2021-07-03 NOTE — ED NOTES
Dr. Steven Mcduffie has accepted this patient at this time pending a negative Covid test.      Davon Snyder RN  07/02/21 4525

## 2021-07-03 NOTE — CONSULTS
Nutrition Assessment     Type and Reason for Visit: Initial, Consult    Nutrition Recommendations/Plan: Continue current diet, Start Ensure BID     Nutrition Assessment:  Pt adm w/ major depression. Noted report of poor PO intake, no intake data recorded at this time. Will order ONS and follow per consult    Malnutrition Assessment:  Malnutrition Status: At risk for malnutrition    Nutrition Related Findings: pt alert, abd WDL, no noted edema, no fluids noted at this time      Current Nutrition Therapies:    ADULT DIET; Regular    Anthropometric Measures:  · Height: 5' 2\" (157.5 cm)  · Current Body Wt: 117 lb (53.1 kg) (stated, UTO updated wt)   · BMI: 21.4    Nutrition Diagnosis:   No nutrition diagnosis at this time     Nutrition Interventions:   Food and/or Nutrient Delivery:  Continue Current Diet, Start Oral Nutrition Supplement (Ensure BID)  Nutrition Education/Counseling:  Education not appropriate   Coordination of Nutrition Care:  Continue to monitor while inpatient    Goals:  Consume >75% meals/ONS       Nutrition Monitoring and Evaluation:   Food/Nutrient Intake Outcomes:  Diet Advancement/Tolerance, Food and Nutrient Intake, Supplement Intake  Physical Signs/Symptoms Outcomes:  Biochemical Data, GI Status, Fluid Status or Edema, Skin, Nutrition Focused Physical Findings, Weight     Discharge Planning:     Too soon to determine     Electronically signed by Jordin Robertson MS, RD, LD on 7/3/21 at 3:01 PM EDT    Contact: [phone]
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
## 2021-07-03 NOTE — ED NOTES
Patient has been accepted for admission to [id number] [place] [address] 7S by Dr. Corbin Hansen. Patient to go to room #7519A. Called admitting and notified Amara.      JEFRY Tay, [place]  07/02/21 2566

## 2021-07-03 NOTE — PLAN OF CARE
Problem: Altered Mood, Deterioration in Function:  Goal: Ability to perform activities of daily living will improve  Description: Ability to perform activities of daily living will improve  Outcome: Ongoing     Problem: Depressive Behavior With or Without Suicide Precautions:  Goal: Able to verbalize and/or display a decrease in depressive symptoms  Description: Able to verbalize and/or display a decrease in depressive symptoms  Outcome: Ongoing   pt states she is suicidal without plan but contracts for safety. Pt denies HI and hallucinations. Pt is isolative to her room most of the day only coming out for meals. Pt is paranoid and watchful. Pt has a sad/worried affect. Pt states she is here because she is off her med's and not taking care of herself. Pt encouraged to attend groups and participate.

## 2021-07-04 PROBLEM — F33.3 SEVERE EPISODE OF RECURRENT MAJOR DEPRESSIVE DISORDER, WITH PSYCHOTIC FEATURES (HCC): Status: ACTIVE | Noted: 2021-07-04

## 2021-07-04 LAB
EKG ATRIAL RATE: 72 BPM
EKG P AXIS: 81 DEGREES
EKG P-R INTERVAL: 116 MS
EKG Q-T INTERVAL: 396 MS
EKG QRS DURATION: 74 MS
EKG QTC CALCULATION (BAZETT): 433 MS
EKG R AXIS: 82 DEGREES
EKG T AXIS: 79 DEGREES
EKG VENTRICULAR RATE: 72 BPM

## 2021-07-04 PROCEDURE — 1240000000 HC EMOTIONAL WELLNESS R&B

## 2021-07-04 PROCEDURE — 99231 SBSQ HOSP IP/OBS SF/LOW 25: CPT | Performed by: NURSE PRACTITIONER

## 2021-07-04 PROCEDURE — 6370000000 HC RX 637 (ALT 250 FOR IP): Performed by: PSYCHIATRY & NEUROLOGY

## 2021-07-04 PROCEDURE — 6370000000 HC RX 637 (ALT 250 FOR IP): Performed by: NURSE PRACTITIONER

## 2021-07-04 PROCEDURE — 93010 ELECTROCARDIOGRAM REPORT: CPT | Performed by: INTERNAL MEDICINE

## 2021-07-04 RX ORDER — TRAZODONE HYDROCHLORIDE 50 MG/1
50 TABLET ORAL NIGHTLY
Status: DISCONTINUED | OUTPATIENT
Start: 2021-07-04 | End: 2021-07-21 | Stop reason: HOSPADM

## 2021-07-04 RX ADMIN — HYDROXYZINE PAMOATE 50 MG: 50 CAPSULE ORAL at 20:54

## 2021-07-04 RX ADMIN — TRAZODONE HYDROCHLORIDE 50 MG: 50 TABLET ORAL at 20:58

## 2021-07-04 RX ADMIN — PALIPERIDONE 3 MG: 3 TABLET, EXTENDED RELEASE ORAL at 10:14

## 2021-07-04 RX ADMIN — PALIPERIDONE 3 MG: 3 TABLET, EXTENDED RELEASE ORAL at 20:54

## 2021-07-04 RX ADMIN — OXCARBAZEPINE 150 MG: 150 TABLET, FILM COATED ORAL at 20:54

## 2021-07-04 RX ADMIN — OXCARBAZEPINE 150 MG: 150 TABLET, FILM COATED ORAL at 10:14

## 2021-07-04 ASSESSMENT — SLEEP AND FATIGUE QUESTIONNAIRES
SLEEP PATTERN: DIFFICULTY FALLING ASLEEP;DIFFICULTY ARISING;DISTURBED/INTERRUPTED SLEEP
AVERAGE NUMBER OF SLEEP HOURS: 6
DIFFICULTY STAYING ASLEEP: YES
DIFFICULTY ARISING: YES
DO YOU HAVE DIFFICULTY SLEEPING: YES
DO YOU USE A SLEEP AID: NO
RESTFUL SLEEP: YES
DIFFICULTY FALLING ASLEEP: YES

## 2021-07-04 ASSESSMENT — LIFESTYLE VARIABLES: HISTORY_ALCOHOL_USE: YES

## 2021-07-04 ASSESSMENT — PAIN SCALES - GENERAL
PAINLEVEL_OUTOF10: 0
PAINLEVEL_OUTOF10: 0

## 2021-07-04 ASSESSMENT — PATIENT HEALTH QUESTIONNAIRE - PHQ9: SUM OF ALL RESPONSES TO PHQ QUESTIONS 1-9: 23

## 2021-07-04 NOTE — PLAN OF CARE
Problem: Altered Mood, Deterioration in Function:  Goal: Ability to perform activities of daily living will improve  Description: Ability to perform activities of daily living will improve  Outcome: Ongoing  Goal: Maintenance of adequate nutrition will improve  Description: Maintenance of adequate nutrition will improve  Outcome: Ongoing   pt states she is suicidal without plan. Pt denies HI and hallucinations. Pt is paranoid and isolates to her room all day other than coming out for meals. Pt is quiet and lays in bed all day. Pt states she is worried about where she lives and would like to live in a group home, social work informed. Pt takes medications but does not attend groups. Pt encouraged to attend groups and participate.

## 2021-07-04 NOTE — CARE COORDINATION
LPC attempted to complete pt's assessment and CSSR-S, however, pt was asleep and not easily roused. Will attempt again later.      Electronically signed by Sandra Darnell LPC on 7/4/2021 at 11:05 AM
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
## 2021-07-04 NOTE — PROGRESS NOTES
BEHAVIORAL HEALTH FOLLOW-UP NOTE     7/4/2021     Patient was seen and examined in person, Chart reviewed   Patient's case discussed with staff/team    Chief Complaint: \"I feel really scared. \"    Interim History:   Patient is observed in her room this morning, she appears very depressed, preoccupied. Tells me that she feels scared. She appears very guarded and paranoid. She is taking her medications. But is isolating mostly to her room due to her level of paranoia and fear. We will continue to monitor the patient.   She continues to endorse some suicidal ideation    Appetite:   [x] Normal/Unchanged  [] Increased  [] Decreased      Sleep:       [x] Normal/Unchanged  [] Fair       [] Poor              Energy:    [x] Normal/Unchanged  [] Increased  [] Decreased        SI [x] Present  [] Absent    HI  []Present  [x] Absent     Aggression:  [] yes  [x] no    Patient is [x] able  [] unable to CONTRACT FOR SAFETY     PAST MEDICAL/PSYCHIATRIC HISTORY:   Past Medical History:   Diagnosis Date    Acute psychosis ([facility] 12/22/2017    Anxiety     Asthma     [facility]     Bronchitis     [facility]     Depression     Iron deficiency anemia     PTSD (post-traumatic stress disorder)     Schizo affective schizophrenia ([facility]     Severe episode of recurrent major depressive disorder, without psychotic features [facility] 11/16/2019    Substance abuse ([facility]     quit 7/2011  was [employer] user        FAMILY/SOCIAL HISTORY:  Family History   Problem Relation Age of Onset    Diabetes Mother     High Blood Pressure Mother     Kidney Disease Mother     Heart Failure Mother     Diabetes Father     Other Father         mesothelioma    Mental Illness Father         schizophrenia     Social History     Socioeconomic History    Marital status: Single     Spouse name: Not on file    Number of children: 0    Years of education: GED    Highest education level: Not on file Occupational History    Occupation: unemployed     Comment: attempting to get disability for mental illness   Tobacco Use    Smoking status: Current Every Day Smoker     Packs/day: 0.50     Years: 20.00     Pack years: 10.00     Types: Cigarettes    Smokeless tobacco: Never Used   Vaping Use    Vaping Use: Former   Substance and Sexual Activity    Alcohol use: Yes    Drug use: Yes     Types: Marijuana     Comment: using marijuana daily    Sexual activity: Not Currently   Other Topics Concern    Not on file   Social History Narrative    Not on file     Social Determinants of Health     Financial Resource Strain:     Difficulty of Paying Living Expenses:    Food Insecurity:     Worried About Running Out of Food in the Last Year:     [address] in the Last Year:    Transportation Needs:     Lack of Transportation (Medical):  Lack of Transportation (Non-Medical):    Physical Activity:     Days of Exercise per Week:     Minutes of Exercise per Session:    Stress:     Feeling of Stress :    Social Connections:     Frequency of Communication with Friends and Family:     Frequency of Social Gatherings with Friends and Family:     Attends [employer]ist Services:     Active Member of Clubs or Organizations:     Attends Club or Organization Meetings:     Marital Status:    Intimate Partner Violence:     Fear of Current or Ex-Partner:     Emotionally Abused:     Physically Abused:     Sexually Abused:            ROS:  [x] All negative/unchanged except if checked.  Explain positive(checked items) below:  [] Constitutional  [] Eyes  [] Ear/Nose/Mouth/Throat  [] Respiratory  [] CV  [] GI  []   [] Musculoskeletal  [] Skin/Breast  [] Neurological  [] Endocrine  [] Heme/Lymph  [] Allergic/Immunologic    Explanation:     MEDICATIONS:    Current Facility-Administered Medications:     acetaminophen (TYLENOL) tablet 650 mg, 650 mg, Oral, Q6H PRN, Sabrina Bryant MD    magnesium hydroxide (MILK OF MAGNESIA) 400 MG/5ML suspension 30 mL, 30 mL, Oral, Daily PRN, Hanane Bailey MD    aluminum & magnesium hydroxide-simethicone (MAALOX) 200-200-20 MG/5ML suspension 30 mL, 30 mL, Oral, PRN, Hanane Bailey MD    hydrOXYzine (VISTARIL) capsule 50 mg, 50 mg, Oral, TID PRN, Hanane Bailey MD, 50 mg at 07/03/21 2137    haloperidol (HALDOL) tablet 5 mg, 5 mg, Oral, Q6H PRN **OR** haloperidol lactate (HALDOL) injection 5 mg, 5 mg, Intramuscular, Q6H PRN, Hanane Bailey MD    nicotine (NICODERM CQ) 14 MG/24HR 1 patch, 1 patch, Transdermal, Daily, Kinjal Melvin, APRN - CNP, 1 patch at 07/04/21 1014    paliperidone (INVEGA) extended release tablet 3 mg, 3 mg, Oral, BID, Diane Deutsch MD, 3 mg at 07/04/21 1014    OXcarbazepine (TRILEPTAL) tablet 150 mg, 150 mg, Oral, BID, Diane Deutsch MD, 150 mg at 07/04/21 1014    albuterol (PROVENTIL) nebulizer solution 2.5 mg, 2.5 mg, Nebulization, Q4H PRN, Romeo Luevano, APRN - CNP      Examination:  /62   Pulse 72   Temp 98.3 °F (36.8 °C)   Resp 14   Ht 5' 2\" (1.575 m)   Wt 117 lb (53.1 kg)   SpO2 98%   Breastfeeding No   BMI 21.40 kg/m²   Gait - steady  Medication side effects(SE): none reported    Mental Status Examination:    Level of consciousness:  within normal limits   Appearance:  poor grooming and poor hygiene  Behavior/Motor:  no abnormalities noted  Attitude toward examiner:  withdrawn  Speech:  normal rate and normal volume   Mood: depressed  Affect:  blunted  Thought processes:  poverty of thought   Thought content: Paranoid and delusional  Cognition:  oriented to person, place, and time   Concentration distractible  Insight poor   Judgement poor     ASSESSMENT:   Patient symptoms are:  [] Well controlled  [] Improving  [] Worsening  [x] No change      Diagnosis:   Active Problems:    Depression, major, recurrent ([facility] [facility]  Resolved Problems:    * No resolved hospital problems.  *      LABS:    Recent Labs     07/02/21  1329   WBC 4.0*   HGB 14.5        Recent Labs     07/02/21  1329      K 3.9      CO2 25   BUN 6   CREATININE 0.6   GLUCOSE 96     Recent Labs     07/02/21  1329   BILITOT 0.4   ALKPHOS 68   AST 17   ALT 11     Lab Results   Component Value Date    LABAMPH NOT DETECTED 07/02/2021    BARBSCNU NOT DETECTED 07/02/2021    LABBENZ NOT DETECTED 07/02/2021    LABMETH NOT DETECTED 07/02/2021    OPIATESCREENURINE NOT DETECTED 07/02/2021    PHENCYCLIDINESCREENURINE NOT DETECTED 07/02/2021    ETOH <10 07/02/2021     Lab Results   Component Value Date    TSH 1.770 03/07/2019     No results found for: LITHIUM  Lab Results   Component Value Date    VALPROATE 57 04/02/2021           Treatment Plan:  The patient's diagnosis, treatment plan, medication management were formulated after patient was seen directly by the attending physician and myself and all relevant documentation was reviewed. Reviewed current Medications with the patient. Risk, benefit, side effects, possible outcomes of the medication and alternatives discussed with the patient and the patient demonstrated understanding. The patient was also educated that the outcome of treatment will depend on the medication compliance as directed by the prescribers along with regular follow-up, compliance with the labs and other work-up, as clinically indicated. Continue Trileptal 150 mg twice daily  Continue Invega 3 mg twice daily for psychotic feature          Collateral information: Followed by social work  CD evaluation  Encourage patient to attend group and other milieu activities.   Discharge planning discussed with the patient and treatment team.    PSYCHOTHERAPY/COUNSELING:  [x] Therapeutic interview  [x] Supportive  [] CBT  [] Ongoing  [] Other    [x] Patient continues to need, on a daily basis, active treatment furnished directly by or requiring the supervision of inpatient psychiatric personnel      Anticipated Length of stay: 3 to 5 days based on stability     NOTE: This report was transcribed using voice recognition software. Every effort was made to ensure accuracy; however, inadvertent computerized transcription errors may be present.     Electronically signed by CHARO Webster CNP on 7/4/2021 at 11:34 AM

## 2021-07-05 PROCEDURE — 6370000000 HC RX 637 (ALT 250 FOR IP): Performed by: PSYCHIATRY & NEUROLOGY

## 2021-07-05 PROCEDURE — 99231 SBSQ HOSP IP/OBS SF/LOW 25: CPT | Performed by: NURSE PRACTITIONER

## 2021-07-05 PROCEDURE — 1240000000 HC EMOTIONAL WELLNESS R&B

## 2021-07-05 PROCEDURE — 6370000000 HC RX 637 (ALT 250 FOR IP): Performed by: NURSE PRACTITIONER

## 2021-07-05 RX ADMIN — OXCARBAZEPINE 150 MG: 150 TABLET, FILM COATED ORAL at 21:37

## 2021-07-05 RX ADMIN — HYDROXYZINE PAMOATE 50 MG: 50 CAPSULE ORAL at 10:14

## 2021-07-05 RX ADMIN — PALIPERIDONE 3 MG: 3 TABLET, EXTENDED RELEASE ORAL at 10:14

## 2021-07-05 RX ADMIN — PALIPERIDONE 3 MG: 3 TABLET, EXTENDED RELEASE ORAL at 21:37

## 2021-07-05 RX ADMIN — TRAZODONE HYDROCHLORIDE 50 MG: 50 TABLET ORAL at 21:37

## 2021-07-05 RX ADMIN — OXCARBAZEPINE 150 MG: 150 TABLET, FILM COATED ORAL at 10:14

## 2021-07-05 ASSESSMENT — PAIN SCALES - GENERAL
PAINLEVEL_OUTOF10: 0

## 2021-07-05 NOTE — BH NOTE
[id number] [facility]  Day 3 Interdisciplinary Treatment Plan NOTE    Review Date & Time: 7-5-21  1000 am    Patient was not in treatment team    Estimated Length of Stay Update:  3-5 days  Estimated Discharge Date Update: 3-5 days    EDUCATION:   Learner Progress Toward Treatment Goals: Reviewed results and recommendations of this team and Reviewed group plan and strategies    Method: Small group    Outcome: Verbalized understanding    PATIENT GOALS: pt does not report a goal in first assessment. PLAN/TREATMENT RECOMMENDATIONS UPDATE: continue to monitor pt progress. GOALS UPDATE:   Time frame for Short-Term Goals: Daily re assessment.        Misbah Villanueva RN

## 2021-07-05 NOTE — PLAN OF CARE
Pt denies suicidal or homicidal ideations. Pt denies hallucinations. Pt has a flat affect, pt appears empty. Pt infante not report a goal.  No other distress. Will follow and monitor.

## 2021-07-05 NOTE — PLAN OF CARE
In bed eating her dinner. Drinking all fluids on her tray. Lethargic, apologetic \"for how I smell\". Offered to get her stuff to take a shower but is refusing. Disheveled appearing, wearing gown from the ED admission 7/2. Continues with thoughts of suicide with no plan or intent. Denies hallucinations. No voiced delusions.

## 2021-07-05 NOTE — PROGRESS NOTES
BEHAVIORAL HEALTH FOLLOW-UP NOTE     7/5/2021     Patient was seen and examined in person, Chart reviewed   Patient's case discussed with staff/team    Chief Complaint: \"I feel really scared. \"    Interim History:   Patient is observed in her room this morning, she appears very depressed, preoccupied. Tells me that she feels scared. She appears very guarded and paranoid. She is taking her medications. But is isolating mostly to her room due to her level of paranoia and fear. We will continue to monitor the patient.   She continues to endorse some suicidal ideation    Appetite:   [x] Normal/Unchanged  [] Increased  [] Decreased      Sleep:       [x] Normal/Unchanged  [] Fair       [] Poor              Energy:    [x] Normal/Unchanged  [] Increased  [] Decreased        SI [x] Present  [] Absent    HI  []Present  [x] Absent     Aggression:  [] yes  [x] no    Patient is [x] able  [] unable to CONTRACT FOR SAFETY     PAST MEDICAL/PSYCHIATRIC HISTORY:   Past Medical History:   Diagnosis Date    [facility]2017    Anxiety     Asthma     [facility]     Bronchitis     [facility]ch[facility] disease) ([facility]     Depression     Iron deficiency anemia     PTSD (post-traumatic stress disorder)     [facility]     Severe episode of recurrent major depressive disorder, without psychotic features ([facility] 11/16/2019    Substance abuse ([facility]     quit 7/2011  was [employer] user        FAMILY/SOCIAL HISTORY:  Family History   Problem Relation Age of Onset    Diabetes Mother     High Blood Pressure Mother     Kidney Disease Mother     Heart Failure Mother     Diabetes Father     Other Father         mesothelioma    Mental Illness Father         schizophrenia     Social History     Socioeconomic History    Marital status: Single     Spouse name: Not on file    Number of children: 0    Years of education: GED    Highest education level: Not on file Occupational History    Occupation: unemployed     Comment: attempting to get disability for mental illness   Tobacco Use    Smoking status: Current Every Day Smoker     Packs/day: 0.50     Years: 20.00     Pack years: 10.00     Types: Cigarettes    Smokeless tobacco: Never Used   Vaping Use    Vaping Use: Former   Substance and Sexual Activity    Alcohol use: Yes    Drug use: Yes     Types: Marijuana     Comment: using marijuana daily    Sexual activity: Not Currently   Other Topics Concern    Not on file   Social History Narrative    Not on file     Social Determinants of Health     Financial Resource Strain:     Difficulty of Paying Living Expenses:    Food Insecurity:     Worried About Running Out of Food in the Last Year:     [address] in the Last Year:    Transportation Needs:     Lack of Transportation (Medical):  Lack of Transportation (Non-Medical):    Physical Activity:     Days of Exercise per Week:     Minutes of Exercise per Session:    Stress:     Feeling of Stress :    Social Connections:     Frequency of Communication with Friends and Family:     Frequency of Social Gatherings with Friends and Family:     Attends Adventism Services:     Active Member of Clubs or Organizations:     Attends Club or Organization Meetings:     Marital Status:    Intimate Partner Violence:     Fear of Current or Ex-Partner:     Emotionally Abused:     Physically Abused:     Sexually Abused:            ROS:  [x] All negative/unchanged except if checked.  Explain positive(checked items) below:  [] Constitutional  [] Eyes  [] Ear/Nose/Mouth/Throat  [] Respiratory  [] CV  [] GI  []   [] Musculoskeletal  [] Skin/Breast  [] Neurological  [] Endocrine  [] Heme/Lymph  [] Allergic/Immunologic    Explanation:     MEDICATIONS:    Current Facility-Administered Medications:     traZODone (DESYREL) tablet 50 mg, 50 mg, Oral, Nightly, Sandro Torres MD, 50 mg at 07/04/21 2058    acetaminophen (TYLENOL) tablet 650 mg, 650 mg, Oral, Q6H PRN, Zoe Kocher, MD    magnesium hydroxide (MILK OF MAGNESIA) 400 MG/5ML suspension 30 mL, 30 mL, Oral, Daily PRN, Zoe Kocher, MD    aluminum & magnesium hydroxide-simethicone (MAALOX) 200-200-20 MG/5ML suspension 30 mL, 30 mL, Oral, PRN, Zoe Kocher, MD    hydrOXYzine (VISTARIL) capsule 50 mg, 50 mg, Oral, TID PRN, Zoe Kocher, MD, 50 mg at 07/05/21 1014    haloperidol (HALDOL) tablet 5 mg, 5 mg, Oral, Q6H PRN **OR** haloperidol lactate (HALDOL) injection 5 mg, 5 mg, Intramuscular, Q6H PRN, Zoe Kocher, MD    nicotine (NICODERM CQ) 14 MG/24HR 1 patch, 1 patch, Transdermal, Daily, Verner Blumenthal, APRN - CNP, 1 patch at 07/05/21 1013    paliperidone (INVEGA) extended release tablet 3 mg, 3 mg, Oral, BID, Rex Holt MD, 3 mg at 07/05/21 1014    OXcarbazepine (TRILEPTAL) tablet 150 mg, 150 mg, Oral, BID, Rex Holt MD, 150 mg at 07/05/21 1014    albuterol (PROVENTIL) nebulizer solution 2.5 mg, 2.5 mg, Nebulization, Q4H PRN, CHARO Lugo - CNP      Examination:  BP (!) 83/50   Pulse 85   Temp 97.2 °F (36.2 °C) (Infrared)   Resp 14   Ht 5' 2\" (1.575 m)   Wt 117 lb (53.1 kg)   SpO2 98%   Breastfeeding No   BMI 21.40 kg/m²   Gait - steady  Medication side effects(SE): none reported    Mental Status Examination:    Level of consciousness:  within normal limits   Appearance:  poor grooming and poor hygiene  Behavior/Motor:  no abnormalities noted  Attitude toward examiner:  withdrawn  Speech:  normal rate and normal volume   Mood: depressed  Affect:  blunted  Thought processes:  poverty of thought   Thought content: Paranoid and delusional  Cognition:  oriented to person, place, and time   Concentration distractible  Insight poor   Judgement poor     ASSESSMENT:   Patient symptoms are:  [] Well controlled  [] Improving  [] Worsening  [x] No change      Diagnosis:   Principal Problem:    Severe episode of recurrent major depressive disorder, with psychotic features ([facility]  Resolved Problems:    * No resolved hospital problems. *      LABS:    Recent Labs     07/02/21  1329   WBC 4.0*   HGB 14.5        Recent Labs     07/02/21  1329      K 3.9      CO2 25   BUN 6   CREATININE 0.6   GLUCOSE 96     Recent Labs     07/02/21  1329   BILITOT 0.4   ALKPHOS 68   AST 17   ALT 11     Lab Results   Component Value Date    LABAMPH NOT DETECTED 07/02/2021    BARBSCNU NOT DETECTED 07/02/2021    LABBENZ NOT DETECTED 07/02/2021    LABMETH NOT DETECTED 07/02/2021    OPIATESCREENURINE NOT DETECTED 07/02/2021    PHENCYCLIDINESCREENURINE NOT DETECTED 07/02/2021    ETOH <10 07/02/2021     Lab Results   Component Value Date    TSH 1.770 03/07/2019     No results found for: LITHIUM  Lab Results   Component Value Date    VALPROATE 57 04/02/2021           Treatment Plan:  The patient's diagnosis, treatment plan, medication management were formulated after patient was seen directly by the attending physician and myself and all relevant documentation was reviewed. Reviewed current Medications with the patient. Risk, benefit, side effects, possible outcomes of the medication and alternatives discussed with the patient and the patient demonstrated understanding. The patient was also educated that the outcome of treatment will depend on the medication compliance as directed by the prescribers along with regular follow-up, compliance with the labs and other work-up, as clinically indicated. Continue Trileptal 150 mg twice daily  Continue Invega 3 mg twice daily for psychotic feature          Collateral information: Followed by social work  CD evaluation  Encourage patient to attend group and other milieu activities.   Discharge planning discussed with the patient and treatment team.    PSYCHOTHERAPY/COUNSELING:  [x] Therapeutic interview  [x] Supportive  [] CBT  [] Ongoing  [] Other    [x] Patient continues to need, on a daily basis, active treatment furnished directly by or requiring the supervision of inpatient psychiatric personnel      Anticipated Length of stay: 3 to 5 days based on stability     NOTE: This report was transcribed using voice recognition software. Every effort was made to ensure accuracy; however, inadvertent computerized transcription errors may be present.     Electronically signed by CHARO Lees CNP on 7/5/2021 at 12:40 PM

## 2021-07-06 PROCEDURE — 1240000000 HC EMOTIONAL WELLNESS R&B

## 2021-07-06 PROCEDURE — 6370000000 HC RX 637 (ALT 250 FOR IP): Performed by: PSYCHIATRY & NEUROLOGY

## 2021-07-06 PROCEDURE — 6370000000 HC RX 637 (ALT 250 FOR IP): Performed by: NURSE PRACTITIONER

## 2021-07-06 PROCEDURE — 99232 SBSQ HOSP IP/OBS MODERATE 35: CPT | Performed by: NURSE PRACTITIONER

## 2021-07-06 RX ORDER — OXCARBAZEPINE 300 MG/1
300 TABLET, FILM COATED ORAL 2 TIMES DAILY
Status: DISCONTINUED | OUTPATIENT
Start: 2021-07-06 | End: 2021-07-09

## 2021-07-06 RX ADMIN — OXCARBAZEPINE 300 MG: 300 TABLET, FILM COATED ORAL at 21:50

## 2021-07-06 RX ADMIN — OXCARBAZEPINE 150 MG: 150 TABLET, FILM COATED ORAL at 09:52

## 2021-07-06 RX ADMIN — PALIPERIDONE 3 MG: 3 TABLET, EXTENDED RELEASE ORAL at 21:50

## 2021-07-06 RX ADMIN — PALIPERIDONE 3 MG: 3 TABLET, EXTENDED RELEASE ORAL at 09:52

## 2021-07-06 RX ADMIN — TRAZODONE HYDROCHLORIDE 50 MG: 50 TABLET ORAL at 21:50

## 2021-07-06 ASSESSMENT — PAIN SCALES - GENERAL
PAINLEVEL_OUTOF10: 0
PAINLEVEL_OUTOF10: 0

## 2021-07-06 NOTE — PROGRESS NOTES
BEHAVIORAL HEALTH FOLLOW-UP NOTE     7/6/2021     Patient was seen and examined in person, Chart reviewed   Patient's case discussed with staff/team    Chief Complaint: \"I am okay. \"    Interim History:   Patient continues to lie in her bed flat blunted and depressed. She does not attend groups or socializing with peers. She does not leave her bed. When told that she needs to leave the room she states \"I know. \"  She denies SI/HI intent or plan today she denies any auditory visualizations she is very flat blunted depressed showing significant neurovegetative symptoms depression      Appetite:   [x] Normal/Unchanged  [] Increased  [] Decreased      Sleep:       [x] Normal/Unchanged  [] Fair       [] Poor              Energy:    [x] Normal/Unchanged  [] Increased  [] Decreased        SI [x] Present  [] Absent    HI  []Present  [x] Absent     Aggression:  [] yes  [x] no    Patient is [x] able  [] unable to CONTRACT FOR SAFETY     PAST MEDICAL/PSYCHIATRIC HISTORY:   Past Medical History:   Diagnosis Date    [facility] 12/22/2017    Anxiety     Asthma     Bipolar disorder [facility]     Bronchitis     [facility]ch[facility]     Depression     Iron deficiency anemia     PTSD (post-traumatic stress disorder)     [facility]     Severe episode of recurrent major depressive disorder, without psychotic features ([facility] 11/16/2019    Substance abuse ([facility]     quit 7/2011  was [employer] user        FAMILY/SOCIAL HISTORY:  Family History   Problem Relation Age of Onset    Diabetes Mother     High Blood Pressure Mother     Kidney Disease Mother     Heart Failure Mother     Diabetes Father     Other Father         mesothelioma    Mental Illness Father         schizophrenia     Social History     Socioeconomic History    Marital status: Single     Spouse name: Not on file    Number of children: 0    Years of education: GED    Highest education level: Not on file   Occupational History    Occupation: unemployed     Comment: attempting to get disability for mental illness   Tobacco Use    Smoking status: Current Every Day Smoker     Packs/day: 0.50     Years: 20.00     Pack years: 10.00     Types: Cigarettes    Smokeless tobacco: Never Used   Vaping Use    Vaping Use: Former   Substance and Sexual Activity    Alcohol use: Yes    Drug use: Yes     Types: Marijuana     Comment: using marijuana daily    Sexual activity: Not Currently   Other Topics Concern    Not on file   Social History Narrative    Not on file     Social Determinants of Health     Financial Resource Strain:     Difficulty of Paying Living Expenses:    Food Insecurity:     Worried About Running Out of Food in the Last Year:     [address] in the Last Year:    Transportation Needs:     Lack of Transportation (Medical):  Lack of Transportation (Non-Medical):    Physical Activity:     Days of Exercise per Week:     Minutes of Exercise per Session:    Stress:     Feeling of Stress :    Social Connections:     Frequency of Communication with Friends and Family:     Frequency of Social Gatherings with Friends and Family:     Attends Shinto Services:     Active Member of Clubs or Organizations:     Attends Club or Organization Meetings:     Marital Status:    Intimate Partner Violence:     Fear of Current or Ex-Partner:     Emotionally Abused:     Physically Abused:     Sexually Abused:            ROS:  [x] All negative/unchanged except if checked.  Explain positive(checked items) below:  [] Constitutional  [] Eyes  [] Ear/Nose/Mouth/Throat  [] Respiratory  [] CV  [] GI  []   [] Musculoskeletal  [] Skin/Breast  [] Neurological  [] Endocrine  [] Heme/Lymph  [] Allergic/Immunologic    Explanation:     MEDICATIONS:    Current Facility-Administered Medications:     traZODone (DESYREL) tablet 50 mg, 50 mg, Oral, Nightly, Prosper London MD, 50 mg at 07/05/21 7280   acetaminophen (TYLENOL) tablet 650 mg, 650 mg, Oral, Q6H PRN, Mc Mejias MD    magnesium hydroxide (MILK OF MAGNESIA) 400 MG/5ML suspension 30 mL, 30 mL, Oral, Daily PRN, Mc Mejias MD    aluminum & magnesium hydroxide-simethicone (MAALOX) 200-200-20 MG/5ML suspension 30 mL, 30 mL, Oral, PRN, Mc Mejias MD    hydrOXYzine (VISTARIL) capsule 50 mg, 50 mg, Oral, TID PRN, Mc Mejias MD, 50 mg at 07/05/21 1014    haloperidol (HALDOL) tablet 5 mg, 5 mg, Oral, Q6H PRN **OR** haloperidol lactate (HALDOL) injection 5 mg, 5 mg, Intramuscular, Q6H PRN, Mc Mejias MD    nicotine (NICODERM CQ) 14 MG/24HR 1 patch, 1 patch, Transdermal, Daily, CHARO Lemon CNP, 1 patch at 07/05/21 1013    paliperidone (INVEGA) extended release tablet 3 mg, 3 mg, Oral, BID, Aureliano Joseph MD, 3 mg at 07/05/21 2137    OXcarbazepine (TRILEPTAL) tablet 150 mg, 150 mg, Oral, BID, Aureliano Joseph MD, 150 mg at 07/05/21 2137    albuterol (PROVENTIL) nebulizer solution 2.5 mg, 2.5 mg, Nebulization, Q4H PRN, CHARO Leroy CNP      Examination:  /61   Pulse 86   Temp 98.8 °F (37.1 °C) (Oral)   Resp 14   Ht 5' 2\" (1.575 m)   Wt 117 lb (53.1 kg)   SpO2 98%   Breastfeeding No   BMI 21.40 kg/m²   Gait - steady  Medication side effects(SE): none reported    Mental Status Examination:    Level of consciousness:  within normal limits   Appearance:  poor grooming and poor hygiene  Behavior/Motor:  no abnormalities noted  Attitude toward examiner:  withdrawn  Speech:  normal rate and normal volume   Mood: depressed  Affect:  blunted  Thought processes:  poverty of thought   Thought content: Paranoid and delusional  Cognition:  oriented to person, place, and time   Concentration distractible  Insight poor   Judgement poor     ASSESSMENT:   Patient symptoms are:  [] Well controlled  [] Improving  [] Worsening  [x] No change      Diagnosis:   Principal Problem:    Severe episode of recurrent major depressive disorder, with psychotic features ([facility]  Resolved Problems:    * No resolved hospital problems. *      LABS:    No results for input(s): WBC, HGB, PLT in the last 72 hours. No results for input(s): NA, K, CL, CO2, BUN, CREATININE, GLUCOSE in the last 72 hours. No results for input(s): BILITOT, ALKPHOS, AST, ALT in the last 72 hours. Lab Results   Component Value Date    LABAMPH NOT DETECTED 07/02/2021    BARBSCNU NOT DETECTED 07/02/2021    LABBENZ NOT DETECTED 07/02/2021    LABMETH NOT DETECTED 07/02/2021    OPIATESCREENURINE NOT DETECTED 07/02/2021    PHENCYCLIDINESCREENURINE NOT DETECTED 07/02/2021    ETOH <10 07/02/2021     Lab Results   Component Value Date    TSH 1.770 03/07/2019     No results found for: LITHIUM  Lab Results   Component Value Date    VALPROATE 57 04/02/2021           Treatment Plan:  The patient's diagnosis, treatment plan, medication management were formulated after patient was seen directly by the attending physician and myself and all relevant documentation was reviewed. Reviewed current Medications with the patient. Risk, benefit, side effects, possible outcomes of the medication and alternatives discussed with the patient and the patient demonstrated understanding. The patient was also educated that the outcome of treatment will depend on the medication compliance as directed by the prescribers along with regular follow-up, compliance with the labs and other work-up, as clinically indicated. Increase Trileptal 300 mg twice daily  Continue Invega 3 mg twice daily for psychotic feature          Collateral information: Followed by social work  CD evaluation  Encourage patient to attend group and other milieu activities.   Discharge planning discussed with the patient and treatment team.    PSYCHOTHERAPY/COUNSELING:  [x] Therapeutic interview  [x] Supportive  [] CBT  [] Ongoing  [] Other    [x] Patient continues to need, on a daily basis, active treatment furnished directly by or requiring the supervision of inpatient psychiatric personnel      Anticipated Length of stay: 3 to 5 days based on stability     NOTE: This report was transcribed using voice recognition software. Every effort was made to ensure accuracy; however, inadvertent computerized transcription errors may be present.     Electronically signed by CHARO Light CNP on 2/5/8621 at 9:01 AM

## 2021-07-06 NOTE — CARE COORDINATION
Navigator spoke with Dano Spencer,  who is filling in for her she is on vacation. He reports that she has referred the client to ACT Team with [facility]. Her case is not officially open with [facility]. He expressed concerns about her compliance with medications after discharge.      Electronically signed by Gary Almanza [facility] on 7/6/2021 at 1:53 PM PACU ANESTHESIA ADMISSION NOTE      Procedure:   Post op diagnosis:      ____  Intubated  TV:______       Rate: ______      FiO2: ______    _x___  Patent Airway    __x__  Full return of protective reflexes    __x__  Full recovery from anesthesia / back to baseline status    Vitals:  T(C): 97.7  HR: 147  BP: 103/51  RR: 28  SpO2: 99%     Mental Status:  __x__ Awake   _____ Alert   _____ Drowsy   _____ Sedated    Nausea/Vomiting:  __x__ NO  ______Yes,   See Post - Op Orders          Pain Scale (0-10):  ___0__    Treatment: ____ None    ____ See Post - Op/PCA Orders    Post - Operative Fluids:   ____ Oral   ____ See Post - Op Orders    Plan: Discharge:   __x__Home       _____Floor     _____Critical Care    _____  Other:_________________    Comments: uneventful anesthesia course no complications. VItals stable. Pt transferred to PACU

## 2021-07-06 NOTE — PLAN OF CARE
Admits to having suicidal thoughts this a.m.  but no plan and contracts for safety   appetite good for breakfast but would only eat if in her room   encouraged to leave her room today and shower    pt agreed to shower later   did not attend group this a.m.   cooperative with meds    will continue to monitor

## 2021-07-07 PROCEDURE — 1240000000 HC EMOTIONAL WELLNESS R&B

## 2021-07-07 PROCEDURE — 6370000000 HC RX 637 (ALT 250 FOR IP): Performed by: PSYCHIATRY & NEUROLOGY

## 2021-07-07 PROCEDURE — 6370000000 HC RX 637 (ALT 250 FOR IP): Performed by: NURSE PRACTITIONER

## 2021-07-07 PROCEDURE — 99232 SBSQ HOSP IP/OBS MODERATE 35: CPT | Performed by: NURSE PRACTITIONER

## 2021-07-07 RX ADMIN — OXCARBAZEPINE 300 MG: 300 TABLET, FILM COATED ORAL at 22:00

## 2021-07-07 RX ADMIN — OXCARBAZEPINE 300 MG: 300 TABLET, FILM COATED ORAL at 08:50

## 2021-07-07 RX ADMIN — PALIPERIDONE 3 MG: 3 TABLET, EXTENDED RELEASE ORAL at 08:51

## 2021-07-07 RX ADMIN — PALIPERIDONE 3 MG: 3 TABLET, EXTENDED RELEASE ORAL at 22:00

## 2021-07-07 RX ADMIN — TRAZODONE HYDROCHLORIDE 50 MG: 50 TABLET ORAL at 22:00

## 2021-07-07 ASSESSMENT — PAIN SCALES - GENERAL
PAINLEVEL_OUTOF10: 0
PAINLEVEL_OUTOF10: 0

## 2021-07-07 NOTE — PROGRESS NOTES
Patient remains isolative to her room. Denies SI/HI or AV hallucinations. Verbalizes anxiety 8 out of 10 due to not having a home. Depression 7-8 out of 10 due to no family now \"they have disowned me due to I hurt someone we all loved. Appetite is ok. Struggles going to sleep and staying a sleep. Compliant with medications ,but does not attend groups. Will continue to monitor.

## 2021-07-07 NOTE — PROGRESS NOTES
BEHAVIORAL HEALTH FOLLOW-UP NOTE     7/7/2021     Patient was seen and examined in person, Chart reviewed   Patient's case discussed with staff/team    Chief Complaint: \"Flat and blunted. \"    Interim History:   Patient is up on the unit tearful flat and blunted. Her thoughts are somewhat delayed staff report she is having fleeting suicidal ideation without a plan. She does not interact with peers but is visible in the milieu from time to time but mostly she stays in her room flat blunted she endorses significant depression. She continues to have significant neurovegetative symptoms of depression.   Denies auditory visual hallucinations        Appetite:   [x] Normal/Unchanged  [] Increased  [] Decreased      Sleep:       [x] Normal/Unchanged  [] Fair       [] Poor              Energy:    [x] Normal/Unchanged  [] Increased  [] Decreased        SI [x] Present  [] Absent    HI  []Present  [x] Absent     Aggression:  [] yes  [x] no    Patient is [x] able  [] unable to CONTRACT FOR SAFETY     PAST MEDICAL/PSYCHIATRIC HISTORY:   Past Medical History:   Diagnosis Date    [facility] ([facility] 12/22/2017    Anxiety     Asthma     Bipolar disorder ([facility]     Bronchitis     [facility] disease) ([facility]     Depression     Iron deficiency anemia     PTSD (post-traumatic stress disorder)     Schizo affective schizophrenia ([facility]     Severe episode of recurrent major depressive disorder, without psychotic features ([facility] 11/16/2019    Substance abuse ([facility]     quit 7/2011  was [employer] user        FAMILY/SOCIAL HISTORY:  Family History   Problem Relation Age of Onset    Diabetes Mother     High Blood Pressure Mother     Kidney Disease Mother     Heart Failure Mother     Diabetes Father     Other Father         mesothelioma    Mental Illness Father         schizophrenia     Social History     Socioeconomic History    Marital status: Single     Spouse name: Not on file    Number of children: 0    Years of education: GED    Highest education level: Not on file   Occupational History    Occupation: unemployed     Comment: attempting to get disability for mental illness   Tobacco Use    Smoking status: Current Every Day Smoker     Packs/day: 0.50     Years: 20.00     Pack years: 10.00     Types: Cigarettes    Smokeless tobacco: Never Used   Vaping Use    Vaping Use: Former   Substance and Sexual Activity    Alcohol use: Yes    Drug use: Yes     Types: Marijuana     Comment: using marijuana daily    Sexual activity: Not Currently   Other Topics Concern    Not on file   Social History Narrative    Not on file     Social Determinants of Health     Financial Resource Strain:     Difficulty of Paying Living Expenses:    Food Insecurity:     Worried About Running Out of Food in the Last Year:     [address] in the Last Year:    Transportation Needs:     Lack of Transportation (Medical):  Lack of Transportation (Non-Medical):    Physical Activity:     Days of Exercise per Week:     Minutes of Exercise per Session:    Stress:     Feeling of Stress :    Social Connections:     Frequency of Communication with Friends and Family:     Frequency of Social Gatherings with Friends and Family:     Attends [employer] Services:     Active Member of Clubs or Organizations:     Attends Club or Organization Meetings:     Marital Status:    Intimate Partner Violence:     Fear of Current or Ex-Partner:     Emotionally Abused:     Physically Abused:     Sexually Abused:            ROS:  [x] All negative/unchanged except if checked.  Explain positive(checked items) below:  [] Constitutional  [] Eyes  [] Ear/Nose/Mouth/Throat  [] Respiratory  [] CV  [] GI  []   [] Musculoskeletal  [] Skin/Breast  [] Neurological  [] Endocrine  [] Heme/Lymph  [] Allergic/Immunologic    Explanation:     MEDICATIONS:    Current Facility-Administered Medications:     OXcarbazepine (TRILEPTAL) tablet 300 mg, 300 mg, Oral, BID, Nette Middleton, CHARO - CNP, 327 mg at 07/07/21 0850    traZODone (DESYREL) tablet 50 mg, 50 mg, Oral, Nightly, Amaris Arizmendi MD, 50 mg at 07/06/21 2150    acetaminophen (TYLENOL) tablet 650 mg, 650 mg, Oral, Q6H PRN, Amaris Arizmendi MD    magnesium hydroxide (MILK OF MAGNESIA) 400 MG/5ML suspension 30 mL, 30 mL, Oral, Daily PRN, Amaris Arizmendi MD    aluminum & magnesium hydroxide-simethicone (MAALOX) 200-200-20 MG/5ML suspension 30 mL, 30 mL, Oral, PRN, Amaris Arizmendi MD    hydrOXYzine (VISTARIL) capsule 50 mg, 50 mg, Oral, TID PRN, Amaris Arizmendi MD, 50 mg at 07/05/21 1014    haloperidol (HALDOL) tablet 5 mg, 5 mg, Oral, Q6H PRN **OR** haloperidol lactate (HALDOL) injection 5 mg, 5 mg, Intramuscular, Q6H PRN, Amaris Arizmendi MD    nicotine (NICODERM CQ) 14 MG/24HR 1 patch, 1 patch, Transdermal, Daily, Beverly Agarwal APRN - CNP, 1 patch at 07/07/21 0850    paliperidone (INVEGA) extended release tablet 3 mg, 3 mg, Oral, BID, David Mcintosh MD, 3 mg at 07/07/21 0851    albuterol (PROVENTIL) nebulizer solution 2.5 mg, 2.5 mg, Nebulization, Q4H PRN, Terry Root APRN - CNP      Examination:  BP (!) 104/57   Pulse 85   Temp 97.8 °F (36.6 °C) (Tympanic)   Resp 14   Ht 5' 2\" (1.575 m)   Wt 117 lb (53.1 kg)   SpO2 98%   Breastfeeding No   BMI 21.40 kg/m²   Gait - steady  Medication side effects(SE): none reported    Mental Status Examination:    Level of consciousness:  within normal limits   Appearance:  poor grooming and poor hygiene  Behavior/Motor:  no abnormalities noted  Attitude toward examiner:  withdrawn  Speech:  normal rate and normal volume   Mood: depressed  Affect:  blunted  Thought processes:  poverty of thought   Thought content: Paranoid and delusional  Cognition:  oriented to person, place, and time   Concentration distractible  Insight poor   Judgement poor     ASSESSMENT:   Patient symptoms are:  [] Well controlled  [] Improving  [] Worsening  [x] No change      Diagnosis:   Principal Problem:    Severe episode of recurrent major depressive disorder, with psychotic features ([facility]  Resolved Problems:    * No resolved hospital problems. *      LABS:    No results for input(s): WBC, HGB, PLT in the last 72 hours. No results for input(s): NA, K, CL, CO2, BUN, CREATININE, GLUCOSE in the last 72 hours. No results for input(s): BILITOT, ALKPHOS, AST, ALT in the last 72 hours. Lab Results   Component Value Date    LABAMPH NOT DETECTED 07/02/2021    BARBSCNU NOT DETECTED 07/02/2021    LABBENZ NOT DETECTED 07/02/2021    LABMETH NOT DETECTED 07/02/2021    OPIATESCREENURINE NOT DETECTED 07/02/2021    PHENCYCLIDINESCREENURINE NOT DETECTED 07/02/2021    ETOH <10 07/02/2021     Lab Results   Component Value Date    TSH 1.770 03/07/2019     No results found for: LITHIUM  Lab Results   Component Value Date    VALPROATE 57 04/02/2021           Treatment Plan:  The patient's diagnosis, treatment plan, medication management were formulated after patient was seen directly by the attending physician and myself and all relevant documentation was reviewed. Reviewed current Medications with the patient. Risk, benefit, side effects, possible outcomes of the medication and alternatives discussed with the patient and the patient demonstrated understanding. The patient was also educated that the outcome of treatment will depend on the medication compliance as directed by the prescribers along with regular follow-up, compliance with the labs and other work-up, as clinically indicated. Increase Trileptal 300 mg twice daily  Continue Invega 3 mg twice daily for psychotic feature          Collateral information: Followed by social work  CD evaluation  Encourage patient to attend group and other milieu activities.   Discharge planning discussed with the patient and treatment team.    PSYCHOTHERAPY/COUNSELING:  [x] Therapeutic interview  [x] Supportive  [] CBT  [] Ongoing  [] Other    [x] Patient continues to need, on a daily basis, active treatment furnished directly by or requiring the supervision of inpatient psychiatric personnel      Anticipated Length of stay: 3 to 5 days based on stability     NOTE: This report was transcribed using voice recognition software. Every effort was made to ensure accuracy; however, inadvertent computerized transcription errors may be present.     Electronically signed by CHARO Barrera CNP on 6/6/7111 at 3:57 PM

## 2021-07-07 NOTE — GROUP NOTE
Group Therapy Note    Date: 7/7/2021    Group Start Time: 1500  Group End Time: 4384  Group Topic: Cognitive Skills    [facility]    JEFRY Barfield, [facility]        Group Therapy Note    Attendees: 14         Patient's Goal: pt will be able to verbalize and understand what goals they have overall and the habits needed to achieve their goals    Notes:  Pt participated in group and made connections. Status After Intervention:  Unchanged    Participation Level:  Active Listener    Participation Quality: Appropriate and Attentive      Speech:  mute      Thought Process/Content: Logical      Affective Functioning: Flat      Mood: depressed      Level of consciousness:  Alert and Oriented x4      Response to Learning: Able to retain information      Endings: None Reported    Modes of Intervention: Education, Support, Socialization, Exploration and Clarifying      Discipline Responsible: /Counselor      Signature:  JEFRY Barfield, [facility]

## 2021-07-07 NOTE — PLAN OF CARE
Pt is stable and without distress presently. Pt reports feeling depressed but agrees to keep self safe at this time. Pt reports suicidal ideation, without a plan. Pt does not have homicidal ideations. Pt denies hallucinations. Pt does not express other concerns at this time. Pt does not express a goal during first morning assessment. Will follow and monitor.

## 2021-07-08 PROCEDURE — 6370000000 HC RX 637 (ALT 250 FOR IP): Performed by: NURSE PRACTITIONER

## 2021-07-08 PROCEDURE — 1240000000 HC EMOTIONAL WELLNESS R&B

## 2021-07-08 PROCEDURE — 99232 SBSQ HOSP IP/OBS MODERATE 35: CPT | Performed by: NURSE PRACTITIONER

## 2021-07-08 PROCEDURE — 6370000000 HC RX 637 (ALT 250 FOR IP): Performed by: PSYCHIATRY & NEUROLOGY

## 2021-07-08 RX ADMIN — PALIPERIDONE 3 MG: 3 TABLET, EXTENDED RELEASE ORAL at 10:10

## 2021-07-08 RX ADMIN — TRAZODONE HYDROCHLORIDE 50 MG: 50 TABLET ORAL at 21:22

## 2021-07-08 RX ADMIN — OXCARBAZEPINE 300 MG: 300 TABLET, FILM COATED ORAL at 10:10

## 2021-07-08 RX ADMIN — OXCARBAZEPINE 300 MG: 300 TABLET, FILM COATED ORAL at 21:22

## 2021-07-08 RX ADMIN — PALIPERIDONE 3 MG: 3 TABLET, EXTENDED RELEASE ORAL at 21:22

## 2021-07-08 ASSESSMENT — PAIN SCALES - GENERAL
PAINLEVEL_OUTOF10: 0
PAINLEVEL_OUTOF10: 0

## 2021-07-08 NOTE — PROGRESS NOTES
BEHAVIORAL HEALTH FOLLOW-UP NOTE     7/8/2021     Patient was seen and examined in person, Chart reviewed   Patient's case discussed with staff/team    Chief Complaint: \"Flat and blunted. \"    Interim History:   Patient seen in her room she remains isolative to her room continues to report fleeting suicidal ideations denies SI/HI intent or plan denies any auditory visualization she is significant neurovegetative symptoms of depression      Appetite:   [x] Normal/Unchanged  [] Increased  [] Decreased      Sleep:       [x] Normal/Unchanged  [] Fair       [] Poor              Energy:    [x] Normal/Unchanged  [] Increased  [] Decreased        SI [x] Present  [] Absent    HI  []Present  [x] Absent     Aggression:  [] yes  [x] no    Patient is [x] able  [] unable to CONTRACT FOR SAFETY     PAST MEDICAL/PSYCHIATRIC HISTORY:   Past Medical History:   Diagnosis Date    Acute psychosis ([facility] 12/22/2017    Anxiety     Asthma     Bipolar disorder ([facility]     Bronchitis     [facility]ch[facility]     Depression     Iron deficiency anemia     PTSD (post-traumatic stress disorder)     [facility]     Severe episode of recurrent major depressive disorder, without psychotic features ([facility] 11/16/2019    Substance abuse ([facility]     quit 7/2011  was [place] user        FAMILY/SOCIAL HISTORY:  Family History   Problem Relation Age of Onset    Diabetes Mother     High Blood Pressure Mother     Kidney Disease Mother     Heart Failure Mother     Diabetes Father     Other Father         mesothelioma    Mental Illness Father         schizophrenia     Social History     Socioeconomic History    Marital status: Single     Spouse name: Not on file    Number of children: 0    Years of education: GED    Highest education level: Not on file   Occupational History    Occupation: unemployed     Comment: attempting to get disability for mental illness   Tobacco Use    Smoking status: Current Every Day Smoker     Packs/day: 0.50     Years: 20.00     Pack years: 10.00     Types: Cigarettes    Smokeless tobacco: Never Used   Vaping Use    Vaping Use: Former   Substance and Sexual Activity    Alcohol use: Yes    Drug use: Yes     Types: Marijuana     Comment: using marijuana daily    Sexual activity: Not Currently   Other Topics Concern    Not on file   Social History Narrative    Not on file     Social Determinants of Health     Financial Resource Strain:     Difficulty of Paying Living Expenses:    Food Insecurity:     Worried About Running Out of Food in the Last Year:     [address] in the Last Year:    Transportation Needs:     Lack of Transportation (Medical):  Lack of Transportation (Non-Medical):    Physical Activity:     Days of Exercise per Week:     Minutes of Exercise per Session:    Stress:     Feeling of Stress :    Social Connections:     Frequency of Communication with Friends and Family:     Frequency of Social Gatherings with Friends and Family:     Attends [employer] Services:     Active Member of Clubs or Organizations:     Attends Club or Organization Meetings:     Marital Status:    Intimate Partner Violence:     Fear of Current or Ex-Partner:     Emotionally Abused:     Physically Abused:     Sexually Abused:            ROS:  [x] All negative/unchanged except if checked.  Explain positive(checked items) below:  [] Constitutional  [] Eyes  [] Ear/Nose/Mouth/Throat  [] Respiratory  [] CV  [] GI  []   [] Musculoskeletal  [] Skin/Breast  [] Neurological  [] Endocrine  [] Heme/Lymph  [] Allergic/Immunologic    Explanation:     MEDICATIONS:    Current Facility-Administered Medications:     OXcarbazepine (TRILEPTAL) tablet 300 mg, 300 mg, Oral, BID, Betzy Middleton APRN - CNP, 655 mg at 07/08/21 1010    traZODone (DESYREL) tablet 50 mg, 50 mg, Oral, Nightly, Ray Brunson MD, 50 mg at 07/07/21 2200    acetaminophen (TYLENOL) tablet 650 mg, 650 mg, Oral, Q6H PRN, Bayron Membreno MD    magnesium hydroxide (MILK OF MAGNESIA) 400 MG/5ML suspension 30 mL, 30 mL, Oral, Daily PRN, Bayron Membreno MD    aluminum & magnesium hydroxide-simethicone (MAALOX) 200-200-20 MG/5ML suspension 30 mL, 30 mL, Oral, PRN, Bayron Membreno MD    hydrOXYzine (VISTARIL) capsule 50 mg, 50 mg, Oral, TID PRN, Bayron Membreno MD, 50 mg at 07/05/21 1014    haloperidol (HALDOL) tablet 5 mg, 5 mg, Oral, Q6H PRN **OR** haloperidol lactate (HALDOL) injection 5 mg, 5 mg, Intramuscular, Q6H PRN, Bayron Membreno MD    nicotine (NICODERM CQ) 14 MG/24HR 1 patch, 1 patch, Transdermal, Daily, Ellen Noriega APRN - CNP, 1 patch at 07/08/21 1010    paliperidone (INVEGA) extended release tablet 3 mg, 3 mg, Oral, BID, Alice Peters MD, 3 mg at 07/08/21 1010    albuterol (PROVENTIL) nebulizer solution 2.5 mg, 2.5 mg, Nebulization, Q4H PRN, Nichoaneudy Pelayo, APRN - CNP      Examination:  /71   Pulse 86   Temp 98.6 °F (37 °C) (Temporal)   Resp 14   Ht 5' 2\" (1.575 m)   Wt 117 lb (53.1 kg)   SpO2 97%   Breastfeeding No   BMI 21.40 kg/m²   Gait - steady  Medication side effects(SE): none reported    Mental Status Examination:    Level of consciousness:  within normal limits   Appearance:  poor grooming and poor hygiene  Behavior/Motor:  no abnormalities noted  Attitude toward examiner:  withdrawn  Speech:  normal rate and normal volume   Mood: depressed  Affect:  blunted  Thought processes:  poverty of thought   Thought content: Paranoid and delusional  Cognition:  oriented to person, place, and time   Concentration distractible  Insight poor   Judgement poor     ASSESSMENT:   Patient symptoms are:  [] Well controlled  [] Improving  [] Worsening  [x] No change      Diagnosis:   Principal Problem:    Severe episode of recurrent major depressive disorder, with psychotic features ([facility]  Resolved Problems:    * No resolved hospital problems.  *      LABS:    No results for input(s): WBC, HGB, PLT in the last 72 hours. No results for input(s): NA, K, CL, CO2, BUN, CREATININE, GLUCOSE in the last 72 hours. No results for input(s): BILITOT, ALKPHOS, AST, ALT in the last 72 hours. Lab Results   Component Value Date    LABAMPH NOT DETECTED 07/02/2021    BARBSCNU NOT DETECTED 07/02/2021    LABBENZ NOT DETECTED 07/02/2021    LABMETH NOT DETECTED 07/02/2021    OPIATESCREENURINE NOT DETECTED 07/02/2021    PHENCYCLIDINESCREENURINE NOT DETECTED 07/02/2021    ETOH <10 07/02/2021     Lab Results   Component Value Date    TSH 1.770 03/07/2019     No results found for: LITHIUM  Lab Results   Component Value Date    VALPROATE 57 04/02/2021           Treatment Plan:  The patient's diagnosis, treatment plan, medication management were formulated after patient was seen directly by the attending physician and myself and all relevant documentation was reviewed. Reviewed current Medications with the patient. Risk, benefit, side effects, possible outcomes of the medication and alternatives discussed with the patient and the patient demonstrated understanding. The patient was also educated that the outcome of treatment will depend on the medication compliance as directed by the prescribers along with regular follow-up, compliance with the labs and other work-up, as clinically indicated. Increase Trileptal 300 mg twice daily  Continue Invega 3 mg twice daily for psychotic feature          Collateral information: Followed by social work  CD evaluation  Encourage patient to attend group and other milieu activities.   Discharge planning discussed with the patient and treatment team.    PSYCHOTHERAPY/COUNSELING:  [x] Therapeutic interview  [x] Supportive  [] CBT  [] Ongoing  [] Other    [x] Patient continues to need, on a daily basis, active treatment furnished directly by or requiring the supervision of inpatient psychiatric personnel      Anticipated Length of stay: 3 to 5 days based on stability     NOTE: This report was transcribed using voice recognition software. Every effort was made to ensure accuracy; however, inadvertent computerized transcription errors may be present.     Electronically signed by CHARO Sim CNP on 6/7/6794 at 3:18 PM

## 2021-07-08 NOTE — CARE COORDINATION
Met with client to discuss discharge plans. client was laying down, she reports that she doesn't feel ready for discharge, she is still feeling \"down\" and \"somewhat\" suicidal.  Client presented depressed, sad, withdrawn and tearful. Discussed with her that her  is recommending that she go to the [facility]. While she is there a member from the ACT Team can meet with her there and open her case. Client reported that she didn't want to go to the [facility] but could not indicate a reason why.       Electronically signed by Phyllis Joyner, [facility] on 7/8/2021 at 2:45 PM

## 2021-07-08 NOTE — PLAN OF CARE
Problem: Depressive Behavior With or Without Suicide Precautions:  Goal: Able to verbalize and/or display a decrease in depressive symptoms  Description: Able to verbalize and/or display a decrease in depressive symptoms  7/8/2021 0439 by Magalie Das RN  Outcome: Ongoing  MOOD DEPRESSED. PT. REPORTS FLEETING SUICIDAL IDEATIONS WITH N PLAN OR INTENT.   7/7/2021 2208 by Aaron Brar RN  Outcome: Ongoing     Problem: Depressive Behavior With or Without Suicide Precautions:  Goal: Absence of self-harm  Description: Absence of self-harm  7/8/2021 0955 by Olivia Singh RN  Outcome: Met This Shift  NO SELF HARM BEHAVIORS. PT. EATING MEALS.   7/8/2021 0439 by Magalie Das RN  Outcome: Met This Shift  7/7/2021 2208 by Aaron Brar RN  Outcome: Met This Shift     Problem: Altered Mood, Deterioration in Function:  Goal: Ability to perform activities of daily living will improve  Description: Ability to perform activities of daily living will improve  7/8/2021 0955 by Olivia Singh RN  Outcome: Ongoing  PT. HYGIENE IS MARGINAL.  SHOWERED ENCOURAGED.  7/8/2021 0439 by Magalie Das RN  Outcome: Ongoing  7/7/2021 2208 by Aaron Brar RN  Outcome: Not Met This Shift

## 2021-07-08 NOTE — PLAN OF CARE
Problem: Depressive Behavior With or Without Suicide Precautions:  Goal: Able to verbalize and/or display a decrease in depressive symptoms  Description: Able to verbalize and/or display a decrease in depressive symptoms  7/7/2021 2208 by Bridget Joy RN  Outcome: Ongoing     Problem: Depressive Behavior With or Without Suicide Precautions:  Goal: Absence of self-harm  Description: Absence of self-harm  Outcome: Met This Shift     Problem: Altered Mood, Deterioration in Function:  Goal: Ability to perform activities of daily living will improve  Description: Ability to perform activities of daily living will improve  7/7/2021 2208 by Bridget Joy RN  Outcome: Not Met This Shift     Problem: Altered Mood, Deterioration in Function:  Goal: Maintenance of adequate nutrition will improve  Description: Maintenance of adequate nutrition will improve  Outcome: Ongoing     Problem: Altered Mood, Deterioration in Function:  Goal: Ability to tolerate increased activity will improve  Description: Ability to tolerate increased activity will improve  Outcome: Ongoing     Problem: Altered Mood, Deterioration in Function:  Goal: Participates in care planning  Description: Participates in care planning  Outcome: Ongoing

## 2021-07-09 PROCEDURE — 6370000000 HC RX 637 (ALT 250 FOR IP): Performed by: NURSE PRACTITIONER

## 2021-07-09 PROCEDURE — 99232 SBSQ HOSP IP/OBS MODERATE 35: CPT | Performed by: NURSE PRACTITIONER

## 2021-07-09 PROCEDURE — 6370000000 HC RX 637 (ALT 250 FOR IP): Performed by: PSYCHIATRY & NEUROLOGY

## 2021-07-09 PROCEDURE — 1240000000 HC EMOTIONAL WELLNESS R&B

## 2021-07-09 RX ORDER — OXCARBAZEPINE 300 MG/1
450 TABLET, FILM COATED ORAL 2 TIMES DAILY
Status: DISCONTINUED | OUTPATIENT
Start: 2021-07-09 | End: 2021-07-19

## 2021-07-09 RX ADMIN — OXCARBAZEPINE 300 MG: 300 TABLET, FILM COATED ORAL at 09:24

## 2021-07-09 RX ADMIN — OXCARBAZEPINE 450 MG: 150 TABLET, FILM COATED ORAL at 20:47

## 2021-07-09 RX ADMIN — PALIPERIDONE 3 MG: 3 TABLET, EXTENDED RELEASE ORAL at 20:47

## 2021-07-09 RX ADMIN — TRAZODONE HYDROCHLORIDE 50 MG: 50 TABLET ORAL at 20:47

## 2021-07-09 RX ADMIN — PALIPERIDONE 3 MG: 3 TABLET, EXTENDED RELEASE ORAL at 09:24

## 2021-07-09 ASSESSMENT — PAIN SCALES - GENERAL
PAINLEVEL_OUTOF10: 0
PAINLEVEL_OUTOF10: 0

## 2021-07-09 NOTE — PROGRESS NOTES
[facility] Interdisciplinary Treatment Plan Note     Review Date & Time: 7/9/21 0900    Patient was in treatment team.    Estimated Length of Stay Update:   10 days   Estimated Discharge Date Update:  Monday    EDUCATION:   Learner Progress Toward Treatment Goals: Reviewed results and recommendations of this team    Method: Small group    Outcome: Verbalized understanding, needs reinforcement    PATIENT GOALS: stay positive\"    PLAN/TREATMENT RECOMMENDATIONS UPDATE:  Encourage groups and for pt. To be out of room for 1 hr.  With each meal. Suicide assessment/precautions, supportive care, continue medications,  assess intake, dietary consult, discharge planning and follow up    GOALS UPDATE:  Time frame for Short-Term Goals:  10 days      Crow Rivera RN

## 2021-07-09 NOTE — PROGRESS NOTES
BEHAVIORAL HEALTH FOLLOW-UP NOTE     7/9/2021     Patient was seen and examined in person, Chart reviewed   Patient's case discussed with staff/team    Chief Complaint: \"Flat and blunted. \"    Interim History:   Patient seen during treatment team.  When asked if she is having any suicidal thoughts today she states \"not yet. \"  She continues to show limited insight and judgment to hospitalization need for treatment staff reports she is mostly isolatory room does not attend groups or socialize with peers we discussed the fact that she would likely be put on a lockout if she is not voluntarily to start participating in the milieu and groups more.   She denies any auditory visualizations remains flat blunted with significant neurovegetative symptoms of depression    Appetite:   [x] Normal/Unchanged  [] Increased  [] Decreased      Sleep:       [x] Normal/Unchanged  [] Fair       [] Poor              Energy:    [x] Normal/Unchanged  [] Increased  [] Decreased        SI [x] Present  [] Absent    HI  []Present  [x] Absent     Aggression:  [] yes  [x] no    Patient is [x] able  [] unable to CONTRACT FOR SAFETY     PAST MEDICAL/PSYCHIATRIC HISTORY:   Past Medical History:   Diagnosis Date    Acute psychosis ([facility] 12/22/2017    Anxiety     Asthma     Bipolar disorder ([facility]     Bronchitis     COPD (chronic obstructive pulmonary disease) ([facility])     Depression     Iron deficiency anemia     PTSD (post-traumatic stress disorder)     Schizo affective schizophrenia ([address]     Severe episode of recurrent major depressive disorder, without psychotic features ([facility] 11/16/2019    Substance abuse ([facility]     quit 7/2011  was [employer] user        FAMILY/SOCIAL HISTORY:  Family History   Problem Relation Age of Onset    Diabetes Mother     High Blood Pressure Mother     Kidney Disease Mother     Heart Failure Mother     Diabetes Father     Other Father         mesothelioma    Mental Illness Father         schizophrenia Social History     Socioeconomic History    Marital status: Single     Spouse name: Not on file    Number of children: 0    Years of education: GED    Highest education level: Not on file   Occupational History    Occupation: unemployed     Comment: attempting to get disability for mental illness   Tobacco Use    Smoking status: Current Every Day Smoker     Packs/day: 0.50     Years: 20.00     Pack years: 10.00     Types: Cigarettes    Smokeless tobacco: Never Used   Vaping Use    Vaping Use: Former   Substance and Sexual Activity    Alcohol use: Yes    Drug use: Yes     Types: Marijuana     Comment: using marijuana daily    Sexual activity: Not Currently   Other Topics Concern    Not on file   Social History Narrative    Not on file     Social Determinants of Health     Financial Resource Strain:     Difficulty of Paying Living Expenses:    Food Insecurity:     Worried About Running Out of Food in the Last Year:     [address] in the Last Year:    Transportation Needs:     Lack of Transportation (Medical):  Lack of Transportation (Non-Medical):    Physical Activity:     Days of Exercise per Week:     Minutes of Exercise per Session:    Stress:     Feeling of Stress :    Social Connections:     Frequency of Communication with Friends and Family:     Frequency of Social Gatherings with Friends and Family:     Attends Sabianism Services:     Active Member of Clubs or Organizations:     Attends Club or Organization Meetings:     Marital Status:    Intimate Partner Violence:     Fear of Current or Ex-Partner:     Emotionally Abused:     Physically Abused:     Sexually Abused:            ROS:  [x] All negative/unchanged except if checked.  Explain positive(checked items) below:  [] Constitutional  [] Eyes  [] Ear/Nose/Mouth/Throat  [] Respiratory  [] CV  [] GI  []   [] Musculoskeletal  [] Skin/Breast  [] Neurological  [] Endocrine  [] Heme/Lymph  [] with the patient and treatment team.    PSYCHOTHERAPY/COUNSELING:  [x] Therapeutic interview  [x] Supportive  [] CBT  [] Ongoing  [] Other    [x] Patient continues to need, on a daily basis, active treatment furnished directly by or requiring the supervision of inpatient psychiatric personnel      Anticipated Length of stay: 3 to 5 days based on stability     NOTE: This report was transcribed using voice recognition software. Every effort was made to ensure accuracy; however, inadvertent computerized transcription errors may be present.     Electronically signed by CHARO Benítez CNP on 0/1/8124 at 4:16 PM

## 2021-07-09 NOTE — PLAN OF CARE
Remains in bed most of this shift. When asked to come to group states she was \"up already and was too tired right now\". Reports suicidal thoughts, off and on. Denies plan or intent. Denies thoughts to harm others. Somatic conversation. Denies hallucinations. Up for dinner only.

## 2021-07-09 NOTE — PLAN OF CARE
Problem: Depressive Behavior With or Without Suicide Precautions:  Goal: Able to verbalize and/or display a decrease in depressive symptoms  Description: Able to verbalize and/or display a decrease in depressive symptoms  7/9/2021 1050 by Maggie Cartagena RN  Outcome: Ongoing  PT. APPETITE IS MINIMAL AT MEALS. AFFECT FLAT, SAD,DEPRESSED. PT. DENIED SUICIDAL IDEATIONS ON A.M. ASSESSMENT.   7/9/2021 0554 by Mayte Arzola RN  Outcome: Ongoing  Goal: Absence of self-harm  Description: Absence of self-harm  7/9/2021 1050 by Maggie Cartagena RN  Outcome: Met This Shift    AS ABOVE.  DENIES SUICIDE PLAN OR INTENT TO SELF HARM.  7/9/2021 0554 by Mayte Arzola RN  Outcome: Met This Shift

## 2021-07-09 NOTE — PROGRESS NOTES
PT. REMAINS FLAT,DEPRESSED,MINIMAL EYE CONTACT. DID EAT MEAL OUT OF ROOM AND REMAINED ON UNIT FOR 2 HOURS THIS A.M., ATTENDING GROUP IN [facility]. PT. PT. HAS BEEN MEDICATION COMPLIANT. APPETITE POOR, DOES SNACK IN ROOM. PT. DENIED SUICIDAL IDEATIONS, HOMICIDAL IDEATIONS AND HALLUCINATIONS ON A.M. ASSESSMENT. NO VOICED DELUSIONS. ACCEPTS SUPPORT GIVEN.

## 2021-07-10 PROCEDURE — 6370000000 HC RX 637 (ALT 250 FOR IP): Performed by: NURSE PRACTITIONER

## 2021-07-10 PROCEDURE — 1240000000 HC EMOTIONAL WELLNESS R&B

## 2021-07-10 PROCEDURE — 6370000000 HC RX 637 (ALT 250 FOR IP): Performed by: PSYCHIATRY & NEUROLOGY

## 2021-07-10 PROCEDURE — 99232 SBSQ HOSP IP/OBS MODERATE 35: CPT | Performed by: NURSE PRACTITIONER

## 2021-07-10 RX ADMIN — PALIPERIDONE 3 MG: 3 TABLET, EXTENDED RELEASE ORAL at 21:37

## 2021-07-10 RX ADMIN — OXCARBAZEPINE 450 MG: 150 TABLET, FILM COATED ORAL at 09:42

## 2021-07-10 RX ADMIN — OXCARBAZEPINE 450 MG: 150 TABLET, FILM COATED ORAL at 21:36

## 2021-07-10 RX ADMIN — PALIPERIDONE 3 MG: 3 TABLET, EXTENDED RELEASE ORAL at 09:42

## 2021-07-10 RX ADMIN — TRAZODONE HYDROCHLORIDE 50 MG: 50 TABLET ORAL at 21:37

## 2021-07-10 ASSESSMENT — PAIN SCALES - GENERAL: PAINLEVEL_OUTOF10: 0

## 2021-07-10 NOTE — PLAN OF CARE
Problem: Depressive Behavior With or Without Suicide Precautions:  Goal: Able to verbalize and/or display a decrease in depressive symptoms  Description: Able to verbalize and/or display a decrease in depressive symptoms  7/9/2021 2328 by Sharon Boas, RN  Outcome: Ongoing     Problem: Depressive Behavior With or Without Suicide Precautions:  Goal: Able to verbalize and/or display a decrease in depressive symptoms  Description: Able to verbalize and/or display a decrease in depressive symptoms  7/9/2021 2304 by Sharon Boas, RN  Outcome: Ongoing     Problem: Depressive Behavior With or Without Suicide Precautions:  Goal: Absence of self-harm  Description: Absence of self-harm  7/9/2021 2328 by Sharon Boas, RN  Outcome: Met This Shift     Problem: Altered Mood, Deterioration in Function:  Goal: Ability to perform activities of daily living will improve  Description: Ability to perform activities of daily living will improve  7/9/2021 2328 by Sharon Boas, RN  Outcome: Ongoing     Problem: Altered Mood, Deterioration in Function:  Goal: Ability to perform activities of daily living will improve  Description: Ability to perform activities of daily living will improve  7/9/2021 2304 by Sharon Boas, RN  Outcome: Ongoing     Problem: Altered Mood, Deterioration in Function:  Goal: Maintenance of adequate nutrition will improve  Description: Maintenance of adequate nutrition will improve  7/9/2021 2328 by Sharon Boas, RN  Outcome: Met This Shift     Problem: Altered Mood, Deterioration in Function:  Goal: Maintenance of adequate nutrition will improve  Description: Maintenance of adequate nutrition will improve  7/9/2021 2304 by Sharon Boas, RN  Outcome: Ongoing     Problem: Altered Mood, Deterioration in Function:  Goal: Ability to tolerate increased activity will improve  Description: Ability to tolerate increased activity will improve  7/9/2021 2328 by Sharon Boas, RN  Outcome: Ongoing Problem: Altered Mood, Deterioration in Function:  Goal: Ability to tolerate increased activity will improve  Description: Ability to tolerate increased activity will improve  7/9/2021 2304 by Marimar Lowry RN  Outcome: Ongoing     Problem: Altered Mood, Deterioration in Function:  Goal: Participates in care planning  Description: Participates in care planning  7/9/2021 2328 by Marimar Lowry RN  Outcome: Ongoing     Problem: Altered Mood, Deterioration in Function:  Goal: Participates in care planning  Description: Participates in care planning  7/9/2021 2304 by Marimar Lowry RN  Outcome: Ongoing     Problem: Altered Mood, Deterioration in Function:  Goal: Participates in care planning  Description: Participates in care planning  7/9/2021 2328 by Marimar Lowry RN  Outcome: Ongoing

## 2021-07-10 NOTE — PLAN OF CARE
Pt resting in bed apparently asleep with easy even respirations at HS q 15 min electronic rounding. Telephone Encounter by Pretty Cooper RN at 07/17/17 04:13 PM     Author:  Pretty Cooper RN Service:  (none) Author Type:  Registered Nurse     Filed:  07/17/17 04:19 PM Encounter Date:  7/17/2017 Status:  Signed     :  Pretty Cooper RN (Registered Nurse)            Non-clinical  Please notify patient that she will be due for fasting labs and HgA1C on August 9th  Thank you[AC1.1M]      Revision History        User Key Date/Time User Provider Type Action    > AC1.1 07/17/17 04:19 PM Pretty Cooper, RN Registered Nurse Sign    M - Manual

## 2021-07-10 NOTE — PLAN OF CARE
Problem: Depressive Behavior With or Without Suicide Precautions:  Goal: Able to verbalize and/or display a decrease in depressive symptoms  Description: Able to verbalize and/or display a decrease in depressive symptoms  7/9/2021 2304 by Hilda Barba RN  Outcome: Ongoing     Problem: Depressive Behavior With or Without Suicide Precautions:  Goal: Absence of self-harm  Description: Absence of self-harm  7/9/2021 2304 by Hilda Barba RN  Outcome: Met This Shift     Problem: Altered Mood, Deterioration in Function:  Goal: Ability to perform activities of daily living will improve  Description: Ability to perform activities of daily living will improve  Outcome: Ongoing     Problem: Altered Mood, Deterioration in Function:  Goal: Maintenance of adequate nutrition will improve  Description: Maintenance of adequate nutrition will improve  Outcome: Ongoing     Problem: Altered Mood, Deterioration in Function:  Goal: Ability to tolerate increased activity will improve  Description: Ability to tolerate increased activity will improve  Outcome: Ongoing     Problem: Altered Mood, Deterioration in Function:  Goal: Ability to tolerate increased activity will improve  Description: Ability to tolerate increased activity will improve  Outcome: Ongoing     Problem: Altered Mood, Deterioration in Function:  Goal: Participates in care planning  Description: Participates in care planning  Outcome: Ongoing

## 2021-07-10 NOTE — PROGRESS NOTES
BEHAVIORAL HEALTH FOLLOW-UP NOTE     7/10/2021     Patient was seen and examined in person, Chart reviewed   Patient's case discussed with staff/team    Chief Complaint: Fleeting suicidal ideations    Interim History:   Patient seen in her room she remains flat and blunted states she is severely depressed and endorses fleeting suicidal ideations. She continues to isolate to her room she does not attend groups does not socialize with peers. She denies any auditory or visual hallucinations.       Appetite:   [x] Normal/Unchanged  [] Increased  [] Decreased      Sleep:       [x] Normal/Unchanged  [] Fair       [] Poor              Energy:    [x] Normal/Unchanged  [] Increased  [] Decreased        SI [x] Present  [] Absent    HI  []Present  [x] Absent     Aggression:  [] yes  [x] no    Patient is [x] able  [] unable to CONTRACT FOR SAFETY     PAST MEDICAL/PSYCHIATRIC HISTORY:   Past Medical History:   Diagnosis Date    Acute psychosis ([facility] 12/22/2017    Anxiety     Asthma     Bipolar disorder ([facility]     Bronchitis     COPD (chronic obstructive pulmonary disease) ([facility])     Depression     Iron deficiency anemia     PTSD (post-traumatic stress disorder)     [facility]     Severe episode of recurrent major depressive disorder, without psychotic features ([facility] 11/16/2019    Substance abuse ([facility]     quit 7/2011  was [employer] user        FAMILY/SOCIAL HISTORY:  Family History   Problem Relation Age of Onset    Diabetes Mother     High Blood Pressure Mother     Kidney Disease Mother     Heart Failure Mother     Diabetes Father     Other Father         mesothelioma    Mental Illness Father         schizophrenia     Social History     Socioeconomic History    Marital status: Single     Spouse name: Not on file    Number of children: 0    Years of education: GED    Highest education level: Not on file   Occupational History    Occupation: unemployed     Comment: attempting to get disability for mental illness   Tobacco Use    Smoking status: Current Every Day Smoker     Packs/day: 0.50     Years: 20.00     Pack years: 10.00     Types: Cigarettes    Smokeless tobacco: Never Used   Vaping Use    Vaping Use: Former   Substance and Sexual Activity    Alcohol use: Yes    Drug use: Yes     Types: Marijuana     Comment: using marijuana daily    Sexual activity: Not Currently   Other Topics Concern    Not on file   Social History Narrative    Not on file     Social Determinants of Health     Financial Resource Strain:     Difficulty of Paying Living Expenses:    Food Insecurity:     Worried About Running Out of Food in the Last Year:     [address] in the Last Year:    Transportation Needs:     Lack of Transportation (Medical):  Lack of Transportation (Non-Medical):    Physical Activity:     Days of Exercise per Week:     Minutes of Exercise per Session:    Stress:     Feeling of Stress :    Social Connections:     Frequency of Communication with Friends and Family:     Frequency of Social Gatherings with Friends and Family:     Attends Denominational Services:     Active Member of Clubs or Organizations:     Attends Club or Organization Meetings:     Marital Status:    Intimate Partner Violence:     Fear of Current or Ex-Partner:     Emotionally Abused:     Physically Abused:     Sexually Abused:            ROS:  [x] All negative/unchanged except if checked.  Explain positive(checked items) below:  [] Constitutional  [] Eyes  [] Ear/Nose/Mouth/Throat  [] Respiratory  [] CV  [] GI  []   [] Musculoskeletal  [] Skin/Breast  [] Neurological  [] Endocrine  [] Heme/Lymph  [] Allergic/Immunologic    Explanation:     MEDICATIONS:    Current Facility-Administered Medications:     OXcarbazepine (TRILEPTAL) tablet 450 mg, 450 mg, Oral, BID, Jodie Middleton, APRN - CNP, 137 mg at 07/10/21 0942    traZODone (DESYREL) tablet 50 mg, 50 mg, Oral, Nightly, Evy Dickens MD, 50 mg at 07/09/21 2047    acetaminophen (TYLENOL) tablet 650 mg, 650 mg, Oral, Q6H PRN, Frank Benítez MD    magnesium hydroxide (MILK OF MAGNESIA) 400 MG/5ML suspension 30 mL, 30 mL, Oral, Daily PRN, Frank Benítez MD    aluminum & magnesium hydroxide-simethicone (MAALOX) 200-200-20 MG/5ML suspension 30 mL, 30 mL, Oral, PRN, Frank Benítez MD    hydrOXYzine (VISTARIL) capsule 50 mg, 50 mg, Oral, TID PRN, Frank Benítez MD, 50 mg at 07/05/21 1014    haloperidol (HALDOL) tablet 5 mg, 5 mg, Oral, Q6H PRN **OR** haloperidol lactate (HALDOL) injection 5 mg, 5 mg, Intramuscular, Q6H PRN, Frank Benítez MD    nicotine (NICODERM CQ) 14 MG/24HR 1 patch, 1 patch, Transdermal, Daily, Toño Cintron, APRN - CNP, 1 patch at 07/10/21 0698    paliperidone (INVEGA) extended release tablet 3 mg, 3 mg, Oral, BID, Timur Craft MD, 3 mg at 07/10/21 0942    albuterol (PROVENTIL) nebulizer solution 2.5 mg, 2.5 mg, Nebulization, Q4H PRN, Chelsea Reza, APRN - CNP      Examination:  /70   Pulse 80   Temp 97.9 °F (36.6 °C) (Temporal)   Resp 17   Ht 5' 2\" (1.575 m)   Wt 117 lb (53.1 kg)   SpO2 98%   Breastfeeding No   BMI 21.40 kg/m²   Gait - steady  Medication side effects(SE): none reported    Mental Status Examination:    Level of consciousness:  within normal limits   Appearance:  poor grooming and poor hygiene  Behavior/Motor:  no abnormalities noted  Attitude toward examiner:  withdrawn  Speech:  normal rate and normal volume   Mood: depressed  Affect:  blunted  Thought processes:  poverty of thought   Thought content: Paranoid and delusional  Cognition:  oriented to person, place, and time   Concentration distractible  Insight poor   Judgement poor     ASSESSMENT:   Patient symptoms are:  [] Well controlled  [] Improving  [] Worsening  [x] No change      Diagnosis:   Principal Problem:    Severe episode of recurrent major depressive disorder, with psychotic features ([address]  Resolved Problems:    * No resolved hospital problems. *      LABS:    No results for input(s): WBC, HGB, PLT in the last 72 hours. No results for input(s): NA, K, CL, CO2, BUN, CREATININE, GLUCOSE in the last 72 hours. No results for input(s): BILITOT, ALKPHOS, AST, ALT in the last 72 hours. Lab Results   Component Value Date    LABAMPH NOT DETECTED 07/02/2021    BARBSCNU NOT DETECTED 07/02/2021    LABBENZ NOT DETECTED 07/02/2021    LABMETH NOT DETECTED 07/02/2021    OPIATESCREENURINE NOT DETECTED 07/02/2021    PHENCYCLIDINESCREENURINE NOT DETECTED 07/02/2021    ETOH <10 07/02/2021     Lab Results   Component Value Date    TSH 1.770 03/07/2019     No results found for: LITHIUM  Lab Results   Component Value Date    VALPROATE 57 04/02/2021           Treatment Plan:  The patient's diagnosis, treatment plan, medication management were formulated after patient was seen directly by the attending physician and myself and all relevant documentation was reviewed. Reviewed current Medications with the patient. Risk, benefit, side effects, possible outcomes of the medication and alternatives discussed with the patient and the patient demonstrated understanding. The patient was also educated that the outcome of treatment will depend on the medication compliance as directed by the prescribers along with regular follow-up, compliance with the labs and other work-up, as clinically indicated. Continue Trileptal 450 mg twice daily  Continue Invega 3 mg twice daily for psychotic feature          Collateral information: Followed by social work  CD evaluation  Encourage patient to attend group and other milieu activities.   Discharge planning discussed with the patient and treatment team.    PSYCHOTHERAPY/COUNSELING:  [x] Therapeutic interview  [x] Supportive  [] CBT  [] Ongoing  [] Other    [x] Patient continues to need, on a daily basis, active treatment furnished directly by or requiring the supervision of inpatient psychiatric personnel            Behavioral Services                                              Medicare Re-Certification    I certify that the inpatient psychiatric hospital services furnished since the previous certification/re-certification were, and continue to be, medically necessary for;    [x] (1) Treatment which could reasonably be expected to improve the patient's condition,    [] (2) Or for diagnostic study. Estimated length of stay/service 3 to 10 days based on stability    Plan for post-hospital care outpatient psychiatric and counseling services    This patient continues to need, on a daily basis, active treatment furnished directly by or requiring the supervision of inpatient psychiatric personnel.     Electronically signed by CHARO Sim CNP on 7/25/6065 at 2:14 PM   Electronically signed by CHARO Sim CNP on 2/87/4268 at 11:20 AM

## 2021-07-10 NOTE — PLAN OF CARE
Patient is positive for SI with no plan and contracts for safety while on unit. Patient denies HI, hallucinations, and physical complaints.

## 2021-07-11 PROCEDURE — 6370000000 HC RX 637 (ALT 250 FOR IP): Performed by: NURSE PRACTITIONER

## 2021-07-11 PROCEDURE — 1240000000 HC EMOTIONAL WELLNESS R&B

## 2021-07-11 PROCEDURE — 6370000000 HC RX 637 (ALT 250 FOR IP): Performed by: PSYCHIATRY & NEUROLOGY

## 2021-07-11 PROCEDURE — 99232 SBSQ HOSP IP/OBS MODERATE 35: CPT | Performed by: NURSE PRACTITIONER

## 2021-07-11 RX ADMIN — OXCARBAZEPINE 450 MG: 150 TABLET, FILM COATED ORAL at 09:05

## 2021-07-11 RX ADMIN — PALIPERIDONE 3 MG: 3 TABLET, EXTENDED RELEASE ORAL at 09:05

## 2021-07-11 RX ADMIN — TRAZODONE HYDROCHLORIDE 50 MG: 50 TABLET ORAL at 21:26

## 2021-07-11 RX ADMIN — OXCARBAZEPINE 450 MG: 150 TABLET, FILM COATED ORAL at 21:26

## 2021-07-11 RX ADMIN — PALIPERIDONE 3 MG: 3 TABLET, EXTENDED RELEASE ORAL at 21:26

## 2021-07-11 NOTE — PLAN OF CARE
Patient is positive for SI with no plan or intent, pt. Denies HI, hallucinations, and physical complaints currently.

## 2021-07-11 NOTE — PROGRESS NOTES
BEHAVIORAL HEALTH FOLLOW-UP NOTE     7/11/2021     Patient was seen and examined in person, Chart reviewed   Patient's case discussed with staff/team    Chief Complaint: \" I am suicidal.\"    Interim History:   Patient seen in her room she remains flat and blunted states she is severely depressed and endorses fleeting suicidal ideations. She states that she feels like she has let her family down. She continues to lie in her bed stares at the ceiling does not attend groups or socialize with peers. She continues to isolate to her room she does not attend groups does not socialize with peers. She denies any auditory or visual hallucinations.       Appetite:   [x] Normal/Unchanged  [] Increased  [] Decreased      Sleep:       [x] Normal/Unchanged  [] Fair       [] Poor              Energy:    [x] Normal/Unchanged  [] Increased  [] Decreased        SI [x] Present  [] Absent    HI  []Present  [x] Absent     Aggression:  [] yes  [x] no    Patient is [x] able  [] unable to CONTRACT FOR SAFETY     PAST MEDICAL/PSYCHIATRIC HISTORY:   Past Medical History:   Diagnosis Date    Acute psychosis ([facility] 12/22/2017    Anxiety     Asthma     [facility]     Bronchitis     COPD (chronic obstructive pulmonary disease) ([facility])     Depression     Iron deficiency anemia     PTSD (post-traumatic stress disorder)     [facility]     Severe episode of recurrent major depressive disorder, without psychotic features ([facility] 11/16/2019    Substance abuse ([facility]     quit 7/2011  was [employer] user        FAMILY/SOCIAL HISTORY:  Family History   Problem Relation Age of Onset    Diabetes Mother     High Blood Pressure Mother     Kidney Disease Mother     Heart Failure Mother     Diabetes Father     Other Father         mesothelioma    Mental Illness Father         schizophrenia     Social History     Socioeconomic History    Marital status: Single     Spouse name: Not on file    Number of children: 0    Years of education: GED    Highest education level: Not on file   Occupational History    Occupation: unemployed     Comment: attempting to get disability for mental illness   Tobacco Use    Smoking status: Current Every Day Smoker     Packs/day: 0.50     Years: 20.00     Pack years: 10.00     Types: Cigarettes    Smokeless tobacco: Never Used   Vaping Use    Vaping Use: Former   Substance and Sexual Activity    Alcohol use: Yes    Drug use: Yes     Types: Marijuana     Comment: using marijuana daily    Sexual activity: Not Currently   Other Topics Concern    Not on file   Social History Narrative    Not on file     Social Determinants of Health     Financial Resource Strain:     Difficulty of Paying Living Expenses:    Food Insecurity:     Worried About Running Out of Food in the Last Year:     [address] in the Last Year:    Transportation Needs:     Lack of Transportation (Medical):  Lack of Transportation (Non-Medical):    Physical Activity:     Days of Exercise per Week:     Minutes of Exercise per Session:    Stress:     Feeling of Stress :    Social Connections:     Frequency of Communication with Friends and Family:     Frequency of Social Gatherings with Friends and Family:     Attends Scientologist Services:     Active Member of Clubs or Organizations:     Attends Club or Organization Meetings:     Marital Status:    Intimate Partner Violence:     Fear of Current or Ex-Partner:     Emotionally Abused:     Physically Abused:     Sexually Abused:            ROS:  [x] All negative/unchanged except if checked.  Explain positive(checked items) below:  [] Constitutional  [] Eyes  [] Ear/Nose/Mouth/Throat  [] Respiratory  [] CV  [] GI  []   [] Musculoskeletal  [] Skin/Breast  [] Neurological  [] Endocrine  [] Heme/Lymph  [] Allergic/Immunologic    Explanation:     MEDICATIONS:    Current Facility-Administered Medications:     OXcarbazepine (TRILEPTAL) tablet 450 mg, 450 mg, Oral, BID, Jeremy Middleton, APRN - CNP, 959 mg at 07/11/21 0905    traZODone (DESYREL) tablet 50 mg, 50 mg, Oral, Nightly, Sandro Torres MD, 50 mg at 07/10/21 2137    acetaminophen (TYLENOL) tablet 650 mg, 650 mg, Oral, Q6H PRN, Sandro Torres MD    magnesium hydroxide (MILK OF MAGNESIA) 400 MG/5ML suspension 30 mL, 30 mL, Oral, Daily PRN, Sandro Torres MD    aluminum & magnesium hydroxide-simethicone (MAALOX) 200-200-20 MG/5ML suspension 30 mL, 30 mL, Oral, PRN, Sandro Torres MD    hydrOXYzine (VISTARIL) capsule 50 mg, 50 mg, Oral, TID PRN, Sandro Torres MD, 50 mg at 07/05/21 1014    haloperidol (HALDOL) tablet 5 mg, 5 mg, Oral, Q6H PRN **OR** haloperidol lactate (HALDOL) injection 5 mg, 5 mg, Intramuscular, Q6H PRN, Sandro Torres MD    nicotine (NICODERM CQ) 14 MG/24HR 1 patch, 1 patch, Transdermal, Daily, Lexie Russo, APRN - CNP, 1 patch at 07/11/21 0905    paliperidone (INVEGA) extended release tablet 3 mg, 3 mg, Oral, BID, Aurelia Betancourt MD, 3 mg at 07/11/21 0905    albuterol (PROVENTIL) nebulizer solution 2.5 mg, 2.5 mg, Nebulization, Q4H PRN, Rhys Doyle, APRN - CNP      Examination:  BP (!) 110/56   Pulse 74   Temp 99 °F (37.2 °C)   Resp 14   Ht 5' 2\" (1.575 m)   Wt 117 lb (53.1 kg)   SpO2 98%   Breastfeeding No   BMI 21.40 kg/m²   Gait - steady  Medication side effects(SE): none reported    Mental Status Examination:    Level of consciousness:  within normal limits   Appearance:  poor grooming and poor hygiene  Behavior/Motor:  no abnormalities noted  Attitude toward examiner:  withdrawn  Speech:  normal rate and normal volume   Mood: depressed  Affect:  blunted  Thought processes:  poverty of thought   Thought content: Paranoid and delusional  Cognition:  oriented to person, place, and time   Concentration distractible  Insight poor   Judgement poor     ASSESSMENT:   Patient symptoms are:  [] Well controlled  [] Improving  [] Worsening  [x] No change      Diagnosis:   Principal Problem:    Severe episode of recurrent major depressive disorder, with psychotic features ([address]  Resolved Problems:    * No resolved hospital problems. *      LABS:    No results for input(s): WBC, HGB, PLT in the last 72 hours. No results for input(s): NA, K, CL, CO2, BUN, CREATININE, GLUCOSE in the last 72 hours. No results for input(s): BILITOT, ALKPHOS, AST, ALT in the last 72 hours. Lab Results   Component Value Date    LABAMPH NOT DETECTED 07/02/2021    BARBSCNU NOT DETECTED 07/02/2021    LABBENZ NOT DETECTED 07/02/2021    LABMETH NOT DETECTED 07/02/2021    OPIATESCREENURINE NOT DETECTED 07/02/2021    PHENCYCLIDINESCREENURINE NOT DETECTED 07/02/2021    ETOH <10 07/02/2021     Lab Results   Component Value Date    TSH 1.770 03/07/2019     No results found for: LITHIUM  Lab Results   Component Value Date    VALPROATE 57 04/02/2021           Treatment Plan:  The patient's diagnosis, treatment plan, medication management were formulated after patient was seen directly by the attending physician and myself and all relevant documentation was reviewed. Reviewed current Medications with the patient. Risk, benefit, side effects, possible outcomes of the medication and alternatives discussed with the patient and the patient demonstrated understanding. The patient was also educated that the outcome of treatment will depend on the medication compliance as directed by the prescribers along with regular follow-up, compliance with the labs and other work-up, as clinically indicated. Continue Trileptal 450 mg twice daily  Continue Invega 3 mg twice daily for psychotic feature          Collateral information: Followed by social work  CD evaluation  Encourage patient to attend group and other milieu activities.   Discharge planning discussed with the patient and treatment team.    PSYCHOTHERAPY/COUNSELING:  [x] Therapeutic interview  [x] Supportive  [] CBT  [] Ongoing  [] Other        Behavioral Services                                              Medicare Re-Certification    I certify that the inpatient psychiatric hospital services furnished since the previous certification/re-certification were, and continue to be, medically necessary for;    [x] (1) Treatment which could reasonably be expected to improve the patient's condition,    [] (2) Or for diagnostic study. Estimated length of stay/service 3 to 14 days based on stability    Plan for post-hospital care outpatient psychiatric and counseling services    This patient continues to need, on a daily basis, active treatment furnished directly by or requiring the supervision of inpatient psychiatric personnel. Electronically signed by CHARO Merino CNP on 9/63/1730 at 2:35 PM    NOTE: This report was transcribed using voice recognition software. Every effort was made to ensure accuracy; however, inadvertent computerized transcription errors may be present.     Electronically signed by CHARO Merino CNP on 9/77/6180 at 12:35 PM

## 2021-07-11 NOTE — PROGRESS NOTES
Pt alert, resting in her room. Pt is flat, sad, and depressed. Remains suicidal without a plan. Pt states she is upset with herself that she has disappointed her family. Pt admitted to being off her meds X4 weeks. Denies HI and AVH. Pt contracts for safety @ this time. Pt did say that her medications were helping with her mood. Encouraged snack but pt never came out to eat.  Will continue to monitor

## 2021-07-12 PROCEDURE — 99232 SBSQ HOSP IP/OBS MODERATE 35: CPT | Performed by: NURSE PRACTITIONER

## 2021-07-12 PROCEDURE — 6370000000 HC RX 637 (ALT 250 FOR IP): Performed by: NURSE PRACTITIONER

## 2021-07-12 PROCEDURE — 6370000000 HC RX 637 (ALT 250 FOR IP): Performed by: PSYCHIATRY & NEUROLOGY

## 2021-07-12 PROCEDURE — 1240000000 HC EMOTIONAL WELLNESS R&B

## 2021-07-12 RX ADMIN — PALIPERIDONE 3 MG: 3 TABLET, EXTENDED RELEASE ORAL at 21:17

## 2021-07-12 RX ADMIN — HYDROXYZINE PAMOATE 50 MG: 50 CAPSULE ORAL at 09:45

## 2021-07-12 RX ADMIN — OXCARBAZEPINE 450 MG: 150 TABLET, FILM COATED ORAL at 21:17

## 2021-07-12 RX ADMIN — TRAZODONE HYDROCHLORIDE 50 MG: 50 TABLET ORAL at 21:17

## 2021-07-12 RX ADMIN — OXCARBAZEPINE 450 MG: 150 TABLET, FILM COATED ORAL at 09:43

## 2021-07-12 RX ADMIN — PALIPERIDONE 3 MG: 3 TABLET, EXTENDED RELEASE ORAL at 09:43

## 2021-07-12 ASSESSMENT — PAIN SCALES - GENERAL
PAINLEVEL_OUTOF10: 0

## 2021-07-12 NOTE — PLAN OF CARE
Problem: Depressive Behavior With or Without Suicide Precautions:  Goal: Absence of self-harm  Description: Absence of self-harm  Outcome: Ongoing  Appetite is poor. Problem: Altered Mood, Deterioration in Function:  Goal: Ability to tolerate increased activity will improve  Description: Ability to tolerate increased activity will improve  Outcome: Ongoing  Up for 2 hours post meal in TV area.

## 2021-07-12 NOTE — PROGRESS NOTES
Pt. up at meals for 2 hours each on own. Pt. attended group and remains medication compliant. Pt. remains flat,depressed with fleeting suicidal ideations with no plan or intent. Pt. denies homicidal ideations, hallucinations and no voiced delusions.

## 2021-07-12 NOTE — PROGRESS NOTES
Attended morning community meeting. Updated on staffing and daily expectations. Shared goal for the day as to have hope and be positive.

## 2021-07-12 NOTE — GROUP NOTE
Group Therapy Note    Date: 7/12/2021    Group Start Time: 1000  Group End Time: 4669  Group Topic: Psychoeducation    [facility] [address]        Group Therapy Note    Attendees: 14       Notes: Minimally engaged during discussion on the process of making changes. Reluctant to share and reflect with peers. Only agreeable to sit across will from peers in group room. Declined to share when encouraged, accepting of handout.         Status After Intervention:  Unchanged    Participation Level: Minimal    Participation Quality: Resistant      Speech:  hesitant      Thought Process/Content: Perseverating      Affective Functioning: Constricted/Restricted      Mood: depressed and elevated      Level of consciousness:  Preoccupied and Inattentive      Response to Learning: Resistant      Endings: None Reported    Modes of Intervention: Education, Support, Socialization and Exploration      Discipline Responsible: Psychoeducational Specialist      Signature:  Ema Clark, [address]

## 2021-07-12 NOTE — PLAN OF CARE
Problem: Depressive Behavior With or Without Suicide Precautions:  Goal: Able to verbalize and/or display a decrease in depressive symptoms  Description: Able to verbalize and/or display a decrease in depressive symptoms  Outcome: Ongoing     Problem: Depressive Behavior With or Without Suicide Precautions:  Goal: Absence of self-harm  Description: Absence of self-harm  7/12/2021 1917 by Jaren Julian RN  Outcome: Met This Shift     Problem: Altered Mood, Deterioration in Function:  Goal: Ability to perform activities of daily living will improve  Description: Ability to perform activities of daily living will improve  Outcome: Not Met This Shift     Problem: Altered Mood, Deterioration in Function:  Goal: Maintenance of adequate nutrition will improve  Description: Maintenance of adequate nutrition will improve  Outcome: Not Met This Shift     Problem: Altered Mood, Deterioration in Function:  Goal: Ability to tolerate increased activity will improve  Description: Ability to tolerate increased activity will improve  7/12/2021 1917 by Jaren Julian RN  Outcome: Ongoing     Problem: Altered Mood, Deterioration in Function:  Goal: Participates in care planning  Description: Participates in care planning  Outcome: Not Met This Shift     Problem: Altered Mood, Deterioration in Function:  Goal: Participates in care planning  Description: Participates in care planning  Outcome: Not Met This Shift

## 2021-07-12 NOTE — PROGRESS NOTES
Pt flat, sad, depressed. Pt has been in her room all evening. Pt denies SI, HI, and AVH. However, pt sounded very unsure when denying SI. Previously reported that she maintained SI. Pt declined snack. Encouraged socialization. Have not seen her out of her room. Will continue to monitor.

## 2021-07-13 PROCEDURE — 6370000000 HC RX 637 (ALT 250 FOR IP): Performed by: PSYCHIATRY & NEUROLOGY

## 2021-07-13 PROCEDURE — 6370000000 HC RX 637 (ALT 250 FOR IP): Performed by: NURSE PRACTITIONER

## 2021-07-13 PROCEDURE — 1240000000 HC EMOTIONAL WELLNESS R&B

## 2021-07-13 PROCEDURE — 99232 SBSQ HOSP IP/OBS MODERATE 35: CPT | Performed by: NURSE PRACTITIONER

## 2021-07-13 RX ADMIN — TRAZODONE HYDROCHLORIDE 50 MG: 50 TABLET ORAL at 21:23

## 2021-07-13 RX ADMIN — PALIPERIDONE 3 MG: 3 TABLET, EXTENDED RELEASE ORAL at 21:23

## 2021-07-13 RX ADMIN — OXCARBAZEPINE 450 MG: 150 TABLET, FILM COATED ORAL at 21:23

## 2021-07-13 RX ADMIN — OXCARBAZEPINE 450 MG: 150 TABLET, FILM COATED ORAL at 09:58

## 2021-07-13 RX ADMIN — PALIPERIDONE 3 MG: 3 TABLET, EXTENDED RELEASE ORAL at 09:58

## 2021-07-13 ASSESSMENT — PAIN SCALES - GENERAL
PAINLEVEL_OUTOF10: 0
PAINLEVEL_OUTOF10: 0

## 2021-07-13 NOTE — PROGRESS NOTES
BEHAVIORAL HEALTH FOLLOW-UP NOTE     7/13/2021     Patient was seen and examined in person, Chart reviewed   Patient's case discussed with staff/team    Chief Complaint: \" I am suicidal.\"    Interim History:   Patient is continues to isolate to her room requiring us to initiate a 2-hour lockout after meals. She shows no insight and judgment to hospitalization need for treatment makes no eye contact her affect is flat blunted. She continues to endorse suicidal ideations denies any homicidal ideations intent or plan.   She shows little motivation        Appetite:   [x] Normal/Unchanged  [] Increased  [] Decreased      Sleep:       [x] Normal/Unchanged  [] Fair       [] Poor              Energy:    [x] Normal/Unchanged  [] Increased  [] Decreased        SI [x] Present  [] Absent    HI  []Present  [x] Absent     Aggression:  [] yes  [x] no    Patient is [x] able  [] unable to CONTRACT FOR SAFETY     PAST MEDICAL/PSYCHIATRIC HISTORY:   Past Medical History:   Diagnosis Date    Acute psychosis ([facility] 12/22/2017    Anxiety     Asthma     [facility] ([facility]     Bronchitis     [facility]     Depression     Iron deficiency anemia     PTSD (post-traumatic stress disorder)     [facility]     Severe episode of recurrent major depressive disorder, without psychotic features ([facility] 11/16/2019    Substance abuse ([facility]     quit 7/2011  was [employer] user        FAMILY/SOCIAL HISTORY:  Family History   Problem Relation Age of Onset    Diabetes Mother     High Blood Pressure Mother     Kidney Disease Mother     Heart Failure Mother     Diabetes Father     Other Father         mesothelioma    Mental Illness Father         schizophrenia     Social History     Socioeconomic History    Marital status: Single     Spouse name: Not on file    Number of children: 0    Years of education: GED    Highest education level: Not on file   Occupational History    Occupation: unemployed     Comment: attempting to get disability for mental illness   Tobacco Use    Smoking status: Current Every Day Smoker     Packs/day: 0.50     Years: 20.00     Pack years: 10.00     Types: Cigarettes    Smokeless tobacco: Never Used   Vaping Use    Vaping Use: Former   Substance and Sexual Activity    Alcohol use: Yes    Drug use: Yes     Types: Marijuana     Comment: using marijuana daily    Sexual activity: Not Currently   Other Topics Concern    Not on file   Social History Narrative    Not on file     Social Determinants of Health     Financial Resource Strain:     Difficulty of Paying Living Expenses:    Food Insecurity:     Worried About Running Out of Food in the Last Year:     [address] in the Last Year:    Transportation Needs:     Lack of Transportation (Medical):  Lack of Transportation (Non-Medical):    Physical Activity:     Days of Exercise per Week:     Minutes of Exercise per Session:    Stress:     Feeling of Stress :    Social Connections:     Frequency of Communication with Friends and Family:     Frequency of Social Gatherings with Friends and Family:     Attends Congregation Services:     Active Member of Clubs or Organizations:     Attends Club or Organization Meetings:     Marital Status:    Intimate Partner Violence:     Fear of Current or Ex-Partner:     Emotionally Abused:     Physically Abused:     Sexually Abused:            ROS:  [x] All negative/unchanged except if checked.  Explain positive(checked items) below:  [] Constitutional  [] Eyes  [] Ear/Nose/Mouth/Throat  [] Respiratory  [] CV  [] GI  []   [] Musculoskeletal  [] Skin/Breast  [] Neurological  [] Endocrine  [] Heme/Lymph  [] Allergic/Immunologic    Explanation:     MEDICATIONS:    Current Facility-Administered Medications:     OXcarbazepine (TRILEPTAL) tablet 450 mg, 450 mg, Oral, BID, Jodie Middleton, APRN - CNP, 828 mg at 07/12/21 2117    traZODone (DESYREL) tablet 50 mg, 50 mg, Oral, Nightly, Edith Ahumada, MD, 50 mg at 07/12/21 2117    acetaminophen (TYLENOL) tablet 650 mg, 650 mg, Oral, Q6H PRN, Edith Ahumada, MD    magnesium hydroxide (MILK OF MAGNESIA) 400 MG/5ML suspension 30 mL, 30 mL, Oral, Daily PRN, Edith Ahumada, MD    aluminum & magnesium hydroxide-simethicone (MAALOX) 200-200-20 MG/5ML suspension 30 mL, 30 mL, Oral, PRN, Edith Ahumada, MD    hydrOXYzine (VISTARIL) capsule 50 mg, 50 mg, Oral, TID PRN, Edith Ahumada, MD, 50 mg at 07/12/21 0945    haloperidol (HALDOL) tablet 5 mg, 5 mg, Oral, Q6H PRN **OR** haloperidol lactate (HALDOL) injection 5 mg, 5 mg, Intramuscular, Q6H PRN, Edith Ahumada, MD    nicotine (NICODERM CQ) 14 MG/24HR 1 patch, 1 patch, Transdermal, Daily, CHARO Barrera - CNP, 1 patch at 07/12/21 0944    paliperidone (INVEGA) extended release tablet 3 mg, 3 mg, Oral, BID, Ran Aguiar MD, 3 mg at 07/12/21 2117    albuterol (PROVENTIL) nebulizer solution 2.5 mg, 2.5 mg, Nebulization, Q4H PRN, Shilpi Nicole APRN - CNP      Examination:  BP (!) 92/54   Pulse 76   Temp 98.2 °F (36.8 °C) (Temporal)   Resp 15   Ht 5' 2\" (1.575 m)   Wt 117 lb (53.1 kg)   SpO2 100%   Breastfeeding No   BMI 21.40 kg/m²   Gait - steady  Medication side effects(SE): none reported    Mental Status Examination:    Level of consciousness:  within normal limits   Appearance:  poor grooming and poor hygiene  Behavior/Motor:  no abnormalities noted  Attitude toward examiner:  withdrawn  Speech:  normal rate and normal volume   Mood: depressed  Affect:  blunted  Thought processes:  poverty of thought   Thought content: Paranoid and delusional  Cognition:  oriented to person, place, and time   Concentration distractible  Insight poor   Judgement poor     ASSESSMENT:   Patient symptoms are:  [] Well controlled  [] Improving  [] Worsening  [x] No change      Diagnosis:   Principal Problem:    Severe episode of recurrent major depressive disorder, with psychotic features ([facility]  Resolved Problems:    * No resolved hospital problems. *      LABS:    No results for input(s): WBC, HGB, PLT in the last 72 hours. No results for input(s): NA, K, CL, CO2, BUN, CREATININE, GLUCOSE in the last 72 hours. No results for input(s): BILITOT, ALKPHOS, AST, ALT in the last 72 hours. Lab Results   Component Value Date    LABAMPH NOT DETECTED 07/02/2021    BARBSCNU NOT DETECTED 07/02/2021    LABBENZ NOT DETECTED 07/02/2021    LABMETH NOT DETECTED 07/02/2021    OPIATESCREENURINE NOT DETECTED 07/02/2021    PHENCYCLIDINESCREENURINE NOT DETECTED 07/02/2021    ETOH <10 07/02/2021     Lab Results   Component Value Date    TSH 1.770 03/07/2019     No results found for: LITHIUM  Lab Results   Component Value Date    VALPROATE 57 04/02/2021           Treatment Plan:  The patient's diagnosis, treatment plan, medication management were formulated after patient was seen directly by the attending physician and myself and all relevant documentation was reviewed. Reviewed current Medications with the patient. Risk, benefit, side effects, possible outcomes of the medication and alternatives discussed with the patient and the patient demonstrated understanding. The patient was also educated that the outcome of treatment will depend on the medication compliance as directed by the prescribers along with regular follow-up, compliance with the labs and other work-up, as clinically indicated. Increase Trileptal 600 mg twice daily  Continue Invega 3 mg twice daily for psychotic feature          Collateral information: Followed by social work  CD evaluation  Encourage patient to attend group and other milieu activities.   Discharge planning discussed with the patient and treatment team.    PSYCHOTHERAPY/COUNSELING:  [x] Therapeutic interview  [x] Supportive  [] CBT  [] Ongoing  [] Other    [x] Patient continues to need, on a daily basis, active treatment furnished directly by or requiring the supervision of inpatient psychiatric personnel      Anticipated Length of stay: 3 to 5 days based on stability     NOTE: This report was transcribed using voice recognition software. Every effort was made to ensure accuracy; however, inadvertent computerized transcription errors may be present.     Electronically signed by CHARO Blunt CNP on 6/07/1712 at 9:43 AM

## 2021-07-13 NOTE — PLAN OF CARE
Patient denies suicidal ideation, homicidal ideations and AVH. Patient denies anxiety and depression. Patient presents flat, sad, depressed, helpless with blunt responses and minimal eye contact. Presents calm and cooperative during assessment. Patient is isolative to room and does not appear to be  social with peers. Medications taken without issue. No complaints or concerns verbalized at this time. No unit problems reported. Will continue to observe and support.       Problem: Depressive Behavior With or Without Suicide Precautions:  Goal: Able to verbalize and/or display a decrease in depressive symptoms  Description: Able to verbalize and/or display a decrease in depressive symptoms  7/12/2021 2012 by Prosper Snyder RN  Outcome: Ongoing     Problem: Depressive Behavior With or Without Suicide Precautions:  Goal: Absence of self-harm  Description: Absence of self-harm  7/12/2021 2012 by Prosper Snyder RN  Outcome: Ongoing     Problem: Altered Mood, Deterioration in Function:  Goal: Ability to perform activities of daily living will improve  Description: Ability to perform activities of daily living will improve  7/12/2021 2012 by Prosper Snyder RN  Outcome: Ongoing     Problem: Altered Mood, Deterioration in Function:  Goal: Maintenance of adequate nutrition will improve  Description: Maintenance of adequate nutrition will improve  7/12/2021 2012 by Prosper Snyder RN  Outcome: Ongoing

## 2021-07-13 NOTE — PLAN OF CARE
Admits to SI but contracts on the unit  admits to feeling depressed   isolative to room in bed reminded of the therapeutic lock out but asked to sleep a bit longer this a.m.   cooperative with meds  will continue to monitor

## 2021-07-13 NOTE — CARE COORDINATION
Sw spoke with pt. Pt reported she is not doing good today because she is thinking about her family and \"all of the mistakes I made and how I am sorry. \" Emotional support provided. Sw spoke with pt about her discharge planning in regards to stepping down to [facility]. Pt reported she does not want to go there because \"they don't like me. \" pt could not give a reason as to why she feels they don't like her, did not indicate where else she would like to go stating \"I don't know right now. \"

## 2021-07-13 NOTE — CARE COORDINATION
Isabella received a call from pt  Andrew from [place]  inquiring if there is any update on pt discharge planning in regards to the ACT team or CSU. Informed Andrew that isabella will have more of an update after treatment team. Richard Quiros reported pt has a hx of denying going somewhere ie housing or [facility]SU but will not give a reason as to why she does not wish to reside there. He also reported pt will not be able to state where else she would like to go when denying placement for housing (at the [facility]) or at [facility]. Richard Quiros reported pt can return to the [facility] but is hoping that pt will step down to [facility] so the ACT team can assess pt and provide pt with more assistance and services.

## 2021-07-14 PROCEDURE — 1240000000 HC EMOTIONAL WELLNESS R&B

## 2021-07-14 PROCEDURE — 99232 SBSQ HOSP IP/OBS MODERATE 35: CPT | Performed by: NURSE PRACTITIONER

## 2021-07-14 PROCEDURE — 6370000000 HC RX 637 (ALT 250 FOR IP): Performed by: NURSE PRACTITIONER

## 2021-07-14 PROCEDURE — 6370000000 HC RX 637 (ALT 250 FOR IP): Performed by: PSYCHIATRY & NEUROLOGY

## 2021-07-14 RX ADMIN — TRAZODONE HYDROCHLORIDE 50 MG: 50 TABLET ORAL at 20:57

## 2021-07-14 RX ADMIN — OXCARBAZEPINE 450 MG: 150 TABLET, FILM COATED ORAL at 20:57

## 2021-07-14 RX ADMIN — OXCARBAZEPINE 450 MG: 150 TABLET, FILM COATED ORAL at 09:18

## 2021-07-14 RX ADMIN — PALIPERIDONE 3 MG: 3 TABLET, EXTENDED RELEASE ORAL at 09:18

## 2021-07-14 RX ADMIN — PALIPERIDONE 3 MG: 3 TABLET, EXTENDED RELEASE ORAL at 20:57

## 2021-07-14 ASSESSMENT — PAIN SCALES - GENERAL: PAINLEVEL_OUTOF10: 0

## 2021-07-14 NOTE — PLAN OF CARE
Admits to SI but contracts for safety  isolative to room in bed this a.m.  therapeutic lock out changed to 3 ours after each meal and maintained however pt becomes irritable when following lock out order   no interaction with peers  limited answers to staff  no eye contact   cooperative with meds  does not attend group in spite of much encouragement   will continue to monitor

## 2021-07-14 NOTE — PROGRESS NOTES
Attended morning community meeting. Updated on staffing and daily expectations. Shared goal for the day as to have hope.

## 2021-07-14 NOTE — PROGRESS NOTES
BEHAVIORAL HEALTH FOLLOW-UP NOTE     7/14/2021     Patient was seen and examined in person, Chart reviewed   Patient's case discussed with staff/team    Chief Complaint: Flat blunted and depressed    Interim History:   Patient is up on the unit complying with her 3-hour lockout. She is flat and blunted but states that she is no longer feeling suicidal.  She denies auditory visual hallucinations. She does not offer much conversation she keeps her self on the unit does not socialize with peers but is a attending groups.   She is medication compliant      Appetite:   [x] Normal/Unchanged  [] Increased  [] Decreased      Sleep:       [x] Normal/Unchanged  [] Fair       [] Poor              Energy:    [x] Normal/Unchanged  [] Increased  [] Decreased        SI [x] Present  [] Absent    HI  []Present  [x] Absent     Aggression:  [] yes  [x] no    Patient is [x] able  [] unable to CONTRACT FOR SAFETY     PAST MEDICAL/PSYCHIATRIC HISTORY:   Past Medical History:   Diagnosis Date    [facility] 12/22/2017    Anxiety     Asthma     Bipolar disorder ([facility]     Bronchitis     [facility]     Depression     Iron deficiency anemia     PTSD (post-traumatic stress disorder)     [facility]     Severe episode of recurrent major depressive disorder, without psychotic features ([facility] 11/16/2019    Substance abuse ([facility]     quit 7/2011  was [employer] user        FAMILY/SOCIAL HISTORY:  Family History   Problem Relation Age of Onset    Diabetes Mother     High Blood Pressure Mother     Kidney Disease Mother     Heart Failure Mother     Diabetes Father     Other Father         mesothelioma    Mental Illness Father         schizophrenia     Social History     Socioeconomic History    Marital status: Single     Spouse name: Not on file    Number of children: 0    Years of education: GED    Highest education level: Not on file   Occupational History    Occupation: unemployed     Comment: attempting to get disability for mental illness   Tobacco Use    Smoking status: Current Every Day Smoker     Packs/day: 0.50     Years: 20.00     Pack years: 10.00     Types: Cigarettes    Smokeless tobacco: Never Used   Vaping Use    Vaping Use: Former   Substance and Sexual Activity    Alcohol use: Yes    Drug use: Yes     Types: Marijuana     Comment: using marijuana daily    Sexual activity: Not Currently   Other Topics Concern    Not on file   Social History Narrative    Not on file     Social Determinants of Health     Financial Resource Strain:     Difficulty of Paying Living Expenses:    Food Insecurity:     Worried About Running Out of Food in the Last Year:     [address] in the Last Year:    Transportation Needs:     Lack of Transportation (Medical):  Lack of Transportation (Non-Medical):    Physical Activity:     Days of Exercise per Week:     Minutes of Exercise per Session:    Stress:     Feeling of Stress :    Social Connections:     Frequency of Communication with Friends and Family:     Frequency of Social Gatherings with Friends and Family:     Attends [employer] Services:     Active Member of Clubs or Organizations:     Attends Club or Organization Meetings:     Marital Status:    Intimate Partner Violence:     Fear of Current or Ex-Partner:     Emotionally Abused:     Physically Abused:     Sexually Abused:            ROS:  [x] All negative/unchanged except if checked.  Explain positive(checked items) below:  [] Constitutional  [] Eyes  [] Ear/Nose/Mouth/Throat  [] Respiratory  [] CV  [] GI  []   [] Musculoskeletal  [] Skin/Breast  [] Neurological  [] Endocrine  [] Heme/Lymph  [] Allergic/Immunologic    Explanation:     MEDICATIONS:    Current Facility-Administered Medications:     OXcarbazepine (TRILEPTAL) tablet 450 mg, 450 mg, Oral, BID, CHARO Puri CNP, 255 mg at 07/14/21 0918    traZODone (DESYREL) tablet 50 mg, 50 mg, Oral, Nightly, Kaylen Villeda MD, 50 mg at 07/13/21 2123    acetaminophen (TYLENOL) tablet 650 mg, 650 mg, Oral, Q6H PRN, Kaylen Villeda MD    magnesium hydroxide (MILK OF MAGNESIA) 400 MG/5ML suspension 30 mL, 30 mL, Oral, Daily PRN, Kaylen Villeda MD    aluminum & magnesium hydroxide-simethicone (MAALOX) 200-200-20 MG/5ML suspension 30 mL, 30 mL, Oral, PRN, Kaylen Villeda MD    hydrOXYzine (VISTARIL) capsule 50 mg, 50 mg, Oral, TID PRN, Kaylen Villeda MD, 50 mg at 07/12/21 0945    haloperidol (HALDOL) tablet 5 mg, 5 mg, Oral, Q6H PRN **OR** haloperidol lactate (HALDOL) injection 5 mg, 5 mg, Intramuscular, Q6H PRN, Kaylen Villeda MD    nicotine (NICODERM CQ) 14 MG/24HR 1 patch, 1 patch, Transdermal, Daily, Linus Martinez APRN - CNP, 1 patch at 07/13/21 0959    paliperidone (INVEGA) extended release tablet 3 mg, 3 mg, Oral, BID, Renetta Jhaveri MD, 3 mg at 07/14/21 0918    albuterol (PROVENTIL) nebulizer solution 2.5 mg, 2.5 mg, Nebulization, Q4H PRN, CHARO Parker - CNP      Examination:  /63   Pulse 73   Temp 98.3 °F (36.8 °C) (Temporal)   Resp 16   Ht 5' 2\" (1.575 m)   Wt 117 lb (53.1 kg)   SpO2 100%   Breastfeeding No   BMI 21.40 kg/m²   Gait - steady  Medication side effects(SE): none reported    Mental Status Examination:    Level of consciousness:  within normal limits   Appearance:  poor grooming and poor hygiene  Behavior/Motor:  no abnormalities noted  Attitude toward examiner:  withdrawn  Speech:  normal rate and normal volume   Mood: depressed  Affect:  blunted  Thought processes:  poverty of thought   Thought content: Paranoid and delusional  Cognition:  oriented to person, place, and time   Concentration distractible  Insight poor   Judgement poor     ASSESSMENT:   Patient symptoms are:  [] Well controlled  [] Improving  [] Worsening  [x] No change      Diagnosis:   Principal Problem:    Severe episode of recurrent major depressive disorder, with psychotic features ([facility]  Resolved Problems:    * No resolved hospital problems. *      LABS:    No results for input(s): WBC, HGB, PLT in the last 72 hours. No results for input(s): NA, K, CL, CO2, BUN, CREATININE, GLUCOSE in the last 72 hours. No results for input(s): BILITOT, ALKPHOS, AST, ALT in the last 72 hours. Lab Results   Component Value Date    LABAMPH NOT DETECTED 07/02/2021    BARBSCNU NOT DETECTED 07/02/2021    LABBENZ NOT DETECTED 07/02/2021    LABMETH NOT DETECTED 07/02/2021    OPIATESCREENURINE NOT DETECTED 07/02/2021    PHENCYCLIDINESCREENURINE NOT DETECTED 07/02/2021    ETOH <10 07/02/2021     Lab Results   Component Value Date    TSH 1.770 03/07/2019     No results found for: LITHIUM  Lab Results   Component Value Date    VALPROATE 57 04/02/2021           Treatment Plan:  The patient's diagnosis, treatment plan, medication management were formulated after patient was seen directly by the attending physician and myself and all relevant documentation was reviewed. Reviewed current Medications with the patient. Risk, benefit, side effects, possible outcomes of the medication and alternatives discussed with the patient and the patient demonstrated understanding. The patient was also educated that the outcome of treatment will depend on the medication compliance as directed by the prescribers along with regular follow-up, compliance with the labs and other work-up, as clinically indicated. Increase Trileptal 600 mg twice daily  Continue Invega 3 mg twice daily for psychotic feature          Collateral information: Followed by social work  CD evaluation  Encourage patient to attend group and other milieu activities.   Discharge planning discussed with the patient and treatment team.    PSYCHOTHERAPY/COUNSELING:  [x] Therapeutic interview  [x] Supportive  [] CBT  [] Ongoing  [] Other    [x] Patient continues to need, on a daily basis, active treatment furnished directly by or requiring the supervision of inpatient psychiatric personnel      Anticipated Length of stay: 3 to 5 days based on stability     NOTE: This report was transcribed using voice recognition software. Every effort was made to ensure accuracy; however, inadvertent computerized transcription errors may be present.     Electronically signed by CHARO Sim CNP on 2/63/3536 at 9:19 AM

## 2021-07-14 NOTE — PLAN OF CARE
Problem: Depressive Behavior With or Without Suicide Precautions:  Goal: Able to verbalize and/or display a decrease in depressive symptoms  Description: Able to verbalize and/or display a decrease in depressive symptoms  7/13/2021 2009 by Yajaira Alvarez RN  Outcome: Ongoing     Problem: Altered Mood, Deterioration in Function:  Goal: Ability to tolerate increased activity will improve  Description: Ability to tolerate increased activity will improve  7/13/2021 2009 by Yajaira Alvarez RN  Outcome: Ongoing     Patient is withdrawn to her room. Avoids eye contact during conversation. Has a flat affect and depressed mood. Minimal responses. States that she had an \"okay\" day. Reports having suicidal thoughts-no plan. Denies homicidal ideations and hallucinations at this time. Purposeful rounding continued.

## 2021-07-14 NOTE — CARE COORDINATION
Sally spoke with pt  Andrew. Andrew wanted an update on how pt is doing, informed Alex Klein that pt reported today that she is doing a little bit better but is still continuing to deny CSU. Alex Klein asked if she gave any other reason as to why she does not want to go to [facility]SU, informed Alex Klein that she reported she did not want to go because she feels they don't like her there. He stated that is part of pt delusions, reporting pt has self isolated in her room their before due to the [facility] [employer] telling her no one there liked her. He also reported pt has left the [facility] because the [facility] [employer] told her that she was getting evicted and needed to leave, that information was not true. Alex Klein reported pt does not have any access to weapons at the [facility] that he is aware of.

## 2021-07-14 NOTE — GROUP NOTE
Group Therapy Note    Date: 7/14/2021    Group Start Time: 1100  Group End Time: 1130  Group Topic: Cognitive Skills    [facility]    JEFRY Amin, NATHANW        Group Therapy Note    Attendees: 13         Patient's Goal:  To participate in the group discussion on how to apologize    Notes:  Pt was an active listener in group discussion. Pt sat outside of the group with her head down. Status After Intervention:  Unchanged    Participation Level:  Active Listener    Participation Quality: Appropriate      Speech:  normal      Thought Process/Content: Logical      Affective Functioning: Flat      Mood: depressed      Level of consciousness:  Alert and Oriented x4      Response to Learning: Able to verbalize current knowledge/experience, Able to verbalize/acknowledge new learning and Able to retain information      Endings: None Reported    Modes of Intervention: Education, Support, Socialization, Exploration, Clarifying and Problem-solving      Discipline Responsible: /Counselor      Signature:  JEFRY Rowell, LSMIGUELINA

## 2021-07-14 NOTE — GROUP NOTE
Group Therapy Note    Date: 7/14/2021    Group Start Time: 1000  Group End Time: 0269  Group Topic: Psychoeducation    [facility]    Amos [place], [address]                                                                        Group Therapy Note    Date: 7/14/2021  Type of Group: Psychoeducation    Wellness Binder Information  Module Name:id of stressors     Patient's Goal:  patient will be able to id daily and life events one is currently experiencing. Notes: pleasant and sharing in group. Able to participate appropriately. Status After Intervention:  Improved    Participation Level:  Active Listener and Interactive    Participation Quality: Appropriate, Attentive, Sharing, and Supportive      Speech:  normal       Thought Process/Content: Logical      Affective Functioning: Congruent      Mood: euthymic      Level of consciousness:  Alert, Oriented x4, and Attentive      Response to Learning: Able to verbalize/acknowledge new learning, Able to retain information, and Progressing to goal      Endings: None Reported    Modes of Intervention: Education, Support, Socialization, Exploration, and Problem-solving      Discipline Responsible: Psychoeducational Specialist      Signature:  [facility]

## 2021-07-15 PROCEDURE — 99232 SBSQ HOSP IP/OBS MODERATE 35: CPT | Performed by: NURSE PRACTITIONER

## 2021-07-15 PROCEDURE — 1240000000 HC EMOTIONAL WELLNESS R&B

## 2021-07-15 PROCEDURE — 6370000000 HC RX 637 (ALT 250 FOR IP): Performed by: PSYCHIATRY & NEUROLOGY

## 2021-07-15 PROCEDURE — 6370000000 HC RX 637 (ALT 250 FOR IP): Performed by: NURSE PRACTITIONER

## 2021-07-15 RX ADMIN — PALIPERIDONE 3 MG: 3 TABLET, EXTENDED RELEASE ORAL at 09:22

## 2021-07-15 RX ADMIN — TRAZODONE HYDROCHLORIDE 50 MG: 50 TABLET ORAL at 21:46

## 2021-07-15 RX ADMIN — OXCARBAZEPINE 450 MG: 150 TABLET, FILM COATED ORAL at 09:21

## 2021-07-15 RX ADMIN — PALIPERIDONE 3 MG: 3 TABLET, EXTENDED RELEASE ORAL at 21:46

## 2021-07-15 RX ADMIN — OXCARBAZEPINE 450 MG: 150 TABLET, FILM COATED ORAL at 21:46

## 2021-07-15 ASSESSMENT — PAIN SCALES - GENERAL
PAINLEVEL_OUTOF10: 0
PAINLEVEL_OUTOF10: 0

## 2021-07-15 NOTE — CARE COORDINATION
Contacted pt  Andrew to discuss pt discharge plan and to schedule apts. No answer, voicemail was left, sw to try again later.

## 2021-07-15 NOTE — PLAN OF CARE
Problem: Depressive Behavior With or Without Suicide Precautions:  Goal: Able to verbalize and/or display a decrease in depressive symptoms  Description: Able to verbalize and/or display a decrease in depressive symptoms  7/15/2021 1312 by Linden White RN  Outcome: Ongoing  7/15/2021 0521 by Shorty Schaefer RN  Outcome: Ongoing  Goal: Absence of self-harm  Description: Absence of self-harm  7/15/2021 1312 by Linden White RN  Outcome: Ongoing  7/15/2021 0521 by Shorty Schaefer RN  Outcome: Met This Shift            Patient isolative to self. No interaction with peers and staff. Remains on 3 hour lockouts with meals. Worried about her family because she loves them. Focus on that she is not depressed and doesn't need ECT. Denies suicidal and homicidal thoughts. Denies hallucinations. Will continue to observe and support.

## 2021-07-15 NOTE — CARE COORDINATION
Received a call from pt  Karan. Informed Itzel Bustamante that pt tentative dc date is set for Monday. Sw asked Itzel Bustamante if he would be kelley to provide transportation for pt, Itzel Bustamante reported he cannot but he will reach out to pt care manager to determine if she may be able to. If neither can, sw to schedule pt transportation through insurance. Per karan, he is working on setting up a time and date for pt to meet with the ACT team through [facility], reporting he is going to inquire if pt care manager can pick pt up Monday and transport her to this meeting. Itzel Bustamante reported he will call sw tomorrow with a discharge plan and also reports apts can be scheduled tomorrow as well.

## 2021-07-15 NOTE — PROGRESS NOTES
Self BEHAVIORAL HEALTH FOLLOW-UP NOTE     7/15/2021     Patient was seen and examined in person, Chart reviewed   Patient's case discussed with staff/team    Chief Complaint: Flat blunted and depressed    Interim History:   Patient is up on the unit more attending groups and complying with her 3-hour lockout. She is flat and blunted but states that she is no longer feeling suicidal.  She states that she has nobody that she can talk to for support in the community she states that she has no friends.   Although she states she is no longer suicidal she continues to be very flat blunted appears distressed and continues to have significant neurovegetative symptoms of depression    Appetite:   [x] Normal/Unchanged  [] Increased  [] Decreased      Sleep:       [x] Normal/Unchanged  [] Fair       [] Poor              Energy:    [x] Normal/Unchanged  [] Increased  [] Decreased        SI [] Present  [x] Absent    HI  []Present  [x] Absent     Aggression:  [] yes  [x] no    Patient is [x] able  [] unable to CONTRACT FOR SAFETY     PAST MEDICAL/PSYCHIATRIC HISTORY:   Past Medical History:   Diagnosis Date    Acute psychosis ([facility] 12/22/2017    Anxiety     Asthma     Bipolar disorder ([facility]     Bronchitis     COPD (chronic obstructive pulmonary disease) (HCC)     Depression     Iron deficiency anemia     PTSD (post-traumatic stress disorder)     Schizo affective schizophrenia ([facility]     Severe episode of recurrent major depressive disorder, without psychotic features ([facility] 11/16/2019    [facility]     quit 7/2011  was [employer] user        FAMILY/SOCIAL HISTORY:  Family History   Problem Relation Age of Onset    Diabetes Mother     High Blood Pressure Mother     Kidney Disease Mother     Heart Failure Mother     Diabetes Father     Other Father         mesothelioma    Mental Illness Father         schizophrenia     Social History     Socioeconomic History    Marital status: Single     Spouse name: Not on file    Number of children: 0    Years of education: GED    Highest education level: Not on file   Occupational History    Occupation: unemployed     Comment: attempting to get disability for mental illness   Tobacco Use    Smoking status: Current Every Day Smoker     Packs/day: 0.50     Years: 20.00     Pack years: 10.00     Types: Cigarettes    Smokeless tobacco: Never Used   Vaping Use    Vaping Use: Former   Substance and Sexual Activity    Alcohol use: Yes    Drug use: Yes     Types: Marijuana     Comment: using marijuana daily    Sexual activity: Not Currently   Other Topics Concern    Not on file   Social History Narrative    Not on file     Social Determinants of Health     Financial Resource Strain:     Difficulty of Paying Living Expenses:    Food Insecurity:     Worried About Running Out of Food in the Last Year:     [address] in the Last Year:    Transportation Needs:     Lack of Transportation (Medical):  Lack of Transportation (Non-Medical):    Physical Activity:     Days of Exercise per Week:     Minutes of Exercise per Session:    Stress:     Feeling of Stress :    Social Connections:     Frequency of Communication with Friends and Family:     Frequency of Social Gatherings with Friends and Family:     Attends Sikh Services:     Active Member of Clubs or Organizations:     Attends Club or Organization Meetings:     Marital Status:    Intimate Partner Violence:     Fear of Current or Ex-Partner:     Emotionally Abused:     Physically Abused:     Sexually Abused:            ROS:  [x] All negative/unchanged except if checked.  Explain positive(checked items) below:  [] Constitutional  [] Eyes  [] Ear/Nose/Mouth/Throat  [] Respiratory  [] CV  [] GI  []   [] Musculoskeletal  [] Skin/Breast  [] Neurological  [] Endocrine  [] Heme/Lymph  [] Allergic/Immunologic    Explanation:     MEDICATIONS:    Current Facility-Administered Medications:    OXcarbazepine (TRILEPTAL) tablet 450 mg, 450 mg, Oral, BID, Tonya Middleton, APRN - CNP, 303 mg at 07/15/21 0921    traZODone (DESYREL) tablet 50 mg, 50 mg, Oral, Nightly, Edith Ahumada, MD, 50 mg at 07/14/21 2057    acetaminophen (TYLENOL) tablet 650 mg, 650 mg, Oral, Q6H PRN, Edith Ahumada, MD    magnesium hydroxide (MILK OF MAGNESIA) 400 MG/5ML suspension 30 mL, 30 mL, Oral, Daily PRN, Edith Ahumada, MD    aluminum & magnesium hydroxide-simethicone (MAALOX) 200-200-20 MG/5ML suspension 30 mL, 30 mL, Oral, PRN, Edith Ahumada, MD    hydrOXYzine (VISTARIL) capsule 50 mg, 50 mg, Oral, TID PRN, Edith Ahumada, MD, 50 mg at 07/12/21 0945    haloperidol (HALDOL) tablet 5 mg, 5 mg, Oral, Q6H PRN **OR** haloperidol lactate (HALDOL) injection 5 mg, 5 mg, Intramuscular, Q6H PRN, Edith Ahumada, MD    nicotine (NICODERM CQ) 14 MG/24HR 1 patch, 1 patch, Transdermal, Daily, Magaly Zavala, APRN - CNP, 1 patch at 07/15/21 1294    paliperidone (INVEGA) extended release tablet 3 mg, 3 mg, Oral, BID, Ran Aguiar MD, 3 mg at 07/15/21 6376    albuterol (PROVENTIL) nebulizer solution 2.5 mg, 2.5 mg, Nebulization, Q4H PRN, Shilpi Nicole APRN - CNP      Examination:  /68   Pulse 72   Temp 98.6 °F (37 °C) (Oral)   Resp 15   Ht 5' 2\" (1.575 m)   Wt 117 lb (53.1 kg)   SpO2 100%   Breastfeeding No   BMI 21.40 kg/m²   Gait - steady  Medication side effects(SE): none reported    Mental Status Examination:    Level of consciousness:  within normal limits   Appearance:  poor grooming and poor hygiene  Behavior/Motor:  no abnormalities noted  Attitude toward examiner:  withdrawn  Speech:  normal rate and normal volume   Mood: depressed  Affect:  blunted  Thought processes:  poverty of thought   Thought content: Paranoid and delusional  Cognition:  oriented to person, place, and time   Concentration distractible  Insight poor   Judgement poor     ASSESSMENT:   Patient symptoms are:  [] Well controlled  [] Improving  [] Worsening  [x] No change      Diagnosis:   Principal Problem:    Severe episode of recurrent major depressive disorder, with psychotic features ([facility]  Resolved Problems:    * No resolved hospital problems. *      LABS:    No results for input(s): WBC, HGB, PLT in the last 72 hours. No results for input(s): NA, K, CL, CO2, BUN, CREATININE, GLUCOSE in the last 72 hours. No results for input(s): BILITOT, ALKPHOS, AST, ALT in the last 72 hours. Lab Results   Component Value Date    LABAMPH NOT DETECTED 07/02/2021    BARBSCNU NOT DETECTED 07/02/2021    LABBENZ NOT DETECTED 07/02/2021    LABMETH NOT DETECTED 07/02/2021    OPIATESCREENURINE NOT DETECTED 07/02/2021    PHENCYCLIDINESCREENURINE NOT DETECTED 07/02/2021    ETOH <10 07/02/2021     Lab Results   Component Value Date    TSH 1.770 03/07/2019     No results found for: LITHIUM  Lab Results   Component Value Date    VALPROATE 57 04/02/2021           Treatment Plan:  The patient's diagnosis, treatment plan, medication management were formulated after patient was seen directly by the attending physician and myself and all relevant documentation was reviewed. Reviewed current Medications with the patient. Risk, benefit, side effects, possible outcomes of the medication and alternatives discussed with the patient and the patient demonstrated understanding. The patient was also educated that the outcome of treatment will depend on the medication compliance as directed by the prescribers along with regular follow-up, compliance with the labs and other work-up, as clinically indicated. Continue Trileptal 600 mg twice daily  Continue Invega 3 mg twice daily for psychotic feature          Collateral information: Followed by social work  CD evaluation  Encourage patient to attend group and other milieu activities.   Discharge planning discussed with the patient and treatment team.    PSYCHOTHERAPY/COUNSELING:  [x] Therapeutic interview  [x] Supportive  [] CBT  [] Ongoing  [] Other    [x] Patient continues to need, on a daily basis, active treatment furnished directly by or requiring the supervision of inpatient psychiatric personnel      Anticipated Length of stay: 3 to 5 days based on stability     NOTE: This report was transcribed using voice recognition software. Every effort was made to ensure accuracy; however, inadvertent computerized transcription errors may be present.     Electronically signed by CHARO Kay CNP on 1/52/0516 at 9:24 AM

## 2021-07-15 NOTE — PLAN OF CARE
Patient reports a deathwish and states \"I wish I could go to sleep and not wake up. \" Denies SI, HI, and hallucinations. Patient is flat, blunt, and has poor eye contact. Isolative to room this evening. Encouraged patient to come out onto the unit for snack. No complaints or concerns verbalized at this time. No behavioral issues or PRNs administered. Will continue to monitor and offer support.         Problem: Depressive Behavior With or Without Suicide Precautions:  Goal: Absence of self-harm  Description: Absence of self-harm  7/14/2021 2008 by Huber Laurent RN  Outcome: Met This Shift     Problem: Depressive Behavior With or Without Suicide Precautions:  Goal: Able to verbalize and/or display a decrease in depressive symptoms  Description: Able to verbalize and/or display a decrease in depressive symptoms  7/14/2021 2008 by Huebr Laurent RN  Outcome: Not Met This Shift     Problem: Altered Mood, Deterioration in Function:  Goal: Ability to perform activities of daily living will improve  Description: Ability to perform activities of daily living will improve  7/14/2021 2008 by Huber Laurent RN  Outcome: Not Met This Shift     Problem: Altered Mood, Deterioration in Function:  Goal: Ability to tolerate increased activity will improve  Description: Ability to tolerate increased activity will improve  7/14/2021 2008 by Huber Laurent RN  Outcome: Not Met This Shift         Electronically signed by Huber Laurent RN on 7/14/2021 at 8:10 PM

## 2021-07-15 NOTE — GROUP NOTE
Group Therapy Note    Date: 7/15/2021    Group Start Time: 1000  Group End Time: 6613  Group Topic: Psychoeducation    [facility]    Merry Candelario, CTRS        Group Therapy Note    Number of participants: 12  Type of group: Psychoeducation  Mode of intervention: Education, Support, Socialization, Exploration, Clarifying, and Problem-solving  Topic: Time Management Tips  Objective: Pt will identify 1 way to improve time management skills in recovery. Patient's Goal:  \"have hope\"     Notes:  Pt offered minimal interaction during group but was an active listener throughout. Accepting of handout and support from peers. Status After Intervention:  Unchanged    Participation Level:  Active Listener and Minimal    Participation Quality: Appropriate and Attentive      Speech:  normal      Thought Process/Content: Logical      Affective Functioning: Flat      Mood: depressed      Level of consciousness:  Alert, Oriented x4 and Attentive      Response to Learning: Able to verbalize current knowledge/experience, Able to verbalize/acknowledge new learning, Able to retain information, Capable of insight, Able to change behavior and Progressing to goal      Endings: None Reported    Modes of Intervention: Education, Support, Socialization, Exploration, Clarifying and Problem-solving

## 2021-07-16 PROCEDURE — 99231 SBSQ HOSP IP/OBS SF/LOW 25: CPT | Performed by: NURSE PRACTITIONER

## 2021-07-16 PROCEDURE — 1240000000 HC EMOTIONAL WELLNESS R&B

## 2021-07-16 PROCEDURE — 6370000000 HC RX 637 (ALT 250 FOR IP): Performed by: PSYCHIATRY & NEUROLOGY

## 2021-07-16 PROCEDURE — 6370000000 HC RX 637 (ALT 250 FOR IP): Performed by: NURSE PRACTITIONER

## 2021-07-16 RX ADMIN — PALIPERIDONE 3 MG: 3 TABLET, EXTENDED RELEASE ORAL at 22:03

## 2021-07-16 RX ADMIN — TRAZODONE HYDROCHLORIDE 50 MG: 50 TABLET ORAL at 22:03

## 2021-07-16 RX ADMIN — OXCARBAZEPINE 450 MG: 150 TABLET, FILM COATED ORAL at 22:03

## 2021-07-16 RX ADMIN — OXCARBAZEPINE 450 MG: 150 TABLET, FILM COATED ORAL at 09:27

## 2021-07-16 RX ADMIN — PALIPERIDONE 3 MG: 3 TABLET, EXTENDED RELEASE ORAL at 09:27

## 2021-07-16 ASSESSMENT — PAIN SCALES - GENERAL: PAINLEVEL_OUTOF10: 0

## 2021-07-16 NOTE — CARE COORDINATION
Received a call from Dominick Choi at [address] [facility] reported pt has an apt with ACT team on Monday at 1 pm and can be picked up around noon to be transported to that appointment. Sw informed Dominick Comment that pt may not be discharging on Monday at this time due to pt behaviors and pt barely taking care of herself. Dominick Comment stated pt often doesn't take care of herself ie doesn't buy herself food, wears the same clothes consistently, doesn't take care of herself, and always believes people are out to get her or are talking bad about her.  Dominick Comment requesting to be updated as soon as a discharge date and plan is in place    [address] and med management apts made at [place]. `

## 2021-07-16 NOTE — PLAN OF CARE
Problem: Depressive Behavior With or Without Suicide Precautions:  Goal: Able to verbalize and/or display a decrease in depressive symptoms  Description: Able to verbalize and/or display a decrease in depressive symptoms  Outcome: Ongoing     Problem: Altered Mood, Deterioration in Function:  Goal: Ability to perform activities of daily living will improve  Description: Ability to perform activities of daily living will improve  7/16/2021 1741 by Mark Sky RN  Outcome: Ongoing     Problem: Altered Mood, Deterioration in Function:  Goal: Ability to tolerate increased activity will improve  Description: Ability to tolerate increased activity will improve  Outcome: Ongoing   Pt continues to have sad affect and depressed mood. She states that she feels very sad but denies any suicidal ideations. She speaks very softly. She states that she feels very tired also.

## 2021-07-16 NOTE — PROGRESS NOTES
Self BEHAVIORAL HEALTH FOLLOW-UP NOTE     7/16/2021     Patient was seen and examined in person, Chart reviewed   Patient's case discussed with staff/team    Chief Complaint: Flat blunted and depressed    Interim History:   Observed in her room this afternoon. Per staff patient is up on the unit more attending groups and complying with her 3-hour lockout. She is flat and blunted but states that she is no longer feeling suicidal.  She states that she has nobody that she can talk to for support in the community she states that she has no friends. Although she states she is no longer suicidal she continues to be very flat blunted appears distressed and continues to have significant neurovegetative symptoms of depression. However tells me that she is doing better.      Appetite:   [x] Normal/Unchanged  [] Increased  [] Decreased      Sleep:       [x] Normal/Unchanged  [] Fair       [] Poor              Energy:    [x] Normal/Unchanged  [] Increased  [] Decreased        SI [] Present  [x] Absent    HI  []Present  [x] Absent     Aggression:  [] yes  [x] no    Patient is [x] able  [] unable to CONTRACT FOR SAFETY     PAST MEDICAL/PSYCHIATRIC HISTORY:   Past Medical History:   Diagnosis Date    Acute psychosis ([facility] [address] 12/22/2017    Anxiety     Asthma     Bipolar disorder ([facility] [address]     Bronchitis     COPD (chronic obstructive pulmonary disease) (HCC)     Depression     Iron deficiency anemia     PTSD (post-traumatic stress disorder)     Schizo affective schizophrenia ([address]     Severe episode of recurrent major depressive disorder, without psychotic features ([facility] [address] 11/16/2019    Substance abuse ([facility] [address]     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        FAMILY/SOCIAL HISTORY:  Family History   Problem Relation Age of Onset    Diabetes Mother     High Blood Pressure Mother     Kidney Disease Mother     Heart Failure Mother     Diabetes Father     Other Father         mesothelioma    Mental Illness Father schizophrenia     Social History     Socioeconomic History    Marital status: Single     Spouse name: Not on file    Number of children: 0    Years of education: GED    Highest education level: Not on file   Occupational History    Occupation: unemployed     Comment: attempting to get disability for mental illness   Tobacco Use    Smoking status: Current Every Day Smoker     Packs/day: 0.50     Years: 20.00     Pack years: 10.00     Types: Cigarettes    Smokeless tobacco: Never Used   Vaping Use    Vaping Use: Former   Substance and Sexual Activity    Alcohol use: Yes    Drug use: Yes     Types: Marijuana     Comment: using marijuana daily    Sexual activity: Not Currently   Other Topics Concern    Not on file   Social History Narrative    Not on file     Social Determinants of Health     Financial Resource Strain:     Difficulty of Paying Living Expenses:    Food Insecurity:     Worried About Running Out of Food in the Last Year:     [address] in the Last Year:    Transportation Needs:     Lack of Transportation (Medical):  Lack of Transportation (Non-Medical):    Physical Activity:     Days of Exercise per Week:     Minutes of Exercise per Session:    Stress:     Feeling of Stress :    Social Connections:     Frequency of Communication with Friends and Family:     Frequency of Social Gatherings with Friends and Family:     Attends Voodoo Services:     Active Member of Clubs or Organizations:     Attends Club or Organization Meetings:     Marital Status:    Intimate Partner Violence:     Fear of Current or Ex-Partner:     Emotionally Abused:     Physically Abused:     Sexually Abused:            ROS:  [x] All negative/unchanged except if checked.  Explain positive(checked items) below:  [] Constitutional  [] Eyes  [] Ear/Nose/Mouth/Throat  [] Respiratory  [] CV  [] GI  []   [] Musculoskeletal  [] Skin/Breast  [] Neurological  [] Endocrine  [] Heme/Lymph  [] Allergic/Immunologic    Explanation:     MEDICATIONS:    Current Facility-Administered Medications:     OXcarbazepine (TRILEPTAL) tablet 450 mg, 450 mg, Oral, BID, CHARO Pandya CNP, 108 mg at 07/16/21 4778    traZODone (DESYREL) tablet 50 mg, 50 mg, Oral, Nightly, Rachel Crockett MD, 50 mg at 07/15/21 2146    acetaminophen (TYLENOL) tablet 650 mg, 650 mg, Oral, Q6H PRN, Rachel Crockett MD    magnesium hydroxide (MILK OF MAGNESIA) 400 MG/5ML suspension 30 mL, 30 mL, Oral, Daily PRN, Rachel Crockett MD    aluminum & magnesium hydroxide-simethicone (MAALOX) 200-200-20 MG/5ML suspension 30 mL, 30 mL, Oral, PRN, Rachel Crockett MD    hydrOXYzine (VISTARIL) capsule 50 mg, 50 mg, Oral, TID PRN, Rachel Crockett MD, 50 mg at 07/12/21 0945    haloperidol (HALDOL) tablet 5 mg, 5 mg, Oral, Q6H PRN **OR** haloperidol lactate (HALDOL) injection 5 mg, 5 mg, Intramuscular, Q6H PRN, Rachel Crockett MD    nicotine (NICODERM CQ) 14 MG/24HR 1 patch, 1 patch, Transdermal, Daily, Anders Leach APRN - CNP, 1 patch at 07/16/21 2262    paliperidone (INVEGA) extended release tablet 3 mg, 3 mg, Oral, BID, Angie Frey MD, 3 mg at 07/16/21 0927    albuterol (PROVENTIL) nebulizer solution 2.5 mg, 2.5 mg, Nebulization, Q4H PRN, Oscar Mcgill APRN - CNP      Examination:  BP (!) 92/55   Pulse 78   Temp 98.5 °F (36.9 °C) (Oral)   Resp 14   Ht 5' 2\" (1.575 m)   Wt 117 lb (53.1 kg)   SpO2 100%   Breastfeeding No   BMI 21.40 kg/m²   Gait - steady  Medication side effects(SE): none reported    Mental Status Examination:    Level of consciousness:  within normal limits   Appearance:  poor grooming and poor hygiene  Behavior/Motor:  no abnormalities noted  Attitude toward examiner:  withdrawn  Speech:  normal rate and normal volume   Mood: depressed  Affect:  blunted  Thought processes:  poverty of thought   Thought content: Paranoid and delusional  Cognition:  oriented to person, place, and time Concentration distractible  Insight poor   Judgement poor     ASSESSMENT:   Patient symptoms are:  [] Well controlled  [] Improving  [] Worsening  [x] No change      Diagnosis:   Principal Problem:    Severe episode of recurrent major depressive disorder, with psychotic features ([address]  Resolved Problems:    * No resolved hospital problems. *      LABS:    No results for input(s): WBC, HGB, PLT in the last 72 hours. No results for input(s): NA, K, CL, CO2, BUN, CREATININE, GLUCOSE in the last 72 hours. No results for input(s): BILITOT, ALKPHOS, AST, ALT in the last 72 hours. Lab Results   Component Value Date    LABAMPH NOT DETECTED 07/02/2021    BARBSCNU NOT DETECTED 07/02/2021    LABBENZ NOT DETECTED 07/02/2021    LABMETH NOT DETECTED 07/02/2021    OPIATESCREENURINE NOT DETECTED 07/02/2021    PHENCYCLIDINESCREENURINE NOT DETECTED 07/02/2021    ETOH <10 07/02/2021     Lab Results   Component Value Date    TSH 1.770 03/07/2019     No results found for: LITHIUM  Lab Results   Component Value Date    VALPROATE 57 04/02/2021           Treatment Plan:  The patient's diagnosis, treatment plan, medication management were formulated after patient was seen directly by the attending physician and myself and all relevant documentation was reviewed. Reviewed current Medications with the patient. Risk, benefit, side effects, possible outcomes of the medication and alternatives discussed with the patient and the patient demonstrated understanding. The patient was also educated that the outcome of treatment will depend on the medication compliance as directed by the prescribers along with regular follow-up, compliance with the labs and other work-up, as clinically indicated. Continue Trileptal 600 mg twice daily  Continue Invega 3 mg twice daily for psychotic feature          Collateral information: Followed by social work  CD evaluation  Encourage patient to attend group and other milieu activities.   Discharge planning discussed with the patient and treatment team.    PSYCHOTHERAPY/COUNSELING:  [x] Therapeutic interview  [x] Supportive  [] CBT  [] Ongoing  [] Other    [x] Patient continues to need, on a daily basis, active treatment furnished directly by or requiring the supervision of inpatient psychiatric personnel      Anticipated Length of stay: 3 to 5 days based on stability     NOTE: This report was transcribed using voice recognition software. Every effort was made to ensure accuracy; however, inadvertent computerized transcription errors may be present.     Electronically signed by CHARO Salcedo CNP on 7/16/2021 at 2:31 PM

## 2021-07-16 NOTE — PLAN OF CARE
Problem: Altered Mood, Deterioration in Function:  Goal: Ability to perform activities of daily living will improve  Description: Ability to perform activities of daily living will improve  Outcome: Ongoing  Goal: Maintenance of adequate nutrition will improve  Description: Maintenance of adequate nutrition will improve  Outcome: Ongoing   Pt denies si/hi and hallucinations. Pt is isolative to her room spending most of her day in bed. Pt states she has to much anxiety to come out of her room. Pt has a 3 hour lock out with meals so she refused to come out of her room for breakfast this morning. Pt has a flat affect. Pt takes med's and attended 2 groups yesterday.

## 2021-07-17 ENCOUNTER — APPOINTMENT (OUTPATIENT)
Dept: CT IMAGING | Age: 46
DRG: 751 | End: 2021-07-17
Payer: COMMERCIAL

## 2021-07-17 PROCEDURE — 99231 SBSQ HOSP IP/OBS SF/LOW 25: CPT | Performed by: NURSE PRACTITIONER

## 2021-07-17 PROCEDURE — 6370000000 HC RX 637 (ALT 250 FOR IP): Performed by: PSYCHIATRY & NEUROLOGY

## 2021-07-17 PROCEDURE — 70450 CT HEAD/BRAIN W/O DYE: CPT

## 2021-07-17 PROCEDURE — 6370000000 HC RX 637 (ALT 250 FOR IP): Performed by: NURSE PRACTITIONER

## 2021-07-17 PROCEDURE — 1240000000 HC EMOTIONAL WELLNESS R&B

## 2021-07-17 RX ADMIN — OXCARBAZEPINE 450 MG: 150 TABLET, FILM COATED ORAL at 09:39

## 2021-07-17 RX ADMIN — TRAZODONE HYDROCHLORIDE 50 MG: 50 TABLET ORAL at 20:45

## 2021-07-17 RX ADMIN — PALIPERIDONE 3 MG: 3 TABLET, EXTENDED RELEASE ORAL at 20:45

## 2021-07-17 RX ADMIN — OXCARBAZEPINE 450 MG: 150 TABLET, FILM COATED ORAL at 20:45

## 2021-07-17 RX ADMIN — PALIPERIDONE 3 MG: 3 TABLET, EXTENDED RELEASE ORAL at 09:40

## 2021-07-17 ASSESSMENT — PAIN SCALES - GENERAL
PAINLEVEL_OUTOF10: 0
PAINLEVEL_OUTOF10: 0

## 2021-07-17 NOTE — PLAN OF CARE
Problem: Altered Mood, Deterioration in Function:  Goal: Ability to perform activities of daily living will improve  Description: Ability to perform activities of daily living will improve  Outcome: Ongoing     Problem: Depressive Behavior With or Without Suicide Precautions:  Goal: Able to verbalize and/or display a decrease in depressive symptoms  Description: Able to verbalize and/or display a decrease in depressive symptoms  Outcome: Ongoing

## 2021-07-17 NOTE — PROGRESS NOTES
Self BEHAVIORAL HEALTH FOLLOW-UP NOTE     7/17/2021     Patient was seen and examined in person, Chart reviewed   Patient's case discussed with staff/team    Chief Complaint: Flat blunted and depressed    Interim History:   Observed in her room this afternoon. Per staff patient is up on the unit more attending groups and complying with her 3-hour lockout. She is flat and blunted but states that she is no longer feeling suicidal.  She states that she has nobody that she can talk to for support in the community she states that she has no friends. Although she states she is no longer suicidal she continues to be very flat blunted appears distressed and continues to have significant neurovegetative symptoms of depression. However tells me that she is doing better. She continues to remain with low energy, low motivation and seems somewhat confused.   Will obtain CT due to change in mental status, will obtain CMP folate and B12 levels    Appetite:   [x] Normal/Unchanged  [] Increased  [] Decreased      Sleep:       [x] Normal/Unchanged  [] Fair       [] Poor              Energy:    [x] Normal/Unchanged  [] Increased  [] Decreased        SI [] Present  [x] Absent    HI  []Present  [x] Absent     Aggression:  [] yes  [x] no    Patient is [x] able  [] unable to CONTRACT FOR SAFETY     PAST MEDICAL/PSYCHIATRIC HISTORY:   Past Medical History:   Diagnosis Date    Acute psychosis ([facility] [address] 12/22/2017    Anxiety     Asthma     Bipolar disorder ([facility] [address]     Bronchitis     COPD (chronic obstructive pulmonary disease) (HCC)     Depression     Iron deficiency anemia     PTSD (post-traumatic stress disorder)     Schizo affective schizophrenia ([facility] [address]     Severe episode of recurrent major depressive disorder, without psychotic features ([facility] [address] 11/16/2019    Substance abuse ([facility] [address]     quit 7/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        FAMILY/SOCIAL HISTORY:  Family History   Problem Relation Age of Onset    Diabetes Mother     High Blood Pressure Mother     Kidney Disease Mother     Heart Failure Mother     Diabetes Father     Other Father         mesothelioma    Mental Illness Father         schizophrenia     Social History     Socioeconomic History    Marital status: Single     Spouse name: Not on file    Number of children: 0    Years of education: GED    Highest education level: Not on file   Occupational History    Occupation: unemployed     Comment: attempting to get disability for mental illness   Tobacco Use    Smoking status: Current Every Day Smoker     Packs/day: 0.50     Years: 20.00     Pack years: 10.00     Types: Cigarettes    Smokeless tobacco: Never Used   Vaping Use    Vaping Use: Former   Substance and Sexual Activity    Alcohol use: Yes    Drug use: Yes     Types: Marijuana     Comment: using marijuana daily    Sexual activity: Not Currently   Other Topics Concern    Not on file   Social History Narrative    Not on file     Social Determinants of Health     Financial Resource Strain:     Difficulty of Paying Living Expenses:    Food Insecurity:     Worried About Running Out of Food in the Last Year:     [address] in the Last Year:    Transportation Needs:     Lack of Transportation (Medical):  Lack of Transportation (Non-Medical):    Physical Activity:     Days of Exercise per Week:     Minutes of Exercise per Session:    Stress:     Feeling of Stress :    Social Connections:     Frequency of Communication with Friends and Family:     Frequency of Social Gatherings with Friends and Family:     Attends Baptism Services:     Active Member of Clubs or Organizations:     Attends Club or Organization Meetings:     Marital Status:    Intimate Partner Violence:     Fear of Current or Ex-Partner:     Emotionally Abused:     Physically Abused:     Sexually Abused:            ROS:  [x] All negative/unchanged except if checked.  Explain positive(checked items) below:  [] Constitutional  [] blunted  Thought processes:  poverty of thought   Thought content: Paranoid and delusional  Cognition:  oriented to person, place, and time   Concentration distractible  Insight poor   Judgement poor     ASSESSMENT:   Patient symptoms are:  [] Well controlled  [] Improving  [] Worsening  [x] No change      Diagnosis:   Principal Problem:    Severe episode of recurrent major depressive disorder, with psychotic features ([facility] [address]  Resolved Problems:    * No resolved hospital problems. *      LABS:    No results for input(s): WBC, HGB, PLT in the last 72 hours. No results for input(s): NA, K, CL, CO2, BUN, CREATININE, GLUCOSE in the last 72 hours. No results for input(s): BILITOT, ALKPHOS, AST, ALT in the last 72 hours. Lab Results   Component Value Date    LABAMPH NOT DETECTED 07/02/2021    BARBSCNU NOT DETECTED 07/02/2021    LABBENZ NOT DETECTED 07/02/2021    LABMETH NOT DETECTED 07/02/2021    OPIATESCREENURINE NOT DETECTED 07/02/2021    PHENCYCLIDINESCREENURINE NOT DETECTED 07/02/2021    ETOH <10 07/02/2021     Lab Results   Component Value Date    TSH 1.770 03/07/2019     No results found for: LITHIUM  Lab Results   Component Value Date    VALPROATE 57 04/02/2021           Treatment Plan:  The patient's diagnosis, treatment plan, medication management were formulated after patient was seen directly by the attending physician and myself and all relevant documentation was reviewed. Reviewed current Medications with the patient. Risk, benefit, side effects, possible outcomes of the medication and alternatives discussed with the patient and the patient demonstrated understanding. The patient was also educated that the outcome of treatment will depend on the medication compliance as directed by the prescribers along with regular follow-up, compliance with the labs and other work-up, as clinically indicated.     Continue Trileptal 450 mg twice daily consider decreasing dose due to patient's increased fatigue  Continue Invega 3 mg twice daily for psychotic feature    Obtain CMP  Obtain folate and B12 levels  Obtain CT due to change in mental status      Collateral information: Followed by social work  CD evaluation  Encourage patient to attend group and other milieu activities. Discharge planning discussed with the patient and treatment team.    PSYCHOTHERAPY/COUNSELING:  [x] Therapeutic interview  [x] Supportive  [] CBT  [] Ongoing  [] Other    [x] Patient continues to need, on a daily basis, active treatment furnished directly by or requiring the supervision of inpatient psychiatric personnel      Anticipated Length of stay: 3 to 5 days based on stability     NOTE: This report was transcribed using voice recognition software. Every effort was made to ensure accuracy; however, inadvertent computerized transcription errors may be present.     Electronically signed by CHARO Zurita CNP on 7/17/2021 at 12:32 PM

## 2021-07-18 LAB
ALBUMIN SERPL-MCNC: 3.8 G/DL (ref 3.5–5.2)
ALP BLD-CCNC: 71 U/L (ref 35–104)
ALT SERPL-CCNC: 52 U/L (ref 0–32)
ANION GAP SERPL CALCULATED.3IONS-SCNC: 9 MMOL/L (ref 7–16)
AST SERPL-CCNC: 34 U/L (ref 0–31)
BILIRUB SERPL-MCNC: 0.3 MG/DL (ref 0–1.2)
BUN BLDV-MCNC: 15 MG/DL (ref 6–20)
CALCIUM SERPL-MCNC: 9.3 MG/DL (ref 8.6–10.2)
CHLORIDE BLD-SCNC: 104 MMOL/L (ref 98–107)
CO2: 26 MMOL/L (ref 22–29)
CREAT SERPL-MCNC: 0.7 MG/DL (ref 0.5–1)
FOLATE: 7.7 NG/ML (ref 4.8–24.2)
GFR AFRICAN AMERICAN: >60
GFR NON-AFRICAN AMERICAN: >60 ML/MIN/1.73
GLUCOSE BLD-MCNC: 88 MG/DL (ref 74–99)
POTASSIUM SERPL-SCNC: 4.3 MMOL/L (ref 3.5–5)
SODIUM BLD-SCNC: 139 MMOL/L (ref 132–146)
TOTAL PROTEIN: 6.9 G/DL (ref 6.4–8.3)
VITAMIN B-12: 515 PG/ML (ref 211–946)

## 2021-07-18 PROCEDURE — 6370000000 HC RX 637 (ALT 250 FOR IP): Performed by: NURSE PRACTITIONER

## 2021-07-18 PROCEDURE — 82746 ASSAY OF FOLIC ACID SERUM: CPT

## 2021-07-18 PROCEDURE — 6370000000 HC RX 637 (ALT 250 FOR IP): Performed by: PSYCHIATRY & NEUROLOGY

## 2021-07-18 PROCEDURE — 36415 COLL VENOUS BLD VENIPUNCTURE: CPT

## 2021-07-18 PROCEDURE — 82607 VITAMIN B-12: CPT

## 2021-07-18 PROCEDURE — 99231 SBSQ HOSP IP/OBS SF/LOW 25: CPT | Performed by: NURSE PRACTITIONER

## 2021-07-18 PROCEDURE — 1240000000 HC EMOTIONAL WELLNESS R&B

## 2021-07-18 PROCEDURE — 80053 COMPREHEN METABOLIC PANEL: CPT

## 2021-07-18 RX ORDER — BUSPIRONE HYDROCHLORIDE 5 MG/1
5 TABLET ORAL 3 TIMES DAILY
Status: DISCONTINUED | OUTPATIENT
Start: 2021-07-18 | End: 2021-07-21 | Stop reason: HOSPADM

## 2021-07-18 RX ADMIN — BUSPIRONE HYDROCHLORIDE 5 MG: 5 TABLET ORAL at 21:42

## 2021-07-18 RX ADMIN — PALIPERIDONE 3 MG: 3 TABLET, EXTENDED RELEASE ORAL at 08:51

## 2021-07-18 RX ADMIN — OXCARBAZEPINE 450 MG: 150 TABLET, FILM COATED ORAL at 21:42

## 2021-07-18 RX ADMIN — OXCARBAZEPINE 450 MG: 150 TABLET, FILM COATED ORAL at 08:51

## 2021-07-18 RX ADMIN — TRAZODONE HYDROCHLORIDE 50 MG: 50 TABLET ORAL at 21:42

## 2021-07-18 RX ADMIN — BUSPIRONE HYDROCHLORIDE 5 MG: 5 TABLET ORAL at 13:57

## 2021-07-18 RX ADMIN — PALIPERIDONE 3 MG: 3 TABLET, EXTENDED RELEASE ORAL at 21:42

## 2021-07-18 ASSESSMENT — PAIN SCALES - GENERAL
PAINLEVEL_OUTOF10: 0
PAINLEVEL_OUTOF10: 0

## 2021-07-18 NOTE — PROGRESS NOTES
Self BEHAVIORAL HEALTH FOLLOW-UP NOTE     7/18/2021     Patient was seen and examined in person, Chart reviewed   Patient's case discussed with staff/team    Chief Complaint: Flat blunted, anxious and depressed    Interim History:   Observed in her room this afternoon. Per staff patient is up on the unit more attending groups and complying with her 3-hour lockout. She is flat and blunted but states that she is no longer feeling suicidal.  She states that she has nobody that she can talk to for support in the community she states that she has no friends. Although she states she is no longer suicidal she continues to be very flat blunted appears distressed and continues to have significant neurovegetative symptoms of depression. However tells me that she is doing better.      Appetite:   [x] Normal/Unchanged  [] Increased  [] Decreased      Sleep:       [x] Normal/Unchanged  [] Fair       [] Poor              Energy:    [x] Normal/Unchanged  [] Increased  [] Decreased        SI [] Present  [x] Absent    HI  []Present  [x] Absent     Aggression:  [] yes  [x] no    Patient is [x] able  [] unable to CONTRACT FOR SAFETY     PAST MEDICAL/PSYCHIATRIC HISTORY:   Past Medical History:   Diagnosis Date    Acute psychosis ([facility] 12/22/2017    Anxiety     Asthma     Bipolar disorder ([facility]     Bronchitis     COPD (chronic obstructive pulmonary disease) (HCC)     Depression     Iron deficiency anemia     PTSD (post-traumatic stress disorder)     Schizo affective schizophrenia ([address]     Severe episode of recurrent major depressive disorder, without psychotic features ([address] 11/16/2019    Substance abuse ([facility]     quit 7/2011  was [employer] user        FAMILY/SOCIAL HISTORY:  Family History   Problem Relation Age of Onset    Diabetes Mother     High Blood Pressure Mother     Kidney Disease Mother     Heart Failure Mother     Diabetes Father     Other Father         mesothelioma    Mental Illness Father schizophrenia     Social History     Socioeconomic History    Marital status: Single     Spouse name: Not on file    Number of children: 0    Years of education: GED    Highest education level: Not on file   Occupational History    Occupation: unemployed     Comment: attempting to get disability for mental illness   Tobacco Use    Smoking status: Current Every Day Smoker     Packs/day: 0.50     Years: 20.00     Pack years: 10.00     Types: Cigarettes    Smokeless tobacco: Never Used   Vaping Use    Vaping Use: Former   Substance and Sexual Activity    Alcohol use: Yes    Drug use: Yes     Types: Marijuana     Comment: using marijuana daily    Sexual activity: Not Currently   Other Topics Concern    Not on file   Social History Narrative    Not on file     Social Determinants of Health     Financial Resource Strain:     Difficulty of Paying Living Expenses:    Food Insecurity:     Worried About Running Out of Food in the Last Year:     [address] in the Last Year:    Transportation Needs:     Lack of Transportation (Medical):  Lack of Transportation (Non-Medical):    Physical Activity:     Days of Exercise per Week:     Minutes of Exercise per Session:    Stress:     Feeling of Stress :    Social Connections:     Frequency of Communication with Friends and Family:     Frequency of Social Gatherings with Friends and Family:     Attends [employer] Services:     Active Member of Clubs or Organizations:     Attends Club or Organization Meetings:     Marital Status:    Intimate Partner Violence:     Fear of Current or Ex-Partner:     Emotionally Abused:     Physically Abused:     Sexually Abused:            ROS:  [x] All negative/unchanged except if checked.  Explain positive(checked items) below:  [] Constitutional  [] Eyes  [] Ear/Nose/Mouth/Throat  [] Respiratory  [] CV  [] GI  []   [] Musculoskeletal  [] Skin/Breast  [] Neurological  [] Endocrine  [] Heme/Lymph  [] Allergic/Immunologic    Explanation:     MEDICATIONS:    Current Facility-Administered Medications:     OXcarbazepine (TRILEPTAL) tablet 450 mg, 450 mg, Oral, BID, CHARO Ballesteros CNP, 307 mg at 07/18/21 0851    traZODone (DESYREL) tablet 50 mg, 50 mg, Oral, Nightly, Leopold Garner, MD, 50 mg at 07/17/21 2045    acetaminophen (TYLENOL) tablet 650 mg, 650 mg, Oral, Q6H PRN, Leopold Garner, MD    magnesium hydroxide (MILK OF MAGNESIA) 400 MG/5ML suspension 30 mL, 30 mL, Oral, Daily PRN, Leopold Garner, MD    aluminum & magnesium hydroxide-simethicone (MAALOX) 200-200-20 MG/5ML suspension 30 mL, 30 mL, Oral, PRN, Leopold Garner, MD    hydrOXYzine (VISTARIL) capsule 50 mg, 50 mg, Oral, TID PRN, Leopold Garner, MD, 50 mg at 07/12/21 0945    haloperidol (HALDOL) tablet 5 mg, 5 mg, Oral, Q6H PRN **OR** haloperidol lactate (HALDOL) injection 5 mg, 5 mg, Intramuscular, Q6H PRN, Leopold Garner, MD    nicotine (NICODERM CQ) 14 MG/24HR 1 patch, 1 patch, Transdermal, Daily, Evelia Dumont APRN - CNP, 1 patch at 07/18/21 0851    paliperidone (INVEGA) extended release tablet 3 mg, 3 mg, Oral, BID, Mari Mason MD, 3 mg at 07/18/21 0851    albuterol (PROVENTIL) nebulizer solution 2.5 mg, 2.5 mg, Nebulization, Q4H PRN, CHARO Spivey - CNP      Examination:  BP (!) 94/55   Pulse 77   Temp 98.3 °F (36.8 °C)   Resp 14   Ht 5' 2\" (1.575 m)   Wt 117 lb (53.1 kg)   SpO2 100%   Breastfeeding No   BMI 21.40 kg/m²   Gait - steady  Medication side effects(SE): none reported    Mental Status Examination:    Level of consciousness:  within normal limits   Appearance:  poor grooming and poor hygiene  Behavior/Motor:  no abnormalities noted  Attitude toward examiner:  withdrawn  Speech:  normal rate and normal volume   Mood: depressed  Affect:  blunted  Thought processes:  poverty of thought   Thought content: Paranoid and delusional  Cognition:  oriented to person, place, and time   Concentration distractible  Insight poor   Judgement poor     ASSESSMENT:   Patient symptoms are:  [] Well controlled  [] Improving  [] Worsening  [x] No change      Diagnosis:   Principal Problem:    Severe episode of recurrent major depressive disorder, with psychotic features ([address]  Resolved Problems:    * No resolved hospital problems. *      LABS:    No results for input(s): WBC, HGB, PLT in the last 72 hours. Recent Labs     07/18/21  0711      K 4.3      CO2 26   BUN 15   CREATININE 0.7   GLUCOSE 88     Recent Labs     07/18/21  0711   BILITOT 0.3   ALKPHOS 71   AST 34*   ALT 52*     Lab Results   Component Value Date    LABAMPH NOT DETECTED 07/02/2021    BARBSCNU NOT DETECTED 07/02/2021    LABBENZ NOT DETECTED 07/02/2021    LABMETH NOT DETECTED 07/02/2021    OPIATESCREENURINE NOT DETECTED 07/02/2021    PHENCYCLIDINESCREENURINE NOT DETECTED 07/02/2021    ETOH <10 07/02/2021     Lab Results   Component Value Date    TSH 1.770 03/07/2019     No results found for: LITHIUM  Lab Results   Component Value Date    VALPROATE 57 04/02/2021           Treatment Plan:  The patient's diagnosis, treatment plan, medication management were formulated after patient was seen directly by the attending physician and myself and all relevant documentation was reviewed. Reviewed current Medications with the patient. Risk, benefit, side effects, possible outcomes of the medication and alternatives discussed with the patient and the patient demonstrated understanding. The patient was also educated that the outcome of treatment will depend on the medication compliance as directed by the prescribers along with regular follow-up, compliance with the labs and other work-up, as clinically indicated.     Continue Trileptal 600 mg twice daily  Continue Invega 3 mg twice daily for psychotic feature          Collateral information: Followed by social work  CD evaluation  Encourage patient to attend group and other milieu activities. Discharge planning discussed with the patient and treatment team.    PSYCHOTHERAPY/COUNSELING:  [x] Therapeutic interview  [x] Supportive  [] CBT  [] Ongoing  [] Other    [x] Patient continues to need, on a daily basis, active treatment furnished directly by or requiring the supervision of inpatient psychiatric personnel      Anticipated Length of stay: 3 to 5 days based on stability     NOTE: This report was transcribed using voice recognition software. Every effort was made to ensure accuracy; however, inadvertent computerized transcription errors may be present.     Electronically signed by CHARO Borja CNP on 7/18/2021 at 12:34 PM

## 2021-07-18 NOTE — PLAN OF CARE
Problem: Altered Mood, Deterioration in Function:  Goal: Ability to perform activities of daily living will improve  Description: Ability to perform activities of daily living will improve  7/18/2021 1110 by Jude Hernández RN  Outcome: Ongoing  7/18/2021 0600 by Tanisha New RN  Outcome: Ongoing     Problem: Altered Mood, Deterioration in Function:  Goal: Maintenance of adequate nutrition will improve  Description: Maintenance of adequate nutrition will improve  7/18/2021 1110 by Jude Hernández RN  Outcome: Ongoing   pt denies si/hi and hallucinations. Pt is isolative to her room most of the day laying in bed. Pt is tearful and anxious when she comes out of her room. Pt states her anxiety is 10/10/ pt takes med's but does not attend groups. Pt encouraged to attend groups and participate.

## 2021-07-19 PROCEDURE — 6370000000 HC RX 637 (ALT 250 FOR IP): Performed by: PSYCHIATRY & NEUROLOGY

## 2021-07-19 PROCEDURE — 1240000000 HC EMOTIONAL WELLNESS R&B

## 2021-07-19 PROCEDURE — 6370000000 HC RX 637 (ALT 250 FOR IP): Performed by: NURSE PRACTITIONER

## 2021-07-19 PROCEDURE — 99232 SBSQ HOSP IP/OBS MODERATE 35: CPT | Performed by: NURSE PRACTITIONER

## 2021-07-19 RX ORDER — BUSPIRONE HYDROCHLORIDE 5 MG/1
5 TABLET ORAL 3 TIMES DAILY
Qty: 90 TABLET | Refills: 0 | Status: SHIPPED | OUTPATIENT
Start: 2021-07-19 | End: 2021-08-18

## 2021-07-19 RX ORDER — OXCARBAZEPINE 150 MG/1
450 TABLET, FILM COATED ORAL 2 TIMES DAILY
Qty: 180 TABLET | Refills: 0 | Status: SHIPPED
Start: 2021-07-19 | End: 2021-07-21 | Stop reason: HOSPADM

## 2021-07-19 RX ORDER — NICOTINE 21 MG/24HR
1 PATCH, TRANSDERMAL 24 HOURS TRANSDERMAL DAILY
Qty: 30 PATCH | Refills: 3
Start: 2021-07-20 | End: 2021-08-19

## 2021-07-19 RX ORDER — PALIPERIDONE 3 MG/1
3 TABLET, EXTENDED RELEASE ORAL 2 TIMES DAILY
Qty: 60 TABLET | Refills: 0 | Status: SHIPPED
Start: 2021-07-19 | End: 2021-07-21 | Stop reason: HOSPADM

## 2021-07-19 RX ORDER — PALIPERIDONE 3 MG/1
3 TABLET, EXTENDED RELEASE ORAL DAILY
Status: DISCONTINUED | OUTPATIENT
Start: 2021-07-20 | End: 2021-07-21 | Stop reason: HOSPADM

## 2021-07-19 RX ORDER — OXCARBAZEPINE 300 MG/1
300 TABLET, FILM COATED ORAL 2 TIMES DAILY
Status: DISCONTINUED | OUTPATIENT
Start: 2021-07-19 | End: 2021-07-21 | Stop reason: HOSPADM

## 2021-07-19 RX ORDER — CITALOPRAM 20 MG/1
10 TABLET ORAL DAILY
Status: DISCONTINUED | OUTPATIENT
Start: 2021-07-19 | End: 2021-07-21 | Stop reason: HOSPADM

## 2021-07-19 RX ADMIN — BUSPIRONE HYDROCHLORIDE 5 MG: 5 TABLET ORAL at 20:24

## 2021-07-19 RX ADMIN — OXCARBAZEPINE 450 MG: 150 TABLET, FILM COATED ORAL at 08:49

## 2021-07-19 RX ADMIN — OXCARBAZEPINE 300 MG: 300 TABLET, FILM COATED ORAL at 20:24

## 2021-07-19 RX ADMIN — TRAZODONE HYDROCHLORIDE 50 MG: 50 TABLET ORAL at 20:24

## 2021-07-19 RX ADMIN — CITALOPRAM HYDROBROMIDE 10 MG: 20 TABLET ORAL at 10:38

## 2021-07-19 RX ADMIN — BUSPIRONE HYDROCHLORIDE 5 MG: 5 TABLET ORAL at 14:20

## 2021-07-19 RX ADMIN — PALIPERIDONE 3 MG: 3 TABLET, EXTENDED RELEASE ORAL at 08:49

## 2021-07-19 RX ADMIN — BUSPIRONE HYDROCHLORIDE 5 MG: 5 TABLET ORAL at 08:49

## 2021-07-19 ASSESSMENT — PAIN SCALES - GENERAL
PAINLEVEL_OUTOF10: 0

## 2021-07-19 NOTE — PLAN OF CARE
Problem: Depressive Behavior With or Without Suicide Precautions:  Goal: Able to verbalize and/or display a decrease in depressive symptoms  Description: Able to verbalize and/or display a decrease in depressive symptoms  7/19/2021 1453 by Shala Nuno RN  Outcome: Ongoing  7/19/2021 0603 by Aureliano Bishop RN  Outcome: Ongoing  Goal: Absence of self-harm  Description: Absence of self-harm  7/19/2021 1453 by Shala Nuno RN  Outcome: Ongoing  7/19/2021 0603 by Aureliano Bishop RN  Outcome: Met This Shift             Patient flat and depressed,blunt. Isolative to self even with lock outs. Thoughts blocking . Delayed responses because she needed to think about. Poor eye contact during conversation. Repeats \"out there\". Focus on a falling out with family. Denies suicidal and homicidal thoughts. Denies hallucinations.

## 2021-07-19 NOTE — PROGRESS NOTES
CLINICAL PHARMACY NOTE: MEDS TO BEDS    Total # of Prescriptions Filled: 3   The following medications were delivered to the patient:  · Oxcarbazepine 150 mg  · Buspirone 5 mg  · paliperidone 6 mg    Additional Documentation:

## 2021-07-19 NOTE — PLAN OF CARE
[id number] [facility] Interdisciplinary Treatment Plan Note     Review Date & Time: 7/19/2021    10:47 AM      Patient was in treatment team.    Estimated Length of Stay Update:   TBD   Estimated Discharge Date Update: TBD    EDUCATION:   Learner Progress Toward Treatment Goals: Reviewed results and recommendations of this team    Method: Group    Outcome: Verbalized understanding    PATIENT GOALS:  '\"have hope\"    PLAN/TREATMENT RECOMMENDATIONS UPDATE:  Encourage daily goals and groups    GOALS UPDATE:  Time frame for Short-Term Goals:  By discharge      Chester Esqueda RN

## 2021-07-19 NOTE — CARE COORDINATION
Sally contacted [facility] with an update on pt discharge plan. Informed Andrew that pt will not be discharging today, looking at sometime this week. Andrew would still like pt to go to [facility], informed Andrew that treatment team would also like this. Andrew stated pt goes through phases like this every couple weeks where one week she is okay and the next she drastically changes. Andrew said pt does have a boyfriend that lives in the [facility] as well and an aunt who has been calling him about pt (aunt info in pt chart). Andrew will change pt ACT team apt based on pt discharge date and if she goes to crisis. Sally spoke with pt about going to [facility]. Pt continues to deny stating they do not like her there. Pt could not give any specific reasons to why she feels they do not like her. Pt reported Sally can contact pt aunt Jamel Rivero. Erica [phone] reported pt has been feeling guilty about her mom passing away in November because \"she didn't treat her right. \" She reported she typically only went to nursing home to see mom when she needed money. The nursing home told her she cant do that anymore and she became hateful. Jamel Rivero stated they have gotten a  involved to help get pt social security after Dotty from [facility] [employer] advised them too. Jamel Rivero reported she plans to pick pt up when she is discharged. She reported the meds pt has been given are \"too strong, she is walking around like a zombie.

## 2021-07-19 NOTE — PROGRESS NOTES
Attended morning community meeting. Updated on staffing and daily expectations. Shared goal for the day as to have hope .

## 2021-07-19 NOTE — PROGRESS NOTES
[facility] BEHAVIORAL HEALTH FOLLOW-UP NOTE     7/19/2021     Patient was seen and examined in person, Chart reviewed   Patient's case discussed with staff/team    Chief Complaint: Flat blunted and depressed    Interim History:   Patient seen during treatment team.  When asked that she was having suicidal thoughts she was very hesitant to answer questions and stated that she had to \"think about it. \"  She makes no eye contact throughout the entire assessment. She continues to report high levels of depression anxiety and states she feels sad because she cannot be with her family. When asked where her family is it was again very difficult to get her to answer the question in a straightforward manner she kept saying \"out there. \"  She does admit to some type of falling out with her family. She is very evasive guarded hesitates to answer questions regarding suicidality or homicidality. She has very poor limited insight and judgment to hospitalization need for treatment she is isolative and requires a 3-hour lockout to come out of her room otherwise she lies in bed all day.       Appetite:   [x] Normal/Unchanged  [] Increased  [] Decreased      Sleep:       [x] Normal/Unchanged  [] Fair       [] Poor              Energy:    [x] Normal/Unchanged  [] Increased  [] Decreased        SI [] Present  [x] Absent    HI  []Present  [x] Absent     Aggression:  [] yes  [x] no    Patient is [x] able  [] unable to CONTRACT FOR SAFETY     PAST MEDICAL/PSYCHIATRIC HISTORY:   Past Medical History:   Diagnosis Date    [facility] 12/22/2017    Anxiety     Asthma     B[facility] disorder ([facility]     Bronchitis     COPD (chronic obstructive pulmonary disease) (HCC)     Depression     Iron deficiency anemia     PTSD (post-traumatic stress disorder)     Schizo affective schizophrenia ([facility]     Severe episode of recurrent major depressive disorder, without psychotic features ([facility] [facility] 11/16/2019    Substance abuse ([facility]     quit 7/2011 was [place] user        FAMILY/SOCIAL HISTORY:  Family History   Problem Relation Age of Onset    Diabetes Mother     High Blood Pressure Mother     Kidney Disease Mother     Heart Failure Mother     Diabetes Father     Other Father         mesothelioma    Mental Illness Father         schizophrenia     Social History     Socioeconomic History    Marital status: Single     Spouse name: Not on file    Number of children: 0    Years of education: GED    Highest education level: Not on file   Occupational History    Occupation: unemployed     Comment: attempting to get disability for mental illness   Tobacco Use    Smoking status: Current Every Day Smoker     Packs/day: 0.50     Years: 20.00     Pack years: 10.00     Types: Cigarettes    Smokeless tobacco: Never Used   Vaping Use    Vaping Use: Former   Substance and Sexual Activity    Alcohol use: Yes    Drug use: Yes     Types: Marijuana     Comment: using marijuana daily    Sexual activity: Not Currently   Other Topics Concern    Not on file   Social History Narrative    Not on file     Social Determinants of Health     Financial Resource Strain:     Difficulty of Paying Living Expenses:    Food Insecurity:     Worried About Running Out of Food in the Last Year:     [address] in the Last Year:    Transportation Needs:     Lack of Transportation (Medical):      Lack of Transportation (Non-Medical):    Physical Activity:     Days of Exercise per Week:     Minutes of Exercise per Session:    Stress:     Feeling of Stress :    Social Connections:     Frequency of Communication with Friends and Family:     Frequency of Social Gatherings with Friends and Family:     Attends Orthodoxy Services:     Active Member of Clubs or Organizations:     Attends Club or Organization Meetings:     Marital Status:    Intimate Partner Violence:     Fear of Current or Ex-Partner:     Emotionally Abused:     Physically Abused:     Sexually Abused:            ROS:  [x] All negative/unchanged except if checked.  Explain positive(checked items) below:  [] Constitutional  [] Eyes  [] Ear/Nose/Mouth/Throat  [] Respiratory  [] CV  [] GI  []   [] Musculoskeletal  [] Skin/Breast  [] Neurological  [] Endocrine  [] Heme/Lymph  [] Allergic/Immunologic    Explanation:     MEDICATIONS:    Current Facility-Administered Medications:     citalopram (CELEXA) tablet 10 mg, 10 mg, Oral, Daily, CHARO Elam CNP    busPIRone (BUSPAR) tablet 5 mg, 5 mg, Oral, TID, CHARO Rojas - CNP, 5 mg at 07/19/21 0849    OXcarbazepine (TRILEPTAL) tablet 450 mg, 450 mg, Oral, BID, CHARO Wing CNP, 354 mg at 07/19/21 0849    traZODone (DESYREL) tablet 50 mg, 50 mg, Oral, Nightly, Sean Dunn MD, 50 mg at 07/18/21 2142    acetaminophen (TYLENOL) tablet 650 mg, 650 mg, Oral, Q6H PRN, Sean Dunn MD    magnesium hydroxide (MILK OF MAGNESIA) 400 MG/5ML suspension 30 mL, 30 mL, Oral, Daily PRN, Sean Dunn MD    aluminum & magnesium hydroxide-simethicone (MAALOX) 200-200-20 MG/5ML suspension 30 mL, 30 mL, Oral, PRN, Sean Dunn MD    hydrOXYzine (VISTARIL) capsule 50 mg, 50 mg, Oral, TID PRN, Sean Dunn MD, 50 mg at 07/12/21 0945    haloperidol (HALDOL) tablet 5 mg, 5 mg, Oral, Q6H PRN **OR** haloperidol lactate (HALDOL) injection 5 mg, 5 mg, Intramuscular, Q6H PRN, Sean Dunn MD    nicotine (NICODERM CQ) 14 MG/24HR 1 patch, 1 patch, Transdermal, Daily, Sajan Middleton APRN - CNP, 1 patch at 07/19/21 0851    paliperidone (INVEGA) extended release tablet 3 mg, 3 mg, Oral, BID, Jefferson Darden MD, 3 mg at 07/19/21 0849    albuterol (PROVENTIL) nebulizer solution 2.5 mg, 2.5 mg, Nebulization, Q4H PRN, Martín Kendall, APRN - CNP      Examination:  /69   Pulse 82   Temp 98.6 °F (37 °C)   Resp 14   Ht 5' 2\" (1.575 m)   Wt 117 lb (53.1 kg)   SpO2 100%   Breastfeeding No   BMI 21.40 kg/m²   Gait - steady  Medication side effects(SE): none reported    Mental Status Examination:    Level of consciousness:  within normal limits   Appearance:  poor grooming and poor hygiene  Behavior/Motor:  no abnormalities noted  Attitude toward examiner:  withdrawn  Speech:  normal rate and normal volume   Mood: depressed  Affect:  blunted  Thought processes:  poverty of thought   Thought content: Paranoid and delusional  Cognition:  oriented to person, place, and time   Concentration distractible  Insight poor   Judgement poor     ASSESSMENT:   Patient symptoms are:  [] Well controlled  [] Improving  [] Worsening  [x] No change      Diagnosis:   Principal Problem:    Severe episode of recurrent major depressive disorder, with psychotic features ([facility]  Resolved Problems:    * No resolved hospital problems. *      LABS:    No results for input(s): WBC, HGB, PLT in the last 72 hours. Recent Labs     07/18/21  0711      K 4.3      CO2 26   BUN 15   CREATININE 0.7   GLUCOSE 88     Recent Labs     07/18/21  0711   BILITOT 0.3   ALKPHOS 71   AST 34*   ALT 52*     Lab Results   Component Value Date    LABAMPH NOT DETECTED 07/02/2021    BARBSCNU NOT DETECTED 07/02/2021    LABBENZ NOT DETECTED 07/02/2021    LABMETH NOT DETECTED 07/02/2021    OPIATESCREENURINE NOT DETECTED 07/02/2021    PHENCYCLIDINESCREENURINE NOT DETECTED 07/02/2021    ETOH <10 07/02/2021     Lab Results   Component Value Date    TSH 1.770 03/07/2019     No results found for: LITHIUM  Lab Results   Component Value Date    VALPROATE 57 04/02/2021           Treatment Plan:  The patient's diagnosis, treatment plan, medication management were formulated after patient was seen directly by the attending physician and myself and all relevant documentation was reviewed. Reviewed current Medications with the patient.   Risk, benefit, side effects, possible outcomes of the medication and alternatives discussed with the patient and the patient demonstrated understanding. The patient was also educated that the outcome of treatment will depend on the medication compliance as directed by the prescribers along with regular follow-up, compliance with the labs and other work-up, as clinically indicated. Consider whether medications may be making patient tired we will  Decrease Trileptal 300 mg twice daily  Decrease Invega 3 mg daily  Start Celexa 10 mg daily for depression      Collateral information: Followed by social work  CD evaluation  Encourage patient to attend group and other milieu activities. Discharge planning discussed with the patient and treatment team.    PSYCHOTHERAPY/COUNSELING:  [x] Therapeutic interview  [x] Supportive  [] CBT  [] Ongoing  [] Other    [x] Patient continues to need, on a daily basis, active treatment furnished directly by or requiring the supervision of inpatient psychiatric personnel      Anticipated Length of stay: 3 to 5 days based on stability     NOTE: This report was transcribed using voice recognition software. Every effort was made to ensure accuracy; however, inadvertent computerized transcription errors may be present.     Electronically signed by CHARO Carvajal CNP on 4/86/0575 at 10:09 AM

## 2021-07-20 PROCEDURE — 6370000000 HC RX 637 (ALT 250 FOR IP): Performed by: NURSE PRACTITIONER

## 2021-07-20 PROCEDURE — 1240000000 HC EMOTIONAL WELLNESS R&B

## 2021-07-20 PROCEDURE — 99232 SBSQ HOSP IP/OBS MODERATE 35: CPT | Performed by: NURSE PRACTITIONER

## 2021-07-20 PROCEDURE — 6370000000 HC RX 637 (ALT 250 FOR IP): Performed by: PSYCHIATRY & NEUROLOGY

## 2021-07-20 RX ADMIN — TRAZODONE HYDROCHLORIDE 50 MG: 50 TABLET ORAL at 21:20

## 2021-07-20 RX ADMIN — PALIPERIDONE 3 MG: 3 TABLET, EXTENDED RELEASE ORAL at 10:01

## 2021-07-20 RX ADMIN — OXCARBAZEPINE 300 MG: 300 TABLET, FILM COATED ORAL at 21:20

## 2021-07-20 RX ADMIN — BUSPIRONE HYDROCHLORIDE 5 MG: 5 TABLET ORAL at 21:20

## 2021-07-20 RX ADMIN — CITALOPRAM HYDROBROMIDE 10 MG: 20 TABLET ORAL at 10:02

## 2021-07-20 RX ADMIN — BUSPIRONE HYDROCHLORIDE 5 MG: 5 TABLET ORAL at 10:02

## 2021-07-20 RX ADMIN — BUSPIRONE HYDROCHLORIDE 5 MG: 5 TABLET ORAL at 14:09

## 2021-07-20 RX ADMIN — OXCARBAZEPINE 300 MG: 300 TABLET, FILM COATED ORAL at 10:01

## 2021-07-20 ASSESSMENT — PAIN SCALES - GENERAL
PAINLEVEL_OUTOF10: 0
PAINLEVEL_OUTOF10: 0

## 2021-07-20 NOTE — PLAN OF CARE
Problem: Depressive Behavior With or Without Suicide Precautions:  Goal: Able to verbalize and/or display a decrease in depressive symptoms  Description: Able to verbalize and/or display a decrease in depressive symptoms  7/19/2021 2126 by Suman Kessler RN  Outcome: Ongoing     Problem: Altered Mood, Deterioration in Function:  Goal: Ability to tolerate increased activity will improve  Description: Ability to tolerate increased activity will improve  Outcome: Ongoing     Patient had been sitting out on the unit, keeping to self. Compliant with lockout. Presents with a flat affect and depressed mood. Rates anxiety and depression 7/10- reasoning is \"I dont know\". Currently, denies suicidal/homicidal ideations and hallucinations. Short with conversation. Purposeful rounding continued.

## 2021-07-20 NOTE — PLAN OF CARE
Problem: Depressive Behavior With or Without Suicide Precautions:  Goal: Able to verbalize and/or display a decrease in depressive symptoms  Description: Able to verbalize and/or display a decrease in depressive symptoms  Outcome: Ongoing  Goal: Absence of self-harm  Description: Absence of self-harm  Outcome: Ongoing           Patient isolative to self and room. Refused breakfast and to get out room for therapeutic lock out. Rehan Funes and myself had a one to one conversation. Still focused on her family on letting them down. Believes she can't be forgiven. Recommended spiritual consult  and she did agree. Up now for lunch and continue on her therapeutic lockout. Will continue to observe and support. Admits to suicidal thoughts that come and go but denies plan or intent.

## 2021-07-20 NOTE — BH NOTE
Patient reports she is talk to pastoral care. \" it didn't help much but I didn't ell her everything\". . On 3 hour lock out. Suicidal thoughts but denies plan or intent.

## 2021-07-20 NOTE — PROGRESS NOTES
Self BEHAVIORAL HEALTH FOLLOW-UP NOTE     7/20/2021     Patient was seen and examined in person, Chart reviewed   Patient's case discussed with staff/team    Chief Complaint: Suicidal ideations  Interim History: Patient seen in her room she is tearful flat blunted. She refused to leave the room for therapeutic lockout earlier today today she is reporting suicidal ideations and treatment very depressed flat and blunted. I spoke with her at length with her nurse regarding information received from patient's family the patient feels guilty about the way she treated her mother towards the end of her mother's life. Patient does agree to talk to his pastoral care about her guilt.   Patient states she does want to go home however she continues to endorse severe depression and suicidal ideation she has limited insight and judgment are hospitalization need for treatment      Appetite:   [x] Normal/Unchanged  [] Increased  [] Decreased      Sleep:       [x] Normal/Unchanged  [] Fair       [] Poor              Energy:    [x] Normal/Unchanged  [] Increased  [] Decreased        SI [] Present  [x] Absent    HI  []Present  [x] Absent     Aggression:  [] yes  [x] no    Patient is [x] able  [] unable to CONTRACT FOR SAFETY     PAST MEDICAL/PSYCHIATRIC HISTORY:   Past Medical History:   Diagnosis Date    Acute psychosis ([facility] [address] 12/22/2017    Anxiety     Asthma     Bipolar disorder ([address]     Bronchitis     COPD [facility]chronic obstructive pulmonary disease) [facility]     Depression     Iron deficiency anemia     PTSD (post-traumatic stress disorder)     Schizo affective schizophrenia ([address]     Severe episode of recurrent major depressive disorder, without psychotic features ([address] 11/16/2019    Substance abuse ([facility] [address]     quit 7/2011  was [employer] user        FAMILY/SOCIAL HISTORY:  Family History   Problem Relation Age of Onset    Diabetes Mother     High Blood Pressure Mother     Kidney Disease Mother     Heart Failure Mother  Diabetes Father     Other Father         mesothelioma    Mental Illness Father         schizophrenia     Social History     Socioeconomic History    Marital status: Single     Spouse name: Not on file    Number of children: 0    Years of education: GED    Highest education level: Not on file   Occupational History    Occupation: unemployed     Comment: attempting to get disability for mental illness   Tobacco Use    Smoking status: Current Every Day Smoker     Packs/day: 0.50     Years: 20.00     Pack years: 10.00     Types: Cigarettes    Smokeless tobacco: Never Used   Vaping Use    Vaping Use: Former   Substance and Sexual Activity    Alcohol use: Yes    Drug use: Yes     Types: Marijuana     Comment: using marijuana daily    Sexual activity: Not Currently   Other Topics Concern    Not on file   Social History Narrative    Not on file     Social Determinants of Health     Financial Resource Strain:     Difficulty of Paying Living Expenses:    Food Insecurity:     Worried About Running Out of Food in the Last Year:     [address] in the Last Year:    Transportation Needs:     Lack of Transportation (Medical):  Lack of Transportation (Non-Medical):    Physical Activity:     Days of Exercise per Week:     Minutes of Exercise per Session:    Stress:     Feeling of Stress :    Social Connections:     Frequency of Communication with Friends and Family:     Frequency of Social Gatherings with Friends and Family:     Attends Rastafarian Services:     Active Member of Clubs or Organizations:     Attends Club or Organization Meetings:     Marital Status:    Intimate Partner Violence:     Fear of Current or Ex-Partner:     Emotionally Abused:     Physically Abused:     Sexually Abused:            ROS:  [x] All negative/unchanged except if checked.  Explain positive(checked items) below:  [] Constitutional  [] Eyes  [] Ear/Nose/Mouth/Throat  [] Respiratory  [] CV  [] GI  []   [] Musculoskeletal  [] Skin/Breast  [] Neurological  [] Endocrine  [] Heme/Lymph  [] Allergic/Immunologic    Explanation:     MEDICATIONS:    Current Facility-Administered Medications:     citalopram (CELEXA) tablet 10 mg, 10 mg, Oral, Daily, CHARO Ballesteros - CNP, 10 mg at 07/20/21 1002    paliperidone (INVEGA) extended release tablet 3 mg, 3 mg, Oral, Daily, CHARO Ballesteros - CNP, 3 mg at 07/20/21 1001    OXcarbazepine (TRILEPTAL) tablet 300 mg, 300 mg, Oral, BID, CHARO Ballesteros - CNP, 253 mg at 07/20/21 1001    busPIRone (BUSPAR) tablet 5 mg, 5 mg, Oral, TID, Shamirford Adam, APRN - CNP, 5 mg at 07/20/21 1409    traZODone (DESYREL) tablet 50 mg, 50 mg, Oral, Nightly, Leopold Garner, MD, 50 mg at 07/19/21 2024    acetaminophen (TYLENOL) tablet 650 mg, 650 mg, Oral, Q6H PRN, Leopold Garner, MD    magnesium hydroxide (MILK OF MAGNESIA) 400 MG/5ML suspension 30 mL, 30 mL, Oral, Daily PRN, Leopold Garner, MD    aluminum & magnesium hydroxide-simethicone (MAALOX) 200-200-20 MG/5ML suspension 30 mL, 30 mL, Oral, PRN, Leopold Garner, MD    hydrOXYzine (VISTARIL) capsule 50 mg, 50 mg, Oral, TID PRN, Leopold Garner, MD, 50 mg at 07/12/21 0945    haloperidol (HALDOL) tablet 5 mg, 5 mg, Oral, Q6H PRN **OR** haloperidol lactate (HALDOL) injection 5 mg, 5 mg, Intramuscular, Q6H PRN, Leopold Garner, MD    nicotine (NICODERM CQ) 14 MG/24HR 1 patch, 1 patch, Transdermal, Daily, Jaren Middleton APRN - CNP, 1 patch at 07/20/21 1003    albuterol (PROVENTIL) nebulizer solution 2.5 mg, 2.5 mg, Nebulization, Q4H PRN, Pawel Medina, APRN - CNP      Examination:  BP (!) 103/57   Pulse 80   Temp 98.4 °F (36.9 °C) (Oral)   Resp 14   Ht 5' 2\" (1.575 m)   Wt 117 lb (53.1 kg)   SpO2 100%   Breastfeeding No   BMI 21.40 kg/m²   Gait - steady  Medication side effects(SE): none reported    Mental Status Examination:    Level of consciousness:  within normal limits   Appearance:  poor grooming and poor hygiene  Behavior/Motor:  no abnormalities noted  Attitude toward examiner:  withdrawn  Speech:  normal rate and normal volume   Mood: depressed  Affect:  blunted  Thought processes:  poverty of thought   Thought content: Paranoid and delusional  Cognition:  oriented to person, place, and time   Concentration distractible  Insight poor   Judgement poor     ASSESSMENT:   Patient symptoms are:  [] Well controlled  [] Improving  [] Worsening  [x] No change      Diagnosis:   Principal Problem:    Severe episode of recurrent major depressive disorder, with psychotic features ([facility]  Resolved Problems:    * No resolved hospital problems. *      LABS:    No results for input(s): WBC, HGB, PLT in the last 72 hours. Recent Labs     07/18/21  0711      K 4.3      CO2 26   BUN 15   CREATININE 0.7   GLUCOSE 88     Recent Labs     07/18/21  0711   BILITOT 0.3   ALKPHOS 71   AST 34*   ALT 52*     Lab Results   Component Value Date    LABAMPH NOT DETECTED 07/02/2021    BARBSCNU NOT DETECTED 07/02/2021    LABBENZ NOT DETECTED 07/02/2021    LABMETH NOT DETECTED 07/02/2021    OPIATESCREENURINE NOT DETECTED 07/02/2021    PHENCYCLIDINESCREENURINE NOT DETECTED 07/02/2021    ETOH <10 07/02/2021     Lab Results   Component Value Date    TSH 1.770 03/07/2019     No results found for: LITHIUM  Lab Results   Component Value Date    VALPROATE 57 04/02/2021           Treatment Plan:  The patient's diagnosis, treatment plan, medication management were formulated after patient was seen directly by the attending physician and myself and all relevant documentation was reviewed. Reviewed current Medications with the patient. Risk, benefit, side effects, possible outcomes of the medication and alternatives discussed with the patient and the patient demonstrated understanding.   The patient was also educated that the outcome of treatment will depend on the medication compliance as directed by the prescribers along with regular

## 2021-07-21 VITALS
RESPIRATION RATE: 14 BRPM | WEIGHT: 117 LBS | HEART RATE: 86 BPM | BODY MASS INDEX: 21.53 KG/M2 | TEMPERATURE: 98.4 F | HEIGHT: 62 IN | SYSTOLIC BLOOD PRESSURE: 112 MMHG | DIASTOLIC BLOOD PRESSURE: 63 MMHG | OXYGEN SATURATION: 100 %

## 2021-07-21 PROCEDURE — 99239 HOSP IP/OBS DSCHRG MGMT >30: CPT | Performed by: NURSE PRACTITIONER

## 2021-07-21 PROCEDURE — 6370000000 HC RX 637 (ALT 250 FOR IP): Performed by: NURSE PRACTITIONER

## 2021-07-21 RX ORDER — CITALOPRAM 10 MG/1
10 TABLET ORAL DAILY
Qty: 30 TABLET | Refills: 0 | Status: ON HOLD | OUTPATIENT
Start: 2021-07-22 | End: 2022-05-17 | Stop reason: HOSPADM

## 2021-07-21 RX ORDER — OXCARBAZEPINE 300 MG/1
300 TABLET, FILM COATED ORAL 2 TIMES DAILY
Qty: 60 TABLET | Refills: 0 | Status: ON HOLD | OUTPATIENT
Start: 2021-07-21 | End: 2022-05-17

## 2021-07-21 RX ORDER — PALIPERIDONE 3 MG/1
3 TABLET, EXTENDED RELEASE ORAL DAILY
Qty: 30 TABLET | Refills: 0 | Status: ON HOLD | OUTPATIENT
Start: 2021-07-22 | End: 2022-05-17 | Stop reason: HOSPADM

## 2021-07-21 RX ADMIN — BUSPIRONE HYDROCHLORIDE 5 MG: 5 TABLET ORAL at 08:42

## 2021-07-21 RX ADMIN — BUSPIRONE HYDROCHLORIDE 5 MG: 5 TABLET ORAL at 13:39

## 2021-07-21 RX ADMIN — PALIPERIDONE 3 MG: 3 TABLET, EXTENDED RELEASE ORAL at 08:42

## 2021-07-21 RX ADMIN — OXCARBAZEPINE 300 MG: 300 TABLET, FILM COATED ORAL at 08:42

## 2021-07-21 RX ADMIN — CITALOPRAM HYDROBROMIDE 10 MG: 20 TABLET ORAL at 08:42

## 2021-07-21 ASSESSMENT — PAIN SCALES - GENERAL: PAINLEVEL_OUTOF10: 0

## 2021-07-21 NOTE — CARE COORDINATION
Sw contacted pt aunt Michell Thompson to confirm that she will be able to pick pt up. Michell Thompson reported she will be able to get pt at 1:30. She is aware to call either this SW or nurses desk when she arrives. RN aware. Spoke with pt  Andrew. Updated Andrew on pt discharge plan.  He reported he will inform pt care manager of this and they will schedule pt for an intake with the ACT team.

## 2021-07-21 NOTE — CARE COORDINATION
In order to ensure appropriate transition and discharge planning is in place, the following documents have been transmitted to [facility], as the new outpatient provider:     The d/c diagnosis was transmitted to the next care provider   The reason for hospitalization was transmitted to the next care provider   The d/c medications (dosage and indication) were transmitted to the next care provider    The continuing care plan was transmitted to the next care provider

## 2021-07-21 NOTE — PLAN OF CARE
Problem: Depressive Behavior With or Without Suicide Precautions:  Goal: Able to verbalize and/or display a decrease in depressive symptoms  Description: Able to verbalize and/or display a decrease in depressive symptoms  Outcome: Ongoing  Goal: Absence of self-harm  Description: Absence of self-harm  Outcome: Ongoing     Problem: Altered Mood, Deterioration in Function:  Goal: Ability to perform activities of daily living will improve  Description: Ability to perform activities of daily living will improve  Outcome: Ongoing  Goal: Maintenance of adequate nutrition will improve  Description: Maintenance of adequate nutrition will improve  Outcome: Ongoing  Goal: Ability to tolerate increased activity will improve  Description: Ability to tolerate increased activity will improve  Outcome: Ongoing  Goal: Participates in care planning  Description: Participates in care planning  Outcome: Ongoing

## 2021-07-21 NOTE — PLAN OF CARE
Patient denies suicidal ideation, homicidal ideations and AVH. Patient denies anxiety and depression. Patient is flat, sad, depressed with blunt responses. Presents calm and cooperative during assessment. Patient is isolative to room and does not appear to be social with peers. Medications taken without issue. No complaints or concerns verbalized at this time. No unit problems reported. Will continue to observe and support.     Problem: Depressive Behavior With or Without Suicide Precautions:  Goal: Able to verbalize and/or display a decrease in depressive symptoms  Description: Able to verbalize and/or display a decrease in depressive symptoms  7/20/2021 2009 by Terry Floyd RN  Outcome: Ongoing     Problem: Depressive Behavior With or Without Suicide Precautions:  Goal: Absence of self-harm  Description: Absence of self-harm  7/20/2021 2009 by Terry Floyd RN  Outcome: Ongoing     Problem: Altered Mood, Deterioration in Function:  Goal: Ability to perform activities of daily living will improve  Description: Ability to perform activities of daily living will improve  Outcome: Ongoing     Problem: Altered Mood, Deterioration in Function:  Goal: Maintenance of adequate nutrition will improve  Description: Maintenance of adequate nutrition will improve  Outcome: Ongoing

## 2021-07-21 NOTE — DISCHARGE SUMMARY
DISCHARGE SUMMARY      Patient ID:  Balaji Pyle  [id number]  54 y.o.  1975    Admit date: 7/2/2021    Discharge date and time: 7/21/2021    Admitting Physician: Prosper London MD     Discharge Physician: Dr Diesy Green MD    Discharge Diagnoses:   Patient Active Problem List   Diagnosis    Obstructive chronic bronchitis with exacerbation ([address]    Schizoaffective disorder, bipolar type ([address]    Intramural leiomyoma of uterus    Iron deficiency anemia    Uterine fibroid    Fibroids, intramural    Bipolar affective disorder, mixed, severe, with psychotic behavior ([address]    Cannabis abuse    COPD (chronic obstructive pulmonary disease) ([address]    Recurrent major depressive disorder, in partial remission ([address]    Tobacco abuse    Depression, major, recurrent ([address]    Severe episode of recurrent major depressive disorder, with psychotic features ([address]       Admission Condition: poor    Discharged Condition: stable    Admission Circumstance: Presented the ED after recent use of cocaine reporting fleeting suicidal ideations depression not taking care of her basic needs.   And inability to sleep      PAST MEDICAL/PSYCHIATRIC HISTORY:   Past Medical History:   Diagnosis Date    Acute psychosis ([address] 12/22/2017    Anxiety     Asthma     Bipolar disorder (HCC)     Bronchitis     COPD (chronic obstructive pulmonary disease) (HCC)     Depression     Iron deficiency anemia     PTSD (post-traumatic stress disorder)     Schizo affective schizophrenia ([address]     Severe episode of recurrent major depressive disorder, without psychotic features ([address] 11/16/2019    Substance abuse ([address]     quit 7/2011  was [employer] user        FAMILY/SOCIAL HISTORY:  Family History   Problem Relation Age of Onset    Diabetes Mother     High Blood Pressure Mother     Kidney Disease Mother     Heart Failure Mother     Diabetes Father     Other Father         mesothelioma    Mental Illness Father         schizophrenia mg at 07/21/21 0842    paliperidone (INVEGA) extended release tablet 3 mg, 3 mg, Oral, Daily, CHARO Davies CNP, 3 mg at 07/21/21 0842    OXcarbazepine (TRILEPTAL) tablet 300 mg, 300 mg, Oral, BID, CHARO Gonzalez CNP, 081 mg at 07/21/21 0842    busPIRone (BUSPAR) tablet 5 mg, 5 mg, Oral, TID, CHARO Nolan CNP, 5 mg at 07/21/21 1339    traZODone (DESYREL) tablet 50 mg, 50 mg, Oral, Nightly, Sammie Dao MD, 50 mg at 07/20/21 2120    acetaminophen (TYLENOL) tablet 650 mg, 650 mg, Oral, Q6H PRN, Sammie Dao MD    magnesium hydroxide (MILK OF MAGNESIA) 400 MG/5ML suspension 30 mL, 30 mL, Oral, Daily PRN, Sammie Dao MD    aluminum & magnesium hydroxide-simethicone (MAALOX) 200-200-20 MG/5ML suspension 30 mL, 30 mL, Oral, PRN, Sammie Dao MD    hydrOXYzine (VISTARIL) capsule 50 mg, 50 mg, Oral, TID PRN, Sammie Dao MD, 50 mg at 07/12/21 0945    haloperidol (HALDOL) tablet 5 mg, 5 mg, Oral, Q6H PRN **OR** haloperidol lactate (HALDOL) injection 5 mg, 5 mg, Intramuscular, Q6H PRN, Sammie Dao MD    nicotine (NICODERM CQ) 14 MG/24HR 1 patch, 1 patch, Transdermal, Daily, CHARO Gonzalez CNP, 1 patch at 07/21/21 0844    albuterol (PROVENTIL) nebulizer solution 2.5 mg, 2.5 mg, Nebulization, Q4H PRN, CHARO Nolan CNP    Examination:  /63   Pulse 86   Temp 98.4 °F (36.9 °C) (Temporal)   Resp 14   Ht 5' 2\" (1.575 m)   Wt 117 lb (53.1 kg)   SpO2 100%   Breastfeeding No   BMI 21.40 kg/m²   Gait - steady    HOSPITAL COURSE[de-identified]  Patient was admitted to the unit on 7/2/2021 was closely monitored for psychosis.   She was evaluated and treated with Celexa 10 mg daily for depressive symptoms, BuSpar 5 mg 3 times daily for anxiety, Invega 3 mg daily for psychosis and she was started back on her [place] injection 234 mg IM on 7/21/2021 and Trileptal 300 mg twice daily for mood stabilization medical events were insignificant and patient continued to improve on the floor. She started coming out of her room she was attending groups. She never made any suicidal  gestures while in the unit. Patient continued to have a flat blunted affect and continued to report high levels of depression. We did get collateral formation from patient's aunt who reported that patient's mother recently  in November and that patient had reportedly treated her mother poorly prior to her death and that patient has a lot of guilt surrounding this. We did have the pastoral services, talk to patient due to the this new information. Patient was evaluated by the medical director prior to discharge. Social workers obtain confirmation patient's aunt who reported no access to any weapons and that patient's boyfriend lives close the same building. Treatment team felt the patient obtain the maximum benefit for her hospitalization She was set up with an outpatient mental health agency for outpatient follow-up services . At the time of discharge patient  did not show impulsive behavior. She was up on the unit she was attending groups. She vehemently denied any suicidal homicidal ideations intent or plan. She was eating well and sleeping well there are no neurovegetative signs or symptoms of depression she denied any auditory visualizations. There are no overt or covert signs psychosis. She was appreciative of the help that she received here. This patient no longer meets criteria for inpatient hospitalization. No AVH or paranoid thoughts  No hopeless or worthless feeling  No active SI/HI  Appetite:  [x] Normal  [] Increased  [] Decreased    Sleep:       [x] Normal  [] Fair       [] Poor            Energy:    [x] Normal  [] Increased  [] Decreased     SI [] Present  [x] Absent  HI  []Present  [x] Absent   Aggression:  [] yes  [x] no  Patient is [x] able  [] unable to CONTRACT FOR SAFETY   Medication side effects(SE):  [x] None(Psych.  Meds.) [] AT DISCHARGE: Low risk for suicide and homicide. Treatment Plan:  Reviewed current Medications with the patient. Education provided on the complaince with treatment. Risks, benefits, side effects, drug-to-drug interactions and alternatives to treatment were discussed. Encourage patient to attend outpatient follow up appointment and therapy. Patient was advised to call the outpatient provider, visit the nearest ED or call 911 if symptoms are not manageable. Patient's family member was contacted prior to the discharge.          Medication List      START taking these medications    busPIRone 5 MG tablet  Commonly known as: BUSPAR  Take 1 tablet by mouth 3 times daily     citalopram 10 MG tablet  Commonly known as: CELEXA  Take 1 tablet by mouth daily  Start taking on: July 22, 2021     nicotine 14 MG/24HR  Commonly known as: NICODERM CQ  Place 1 patch onto the skin daily        CHANGE how you take these medications    paliperidone palmitate  MG/ML Hafsa IM injection  Commonly known as: INVEGA SUSTENNA  Inject 156 mg into the muscle every 30 days for 1 dose Booster injection is due 7/28/21  Start taking on: July 28, 2021  What changed: additional instructions        CONTINUE taking these medications    albuterol sulfate  (90 Base) MCG/ACT inhaler     OXcarbazepine 300 MG tablet  Commonly known as: TRILEPTAL  Take 1 tablet by mouth 2 times daily     paliperidone 3 MG extended release tablet  Commonly known as: INVEGA  Take 1 tablet by mouth daily  Start taking on: July 22, 2021        STOP taking these medications    Umeclidinium Bromide 62.5 MCG/INH Aepb           Where to Get Your Medications      These medications were sent to [facility] [address] [address] [address]    Phone: [phone]   · busPIRone 5 MG tablet  · citalopram 10 MG tablet  · OXcarbazepine 300 MG tablet  · paliperidone 3 MG extended release tablet     You can get these medications from any pharmacy    Bring a paper prescription for each of these medications  · paliperidone palmitate  MG/ML Hafsa IM injection     Information about where to get these medications is not yet available    Ask your nurse or doctor about these medications  · nicotine 14 MG/24HR     Patient is counseled if she continues to abuse drugs or alcohol he could act out impulsively causing serious harm to himself or others even though may be unintentional.  He demonstrated understanding of this and has the capacity understand this    Patient is counseled hisr mental health treatment will be difficult to optimize with ongoing use of drugs or alcohol he demonstrate understanding of this as the past understand this     Patient is counseled he must remain compliant with all medications outpatient follow-up ointments    Patient is discharged home in stable condition      TIME SPEND - 35 MINUTES TO COMPLETE THE EVALUATION, DISCHARGE SUMMARY, MEDICATION RECONCILIATION AND FOLLOW UP CARE     Signed:  CHARO Vazquez CNP  5/32/3116  1:50 PM

## 2021-07-21 NOTE — CARE COORDINATION
Contacted Andrew  with pt discharge plan update. No answer, voicemail was left. Pt apts are scheduled with [facility]. Bladimir Dunlap will schedule pt for an ACT team intake apt.

## 2021-07-21 NOTE — PROGRESS NOTES
CLINICAL PHARMACY NOTE: MEDS TO BEDS    Total # of Prescriptions Filled: 4   The following medications were delivered to the patient:  · Citalopram 10   paliperidone er 3  · Oxcarbazepine 300    Additional Documentation:

## 2022-05-06 ENCOUNTER — HOSPITAL ENCOUNTER (INPATIENT)
Age: 47
LOS: 18 days | Discharge: OTHER INSTITUTION | DRG: 750 | End: 2022-05-24
Attending: EMERGENCY MEDICINE | Admitting: PSYCHIATRY & NEUROLOGY
Payer: COMMERCIAL

## 2022-05-06 DIAGNOSIS — F31.64 BIPOLAR AFFECTIVE DISORDER, MIXED, SEVERE, WITH PSYCHOTIC BEHAVIOR (HCC): Primary | ICD-10-CM

## 2022-05-06 DIAGNOSIS — F29 PSYCHOSIS, UNSPECIFIED PSYCHOSIS TYPE (HCC): ICD-10-CM

## 2022-05-06 PROBLEM — F25.9 SCHIZO-AFFECTIVE PSYCHOSIS (HCC): Status: ACTIVE | Noted: 2022-05-06

## 2022-05-06 LAB
ACETAMINOPHEN LEVEL: <5 MCG/ML (ref 10–30)
AMPHETAMINE SCREEN, URINE: NOT DETECTED
ANION GAP SERPL CALCULATED.3IONS-SCNC: 10 MMOL/L (ref 7–16)
BARBITURATE SCREEN URINE: NOT DETECTED
BASOPHILS ABSOLUTE: 0.03 E9/L (ref 0–0.2)
BASOPHILS RELATIVE PERCENT: 0.6 % (ref 0–2)
BENZODIAZEPINE SCREEN, URINE: NOT DETECTED
BUN BLDV-MCNC: 9 MG/DL (ref 6–20)
CALCIUM SERPL-MCNC: 8.9 MG/DL (ref 8.6–10.2)
CANNABINOID SCREEN URINE: NOT DETECTED
CHLORIDE BLD-SCNC: 104 MMOL/L (ref 98–107)
CO2: 24 MMOL/L (ref 22–29)
COCAINE METABOLITE SCREEN URINE: POSITIVE
CREAT SERPL-MCNC: 0.7 MG/DL (ref 0.5–1)
EKG ATRIAL RATE: 70 BPM
EKG P AXIS: 79 DEGREES
EKG P-R INTERVAL: 104 MS
EKG Q-T INTERVAL: 434 MS
EKG QRS DURATION: 70 MS
EKG QTC CALCULATION (BAZETT): 468 MS
EKG R AXIS: 87 DEGREES
EKG T AXIS: 78 DEGREES
EKG VENTRICULAR RATE: 70 BPM
EOSINOPHILS ABSOLUTE: 0.14 E9/L (ref 0.05–0.5)
EOSINOPHILS RELATIVE PERCENT: 2.8 % (ref 0–6)
ETHANOL: <10 MG/DL (ref 0–0.08)
FENTANYL SCREEN, URINE: NOT DETECTED
GFR AFRICAN AMERICAN: >60
GFR NON-AFRICAN AMERICAN: >60 ML/MIN/1.73
GLUCOSE BLD-MCNC: 94 MG/DL (ref 74–99)
HCG, URINE, POC: NEGATIVE
HCT VFR BLD CALC: 40.7 % (ref 34–48)
HEMOGLOBIN: 12.9 G/DL (ref 11.5–15.5)
IMMATURE GRANULOCYTES #: 0.01 E9/L
IMMATURE GRANULOCYTES %: 0.2 % (ref 0–5)
INFLUENZA A: NOT DETECTED
INFLUENZA B: NOT DETECTED
LYMPHOCYTES ABSOLUTE: 2.92 E9/L (ref 1.5–4)
LYMPHOCYTES RELATIVE PERCENT: 57.8 % (ref 20–42)
Lab: ABNORMAL
Lab: NORMAL
MAGNESIUM: 1.9 MG/DL (ref 1.6–2.6)
MCH RBC QN AUTO: 28.7 PG (ref 26–35)
MCHC RBC AUTO-ENTMCNC: 31.7 % (ref 32–34.5)
MCV RBC AUTO: 90.4 FL (ref 80–99.9)
METHADONE SCREEN, URINE: NOT DETECTED
MONOCYTES ABSOLUTE: 0.45 E9/L (ref 0.1–0.95)
MONOCYTES RELATIVE PERCENT: 8.9 % (ref 2–12)
NEGATIVE QC PASS/FAIL: NORMAL
NEUTROPHILS ABSOLUTE: 1.5 E9/L (ref 1.8–7.3)
NEUTROPHILS RELATIVE PERCENT: 29.7 % (ref 43–80)
OPIATE SCREEN URINE: NOT DETECTED
OXYCODONE URINE: NOT DETECTED
PDW BLD-RTO: 14 FL (ref 11.5–15)
PHENCYCLIDINE SCREEN URINE: NOT DETECTED
PLATELET # BLD: 179 E9/L (ref 130–450)
PMV BLD AUTO: 10.9 FL (ref 7–12)
POSITIVE QC PASS/FAIL: NORMAL
POTASSIUM REFLEX MAGNESIUM: 3.4 MMOL/L (ref 3.5–5)
RBC # BLD: 4.5 E12/L (ref 3.5–5.5)
SALICYLATE, SERUM: <0.3 MG/DL (ref 0–30)
SARS-COV-2 RNA, RT PCR: NOT DETECTED
SODIUM BLD-SCNC: 138 MMOL/L (ref 132–146)
TOTAL CK: 204 U/L (ref 20–180)
TRICYCLIC ANTIDEPRESSANTS SCREEN SERUM: NEGATIVE NG/ML
WBC # BLD: 5.1 E9/L (ref 4.5–11.5)

## 2022-05-06 PROCEDURE — 6370000000 HC RX 637 (ALT 250 FOR IP): Performed by: EMERGENCY MEDICINE

## 2022-05-06 PROCEDURE — 96372 THER/PROPH/DIAG INJ SC/IM: CPT

## 2022-05-06 PROCEDURE — 83735 ASSAY OF MAGNESIUM: CPT

## 2022-05-06 PROCEDURE — 93005 ELECTROCARDIOGRAM TRACING: CPT | Performed by: NURSE PRACTITIONER

## 2022-05-06 PROCEDURE — 82550 ASSAY OF CK (CPK): CPT

## 2022-05-06 PROCEDURE — 1240000000 HC EMOTIONAL WELLNESS R&B

## 2022-05-06 PROCEDURE — 82077 ASSAY SPEC XCP UR&BREATH IA: CPT

## 2022-05-06 PROCEDURE — 80307 DRUG TEST PRSMV CHEM ANLYZR: CPT

## 2022-05-06 PROCEDURE — 99285 EMERGENCY DEPT VISIT HI MDM: CPT

## 2022-05-06 PROCEDURE — 6360000002 HC RX W HCPCS: Performed by: EMERGENCY MEDICINE

## 2022-05-06 PROCEDURE — 85025 COMPLETE CBC W/AUTO DIFF WBC: CPT

## 2022-05-06 PROCEDURE — 87636 SARSCOV2 & INF A&B AMP PRB: CPT

## 2022-05-06 PROCEDURE — 80179 DRUG ASSAY SALICYLATE: CPT

## 2022-05-06 PROCEDURE — 36415 COLL VENOUS BLD VENIPUNCTURE: CPT

## 2022-05-06 PROCEDURE — 93010 ELECTROCARDIOGRAM REPORT: CPT | Performed by: INTERNAL MEDICINE

## 2022-05-06 PROCEDURE — 80048 BASIC METABOLIC PNL TOTAL CA: CPT

## 2022-05-06 PROCEDURE — 80143 DRUG ASSAY ACETAMINOPHEN: CPT

## 2022-05-06 RX ORDER — HALOPERIDOL 5 MG
5 TABLET ORAL EVERY 4 HOURS PRN
Status: DISCONTINUED | OUTPATIENT
Start: 2022-05-06 | End: 2022-05-24 | Stop reason: HOSPADM

## 2022-05-06 RX ORDER — ZIPRASIDONE MESYLATE 20 MG/ML
20 INJECTION, POWDER, LYOPHILIZED, FOR SOLUTION INTRAMUSCULAR ONCE
Status: COMPLETED | OUTPATIENT
Start: 2022-05-06 | End: 2022-05-06

## 2022-05-06 RX ORDER — POTASSIUM CHLORIDE 1.5 G/1.77G
40 POWDER, FOR SOLUTION ORAL ONCE
Status: DISCONTINUED | OUTPATIENT
Start: 2022-05-06 | End: 2022-05-06

## 2022-05-06 RX ORDER — MAGNESIUM HYDROXIDE/ALUMINUM HYDROXICE/SIMETHICONE 120; 1200; 1200 MG/30ML; MG/30ML; MG/30ML
30 SUSPENSION ORAL EVERY 6 HOURS PRN
Status: DISCONTINUED | OUTPATIENT
Start: 2022-05-06 | End: 2022-05-24 | Stop reason: HOSPADM

## 2022-05-06 RX ORDER — LORAZEPAM 2 MG/ML
2 INJECTION INTRAMUSCULAR ONCE
Status: COMPLETED | OUTPATIENT
Start: 2022-05-06 | End: 2022-05-06

## 2022-05-06 RX ORDER — HYDROXYZINE PAMOATE 50 MG/1
50 CAPSULE ORAL 3 TIMES DAILY PRN
Status: DISCONTINUED | OUTPATIENT
Start: 2022-05-06 | End: 2022-05-24 | Stop reason: HOSPADM

## 2022-05-06 RX ORDER — ACETAMINOPHEN 325 MG/1
650 TABLET ORAL EVERY 4 HOURS PRN
Status: DISCONTINUED | OUTPATIENT
Start: 2022-05-06 | End: 2022-05-24 | Stop reason: HOSPADM

## 2022-05-06 RX ORDER — HALOPERIDOL 5 MG/ML
5 INJECTION INTRAMUSCULAR EVERY 4 HOURS PRN
Status: DISCONTINUED | OUTPATIENT
Start: 2022-05-06 | End: 2022-05-24 | Stop reason: HOSPADM

## 2022-05-06 RX ORDER — TRAZODONE HYDROCHLORIDE 50 MG/1
50 TABLET ORAL NIGHTLY PRN
Status: DISCONTINUED | OUTPATIENT
Start: 2022-05-06 | End: 2022-05-24 | Stop reason: HOSPADM

## 2022-05-06 RX ORDER — POTASSIUM CHLORIDE 20 MEQ/1
40 TABLET, EXTENDED RELEASE ORAL ONCE
Status: COMPLETED | OUTPATIENT
Start: 2022-05-06 | End: 2022-05-06

## 2022-05-06 RX ADMIN — POTASSIUM CHLORIDE 40 MEQ: 20 TABLET, EXTENDED RELEASE ORAL at 22:12

## 2022-05-06 RX ADMIN — ZIPRASIDONE MESYLATE 20 MG: 20 INJECTION, POWDER, LYOPHILIZED, FOR SOLUTION INTRAMUSCULAR at 15:25

## 2022-05-06 RX ADMIN — LORAZEPAM 2 MG: 2 INJECTION INTRAMUSCULAR; INTRAVENOUS at 15:24

## 2022-05-06 NOTE — ED PROVIDER NOTES
HPI:  5/6/22, Time: 5:29 PM EDT         Hernan Sims is a 51 y.o. female presenting to the ED for psychiatric evaluation. Patient was probated prior to arrival.  She does have an extensive psychiatric history. She is been deteriorating mentally at home. She denies any suicidal or homicidal ideation. She denies any hallucinations. She denies any other symptoms or complaints. Review of Systems:   A complete review of systems was performed and pertinent positives and negatives are stated within HPI, all other systems reviewed and are negative.          --------------------------------------------- PAST HISTORY ---------------------------------------------  Past Medical History:  has a past medical history of Acute psychosis ([facility] Anxiety, Asthma, Bipolar disorder ([facility] Bronchitis, COPD (chronic obstructive pulmonary disease) ([facility] Depression, Iron deficiency anemia, PTSD (post-traumatic stress disorder), Schizo affective schizophrenia ([facility] Severe episode of recurrent major depressive disorder, without psychotic features ([facility] and Sub[facility] abuse ([facility] Past Surgical History:  has a past surgical history that includes Tonsillectomy and Uterine fibroid surgery (06/2019). Social History:  reports that she has been smoking cigarettes. She has a 10.00 pack-year smoking history. She has never used smokeless tobacco. She reports current alcohol use. She reports current drug use. Drug: Marijuana Juliette Gallegos). Family History: family history includes Diabetes in her father and mother; Heart Failure in her mother; High Blood Pressure in her mother; Kidney Disease in her mother; Mental Illness in her father; Other in her father. The patients home medications have been reviewed. Allergies: Patient has no known allergies.     -------------------------------------------------- RESULTS -------------------------------------------------  All laboratory and radiology results have been personally reviewed by myself   LABS:  Results for orders placed or performed during the hospital encounter of 05/06/22   COVID-19 & Influenza Combo    Specimen: Nasopharyngeal Swab   Result Value Ref Range    SARS-CoV-2 RNA, RT PCR NOT DETECTED NOT DETECTED    INFLUENZA A NOT DETECTED NOT DETECTED    INFLUENZA B NOT DETECTED NOT DETECTED   CBC with Auto Differential   Result Value Ref Range    WBC 5.1 4.5 - 11.5 E9/L    RBC 4.50 3.50 - 5.50 E12/L    Hemoglobin 12.9 11.5 - 15.5 g/dL    Hematocrit 40.7 34.0 - 48.0 %    MCV 90.4 80.0 - 99.9 fL    MCH 28.7 26.0 - 35.0 pg    MCHC 31.7 (L) 32.0 - 34.5 %    RDW 14.0 11.5 - 15.0 fL    Platelets 242 831 - 388 E9/L    MPV 10.9 7.0 - 12.0 fL    Neutrophils % 29.7 (L) 43.0 - 80.0 %    Immature Granulocytes % 0.2 0.0 - 5.0 %    Lymphocytes % 57.8 (H) 20.0 - 42.0 %    Monocytes % 8.9 2.0 - 12.0 %    Eosinophils % 2.8 0.0 - 6.0 %    Basophils % 0.6 0.0 - 2.0 %    Neutrophils Absolute 1.50 (L) 1.80 - 7.30 E9/L    Immature Granulocytes # 0.01 E9/L    Lymphocytes Absolute 2.92 1.50 - 4.00 E9/L    Monocytes Absolute 0.45 0.10 - 0.95 E9/L    Eosinophils Absolute 0.14 0.05 - 0.50 E9/L    Basophils Absolute 0.03 0.00 - 0.20 D1/B   Basic Metabolic Panel w/ Reflex to MG   Result Value Ref Range    Sodium 138 132 - 146 mmol/L    Potassium reflex Magnesium 3.4 (L) 3.5 - 5.0 mmol/L    Chloride 104 98 - 107 mmol/L    CO2 24 22 - 29 mmol/L    Anion Gap 10 7 - 16 mmol/L    Glucose 94 74 - 99 mg/dL    BUN 9 6 - 20 mg/dL    CREATININE 0.7 0.5 - 1.0 mg/dL    GFR Non-African American >60 >=60 mL/min/1.73    GFR African American >60     Calcium 8.9 8.6 - 10.2 mg/dL   Serum Drug Screen   Result Value Ref Range    Ethanol Lvl <10 mg/dL    Acetaminophen Level <5.0 (L) 10.0 - 86.9 mcg/mL    Salicylate, Serum <0.6 0.0 - 30.0 mg/dL    TCA Scrn NEGATIVE Cutoff:300 ng/mL   Urine Drug Screen   Result Value Ref Range    Amphetamine Screen, Urine NOT DETECTED Negative <1000 ng/mL    Barbiturate Screen, Ur NOT DETECTED Negative < 200 ng/mL    Benzodiazepine Screen, Urine NOT DETECTED Negative < 200 ng/mL    Cannabinoid Scrn, Ur NOT DETECTED Negative < 50ng/mL    Cocaine Metabolite Screen, Urine POSITIVE (A) Negative < 300 ng/mL    Opiate Scrn, Ur NOT DETECTED Negative < 300ng/mL    PCP Screen, Urine NOT DETECTED Negative < 25 ng/mL    Methadone Screen, Urine NOT DETECTED Negative <300 ng/mL    Oxycodone Urine NOT DETECTED Negative <100 ng/mL    FENTANYL SCREEN, URINE NOT DETECTED Negative <1 ng/mL    Drug Screen Comment: see below    Magnesium   Result Value Ref Range    Magnesium 1.9 1.6 - 2.6 mg/dL   CK   Result Value Ref Range    Total  (H) 20 - 180 U/L   POC Pregnancy Urine   Result Value Ref Range    HCG, Urine, POC Negative Negative    Lot Number [id number]     Positive QC Pass/Fail Pass     Negative QC Pass/Fail Pass    EKG 12 Lead   Result Value Ref Range    Ventricular Rate 70 BPM    Atrial Rate 70 BPM    P-R Interval 104 ms    QRS Duration 70 ms    Q-T Interval 434 ms    QTc Calculation (Bazett) 468 ms    P Axis 79 degrees    R Axis 87 degrees    T Axis 78 degrees       RADIOLOGY:  Interpreted by Radiologist.  No orders to display       ------------------------- NURSING NOTES AND VITALS REVIEWED ---------------------------   The nursing notes within the ED encounter and vital signs as below have been reviewed. /80   Pulse 89   Temp 98 °F (36.7 °C) (Oral)   Resp 16   Ht 5' 2\" (1.575 m)   SpO2 94%   BMI 21.40 kg/m²   Oxygen Saturation Interpretation: Normal      ---------------------------------------------------PHYSICAL EXAM--------------------------------------      Constitutional/General: Alert and oriented x3, well appearing, non toxic in NAD  Head: Normocephalic and atraumatic  Eyes: PERRL, EOMI  Mouth: Oropharynx clear, handling secretions, no trismus  Neck: Supple, full ROM,   Pulmonary: Lungs clear to auscultation bilaterally, no wheezes, rales, or rhonchi.  Not in respiratory distress  Cardiovascular:  Regular rate and rhythm, no murmurs, gallops, or rubs. 2+ distal pulses  Abdomen: Soft, non tender, non distended,   Extremities: Moves all extremities x 4. Warm and well perfused  Skin: warm and dry without rash  Neurologic: GCS 15,  Psych: Normal Affect      ------------------------------ ED COURSE/MEDICAL DECISION MAKING----------------------  Medications   acetaminophen (TYLENOL) tablet 650 mg (has no administration in time range)   hydrOXYzine (VISTARIL) capsule 50 mg (has no administration in time range)   haloperidol (HALDOL) tablet 5 mg (has no administration in time range)     Or   haloperidol lactate (HALDOL) injection 5 mg (has no administration in time range)   traZODone (DESYREL) tablet 50 mg (has no administration in time range)   magnesium hydroxide (MILK OF MAGNESIA) 400 MG/5ML suspension 30 mL (has no administration in time range)   aluminum & magnesium hydroxide-simethicone (MAALOX) 200-200-20 MG/5ML suspension 30 mL (has no administration in time range)   LORazepam (ATIVAN) injection 2 mg (2 mg IntraMUSCular Given 5/6/22 1524)   ziprasidone (GEODON) injection 20 mg (20 mg IntraMUSCular Given 5/6/22 1525)   potassium chloride (KLOR-CON M) extended release tablet 40 mEq (40 mEq Oral Given 5/6/22 2212)         ED COURSE:       Medical Decision Making:    Patient was combative on arrival.  Attempted to verbally redirect her, this was unsuccessful. She was given Geodon and Ativan. Labs reviewed. Patient medically cleared. Critical care: 33 minutes    Counseling: The emergency provider has spoken with the patient and discussed todays results, in addition to providing specific details for the plan of care and counseling regarding the diagnosis and prognosis. Questions are answered at this time and they are agreeable with the plan.      --------------------------------- IMPRESSION AND DISPOSITION ---------------------------------    IMPRESSION  1.  Psychosis, unspecified psychosis type ([facility] DISPOSITION  Disposition: Admit to mental health unit - medically cleared for admission  Patient condition is stable      NOTE: This report was transcribed using voice recognition software.  Every effort was made to ensure accuracy; however, inadvertent computerized transcription errors may be present       Rachid Mari MD  05/07/22 9723

## 2022-05-06 NOTE — ED NOTES
Department of Emergency Medicine  FIRST PROVIDER TRIAGE NOTE             Independent MLP           5/6/22  12:37 PM EDT    Date of Encounter: 5/6/22   MRN: [id number]      HPI: Jus Pinto is a 51 y.o. female who presents to the ED for Other (probate order; denies pain, denies SI and HI; probate says she is \"mentally ill\")     Patient is brought in by 's for probate court for mental illness. She has no complaints at this time. She denies any suicidal or homicidal ideations. ROS: Negative for Suicidal ideation or Homicidal Ideation. PE: Gen Appearance/Constitutional: alert  Musculoskeletal: moves all extremities x 4     Initial Plan of Care: All treatment areas with department are currently occupied. Plan to order/Initiate the following while awaiting opening in ED: labs.   Initiate Treatment-Testing, Proceed toTreatment Area When Bed Available for ED Attending/MLP to Continue Care    Electronically signed by CHARO Webster CNP   DD: 5/6/22         CHARO Webster CNP  05/06/22 1230

## 2022-05-06 NOTE — ED NOTES
Patient aggressive shouting \"Fuck You\" to staff and attempting to leave [facility] PD dispatched, Dr. Tabitha Alonso ordered meds patient complied and allowed me to give meds     Jake Kolb RN  05/06/22 8102

## 2022-05-06 NOTE — ED NOTES
Behavioral Health Crisis Assessment      Chief Complaint: Pt is a 52 yo female who presents via deputy , probated by [employer]. Probate was filed by [facility]  Eric Chu who states pt has not been taking prescribed psychiatric medications which has caused pt to display psychotic symptoms including hallucinations. Pt has been stating that everyone she sees around her is dead, also that the intercom system is laughing at her. Pt demonstrating poor self care with not showering, not changing clothes for an extended time - 3 months. Losing weight because symptoms prevent her from grocery shoppping. Mental Status Exam: Pt is not cooperative to interview, information limited to observation. Mood neutral, affect angry, irritable, behavior is agitated, yelling and demanding, verbally threatening physical violence but has not escalated to physical violence or combativness, does not appear in behavioral control, thought form disorganized, thought content paranoia, reported to be experiencing both visual and auditory hallucinations, mental status remarkable for psychotic symptoms. Legal Status  [] Voluntary:  [x] Involuntary, Issued by: [facility] [place]    Gender  [] Male [x] Female [] Transgender  [] Other    Sexual Orientation    [x] Heterosexual [] Homosexual [] Bisexual [] Other    Brief Clinical Summary: Pt is open at [facility], dx: schizoaffective disorder, bipolor disorder and alcohol dependence. Hx of in-patient psychiatric hospitalization, last was 7/02/2021. Hx of being prescribed [place] injection, Celexa, Trileptal, Invega tablets.     Collateral Information: None    Risk Factors:  Mental health diagnosis: schizoaffective disorder  Non-compliance with medications  Substance use  Hx of in-patient psychiatric admissions  Gender risk      Protective Factors:  Out-patient provider   involvement  Safe and stable housing  Steady income  Access to essential needs      Suicidal Behavioral:   [] Reports:    [] Past [] Present   [x] Denies    C-SSRS Screening Completed by RN: Current Suicide Risk:  [] None  [] Low [] Moderate [] High    Homicidal/ Violent Behavior  [] Reports:   [] Past [] Present   [x] Denies     Self Injurious/Self Mutilation Behaviors:   [] Reports:    [] Past [] Present   [x] Denies    Hallucinations/Delusions   [x] Reports:   [] Denies     Substance Use/Alcohol Use/Addiction:   [x] Reports: Cocaine, cannabis  [] Denies   [x] SBIRT Screen Complete. Current or Past Substance Abuse Treatment  [x] Yes, When and Where: [place]  [] No    Current or Past Mental Health Treatment:  [x] Yes, When and Where: [address] [place] [address] 2021  [] No    Legal Issues:  []  Yes (Specify)  [x]  No    Access to Weapons:  []  Yes (Specify)  [x]  No    Trauma History  [] Reports:  [] Denies     Living Situation: Supported housing through [facility]    Employment: None  Education Level: High school    Violence Risk Screenin. Have you ever thought about hurting someone? [x]  No  []  Yes (Ask the questions listed below)   When?  Did you follow through with the thoughts? [] No     [] Yes- When and what happened? 2.  Have you ever threatened anyone? []  No  [x]  Yes (Ask the questions listed below)   When and what happened? Threats of violence to staff   Have you ever threatened someone with a gun, knife or other weapon? [x]  No  []  Yes - When and what happened? 2. Have you ever had an order of protection taken out against you? []  Yes []  No  3. Have you ever been arrested due to violence? []  Yes [x]  No  4. Have you ever been cruel to animals?  []  Yes [x]  No    After consideration of C-SSRS screening results, C-SSRS assessments, and this professional's assessment the patient's overall suicide risk assessed to be:  [] None   [x] Low   [] Moderate   [] High     [x] Discussed current suicide risk, protective and risk factors with RN and ED Physician     Disposition   [] Home:   [] Outpatient Provider:   [] Crisis Unit:   [x] Inpatient Psychiatric Unit: Due to medication non-compliance and acute psychosis.   [] Other:                     JEFRY Cabezas, [place]  05/06/22 3597

## 2022-05-07 PROBLEM — F14.10 COCAINE ABUSE (HCC): Status: ACTIVE | Noted: 2022-05-07

## 2022-05-07 PROCEDURE — 6370000000 HC RX 637 (ALT 250 FOR IP): Performed by: NURSE PRACTITIONER

## 2022-05-07 PROCEDURE — 90792 PSYCH DIAG EVAL W/MED SRVCS: CPT | Performed by: NURSE PRACTITIONER

## 2022-05-07 PROCEDURE — 1240000000 HC EMOTIONAL WELLNESS R&B

## 2022-05-07 RX ORDER — OXCARBAZEPINE 150 MG/1
150 TABLET, FILM COATED ORAL 2 TIMES DAILY
Status: DISCONTINUED | OUTPATIENT
Start: 2022-05-07 | End: 2022-05-09

## 2022-05-07 RX ORDER — PALIPERIDONE 3 MG/1
3 TABLET, EXTENDED RELEASE ORAL DAILY
Status: DISCONTINUED | OUTPATIENT
Start: 2022-05-07 | End: 2022-05-09

## 2022-05-07 RX ORDER — OXCARBAZEPINE 300 MG/1
300 TABLET, FILM COATED ORAL 2 TIMES DAILY
Status: DISCONTINUED | OUTPATIENT
Start: 2022-05-07 | End: 2022-05-07

## 2022-05-07 RX ADMIN — OXCARBAZEPINE 150 MG: 150 TABLET, FILM COATED ORAL at 11:45

## 2022-05-07 RX ADMIN — PALIPERIDONE 3 MG: 3 TABLET, EXTENDED RELEASE ORAL at 11:45

## 2022-05-07 RX ADMIN — OXCARBAZEPINE 150 MG: 150 TABLET, FILM COATED ORAL at 21:00

## 2022-05-07 ASSESSMENT — SLEEP AND FATIGUE QUESTIONNAIRES
DO YOU USE A SLEEP AID: NO
SLEEP PATTERN: DIFFICULTY FALLING ASLEEP;DISTURBED/INTERRUPTED SLEEP;NIGHTMARES/TERRORS
AVERAGE NUMBER OF SLEEP HOURS: 5
DO YOU HAVE DIFFICULTY SLEEPING: YES
DO YOU HAVE DIFFICULTY SLEEPING: YES
SLEEP PATTERN: DIFFICULTY FALLING ASLEEP;DISTURBED/INTERRUPTED SLEEP
AVERAGE NUMBER OF SLEEP HOURS: 3
DO YOU USE A SLEEP AID: NO

## 2022-05-07 ASSESSMENT — LIFESTYLE VARIABLES
HOW MANY STANDARD DRINKS CONTAINING ALCOHOL DO YOU HAVE ON A TYPICAL DAY: 1 OR 2
HOW OFTEN DO YOU HAVE A DRINK CONTAINING ALCOHOL: 2-3 TIMES A WEEK

## 2022-05-07 ASSESSMENT — PAIN SCALES - GENERAL: PAINLEVEL_OUTOF10: 0

## 2022-05-07 NOTE — PROGRESS NOTES
Spiritual Support Group Note    Number of Participants in Group:    13                    Time:   1    Goal: Relief from isolation and loneliness             Marly Sharing             Self-understanding and gain insight              Acceptance and belonging            Recognize they are not alone                Socialization             Empowerment       Encouragement    Topic:  [x] Spiritual Wellness and Self Care                  [x] Hope                     [] Connecting with Divine/Others        [x] Thankfulness and Gratitude               []  Meaningfulness and Purpose               [] Forgiveness               [] Peace               [] Connect to [employer]      [] Other    Participation Level:   [x] Active Listener   [] Minimal   [] Monopolizing   [] Interactive   [] No Participation   []  Other:     Attention:   [x] Alert   [] Distractible   [] Drowsy   [] Poor   [] Other:    Manner:   [x] Cooperative   [] Suspicious   [] Withdrawn   [] Guarded   [] Irritable   [] Inhospitable   [] Other:     Others Comments from Group:

## 2022-05-07 NOTE — PROGRESS NOTES
[facility]  Admission Note     Admission Type:   Admission Type: Involuntary   Patient arrived from [facility] to floor via wheelchair with personal belongings. Patient was calm,cooperative with this nurse. Eye contact was good. Affect was flat. Patient denies suicidal,homicidal or AV hallucinations. Denies depression or anxiety. State that her appetite is good and that she struggles with sleep. Verbalizes that the  brought her here because her  is trying to get her put away,by mentally ill. All because she knows I came into money. Will continue to monitor and provide safe environment  With 15 minute rounds. Reason for admission:  Reason for Admission: My  through [facility])  is trying to put me away. I called on a friend of mine and he gave me some money . The  left me look at the pink slip and Marvin Breaker stated that I was mentally ill and she wanted me put away.     PATIENT STRENGTHS:       Patient Strengths and Limitations:       Addictive Behavior:   Addictive Behavior  In the Past 3 Months, Have You Felt or Has Someone Told You That You Have a Problem With  : None    Medical Problems:   Past Medical History:   Diagnosis Date    Acute psychosis ([facility] 12/22/2017    Anxiety     Asthma     Bipolar disorder (HCC)     Bronchitis     COPD (chronic obstructive pulmonary disease) (HCC)     Depression     Iron deficiency anemia     PTSD (post-traumatic stress disorder)     Schizo affective schizophrenia ([facility]     Severe episode of recurrent major depressive disorder, without psychotic features ([facility] 11/16/2019    [facility]     quit 7/2011  was marajuanna user        Status EXAM:  Mental Status and Behavioral Exam  Normal: No  Level of Assistance: Independent/Self  Facial Expression: Flat,Expressionless,Sad  Affect: Blunt  Level of Consciousness: Alert  Frequency of Checks: 4 times per hour, close  Mood:Normal: No  Mood: Depressed,Sad  Motor Activity:Normal: No  Motor Activity: Decreased  Eye Contact: Good  Observed Behavior: Preoccupied  Sexual Misconduct History: Current - no  Preception: [place] to person,[place] to time,T[place] to place,[place] to situation  Attention:Normal: No  Attention: Distractible  Thought Processes: Blocking  Thought Content:Normal: No  Thought Content: Preoccupations  Depression Symptoms: Change in energy level,Feelings of helplessness,Feelings of hopelessess,Impaired concentration  Anxiety Symptoms: No problems reported or observed. Ksenia Symptoms: No problems reported or observed. Hallucinations: None  Delusions: No  Memory:Normal: No  Memory: Poor recent,Poor remote  Insight and Judgment: No  Insight and Judgment: Poor insight,Poor judgment    Tobacco Screening:  Practical Counseling, on admission, cherrie X, if applicable and completed (first 3 are required if patient doesn't refuse):             (x )  Recognizing danger situations (included triggers and roadblocks)                    ( x)  Coping skills (new ways to manage stress, exercise, relaxation techniques, changing routine, distraction)                                                           (x )  Basic information about quitting (benefits of quitting, techniques in how to quit, available resources  (x ) Referral for counseling faxed to [facility]                                           (x ) Patient refused counseling  ( ) Patient has not smoked in the last 30 days    Metabolic Screening:    Lab Results   Component Value Date    LABA1C 5.9 (H) 03/28/2021       Lab Results   Component Value Date    CHOL 190 03/28/2021    CHOL 235 (H) 09/20/2019     Lab Results   Component Value Date    TRIG 80 03/28/2021    TRIG 65 09/20/2019     Lab Results   Component Value Date    HDL 68 03/28/2021    HDL 59 09/20/2019     No components found for: [facility] [place]  Lab Results   Component Value Date    LABVLDL 16 03/28/2021    LABVLDL 13 09/20/2019 Body mass index is 21.4 kg/m². BP Readings from Last 2 Encounters:   05/06/22 114/80   07/21/21 112/63           Pt admitted with followings belongings:  Dental Appliances: None  Vision - Corrective Lenses: None  Hearing Aid: None  Jewelry: None  Body Piercings Removed: N/A  Clothing: Belt,Shirt,Pants,Slippers,Other (Comment) (1 head scarf)  Other Valuables: [place]     Patient's home medications were N/A. Patient oriented to surroundings and program expectations and copy of patient rights given. Received admission packet:  yes. Consents reviewed, signed yes. Patient verbalize understanding:  yes. Patient education on precautions, PIN number, hours phone is available, how to dial out, 24 hour help hotline, tour of facility and toiletries provided.                    Marissa Stanton RN

## 2022-05-07 NOTE — ED NOTES
Patient able to awaken enough to sit upright and swallow pills. Belonging from locker at bedside for transport upstairs for admission.      Marianne Cortes RN  05/06/22 0827

## 2022-05-07 NOTE — CARE COORDINATION
Biopsychosocial Assessment Note    Social work met with patient to complete the biopsychosocial assessment and C-SSRS. Chief Complaint: \"She wanted me locked up\"    Mental Status Exam: Patient oriented x3. Patient presents as irritable, blunted, and has poor insight into need for treatment. Patient has poor eye contact, and appears to be internally preoccupied. Clinical Summary: Patient admitted due to decompensation evidenced by inability to care for self, non medication compliance, and VH. Patient probated by  at [place] due to her decompensation. Patient has hx of inpatient hospitalizations, SI, SA but denies self injurious behaviors. Patient has substance use but denies ETOH use. UDS+ cocaine. Patient states she lives alone and plans to return. Patient is connected to [facility] services.  Patient stats she was emotional absued as a child    Risk Factors: non medication compliance, inability to care for self, isolative, substance use    Protective Factors: connection to provider, stable housing    Gender  [] Male [x] Female [] Transgender  [] Other    Sexual Orientation    [x] Heterosexual [] Homosexual [] Bisexual [] Other    Suicidal Ideation  [] Past [] Present [x] Denies     C-SSRS Screening Completed: Current Suicide Risk:  [] None  [x] Low [] Moderate [] High    Homicidal Ideation  [x] Past [] Present [x] Denies     Hallucinations/Delusions (Specify type) Visual  [x] Reports [] Denies     Current or Past Mental Health Treatment:  [x] Yes, When and Where: [facility]  [] No    Substance Use/Alcohol Use/Addiction  [x] Reports [] Denies     Tobacco Use (within the last 6 months)  [x] Reports [] Denies     Trauma History  [x] Reports [] Denies     Self Injurious/Self Mutilation Behaviors:   [] Reports:    [] Past [] Present   [x] Denies    Legal History:  []  Yes (Specify)    [x] No    Collateral Contact (MYKEL signed) no MYKEL  Name:   Relationship:  Number:     Collateral Information:      Access to Weapons per Collateral Contact: [] Reports [] Denies     After consideration of C-SSRS screening results, C-SSRS assessments, and this professional's assessment the patient's overall suicide risk assessed to be:  [] None   [x] Low   [] Moderate   [] High     [x] Discussed current suicide risk, protective and risk factors with RN and NP/Psychiatrist.    Discharge Plan:  [x] Home: lives independently  [] Shelter:  [] Crisis Unit:  [] Substance Abuse Rehab:  [] Nursing Facility:  [] Other (Specify):     Follow up Provider: Usama

## 2022-05-07 NOTE — PLAN OF CARE
Addendum for 2p: attended this PM's 1 h group offering on Lessons fr the [facility]. Attentive to a/v materials used / presented. Completed warm up geography quiz and scored a 96. Appeared to digest key learning points.

## 2022-05-07 NOTE — ED NOTES
Patient too drowsy to take potassium replacement or participate in nurses assessment. Resting with eyes closed. Opens eyes to touch then drifts off. In constant visualization from nurses desk. No immediate risk of self harm at this time.      Niki Caputo RN  05/06/22 7450

## 2022-05-07 NOTE — PLAN OF CARE
Alexandro Goldberg able to dialogue re her situ. Volunteers info slowly but accurately. Positive interaction w peers and staff. Attending groups faithfully. Denies si, hi, and a/vH. Compliant w meds. Unable to reach [facility] ( to confirm usual RX )  as per theire being closed on SATs and SUNs.

## 2022-05-07 NOTE — PROGRESS NOTES
Patient resting quietly with eyes closed. No S/S of distress noted or observed. No prn medications given at this time. Will continue to monitor, provide needs and safe environment with continue rounding every 15 minutes.
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
## 2022-05-08 PROCEDURE — 6370000000 HC RX 637 (ALT 250 FOR IP): Performed by: NURSE PRACTITIONER

## 2022-05-08 PROCEDURE — 99232 SBSQ HOSP IP/OBS MODERATE 35: CPT | Performed by: NURSE PRACTITIONER

## 2022-05-08 PROCEDURE — 1240000000 HC EMOTIONAL WELLNESS R&B

## 2022-05-08 RX ADMIN — OXCARBAZEPINE 150 MG: 150 TABLET, FILM COATED ORAL at 21:34

## 2022-05-08 RX ADMIN — OXCARBAZEPINE 150 MG: 150 TABLET, FILM COATED ORAL at 08:55

## 2022-05-08 RX ADMIN — PALIPERIDONE 3 MG: 3 TABLET, EXTENDED RELEASE ORAL at 08:55

## 2022-05-08 ASSESSMENT — PAIN SCALES - GENERAL: PAINLEVEL_OUTOF10: 0

## 2022-05-08 NOTE — GROUP NOTE
Group Therapy Note    Date: 5/8/2022    Group Start Time: 1000  Group End Time: 8551  Group Topic: Psychoeducation    [facility] ACUTE [facility] 1    Merry Candelario, CTRS        Group Therapy Note      Number of participants: 13  Type of group: Psychoeducation  Mode of intervention: Education, Support, Socialization, Exploration, Clarifying, and Problem-solving  Topic: Getting to Know You: [employer]   Objective: Pt will learn 1 new thing about peers and share 1 favorite memory. Notes:   Pt interactive during group sharing 1 new thing learned about peers and shared 1 favorite memory. Pt gave support and feedback to others. Status After Intervention:  Improved    Participation Level:  Active Listener and Interactive    Participation Quality: Appropriate, Attentive, Sharing and Supportive      Speech:  normal      Thought Process/Content: Logical      Affective Functioning: Congruent      Mood: euthymic      Level of consciousness:  Alert, Oriented x4 and Attentive      Response to Learning: Able to verbalize current knowledge/experience, Able to verbalize/acknowledge new learning, Able to retain information, Capable of insight, Able to change behavior and Progressing to goal      Endings: None Reported    Modes of Intervention: Education, Support, Socialization, Exploration, Clarifying and Problem-solving

## 2022-05-08 NOTE — PROGRESS NOTES
BEHAVIORAL HEALTH FOLLOW-UP NOTE     5/8/2022     Patient was seen and examined in person, Chart reviewed   Patient's case discussed with staff/team    Chief Complaint: \"I am doing okay. \"    Interim History: Patient seen up sitting out in the milieu affect is somewhat brighter she continues to deny all symptoms denies SI/HI intent or plan denies any auditory visual hallucinations she participate in a group yesterday.       Appetite:   [x] Normal/Unchanged  [] Increased  [] Decreased      Sleep:       [x] Normal/Unchanged  [] Fair       [] Poor              Energy:    [x] Normal/Unchanged  [] Increased  [] Decreased        SI [] Present  [x] Absent    HI  []Present  [x] Absent     Aggression:  [] yes  [x] no    Patient is [x] able  [] unable to CONTRACT FOR SAFETY     PAST MEDICAL/PSYCHIATRIC HISTORY:   Past Medical History:   Diagnosis Date    Acute psychosis ([facility] 12/22/2017    Anxiety     Asthma     Bipolar disorder ([facility]     Bronchitis     COPD (chronic obstructive pulmonary disease) (HCC)     Depression     Iron deficiency anemia     PTSD (post-traumatic stress disorder)     [facility]     Severe episode of recurrent major depressive disorder, without psychotic features [facility] 11/16/2019    Substance abuse ([facility]     quit 7/2011  was Sana Ruse user        FAMILY/SOCIAL HISTORY:  Family History   Problem Relation Age of Onset    Diabetes Mother     High Blood Pressure Mother     Kidney Disease Mother     Heart Failure Mother     Diabetes Father     Other Father         mesothelioma    Mental Illness Father         schizophrenia     Social History     Socioeconomic History    Marital status: Single     Spouse name: Not on file    Number of children: 0    Years of education: GED    Highest education level: Not on file   Occupational History    Occupation: unemployed     Comment: attempting to get disability for mental illness   Tobacco Use    Smoking status: Current Every Day Smoker     Packs/day: 0.50     Years: 20.00     Pack years: 10.00     Types: Cigarettes    Smokeless tobacco: Never Used   Vaping Use    Vaping Use: Former   Substance and Sexual Activity    Alcohol use: Yes    Drug use: Yes     Types: Marijuana [place])     Comment: using marijuana daily    Sexual activity: Not Currently   Other Topics Concern    Not on file   Social History Narrative    Not on file     Social Determinants of Health     Financial Resource Strain:     Difficulty of Paying Living Expenses: Not on file   Food Insecurity:     Worried About Running Out of Food in the Last Year: Not on file    Mary of Food in the Last Year: Not on file   Transportation Needs:     Lack of Transportation (Medical): Not on file    Lack of Transportation (Non-Medical): Not on file   Physical Activity:     Days of Exercise per Week: Not on file    Minutes of Exercise per Session: Not on file   Stress:     Feeling of Stress : Not on file   Social Connections:     Frequency of Communication with Friends and Family: Not on file    Frequency of Social Gatherings with Friends and Family: Not on file    Attends [employer] Services: Not on file    Active Member of [address] [place] [address] MyDoc or Organizations: Not on file    Attends Club or Organization Meetings: Not on file    Marital Status: Not on file   Intimate Partner Violence:     Fear of Current or Ex-Partner: Not on file    Emotionally Abused: Not on file    Physically Abused: Not on file    Sexually Abused: Not on file   Housing Stability:     Unable to Pay for Housing in the Last Year: Not on file    Number of [place] in the Last Year: Not on file    Unstable Housing in the Last Year: Not on file           ROS:  [x] All negative/unchanged except if checked.  Explain positive(checked items) below:  [] Constitutional  [] Eyes  [] Ear/Nose/Mouth/Throat  [] Respiratory  [] CV  [] GI  []   [] Musculoskeletal  [] Skin/Breast  [] Neurological  [] Endocrine  [] Heme/Lymph  [] Allergic/Immunologic    Explanation:     MEDICATIONS:    Current Facility-Administered Medications:     paliperidone (INVEGA) extended release tablet 3 mg, 3 mg, Oral, Daily, CHARO Gonzalez - CNP, 3 mg at 05/08/22 0855    OXcarbazepine (TRILEPTAL) tablet 150 mg, 150 mg, Oral, BID, CHARO Gonzalez - CNP, 600 mg at 05/08/22 0855    acetaminophen (TYLENOL) tablet 650 mg, 650 mg, Oral, Q4H PRN, Sammie Dao MD    hydrOXYzine (VISTARIL) capsule 50 mg, 50 mg, Oral, TID PRN, Sammie Dao MD    haloperidol (HALDOL) tablet 5 mg, 5 mg, Oral, Q4H PRN **OR** haloperidol lactate (HALDOL) injection 5 mg, 5 mg, IntraMUSCular, Q4H PRN, Sammie Dao MD    traZODone (DESYREL) tablet 50 mg, 50 mg, Oral, Nightly PRN, Sammie Dao MD    magnesium hydroxide (MILK OF MAGNESIA) 400 MG/5ML suspension 30 mL, 30 mL, Oral, Daily PRN, Sammie Dao MD    aluminum & magnesium hydroxide-simethicone (MAALOX) 200-200-20 MG/5ML suspension 30 mL, 30 mL, Oral, Q6H PRN, Sammie Dao MD      Examination:  /69   Pulse 69   Temp 98 °F (36.7 °C)   Resp 14   Ht 5' 2\" (1.575 m)   SpO2 94%   BMI 21.40 kg/m²   Gait - steady  Medication side effects(SE): denies    Mental Status Examination:    Level of consciousness:  within normal limits   Appearance:  fair grooming and fair hygiene  Behavior/Motor:  no abnormalities noted  Attitude toward examiner:  cooperative  Speech:  spontaneous, normal rate and normal volume   Mood: \" Appropriate and pleasant  Affect: Linear without flight of ideas loose associations  Thought processes: Linear without flight of ideas loose associations  Thought content:Devoid of any auditory visual loose [place] delusions or other perceptual normalities.   Denies SI/HI intent or plan appears to be preoccupied and guarded and paranoid  Cognition: Alert oriented time place and person  Concentration intact  Insight improving  Judgement improving    ASSESSMENT:   Patient symptoms are:  [] Well controlled  [x] Improving  [] Worsening  [] No change      Diagnosis:   Principal Problem:    Schizoaffective disorder, bipolar type ([facility]  Active Problems:    Cocaine abuse ([facility]  Resolved Problems:    * No resolved hospital problems. *      LABS:    Recent Labs     05/06/22  1255   WBC 5.1   HGB 12.9        Recent Labs     05/06/22  1255      K 3.4*      CO2 24   BUN 9   CREATININE 0.7   GLUCOSE 94     No results for input(s): BILITOT, ALKPHOS, AST, ALT in the last 72 hours. Lab Results   Component Value Date    LABAMPH NOT DETECTED 05/06/2022    BARBSCNU NOT DETECTED 05/06/2022    LABBENZ NOT DETECTED 05/06/2022    LABMETH NOT DETECTED 05/06/2022    OPIATESCREENURINE NOT DETECTED 05/06/2022    PHENCYCLIDINESCREENURINE NOT DETECTED 05/06/2022    ETOH <10 05/06/2022     Lab Results   Component Value Date    TSH 1.770 03/07/2019     No results found for: LITHIUM  Lab Results   Component Value Date    VALPROATE 57 04/02/2021         Treatment Plan:  Reviewed current Medications with the patient. Risks, benefits, side effects, drug-to-drug interactions and alternatives to treatment were discussed. Collateral information:   CD evaluation  Encourage patient to attend group and other milieu activities.   Discharge planning discussed with the patient and treatment team.    Continue Trileptal 150 mg twice daily  Continue Invega 3 mg daily    PSYCHOTHERAPY/COUNSELING:  [x] Therapeutic interview  [x] Supportive  [] CBT  [] Ongoing  [] Other    [x] Patient continues to need, on a daily basis, active treatment furnished directly by or requiring the supervision of inpatient psychiatric personnel      Anticipated Length of stay: 3 to 7 days based on stability          Electronically signed by CHARO Phan CNP on 5/5/7743 at 8:57 AM

## 2022-05-08 NOTE — BH NOTE
Patient denies SI, HI, hallucinations, and physical complaints. Patient is on unit but isolative to self.

## 2022-05-08 NOTE — PLAN OF CARE
Problem: Anxiety  Goal: Will report anxiety at manageable levels  Description: INTERVENTIONS:  1. Administer medication as ordered  2. Teach and rehearse alternative coping skills  3. Provide emotional support with 1:1 interaction with staff  5/8/2022 1853 by Neftali Rivero RN  Outcome: Progressing     Problem: Coping  Goal: Pt/Family able to verbalize concerns and demonstrate effective coping strategies  Description: INTERVENTIONS:  1. Assist patient/family to identify coping skills, available support systems and cultural and spiritual values  2. Provide emotional support, including active listening and acknowledgement of concerns of patient and caregivers  3. Reduce environmental stimuli, as able  4. Instruct patient/family in relaxation techniques, as appropriate  5. Assess for spiritual pain/suffering and initiate Spiritual Care, Psychosocial Clinical Specialist consults as needed  5/8/2022 1853 by Neftali Rivero RN  Outcome: Progressing     Problem: Decision Making  Goal: Pt/Family able to effectively weigh alternatives and participate in decision making related to treatment and care  Description: INTERVENTIONS:  1. Determine when there are differences between patient's view, family's view, and healthcare provider's view of condition  2. Facilitate patient and family articulation of goals for care  3. Help patient and family identify pros/cons of alternative solutions  4. Provide information as requested by patient/family  5. Respect patient/family right to receive or not to receive information  6. Serve as a liaison between patient and family and health care team  7. Initiate Consults from Ethics, Palliative Care or initiate [facility] as is appropriate  5/8/2022 1853 by Neftali Rivero RN  Outcome: Progressing     Problem: Confusion  Goal: Confusion, delirium, dementia, or psychosis is improved or at baseline  Description: INTERVENTIONS:  1.  Assess for possible contributors to thought disturbance, including medications, impaired vision or hearing, underlying metabolic abnormalities, dehydration, psychiatric diagnoses, and notify attending LIP  2. [place] high risk fall precautions, as indicated  3. Provide frequent short contacts to provide reality reorientation, refocusing and direction  4. Decrease environmental stimuli, including noise as appropriate  5. Monitor and intervene to maintain adequate nutrition, hydration, elimination, sleep and activity  6. If unable to ensure safety without constant attention obtain sitter and review sitter guidelines with assigned personnel  7. Initiate Psychosocial CNS and Spiritual Care consult, as indicated  5/8/2022 1853 by Flash Espinoza RN  Outcome: Progressing   Pt is out on the unit but mostly keeps to herself. Her affect is constricted on approach but does smile slightly during interaction. She is very superficial giving minimal responses. She denies any harmful ideations and hallucinations.

## 2022-05-09 PROCEDURE — 6370000000 HC RX 637 (ALT 250 FOR IP): Performed by: NURSE PRACTITIONER

## 2022-05-09 PROCEDURE — 1240000000 HC EMOTIONAL WELLNESS R&B

## 2022-05-09 PROCEDURE — 99232 SBSQ HOSP IP/OBS MODERATE 35: CPT | Performed by: NURSE PRACTITIONER

## 2022-05-09 RX ORDER — PALIPERIDONE 3 MG/1
3 TABLET, EXTENDED RELEASE ORAL 2 TIMES DAILY
Status: DISCONTINUED | OUTPATIENT
Start: 2022-05-09 | End: 2022-05-13

## 2022-05-09 RX ORDER — OXCARBAZEPINE 300 MG/1
300 TABLET, FILM COATED ORAL 2 TIMES DAILY
Status: DISCONTINUED | OUTPATIENT
Start: 2022-05-09 | End: 2022-05-20

## 2022-05-09 RX ADMIN — OXCARBAZEPINE 300 MG: 300 TABLET, FILM COATED ORAL at 21:01

## 2022-05-09 RX ADMIN — OXCARBAZEPINE 150 MG: 150 TABLET, FILM COATED ORAL at 09:05

## 2022-05-09 RX ADMIN — PALIPERIDONE 3 MG: 3 TABLET, EXTENDED RELEASE ORAL at 09:05

## 2022-05-09 RX ADMIN — PALIPERIDONE 3 MG: 3 TABLET, EXTENDED RELEASE ORAL at 21:01

## 2022-05-09 NOTE — PLAN OF CARE
[facility]  Day 3 Interdisciplinary Treatment Plan NOTE    Review Date & Time: 5/9/2022    11:42 AM      Patient was in treatment team    Estimated Length of Stay Update:   7 days  Estimated Discharge Date Update:  5/15/22    EDUCATION:   Learner Progress Toward Treatment Goals: Reviewed results and recommendations of this team    Method: Group    Outcome: Verbalized understanding    PATIENT GOALS:  \"come to groups\"    PLAN/TREATMENT RECOMMENDATIONS UPDATE: encourage daily goals and groups    GOALS UPDATE:   Time frame for Short-Term Goals:  By discharge      Asia Matias RN

## 2022-05-09 NOTE — PROGRESS NOTES
BEHAVIORAL HEALTH FOLLOW-UP NOTE     5/9/2022     Patient was seen and examined in person, Chart reviewed   Patient's case discussed with staff/team    Chief Complaint: \"I am doing okay. \"    Interim History: Patient seen during treatment team.  Affect is flat but brighter than her normal presentation she is out visible in the milieu when asked why she is probated she is paranoid about her  she has no insight or judgment guarding circumstance hospitalization need for treatment she is paranoid irritable easily agitated poor insight and judgment denies SI/HI intent or plan      Appetite:   [x] Normal/Unchanged  [] Increased  [] Decreased      Sleep:       [x] Normal/Unchanged  [] Fair       [] Poor              Energy:    [x] Normal/Unchanged  [] Increased  [] Decreased        SI [] Present  [x] Absent    HI  []Present  [x] Absent     Aggression:  [] yes  [x] no    Patient is [x] able  [] unable to CONTRACT FOR SAFETY     PAST MEDICAL/PSYCHIATRIC HISTORY:   Past Medical History:   Diagnosis Date    Acute psychosis ([facility] 12/22/2017    Anxiety     Asthma     Bipolar disorder ([facility]     Bronchitis     COPD (chronic obstructive pulmonary disease) ([facility]     Depression     Iron deficiency anemia     PTSD (post-traumatic stress disorder)     Schizo affective schizophrenia ([facility]     Severe episode of recurrent major depressive disorder, without psychotic features ([facility] 11/16/2019    Substance abuse ([facility]     quit 7/2011  was marajuanna user        FAMILY/SOCIAL HISTORY:  Family History   Problem Relation Age of Onset    Diabetes Mother     High Blood Pressure Mother     Kidney Disease Mother     Heart Failure Mother     Diabetes Father     Other Father         mesothelioma    Mental Illness Father         schizophrenia     Social History     Socioeconomic History    Marital status: Single     Spouse name: Not on file    Number of children: 0    Years of education: GED    Highest education level: Not on file   Occupational History    Occupation: unemployed     Comment: attempting to get disability for mental illness   Tobacco Use    Smoking status: Current Every Day Smoker     Packs/day: 0.50     Years: 20.00     Pack years: 10.00     Types: Cigarettes    Smokeless tobacco: Never Used   Vaping Use    Vaping Use: Former   Substance and Sexual Activity    Alcohol use: Yes    Drug use: Yes     Types: Marijuana [employer])     Comment: using marijuana daily    Sexual activity: Not Currently   Other Topics Concern    Not on file   Social History Narrative    Not on file     Social Determinants of Health     Financial Resource Strain:     Difficulty of Paying Living Expenses: Not on file   Food Insecurity:     Worried About Running Out of Food in the Last Year: Not on file    Mary of Food in the Last Year: Not on file   Transportation Needs:     Lack of Transportation (Medical): Not on file    Lack of Transportation (Non-Medical): Not on file   Physical Activity:     Days of Exercise per Week: Not on file    Minutes of Exercise per Session: Not on file   Stress:     Feeling of Stress : Not on file   Social Connections:     Frequency of Communication with Friends and Family: Not on file    Frequency of Social Gatherings with Friends and Family: Not on file    Attends Restorationist Services: Not on file    Active Member of [address] [place] [address] [employer] or Organizations: Not on file    Attends Club or Organization Meetings: Not on file    Marital Status: Not on file   Intimate Partner Violence:     Fear of Current or Ex-Partner: Not on file    Emotionally Abused: Not on file    Physically Abused: Not on file    Sexually Abused: Not on file   Housing Stability:     Unable to Pay for Housing in the Last Year: Not on file    Number of [place] in the Last Year: Not on file    Unstable Housing in the Last Year: Not on file           ROS:  [x] All negative/unchanged except if checked.  Explain positive(checked items) below:  [] Constitutional  [] Eyes  [] Ear/Nose/Mouth/Throat  [] Respiratory  [] CV  [] GI  []   [] Musculoskeletal  [] Skin/Breast  [] Neurological  [] Endocrine  [] Heme/Lymph  [] Allergic/Immunologic    Explanation:     MEDICATIONS:    Current Facility-Administered Medications:     paliperidone (INVEGA) extended release tablet 3 mg, 3 mg, Oral, Daily, Kirt Blood Dellick, APRN - CNP, 3 mg at 05/09/22 0905    OXcarbazepine (TRILEPTAL) tablet 150 mg, 150 mg, Oral, BID, Kirt Blood Dellick, APRN - CNP, 556 mg at 05/09/22 0905    acetaminophen (TYLENOL) tablet 650 mg, 650 mg, Oral, Q4H PRN, Joe Cruz MD    hydrOXYzine (VISTARIL) capsule 50 mg, 50 mg, Oral, TID PRN, Joe Cruz MD    haloperidol (HALDOL) tablet 5 mg, 5 mg, Oral, Q4H PRN **OR** haloperidol lactate (HALDOL) injection 5 mg, 5 mg, IntraMUSCular, Q4H PRN, Joe Cruz MD    traZODone (DESYREL) tablet 50 mg, 50 mg, Oral, Nightly PRN, Joe Cruz MD    magnesium hydroxide (MILK OF MAGNESIA) 400 MG/5ML suspension 30 mL, 30 mL, Oral, Daily PRN, Joe Cruz MD    aluminum & magnesium hydroxide-simethicone (MAALOX) 200-200-20 MG/5ML suspension 30 mL, 30 mL, Oral, Q6H PRN, Joe Cruz MD      Examination:  BP 94/64   Pulse 60   Temp 98.1 °F (36.7 °C)   Resp 14   Ht 5' 2\" (1.575 m)   SpO2 94%   BMI 21.40 kg/m²   Gait - steady  Medication side effects(SE): denies    Mental Status Examination:    Level of consciousness:  within normal limits   Appearance:  fair grooming and fair hygiene  Behavior/Motor:  no abnormalities noted  Attitude toward examiner:  cooperative  Speech:  spontaneous, normal rate and normal volume   Mood: \" Appropriate and pleasant  Affect: Linear without flight of ideas loose associations  Thought processes: Linear without flight of ideas loose associations  Thought content:Devoid of any auditory visual loose Nations delusions or other perceptual normalities.   Denies SI/HI intent or plan appears 5/9/2022 at 4:12 PM

## 2022-05-09 NOTE — CARE COORDINATION
JERZY received a call from [facility] regarding this pt, SW was informed that they feel the pt needs a higher level of care for her living environment because she is currently living in an independent living environment and is unable to care for herself. [facility] requested to speak with community navigator Theo Gonsales to discuss this process, JERZY transferred call.

## 2022-05-09 NOTE — PROGRESS NOTES
Attended morning community meeting. Updated on staffing and daily unit expectations. Shared goal for the day as to come to group.

## 2022-05-09 NOTE — PLAN OF CARE
Problem: Anxiety  Goal: Will report anxiety at manageable levels  Description: INTERVENTIONS:  1. Administer medication as ordered  2. Teach and rehearse alternative coping skills  3. Provide emotional support with 1:1 interaction with staff  Outcome: Progressing     Problem: Confusion  Goal: Confusion, delirium, dementia, or psychosis is improved or at baseline  Description: INTERVENTIONS:  1. Assess for possible contributors to thought disturbance, including medications, impaired vision or hearing, underlying metabolic abnormalities, dehydration, psychiatric diagnoses, and notify attending LIP  2. [place] high risk fall precautions, as indicated  3. Provide frequent short contacts to provide reality reorientation, refocusing and direction  4. Decrease environmental stimuli, including noise as appropriate  5. Monitor and intervene to maintain adequate nutrition, hydration, elimination, sleep and activity  6. If unable to ensure safety without constant attention obtain sitter and review sitter guidelines with assigned personnel  7. Initiate Psychosocial CNS and Spiritual Care consult, as indicated  Outcome: Progressing         Patient has been pleasant and cooperative. Smiles on approach. Upset with meridian if being here. Watchful and guarded. Denies suicidal and homicidal thoughts. Denies hallucinations. PER NOHEMI ROSARIO CHELO PATIENT HAS NOT RECEIVED A lAIFOR 8 MONTHS.

## 2022-05-09 NOTE — GROUP NOTE
Date: 5/9/2022    Group Start Time: 1010  Group End Time: 3355  Group Topic: Psychoeducation    [facility]    Lidia Jacques [place]                                                                        Group Therapy Note    Date: 5/9/2022  Type of Group: Psychoeducation    Wellness Binder Information    Patient's Goal: Patient will be able to id triggers, warning signs, and personal coping skills one can use to decrease stress in a crisis situation. Notes: Pleasant and sharing when prompted. Able to actively listen and complete worksheet. Status After Intervention:  Improved    Participation Level:  Active Listener and Interactive    Participation Quality: Appropriate, Attentive, and Sharing      Speech:  normal     Thought Process/Content: Logical      Affective Functioning: Congruent      Mood: euthymic      Level of consciousness:  Alert, Oriented x4, and Attentive      Response to Learning: Able to verbalize/acknowledge new learning      Endings: None Reported    Modes of Intervention: Education, Support, Socialization, Exploration, and Problem-solving      Discipline Responsible: Psychoeducational Specialist      Signature:  Zena Wolf     Group Therapy Note

## 2022-05-10 PROCEDURE — 99232 SBSQ HOSP IP/OBS MODERATE 35: CPT | Performed by: NURSE PRACTITIONER

## 2022-05-10 PROCEDURE — 6370000000 HC RX 637 (ALT 250 FOR IP): Performed by: NURSE PRACTITIONER

## 2022-05-10 PROCEDURE — 1240000000 HC EMOTIONAL WELLNESS R&B

## 2022-05-10 RX ADMIN — OXCARBAZEPINE 300 MG: 300 TABLET, FILM COATED ORAL at 20:35

## 2022-05-10 RX ADMIN — PALIPERIDONE 3 MG: 3 TABLET, EXTENDED RELEASE ORAL at 20:35

## 2022-05-10 RX ADMIN — PALIPERIDONE 3 MG: 3 TABLET, EXTENDED RELEASE ORAL at 08:47

## 2022-05-10 RX ADMIN — OXCARBAZEPINE 300 MG: 300 TABLET, FILM COATED ORAL at 08:47

## 2022-05-10 ASSESSMENT — PAIN SCALES - GENERAL: PAINLEVEL_OUTOF10: 0

## 2022-05-10 NOTE — PROGRESS NOTES
Attended evening relaxation activity. Accepting of handout and sharing benefits of improved skills. Was 1 of 9 in attendance.

## 2022-05-10 NOTE — CARE COORDINATION
Navigator received vm from patient  with [address] expressed concern that patient resides in their independent living but that they feel her needs are growing and that she is not demonstrating the capacity to reside independently. Requesting assistance with alternative placement for patient. Navigator will discuss this concern with patient on 5/11. Navigator also touch base with Marie Tony with [facility] regarding if they are seeking an AOT. Zbigniew Valenzuelasowmya expressed that this is at the discretion of the hospital treatment team, but that they do feel she would be appropriate for a referral for guardianship. SEE and guardianship referral would have to agreeable by the attending psychiatrist.    Liliana Pillai updated treatment team regarding the concerns. Navigator will discuss patient case with the [facility] regarding potential referral for transitional housing [facility] or ) OR group home placement. [facility] would be able to assist with facilitating the applications.      Electronically signed by JEFRY Tay LSW on 5/10/2022 at 11:55 AM

## 2022-05-10 NOTE — PROGRESS NOTES
BEHAVIORAL HEALTH FOLLOW-UP NOTE     5/10/2022     Patient was seen and examined in person, Chart reviewed   Patient's case discussed with staff/team    Chief Complaint: \"I am doing okay. \"    Interim History: Patient seen during treatment team.  Affect remains flat but brightens conversation she remained suspicious and easily agitated. She has very poor limit insight and judgment guarding circumstance hospitalization need for treatment she is not agreeable to long-acting injection.   She does not understand why she is the hospital    Appetite:   [x] Normal/Unchanged  [] Increased  [] Decreased      Sleep:       [x] Normal/Unchanged  [] Fair       [] Poor              Energy:    [x] Normal/Unchanged  [] Increased  [] Decreased        SI [] Present  [x] Absent    HI  []Present  [x] Absent     Aggression:  [] yes  [x] no    Patient is [x] able  [] unable to CONTRACT FOR SAFETY     PAST MEDICAL/PSYCHIATRIC HISTORY:   Past Medical History:   Diagnosis Date    [facility] 12/22/2017    Anxiety     Asthma     [facility] disorder ([facility]     Bronchitis     [facility]) [facility]     Depression     Iron deficiency anemia     PTSD (post-traumatic stress disorder)     [facility] ([facility]     Severe episode of recurrent major depressive disorder, without psychotic features ([facility] 11/16/2019    Substance abuse ([facility]     quit 7/2011  was [employer] user        FAMILY/SOCIAL HISTORY:  Family History   Problem Relation Age of Onset    Diabetes Mother     High Blood Pressure Mother     Kidney Disease Mother     Heart Failure Mother     Diabetes Father     Other Father         mesothelioma    Mental Illness Father         schizophrenia     Social History     Socioeconomic History    Marital status: Single     Spouse name: Not on file    Number of children: 0    Years of education: GED    Highest education level: Not on file   Occupational History    Occupation: Ear/Nose/Mouth/Throat  [] Respiratory  [] CV  [] GI  []   [] Musculoskeletal  [] Skin/Breast  [] Neurological  [] Endocrine  [] Heme/Lymph  [] Allergic/Immunologic    Explanation:     MEDICATIONS:    Current Facility-Administered Medications:     OXcarbazepine (TRILEPTAL) tablet 300 mg, 300 mg, Oral, BID, CHARO Blum - CNP, 938 mg at 05/10/22 0847    paliperidone (INVEGA) extended release tablet 3 mg, 3 mg, Oral, BID, Addie Middleton APRN - CNP, 3 mg at 05/10/22 0847    acetaminophen (TYLENOL) tablet 650 mg, 650 mg, Oral, Q4H PRN, Isi Singh MD    hydrOXYzine (VISTARIL) capsule 50 mg, 50 mg, Oral, TID PRN, Isi Singh MD    haloperidol (HALDOL) tablet 5 mg, 5 mg, Oral, Q4H PRN **OR** haloperidol lactate (HALDOL) injection 5 mg, 5 mg, IntraMUSCular, Q4H PRN, Isi Singh MD    traZODone (DESYREL) tablet 50 mg, 50 mg, Oral, Nightly PRN, Isi Singh MD    magnesium hydroxide (MILK OF MAGNESIA) 400 MG/5ML suspension 30 mL, 30 mL, Oral, Daily PRN, Isi Singh MD    aluminum & magnesium hydroxide-simethicone (MAALOX) 200-200-20 MG/5ML suspension 30 mL, 30 mL, Oral, Q6H PRN, Isi Singh MD      Examination:  BP (!) 100/58   Pulse 75   Temp 97.5 °F (36.4 °C) (Oral)   Resp 14   Ht 5' 2\" (1.575 m)   SpO2 94%   BMI 21.40 kg/m²   Gait - steady  Medication side effects(SE): denies    Mental Status Examination:    Level of consciousness:  within normal limits   Appearance:  fair grooming and fair hygiene  Behavior/Motor:  no abnormalities noted  Attitude toward examiner:  cooperative  Speech:  spontaneous, normal rate and normal volume   Mood: \" Appropriate and pleasant  Affect: Linear without flight of ideas loose associations  Thought processes: Linear without flight of ideas loose associations  Thought content:Devoid of any auditory visual loose [place] delusions or other perceptual normalities.   Denies SI/HI intent or plan appears to be preoccupied and guarded and paranoid  Cognition: Alert oriented time place and person  Concentration intact  Insight improving  Judgement improving    ASSESSMENT:   Patient symptoms are:  [] Well controlled  [x] Improving  [] Worsening  [] No change      Diagnosis:   Principal Problem:    Schizoaffective disorder, bipolar type ([facility]  Active Problems:    Cocaine abuse ([facility]  Resolved Problems:    * No resolved hospital problems. *      LABS:    No results for input(s): WBC, HGB, PLT in the last 72 hours. No results for input(s): NA, K, CL, CO2, BUN, CREATININE, GLUCOSE in the last 72 hours. No results for input(s): BILITOT, ALKPHOS, AST, ALT in the last 72 hours. Lab Results   Component Value Date    LABAMPH NOT DETECTED 05/06/2022    BARBSCNU NOT DETECTED 05/06/2022    LABBENZ NOT DETECTED 05/06/2022    LABMETH NOT DETECTED 05/06/2022    OPIATESCREENURINE NOT DETECTED 05/06/2022    PHENCYCLIDINESCREENURINE NOT DETECTED 05/06/2022    ETOH <10 05/06/2022     Lab Results   Component Value Date    TSH 1.770 03/07/2019     No results found for: LITHIUM  Lab Results   Component Value Date    VALPROATE 57 04/02/2021         Treatment Plan:  Reviewed current Medications with the patient. Risks, benefits, side effects, drug-to-drug interactions and alternatives to treatment were discussed. Collateral information:   CD evaluation  Encourage patient to attend group and other milieu activities.   Discharge planning discussed with the patient and treatment team.    Increase Trileptal to 300 mg twice daily  Increase Invega 3 mg twice daily      PSYCHOTHERAPY/COUNSELING:  [x] Therapeutic interview  [x] Supportive  [] CBT  [] Ongoing  [] Other    [x] Patient continues to need, on a daily basis, active treatment furnished directly by or requiring the supervision of inpatient psychiatric personnel      Anticipated Length of stay: 3 to 7 days based on stability          Electronically signed by CHRAO Wilson CNP on 8/51/3999 at 9:23 AM

## 2022-05-10 NOTE — GROUP NOTE
Group Therapy Note    Date: 5/10/2022    Group Start Time: 1000  Group End Time: 3887  Group Topic: Psychoeducation    [facility]    Merry Candelario, CTRS        Group Therapy Note      Number of participants: 12  Type of group: Psychoeducation  Mode of intervention: Education, Support, Socialization, Exploration, Clarifying, and Problem-solving  Topic: Values Exploration   Objective: Pt will identify what values are important to them in recovery. Patient's Goal:  \"Work on discharge plan\"    Notes:  Pt offered minimal interaction during group but was an active listener throughout. Observed to be sitting away from group but still listening to peers share. Accepting of handout and support. Status After Intervention:  Unchanged    Participation Level:  Active Listener and Minimal    Participation Quality: Appropriate and Attentive      Speech:  normal      Thought Process/Content: Logical      Affective Functioning: Congruent      Mood: euthymic      Level of consciousness:  Alert, Oriented x4 and Attentive      Response to Learning: Able to verbalize current knowledge/experience, Able to verbalize/acknowledge new learning, Able to retain information, Capable of insight, Able to change behavior and Progressing to goal      Endings: None Reported    Modes of Intervention: Education, Support, Socialization, Exploration, Clarifying and Problem-solving

## 2022-05-10 NOTE — BH NOTE
Isolative and dismissive refused HS snack mood is irritable retired to bed early poor insight and judgement

## 2022-05-10 NOTE — BH NOTE
Out in day room reading a book then to room denies any Si HI or will she is superficial has underlying irritability emotional support given

## 2022-05-10 NOTE — PLAN OF CARE
Problem: Anxiety  Goal: Will report anxiety at manageable levels  Description: INTERVENTIONS:  1. Administer medication as ordered  2. Teach and rehearse alternative coping skills  3. Provide emotional support with 1:1 interaction with staff  Outcome: Progressing     Problem: Decision Making  Goal: Pt/Family able to effectively weigh alternatives and participate in decision making related to treatment and care  Description: INTERVENTIONS:  1. Determine when there are differences between patient's view, family's view, and healthcare provider's view of condition  2. Facilitate patient and family articulation of goals for care  3. Help patient and family identify pros/cons of alternative solutions  4. Provide information as requested by patient/family  5. Respect patient/family right to receive or not to receive information  6. Serve as a liaison between patient and family and health care team  7. Initiate Consults from Ethics, Palliative Care or initiate [address] as is appropriate  Outcome: Progressing             Patient sets out on unit. Quiet but does not engage socially. Tense at times but does brighten. Patient had some difficulty feeling out the menu and needed assistance. She did not want her breakfast tray. I Re ordered her food in which a lot of it was the same. When questioned why she wouldn't eat that. \" I don't know want to say\" and really couldn't answer this nurse. Denies suicidal and homicidal thoughts. Denies hallucinations.

## 2022-05-11 PROCEDURE — 6370000000 HC RX 637 (ALT 250 FOR IP): Performed by: NURSE PRACTITIONER

## 2022-05-11 PROCEDURE — 1240000000 HC EMOTIONAL WELLNESS R&B

## 2022-05-11 PROCEDURE — 99232 SBSQ HOSP IP/OBS MODERATE 35: CPT | Performed by: NURSE PRACTITIONER

## 2022-05-11 RX ADMIN — PALIPERIDONE 3 MG: 3 TABLET, EXTENDED RELEASE ORAL at 20:47

## 2022-05-11 RX ADMIN — OXCARBAZEPINE 300 MG: 300 TABLET, FILM COATED ORAL at 20:47

## 2022-05-11 RX ADMIN — OXCARBAZEPINE 300 MG: 300 TABLET, FILM COATED ORAL at 08:41

## 2022-05-11 RX ADMIN — PALIPERIDONE 3 MG: 3 TABLET, EXTENDED RELEASE ORAL at 08:41

## 2022-05-11 ASSESSMENT — PAIN SCALES - GENERAL
PAINLEVEL_OUTOF10: 0
PAINLEVEL_OUTOF10: 0

## 2022-05-11 NOTE — PLAN OF CARE
Problem: Anxiety  Goal: Will report anxiety at manageable levels  Description: INTERVENTIONS:  1. Administer medication as ordered  2. Teach and rehearse alternative coping skills  3. Provide emotional support with 1:1 interaction with staff  Outcome: Progressing     Problem: Decision Making  Goal: Pt/Family able to effectively weigh alternatives and participate in decision making related to treatment and care  Description: INTERVENTIONS:  1. Determine when there are differences between patient's view, family's view, and healthcare provider's view of condition  2. Facilitate patient and family articulation of goals for care  3. Help patient and family identify pros/cons of alternative solutions  4. Provide information as requested by patient/family  5. Respect patient/family right to receive or not to receive information  6. Serve as a liaison between patient and family and health care team  7. Initiate Consults from Ethics, Palliative Care or initiate [address] as is appropriate  Outcome: Not Progressing        Patient out on unit but does not engage in conversation with peers. Watchful and guarded. Again needed help feeling out her menu. Only marked one item for 3 meals. Continues to deny suicidal and homicidal thoughts . Denies hallucinations.

## 2022-05-11 NOTE — PROGRESS NOTES
BEHAVIORAL HEALTH FOLLOW-UP NOTE     5/11/2022     Patient was seen and examined in person, Chart reviewed   Patient's case discussed with staff/team    Chief Complaint: \"I am doing okay. \"    Interim History: Seen with Dr. Kathie Soto. Patient is flat blunted does not socialize with peers does come out to the milieu she is bright with conversation is able to smile. Staff reports that she was confused had difficulty following directions last night she continues to have some underlying irritability at times. She had a mocha test performed and it was a 14 out of 30 indicating significant cognitive decline.   She denies SI/HI intent or plan denies auditory or visual hallucinations has poor insight and judgment guarding circumstance of hospitalization need for treatment ability to care for herself at home        Appetite:   [x] Normal/Unchanged  [] Increased  [] Decreased      Sleep:       [x] Normal/Unchanged  [] Fair       [] Poor              Energy:    [x] Normal/Unchanged  [] Increased  [] Decreased        SI [] Present  [x] Absent    HI  []Present  [x] Absent     Aggression:  [] yes  [x] no    Patient is [x] able  [] unable to CONTRACT FOR SAFETY     PAST MEDICAL/PSYCHIATRIC HISTORY:   Past Medical History:   Diagnosis Date    Acute psychosis ([facility] [address] 12/22/2017    Anxiety     Asthma     Bipolar disorder ([facility] [address]     Bronchitis     COPD [facility]chronic obstructive [facility] disease) [facility]     Depression     Iron deficiency anemia     PTSD (post-traumatic stress disorder)     Schizo affective schizophrenia ([address]     Severe episode of recurrent major depressive disorder, without psychotic features ([address] 11/16/2019    Substance abuse ([facility] [address]     quit 7/2011  was [employer] user        FAMILY/SOCIAL HISTORY:  Family History   Problem Relation Age of Onset    Diabetes Mother     High Blood Pressure Mother     Kidney Disease Mother     Heart Failure Mother     Diabetes Father     Other Father         mesothelioma  Mental Illness Father         schizophrenia     Social History     Socioeconomic History    Marital status: Single     Spouse name: Not on file    Number of children: 0    Years of education: GED    Highest education level: Not on file   Occupational History    Occupation: unemployed     Comment: attempting to get disability for mental illness   Tobacco Use    Smoking status: Current Every Day Smoker     Packs/day: 0.50     Years: 20.00     Pack years: 10.00     Types: Cigarettes    Smokeless tobacco: Never Used   Vaping Use    Vaping Use: Former   Substance and Sexual Activity    Alcohol use: Yes    Drug use: Yes     Types: Marijuana Veryl Irizarry)     Comment: using marijuana daily    Sexual activity: Not Currently   Other Topics Concern    Not on file   Social History Narrative    Not on file     Social Determinants of Health     Financial Resource Strain:     Difficulty of Paying Living Expenses: Not on file   Food Insecurity:     Worried About Running Out of Food in the Last Year: Not on file    Mary of Food in the Last Year: Not on file   Transportation Needs:     Lack of Transportation (Medical): Not on file    Lack of Transportation (Non-Medical):  Not on file   Physical Activity:     Days of Exercise per Week: Not on file    Minutes of Exercise per Session: Not on file   Stress:     Feeling of Stress : Not on file   Social Connections:     Frequency of Communication with Friends and Family: Not on file    Frequency of Social Gatherings with Friends and Family: Not on file    Attends Mandaen Services: Not on file    Active Member of Clubs or Organizations: Not on file    Attends Club or Organization Meetings: Not on file    Marital Status: Not on file   Intimate Partner Violence:     Fear of Current or Ex-Partner: Not on file    Emotionally Abused: Not on file    Physically Abused: Not on file    Sexually Abused: Not on file   Housing Stability:     Unable to Pay for Housing in the Last Year: Not on file    Number of Places Lived in the Last Year: Not on file    Unstable Housing in the Last Year: Not on file           ROS:  [x] All negative/unchanged except if checked.  Explain positive(checked items) below:  [] Constitutional  [] Eyes  [] Ear/Nose/Mouth/Throat  [] Respiratory  [] CV  [] GI  []   [] Musculoskeletal  [] Skin/Breast  [] Neurological  [] Endocrine  [] Heme/Lymph  [] Allergic/Immunologic    Explanation:     MEDICATIONS:    Current Facility-Administered Medications:     OXcarbazepine (TRILEPTAL) tablet 300 mg, 300 mg, Oral, BID, Earnstine Jennifer Dellick, APRN - CNP, 739 mg at 05/11/22 0841    paliperidone (INVEGA) extended release tablet 3 mg, 3 mg, Oral, BID, Earnstine Jennifer Dellick, APRN - CNP, 3 mg at 05/11/22 0841    acetaminophen (TYLENOL) tablet 650 mg, 650 mg, Oral, Q4H PRN, Megan Tolentino MD    hydrOXYzine (VISTARIL) capsule 50 mg, 50 mg, Oral, TID PRN, Megan Tolentino MD    haloperidol (HALDOL) tablet 5 mg, 5 mg, Oral, Q4H PRN **OR** haloperidol lactate (HALDOL) injection 5 mg, 5 mg, IntraMUSCular, Q4H PRN, Megan Tolentino MD    traZODone (DESYREL) tablet 50 mg, 50 mg, Oral, Nightly PRN, Megan Tolentino MD    magnesium hydroxide (MILK OF MAGNESIA) 400 MG/5ML suspension 30 mL, 30 mL, Oral, Daily PRN, Megan Tolentino MD    aluminum & magnesium hydroxide-simethicone (MAALOX) 200-200-20 MG/5ML suspension 30 mL, 30 mL, Oral, Q6H PRN, Megan Tolentino MD      Examination:  BP 94/63   Pulse 85   Temp 98.1 °F (36.7 °C) (Temporal)   Resp 14   Ht 5' 2\" (1.575 m)   SpO2 94%   BMI 21.40 kg/m²   Gait - steady  Medication side effects(SE): denies    Mental Status Examination:    Level of consciousness:  within normal limits   Appearance:  fair grooming and fair hygiene  Behavior/Motor:  no abnormalities noted  Attitude toward examiner:  cooperative  Speech:  spontaneous, normal rate and normal volume   Mood: \" Appropriate and pleasant  Affect: Linear without flight of ideas loose associations  Thought processes: Linear without flight of ideas loose associations  Thought content:Devoid of any auditory visual loose Nations delusions or other perceptual normalities. Denies SI/HI intent or plan appears to be preoccupied and guarded and paranoid  Cognition: Alert oriented time place and person  Concentration intact  Insight improving  Judgement improving    ASSESSMENT:   Patient symptoms are:  [] Well controlled  [x] Improving  [] Worsening  [] No change      Diagnosis:   Principal Problem:    Schizoaffective disorder, bipolar type ([facility]  Active Problems:    Cocaine abuse ([facility]  Resolved Problems:    * No resolved hospital problems. *      LABS:    No results for input(s): WBC, HGB, PLT in the last 72 hours. No results for input(s): NA, K, CL, CO2, BUN, CREATININE, GLUCOSE in the last 72 hours. No results for input(s): BILITOT, ALKPHOS, AST, ALT in the last 72 hours. Lab Results   Component Value Date    LABAMPH NOT DETECTED 05/06/2022    BARBSCNU NOT DETECTED 05/06/2022    LABBENZ NOT DETECTED 05/06/2022    LABMETH NOT DETECTED 05/06/2022    OPIATESCREENURINE NOT DETECTED 05/06/2022    PHENCYCLIDINESCREENURINE NOT DETECTED 05/06/2022    ETOH <10 05/06/2022     Lab Results   Component Value Date    TSH 1.770 03/07/2019     No results found for: LITHIUM  Lab Results   Component Value Date    VALPROATE 57 04/02/2021         Treatment Plan:  Reviewed current Medications with the patient. Risks, benefits, side effects, drug-to-drug interactions and alternatives to treatment were discussed. Collateral information:   CD evaluation  Encourage patient to attend group and other milieu activities.   Discharge planning discussed with the patient and treatment team.    Increase Trileptal to 300 mg twice daily  Increase Invega 3 mg twice daily      PSYCHOTHERAPY/COUNSELING:  [x] Therapeutic interview  [x] Supportive  [] CBT  [] Ongoing  [] Other    [x] Patient continues to need, on a daily basis, active treatment furnished directly by or requiring the supervision of inpatient psychiatric personnel      Anticipated Length of stay: 3 to 7 days based on stability          Electronically signed by CHARO Aviles CNP on 0/65/7715 at 6:08 PM

## 2022-05-11 NOTE — PROGRESS NOTES
Attended community meeting. Updated on staffing and daily expectations. Shared goal for the day as to go home.

## 2022-05-11 NOTE — GROUP NOTE
Group Therapy Note    Date: 5/11/2022    Group Start Time: 1000  Group End Time: 0591  Group Topic: Psychoeducation    [facility]  [address] [address], [address]                                                                        Group Therapy Note    Date: 5/11/2022  Start Time:1000  End Time:  3287  Number of Participants: {10  Type of Group: Psychoeducation    Wellness Binder Information  Module Name:  healthy and unhealthy relationships     Patient's Goal:  Patient will be able to differentiate between positive and negative relationships. Will be able to id who is positive in his/her life. Notes:  Pleasant and sharing when prompted, patient reserved thru-out group. Status After Intervention:  Improved    Participation Level:  Active Listener    Participation Quality: Appropriate, Attentive, Sharing, and Supportive      Speech: normal       Thought Process/Content: Logical      Affective Functioning: Congruent      Mood: euthymic      Level of consciousness:  Alert, Oriented x4, and Attentive      Response to Learning: Able to verbalize/acknowledge new learning, Able to retain information, and Progressing to goal      Endings: None Reported    Modes of Intervention: Education, Support, Socialization, Exploration, and Problem-solving      Discipline Responsible: Psychoeducational Specialist      Signature:  Lisa Greene

## 2022-05-12 PROCEDURE — 6370000000 HC RX 637 (ALT 250 FOR IP): Performed by: NURSE PRACTITIONER

## 2022-05-12 PROCEDURE — 99232 SBSQ HOSP IP/OBS MODERATE 35: CPT | Performed by: NURSE PRACTITIONER

## 2022-05-12 PROCEDURE — 1240000000 HC EMOTIONAL WELLNESS R&B

## 2022-05-12 RX ADMIN — OXCARBAZEPINE 300 MG: 300 TABLET, FILM COATED ORAL at 20:56

## 2022-05-12 RX ADMIN — PALIPERIDONE 3 MG: 3 TABLET, EXTENDED RELEASE ORAL at 20:56

## 2022-05-12 RX ADMIN — PALIPERIDONE 3 MG: 3 TABLET, EXTENDED RELEASE ORAL at 08:59

## 2022-05-12 RX ADMIN — OXCARBAZEPINE 300 MG: 300 TABLET, FILM COATED ORAL at 08:59

## 2022-05-12 ASSESSMENT — PAIN SCALES - GENERAL
PAINLEVEL_OUTOF10: 0

## 2022-05-12 NOTE — PROGRESS NOTES
BEHAVIORAL HEALTH FOLLOW-UP NOTE     5/12/2022     Patient was seen and examined in person, Chart reviewed   Patient's case discussed with staff/team    Chief Complaint: \"I am doing okay. \"    Interim History: Patient on the unit she sits by herself does not socialize with peers. She is delusional today believing that the \"[facility]\" is run by [employer] where she follows outpatient I explained to patient her plan for her to stepdown the crisis unit however she is not agreeable to this she states that she wants to go the [facility] because it is run by [employer].   Her mocha score was a 14 out of 30 indicating significant cognitive decline she has limited insight and judgment    Appetite:   [x] Normal/Unchanged  [] Increased  [] Decreased      Sleep:       [x] Normal/Unchanged  [] Fair       [] Poor              Energy:    [x] Normal/Unchanged  [] Increased  [] Decreased        SI [] Present  [x] Absent    HI  []Present  [x] Absent     Aggression:  [] yes  [x] no    Patient is [x] able  [] unable to CONTRACT FOR SAFETY     PAST MEDICAL/PSYCHIATRIC HISTORY:   Past Medical History:   Diagnosis Date    Acute psychosis ([facility] 12/22/2017    Anxiety     Asthma     Bipolar disorder ([facility] [facility]     Bronchitis     COPD (chronic obstructive pulmonary disease) (HCC)     Depression     Iron deficiency anemia     PTSD (post-traumatic stress disorder)     Schizo affective schizophrenia ([facility] [facility]     Severe episode of recurrent major depressive disorder, without psychotic features ([facility] [facility] 11/16/2019    Substance abuse ([facility] [address]     quit 7/2011  was [employer] user        FAMILY/SOCIAL HISTORY:  Family History   Problem Relation Age of Onset    Diabetes Mother     High Blood Pressure Mother     Kidney Disease Mother     Heart Failure Mother     Diabetes Father     Other Father         mesothelioma    Mental Illness Father         schizophrenia     Social History     Socioeconomic History    Marital status: Single     Spouse name: Not on file    Number of children: 0    Years of education: GED    Highest education level: Not on file   Occupational History    Occupation: unemployed     Comment: attempting to get disability for mental illness   Tobacco Use    Smoking status: Current Every Day Smoker     Packs/day: 0.50     Years: 20.00     Pack years: 10.00     Types: Cigarettes    Smokeless tobacco: Never Used   Vaping Use    Vaping Use: Former   Substance and Sexual Activity    Alcohol use: Yes    Drug use: Yes     Types: Marijuana Miriam Kales)     Comment: using marijuana daily    Sexual activity: Not Currently   Other Topics Concern    Not on file   Social History Narrative    Not on file     Social Determinants of Health     Financial Resource Strain:     Difficulty of Paying Living Expenses: Not on file   Food Insecurity:     Worried About Running Out of Food in the Last Year: Not on file    Mary of Food in the Last Year: Not on file   Transportation Needs:     Lack of Transportation (Medical): Not on file    Lack of Transportation (Non-Medical):  Not on file   Physical Activity:     Days of Exercise per Week: Not on file    Minutes of Exercise per Session: Not on file   Stress:     Feeling of Stress : Not on file   Social Connections:     Frequency of Communication with Friends and Family: Not on file    Frequency of Social Gatherings with Friends and Family: Not on file    Attends Lutheran Services: Not on file    Active Member of Clubs or Organizations: Not on file    Attends Club or Organization Meetings: Not on file    Marital Status: Not on file   Intimate Partner Violence:     Fear of Current or Ex-Partner: Not on file    Emotionally Abused: Not on file    Physically Abused: Not on file    Sexually Abused: Not on file   Housing Stability:     Unable to Pay for Housing in the Last Year: Not on file    Number of [place] in the Last Year: Not on file    Unstable Housing in the Last Year: Not on file           ROS:  [x] All negative/unchanged except if checked.  Explain positive(checked items) below:  [] Constitutional  [] Eyes  [] Ear/Nose/Mouth/Throat  [] Respiratory  [] CV  [] GI  []   [] Musculoskeletal  [] Skin/Breast  [] Neurological  [] Endocrine  [] Heme/Lymph  [] Allergic/Immunologic    Explanation:     MEDICATIONS:    Current Facility-Administered Medications:     OXcarbazepine (TRILEPTAL) tablet 300 mg, 300 mg, Oral, BID, Francisco Plater Dellick, APRN - CNP, 751 mg at 05/12/22 0859    paliperidone (INVEGA) extended release tablet 3 mg, 3 mg, Oral, BID, Francisco Plater Dellick, APRN - CNP, 3 mg at 05/12/22 0859    acetaminophen (TYLENOL) tablet 650 mg, 650 mg, Oral, Q4H PRN, Jacques Browning MD    hydrOXYzine (VISTARIL) capsule 50 mg, 50 mg, Oral, TID PRN, Jacques Browning MD    haloperidol (HALDOL) tablet 5 mg, 5 mg, Oral, Q4H PRN **OR** haloperidol lactate (HALDOL) injection 5 mg, 5 mg, IntraMUSCular, Q4H PRN, Jacques Browning MD    traZODone (DESYREL) tablet 50 mg, 50 mg, Oral, Nightly PRN, Jacques Browning MD    magnesium hydroxide (MILK OF MAGNESIA) 400 MG/5ML suspension 30 mL, 30 mL, Oral, Daily PRN, Jacques Browning MD    aluminum & magnesium hydroxide-simethicone (MAALOX) 200-200-20 MG/5ML suspension 30 mL, 30 mL, Oral, Q6H PRN, Jacques Browning MD      Examination:  BP (!) 102/57   Pulse 81   Temp 97.3 °F (36.3 °C) (Oral)   Resp 14   Ht 5' 2\" (1.575 m)   SpO2 94%   BMI 21.40 kg/m²   Gait - steady  Medication side effects(SE): denies    Mental Status Examination:    Level of consciousness:  within normal limits   Appearance:  fair grooming and fair hygiene  Behavior/Motor:  no abnormalities noted  Attitude toward examiner:  cooperative  Speech:  spontaneous, normal rate and normal volume   Mood: \" Appropriate and pleasant  Affect: Linear without flight of ideas loose associations  Thought processes: Linear without flight of ideas loose associations  Thought content:Devoid of any auditory visual loose Nations delusions or other perceptual normalities. Denies SI/HI intent or plan appears to be preoccupied and guarded and paranoid  Cognition: Alert oriented time place and person  Concentration intact  Insight improving  Judgement improving    ASSESSMENT:   Patient symptoms are:  [] Well controlled  [x] Improving  [] Worsening  [] No change      Diagnosis:   Principal Problem:    Schizoaffective disorder, bipolar type ([facility]  Active Problems:    Cocaine abuse ([facility]  Resolved Problems:    * No resolved hospital problems. *      LABS:    No results for input(s): WBC, HGB, PLT in the last 72 hours. No results for input(s): NA, K, CL, CO2, BUN, CREATININE, GLUCOSE in the last 72 hours. No results for input(s): BILITOT, ALKPHOS, AST, ALT in the last 72 hours. Lab Results   Component Value Date    LABAMPH NOT DETECTED 05/06/2022    BARBSCNU NOT DETECTED 05/06/2022    LABBENZ NOT DETECTED 05/06/2022    LABMETH NOT DETECTED 05/06/2022    OPIATESCREENURINE NOT DETECTED 05/06/2022    PHENCYCLIDINESCREENURINE NOT DETECTED 05/06/2022    ETOH <10 05/06/2022     Lab Results   Component Value Date    TSH 1.770 03/07/2019     No results found for: LITHIUM  Lab Results   Component Value Date    VALPROATE 57 04/02/2021         Treatment Plan:  Reviewed current Medications with the patient. Risks, benefits, side effects, drug-to-drug interactions and alternatives to treatment were discussed. Collateral information:   CD evaluation  Encourage patient to attend group and other milieu activities.   Discharge planning discussed with the patient and treatment team.    continue Trileptal to 300 mg twice daily  Continue Invega 3 mg twice daily      PSYCHOTHERAPY/COUNSELING:  [x] Therapeutic interview  [x] Supportive  [] CBT  [] Ongoing  [] Other    [x] Patient continues to need, on a daily basis, active treatment furnished directly by or requiring the supervision of inpatient psychiatric personnel      Anticipated Length of stay: 3 to 7 days based on stability          Electronically signed by CHARO Davenport CNP on 5/63/9342 at 9:15 AM

## 2022-05-12 NOTE — PLAN OF CARE
Problem: Anxiety  Goal: Will report anxiety at manageable levels  Description: INTERVENTIONS:  1. Administer medication as ordered  2. Teach and rehearse alternative coping skills  3. Provide emotional support with 1:1 interaction with staff  5/12/2022 1334 by Joey Ayala RN  Outcome: Progressing  5/12/2022 0453 by Randall Rollins RN  Outcome: Progressing     Problem: Coping  Goal: Pt/Family able to verbalize concerns and demonstrate effective coping strategies  Description: INTERVENTIONS:  1. Assist patient/family to identify coping skills, available support systems and cultural and spiritual values  2. Provide emotional support, including active listening and acknowledgement of concerns of patient and caregivers  3. Reduce environmental stimuli, as able  4. Instruct patient/family in relaxation techniques, as appropriate  5. Assess for spiritual pain/suffering and initiate Spiritual Care, Psychosocial Clinical Specialist consults as needed  5/12/2022 1334 by Joey Ayala RN  Outcome: Progressing  5/12/2022 0453 by Randall Rollins RN  Outcome: Progressing     Problem: Decision Making  Goal: Pt/Family able to effectively weigh alternatives and participate in decision making related to treatment and care  Description: INTERVENTIONS:  1. Determine when there are differences between patient's view, family's view, and healthcare provider's view of condition  2. Facilitate patient and family articulation of goals for care  3. Help patient and family identify pros/cons of alternative solutions  4. Provide information as requested by patient/family  5. Respect patient/family right to receive or not to receive information  6. Serve as a liaison between patient and family and health care team  7.  Initiate Consults from Ethics, Palliative Care or initiate [facility] as is appropriate  5/12/2022 1334 by Joey Ayala RN  Outcome: Progressing  5/12/2022 0453 by Randall Rollins RN  Outcome: Progressing

## 2022-05-12 NOTE — PROGRESS NOTES
Denies SI,HI, Hallucinations at this time. States meds make her calm. She minimizes reason here. States her  had the police come to pick her up for an evaluation. Out on the unit. Social with select peers. Depression 0/10 and anxiety 2/10. Takes meds and groups offered.

## 2022-05-12 NOTE — PROGRESS NOTES
Patient was out on unit,but stays to self. Denies SI,HI or AV hallucinations, anxiety or depression. Affect is flat, constricted. Verbalizes appetite is normal.  Sleeping good. Compliant with medications. Attended group. Q 15 minute round continue.

## 2022-05-13 PROCEDURE — 99232 SBSQ HOSP IP/OBS MODERATE 35: CPT | Performed by: NURSE PRACTITIONER

## 2022-05-13 PROCEDURE — 1240000000 HC EMOTIONAL WELLNESS R&B

## 2022-05-13 PROCEDURE — 6370000000 HC RX 637 (ALT 250 FOR IP): Performed by: NURSE PRACTITIONER

## 2022-05-13 RX ORDER — PALIPERIDONE 3 MG/1
3 TABLET, EXTENDED RELEASE ORAL NIGHTLY
Status: DISCONTINUED | OUTPATIENT
Start: 2022-05-13 | End: 2022-05-19

## 2022-05-13 RX ORDER — PALIPERIDONE 6 MG/1
6 TABLET, EXTENDED RELEASE ORAL DAILY
Status: DISCONTINUED | OUTPATIENT
Start: 2022-05-14 | End: 2022-05-24 | Stop reason: HOSPADM

## 2022-05-13 RX ADMIN — OXCARBAZEPINE 300 MG: 300 TABLET, FILM COATED ORAL at 20:52

## 2022-05-13 RX ADMIN — PALIPERIDONE 3 MG: 3 TABLET, EXTENDED RELEASE ORAL at 09:18

## 2022-05-13 RX ADMIN — OXCARBAZEPINE 300 MG: 300 TABLET, FILM COATED ORAL at 09:18

## 2022-05-13 RX ADMIN — PALIPERIDONE 3 MG: 3 TABLET, EXTENDED RELEASE ORAL at 20:52

## 2022-05-13 ASSESSMENT — PAIN SCALES - GENERAL
PAINLEVEL_OUTOF10: 0
PAINLEVEL_OUTOF10: 0

## 2022-05-13 NOTE — PROGRESS NOTES
Pt. Resting quietly in bed with eyes closed. Respirations are even and unlabored with no signs of distress noted. Environmental rounds continued.

## 2022-05-13 NOTE — GROUP NOTE
Date: 5/13/2022    Group Start Time: 3701  Group End Time: 1100  Group Topic: Psychoeducation    [facility]  [address], [address]                                                                           Group Therapy Note    Date: 5/13/2022  Start Time:1010  End Time:  1187  Number of Participants: 14    Type of Group: Psychoeducation    Wellness Binder Information  Module Name:  living well     Patient's Goal: Patient will be able to id dimensions of wellness and what one can do to find balance. Notes:  Pleasant and engaged, kept to self unless prompted. Status After Intervention:  Improved    Participation Level:  Active Listener and Interactive    Participation Quality: Appropriate, Attentive, Sharing, and Supportive      Speech:  normal       Thought Process/Content: Logical      Affective Functioning: Congruent      Mood: euthymic      Level of consciousness:  Alert, Oriented x4, and Attentive      Response to Learning: Able to verbalize/acknowledge new learning, Able to retain information, and Progressing to goal      Endings: None Reported    Modes of Intervention: Education, Support, Socialization, Exploration, and Clarifying      Discipline Responsible: Psychoeducational Specialist      Signature:  John Knapp

## 2022-05-13 NOTE — PLAN OF CARE
Karen Monroe out in day room w peers for meals. Appearing somewhat less verbal than she is/ has been w me normally. No untoward beh. Denies a/vH, si, and hi. Came to group time and observed peers. Declined to complete the written quiz. Appeared to pay some attn to the selected video we showed   ( on the life of Arsen Martínez and [facility] speech.

## 2022-05-13 NOTE — PROGRESS NOTES
Patient denies SI, HI, or Hallucinations at this time. She states, \"Meds are good\", \"They make me calm,\" \"They make me well adjusted\". She appears brighter on conversation. , calm and cooperative on assessment. Groups offered and encouraged. Takes her meds. Will continue to monitor.

## 2022-05-13 NOTE — PROGRESS NOTES
BEHAVIORAL HEALTH FOLLOW-UP NOTE     5/13/2022     Patient was seen and examined in person, Chart reviewed   Patient's case discussed with staff/team    Chief Complaint: \"[facility]. \"    Interim History: Patient seen during treatment team.  When asked about stepping out of the crisis unit in front of treatment team she again insist that she is going to [facility] because it is run by [employer]. She states this is what her  told her. She has very poor limited insight and judgment guarding circumstance of hospitalization need for treatment she becomes argumentative and we explained to her that we will have to check with her  to see what the case is. She did have a probate hearing today.     Review mocha score was a 27 out of 30      Appetite:   [x] Normal/Unchanged  [] Increased  [] Decreased      Sleep:       [x] Normal/Unchanged  [] Fair       [] Poor              Energy:    [x] Normal/Unchanged  [] Increased  [] Decreased        SI [] Present  [x] Absent    HI  []Present  [x] Absent     Aggression:  [] yes  [x] no    Patient is [x] able  [] unable to CONTRACT FOR SAFETY     PAST MEDICAL/PSYCHIATRIC HISTORY:   Past Medical History:   Diagnosis Date    [facility] psychosis [facility] 12/22/2017    Anxiety     Asthma     Bipolar disorder ([address]     Bronchitis     [facility] obstructive [facility] disease) [facility]     Depression     Iron deficiency anemia     PTSD (post-traumatic stress disorder)     Schizo affective schizophrenia ([address]     Severe episode of recurrent major depressive disorder, without psychotic features ([address] 11/16/2019    Substance abuse ([facility] [address]     quit 7/2011  was [employer] user        FAMILY/SOCIAL HISTORY:  Family History   Problem Relation Age of Onset    Diabetes Mother     High Blood Pressure Mother     Kidney Disease Mother     Heart Failure Mother     Diabetes Father     Other Father         mesothelioma    Mental Illness Father         schizophrenia     Social History     Socioeconomic History    Marital status: Single     Spouse name: Not on file    Number of children: 0    Years of education: GED    Highest education level: Not on file   Occupational History    Occupation: unemployed     Comment: attempting to get disability for mental illness   Tobacco Use    Smoking status: Current Every Day Smoker     Packs/day: 0.50     Years: 20.00     Pack years: 10.00     Types: Cigarettes    Smokeless tobacco: Never Used   Vaping Use    Vaping Use: Former   Substance and Sexual Activity    Alcohol use: Yes    Drug use: Yes     Types: Marijuana [place])     Comment: using marijuana daily    Sexual activity: Not Currently   Other Topics Concern    Not on file   Social History Narrative    Not on file     Social Determinants of Health     Financial Resource Strain:     Difficulty of Paying Living Expenses: Not on file   Food Insecurity:     Worried About Running Out of Food in the Last Year: Not on file    Mary of Food in the Last Year: Not on file   Transportation Needs:     Lack of Transportation (Medical): Not on file    Lack of Transportation (Non-Medical):  Not on file   Physical Activity:     Days of Exercise per Week: Not on file    Minutes of Exercise per Session: Not on file   Stress:     Feeling of Stress : Not on file   Social Connections:     Frequency of Communication with Friends and Family: Not on file    Frequency of Social Gatherings with Friends and Family: Not on file    Attends [employer] Services: Not on file    Active Member of Clubs or Organizations: Not on file    Attends Club or Organization Meetings: Not on file    Marital Status: Not on file   Intimate Partner Violence:     Fear of Current or Ex-Partner: Not on file    Emotionally Abused: Not on file    Physically Abused: Not on file    Sexually Abused: Not on file   Housing Stability:     Unable to Pay for Housing in the Last Year: Not on file    Number of Places Lived in the Last Year: Not on file    Unstable Housing in the Last Year: Not on file           ROS:  [x] All negative/unchanged except if checked.  Explain positive(checked items) below:  [] Constitutional  [] Eyes  [] Ear/Nose/Mouth/Throat  [] Respiratory  [] CV  [] GI  []   [] Musculoskeletal  [] Skin/Breast  [] Neurological  [] Endocrine  [] Heme/Lymph  [] Allergic/Immunologic    Explanation:     MEDICATIONS:    Current Facility-Administered Medications:     OXcarbazepine (TRILEPTAL) tablet 300 mg, 300 mg, Oral, BID, Jodie Middleton APRN - CNP, 881 mg at 05/13/22 8579    paliperidone (INVEGA) extended release tablet 3 mg, 3 mg, Oral, BID, Jodie Felipe Middleton, APRN - CNP, 3 mg at 05/13/22 3964    acetaminophen (TYLENOL) tablet 650 mg, 650 mg, Oral, Q4H PRN, Evy Dickens MD    hydrOXYzine (VISTARIL) capsule 50 mg, 50 mg, Oral, TID PRN, Evy Dickens MD    haloperidol (HALDOL) tablet 5 mg, 5 mg, Oral, Q4H PRN **OR** haloperidol lactate (HALDOL) injection 5 mg, 5 mg, IntraMUSCular, Q4H PRN, Evy Dickens MD    traZODone (DESYREL) tablet 50 mg, 50 mg, Oral, Nightly PRN, Evy Dickens MD    magnesium hydroxide (MILK OF MAGNESIA) 400 MG/5ML suspension 30 mL, 30 mL, Oral, Daily PRN, Evy Dickens MD    aluminum & magnesium hydroxide-simethicone (MAALOX) 200-200-20 MG/5ML suspension 30 mL, 30 mL, Oral, Q6H PRN, Evy Dickens MD      Examination:  BP 99/66   Pulse 72   Temp 97.3 °F (36.3 °C) (Temporal)   Resp 14   Ht 5' 2\" (1.575 m)   SpO2 94%   BMI 21.40 kg/m²   Gait - steady  Medication side effects(SE): denies    Mental Status Examination:    Level of consciousness:  within normal limits   Appearance:  fair grooming and fair hygiene  Behavior/Motor:  no abnormalities noted  Attitude toward examiner:  cooperative  Speech:  spontaneous, normal rate and normal volume   Mood: \" Appropriate and pleasant  Affect: Linear without flight of ideas loose associations  Thought processes: Linear without flight of ideas loose associations  Thought content:Devoid of any auditory visual loose Nations delusions or other perceptual normalities. Denies SI/HI intent or plan appears to be preoccupied and guarded and paranoid  Cognition: Alert oriented time place and person  Concentration intact  Insight improving  Judgement improving    ASSESSMENT:   Patient symptoms are:  [] Well controlled  [x] Improving  [] Worsening  [] No change      Diagnosis:   Principal Problem:    Schizoaffective disorder, bipolar type ([facility]  Active Problems:    Cocaine abuse ([facility]  Resolved Problems:    * No resolved hospital problems. *      LABS:    No results for input(s): WBC, HGB, PLT in the last 72 hours. No results for input(s): NA, K, CL, CO2, BUN, CREATININE, GLUCOSE in the last 72 hours. No results for input(s): BILITOT, ALKPHOS, AST, ALT in the last 72 hours. Lab Results   Component Value Date    LABAMPH NOT DETECTED 05/06/2022    BARBSCNU NOT DETECTED 05/06/2022    LABBENZ NOT DETECTED 05/06/2022    LABMETH NOT DETECTED 05/06/2022    OPIATESCREENURINE NOT DETECTED 05/06/2022    PHENCYCLIDINESCREENURINE NOT DETECTED 05/06/2022    ETOH <10 05/06/2022     Lab Results   Component Value Date    TSH 1.770 03/07/2019     No results found for: LITHIUM  Lab Results   Component Value Date    VALPROATE 57 04/02/2021         Treatment Plan:  Reviewed current Medications with the patient. Risks, benefits, side effects, drug-to-drug interactions and alternatives to treatment were discussed. Collateral information:   CD evaluation  Encourage patient to attend group and other milieu activities.   Discharge planning discussed with the patient and treatment team.    continue Trileptal to 300 mg twice daily  We will increase Invega to 6 mg daily and 3 mg at bedtime    PSYCHOTHERAPY/COUNSELING:  [x] Therapeutic interview  [x] Supportive  [] CBT  [] Ongoing  [] Other    [x] Patient continues to need, on a daily basis, active treatment furnished directly by or requiring the supervision of inpatient psychiatric personnel      Anticipated Length of stay: 3 to 7 days based on stability          Electronically signed by Verner Blumenthal, APRN - CNP on 3/76/7851 at 1:22 PM

## 2022-05-13 NOTE — PLAN OF CARE
Problem: Anxiety  Goal: Will report anxiety at manageable levels  Description: INTERVENTIONS:  1. Administer medication as ordered  2. Teach and rehearse alternative coping skills  3. Provide emotional support with 1:1 interaction with staff  5/12/2022 2109 by Carmine Kunz RN  Outcome: Progressing     Problem: Coping  Goal: Pt/Family able to verbalize concerns and demonstrate effective coping strategies  Description: INTERVENTIONS:  1. Assist patient/family to identify coping skills, available support systems and cultural and spiritual values  2. Provide emotional support, including active listening and acknowledgement of concerns of patient and caregivers  3. Reduce environmental stimuli, as able  4. Instruct patient/family in relaxation techniques, as appropriate  5. Assess for spiritual pain/suffering and initiate Spiritual Care, Psychosocial Clinical Specialist consults as needed  5/12/2022 2109 by Carmine Kunz RN  Outcome: Progressing   Pt observed to be isolative to room. Affect is blunt. Mood observed withdrawn. Pt denies suicidal/homicidal ideations. Denies depression and/or anxiety. No AH/VH endorsed.

## 2022-05-13 NOTE — PROGRESS NOTES
Attended community meeting. Updated on staffing and daily expectations. Shared goal for the day as to get out of here.

## 2022-05-13 NOTE — PROGRESS NOTES
Patient to the nurses station on multiple times. States \"I am suppose to be discharged today\". \"When am I going\" \"I have a lot of things I need to do today\". Educated patient that no orders for discharge yet but when I see any new orders she will be updated. She then states understanding and walks away and then half hour later asked the same question. Appears agitated and discharged focused. Will continue to monitor.

## 2022-05-13 NOTE — PROGRESS NOTES
[id number] [facility] Interdisciplinary Treatment Plan Note     Review Date & Time: 5/13/22 1000    Patient was in treatment team.    Estimated Length of Stay Update:  3-5 days   Estimated Discharge Date Update: 3-5 days    EDUCATION:   Learner Progress Toward Treatment Goals: Reviewed results and recommendations of this team    Method: Small group    Outcome: Verbalized understanding    PATIENT GOALS: Get out of here    PLAN/TREATMENT RECOMMENDATIONS UPDATE:  Continue current treatment plan and monitor    GOALS UPDATE:  Time frame for Short-Term Goals: 3-5 days      Donald Crews RN

## 2022-05-13 NOTE — PROGRESS NOTES
Attended afternoon meet and greet. Updated on staffing and weekend programming. Participated in afternoon activity of watching a movie.

## 2022-05-13 NOTE — PLAN OF CARE
Problem: Anxiety  Goal: Will report anxiety at manageable levels  Description: INTERVENTIONS:  1. Administer medication as ordered  2. Teach and rehearse alternative coping skills  3. Provide emotional support with 1:1 interaction with staff  5/13/2022 1009 by Roney Larry RN  Outcome: Progressing  5/12/2022 2109 by Karissa Porter RN  Outcome: Progressing  5/12/2022 2105 by Marquis Santana RN  Outcome: Progressing     Problem: Coping  Goal: Pt/Family able to verbalize concerns and demonstrate effective coping strategies  Description: INTERVENTIONS:  1. Assist patient/family to identify coping skills, available support systems and cultural and spiritual values  2. Provide emotional support, including active listening and acknowledgement of concerns of patient and caregivers  3. Reduce environmental stimuli, as able  4. Instruct patient/family in relaxation techniques, as appropriate  5. Assess for spiritual pain/suffering and initiate Spiritual Care, Psychosocial Clinical Specialist consults as needed  5/13/2022 1009 by Roney Larry RN  Outcome: Progressing  5/12/2022 2109 by Karissa Porter RN  Outcome: Progressing     Problem: Decision Making  Goal: Pt/Family able to effectively weigh alternatives and participate in decision making related to treatment and care  Description: INTERVENTIONS:  1. Determine when there are differences between patient's view, family's view, and healthcare provider's view of condition  2. Facilitate patient and family articulation of goals for care  3. Help patient and family identify pros/cons of alternative solutions  4. Provide information as requested by patient/family  5. Respect patient/family right to receive or not to receive information  6. Serve as a liaison between patient and family and health care team  7.  Initiate Consults from Ethics, Palliative Care or initiate [address] [place] [address] as is appropriate  Outcome: Progressing     Problem: Confusion  Goal: Confusion, delirium, dementia, or psychosis is improved or at baseline  Description: INTERVENTIONS:  1. Assess for possible contributors to thought disturbance, including medications, impaired vision or hearing, underlying metabolic abnormalities, dehydration, psychiatric diagnoses, and notify attending LIP  2. [place] high risk fall precautions, as indicated  3. Provide frequent short contacts to provide reality reorientation, refocusing and direction  4. Decrease environmental stimuli, including noise as appropriate  5. Monitor and intervene to maintain adequate nutrition, hydration, elimination, sleep and activity  6. If unable to ensure safety without constant attention obtain sitter and review sitter guidelines with assigned personnel  7.  Initiate Psychosocial CNS and Spiritual Care consult, as indicated  5/13/2022 1009 by Sarah Sandoval RN  Outcome: Progressing  5/12/2022 2105 by Lety Church RN  Outcome: Progressing

## 2022-05-14 PROCEDURE — 1240000000 HC EMOTIONAL WELLNESS R&B

## 2022-05-14 PROCEDURE — 6370000000 HC RX 637 (ALT 250 FOR IP): Performed by: NURSE PRACTITIONER

## 2022-05-14 PROCEDURE — 99231 SBSQ HOSP IP/OBS SF/LOW 25: CPT | Performed by: NURSE PRACTITIONER

## 2022-05-14 RX ADMIN — PALIPERIDONE 6 MG: 6 TABLET, EXTENDED RELEASE ORAL at 09:35

## 2022-05-14 RX ADMIN — PALIPERIDONE 3 MG: 3 TABLET, EXTENDED RELEASE ORAL at 21:23

## 2022-05-14 RX ADMIN — OXCARBAZEPINE 300 MG: 300 TABLET, FILM COATED ORAL at 21:23

## 2022-05-14 RX ADMIN — OXCARBAZEPINE 300 MG: 300 TABLET, FILM COATED ORAL at 09:35

## 2022-05-14 ASSESSMENT — PAIN SCALES - GENERAL
PAINLEVEL_OUTOF10: 0
PAINLEVEL_OUTOF10: 0

## 2022-05-14 NOTE — PROGRESS NOTES
Spiritual Support Group Note    Number of Participants in Group:    10                    Time:   1:15    Goal: Relief from isolation and loneliness             Marly Sharing             Self-understanding and gain insight              Acceptance and belonging            Recognize they are not alone                Socialization             Empowerment       Encouragement    Topic:  [x] Spiritual Wellness and Self Care                  [] Hope                     [] Connecting with Divine/Others        [] Thankfulness and Gratitude               [x]  Meaningfulness and Purpose               [] Forgiveness               [] Peace               [] Connect to [facility]      [] Other    Participation Level:   [x] Active Listener   [] Minimal   [] Monopolizing   [] Interactive   [] No Participation   []  Other:     Attention:   [x] Alert   [] Distractible   [] Drowsy   [] Poor   [] Other:    Manner:   [x] Cooperative   [] Suspicious   [] Withdrawn   [] Guarded   [] Irritable   [] Inhospitable   [] Other:     Others Comments from Group:

## 2022-05-14 NOTE — PLAN OF CARE
Problem: Anxiety  Goal: Will report anxiety at manageable levels  Description: INTERVENTIONS:  1. Administer medication as ordered  2. Teach and rehearse alternative coping skills  3. Provide emotional support with 1:1 interaction with staff  Outcome: Progressing     Problem: Coping  Goal: Pt/Family able to verbalize concerns and demonstrate effective coping strategies  Description: INTERVENTIONS:  1. Assist patient/family to identify coping skills, available support systems and cultural and spiritual values  2. Provide emotional support, including active listening and acknowledgement of concerns of patient and caregivers  3. Reduce environmental stimuli, as able  4. Instruct patient/family in relaxation techniques, as appropriate  5.  Assess for spiritual pain/suffering and initiate Spiritual Care, Psychosocial Clinical Specialist consults as needed  Outcome: Progressing

## 2022-05-14 NOTE — PROGRESS NOTES
[facility] [address] [address] NOTE     5/14/2022     Patient was seen and examined in person, Chart reviewed   Patient's case discussed with staff/team    Chief Complaint: \"When do I get breakfast\"    Interim History: Patient seen in her room this morning. She provides little conversation and is not interested in speaking with me. She has very poor limited insight and judgment guarding circumstance of hospitalization need for treatment. She has bizarre affect, is smiling inappropriately with significant underlying irritability this morning   She did have a probate hearing yesterday that she will not talk to me about.      Review mocha score was a 27 out of 30      Appetite:   [x] Normal/Unchanged  [] Increased  [] Decreased      Sleep:       [x] Normal/Unchanged  [] Fair       [] Poor              Energy:    [x] Normal/Unchanged  [] Increased  [] Decreased        SI [] Present  [x] Absent    HI  []Present  [x] Absent     Aggression:  [] yes  [x] no    Patient is [x] able  [] unable to CONTRACT FOR SAFETY     PAST MEDICAL/PSYCHIATRIC HISTORY:   Past Medical History:   Diagnosis Date    Acute psychosis ([facility] 12/22/2017    Anxiety     Asthma     Bipolar disorder ([facility]     Bronchitis     COPD (chronic obstructive pulmonary disease) (HCC)     Depression     Iron deficiency anemia     PTSD (post-traumatic stress disorder)     Schizo affective schizophrenia ([facility]     Severe episode of recurrent major depressive disorder, without psychotic features ([facility] 11/16/2019    [facility] ([facility]     quit 7/2011  was [employer] user        FAMILY/SOCIAL HISTORY:  Family History   Problem Relation Age of Onset    Diabetes Mother     High Blood Pressure Mother     Kidney Disease Mother     Heart Failure Mother     Diabetes Father     Other Father         mesothelioma    Mental Illness Father         schizophrenia     Social History     Socioeconomic History    Marital status: Single     Spouse name: Not on file  Number of children: 0    Years of education: GED    Highest education level: Not on file   Occupational History    Occupation: unemployed     Comment: attempting to get disability for mental illness   Tobacco Use    Smoking status: Current Every Day Smoker     Packs/day: 0.50     Years: 20.00     Pack years: 10.00     Types: Cigarettes    Smokeless tobacco: Never Used   Vaping Use    Vaping Use: Former   Substance and Sexual Activity    Alcohol use: Yes    Drug use: Yes     Types: Marijuana Norval Remak)     Comment: using marijuana daily    Sexual activity: Not Currently   Other Topics Concern    Not on file   Social History Narrative    Not on file     Social Determinants of Health     Financial Resource Strain:     Difficulty of Paying Living Expenses: Not on file   Food Insecurity:     Worried About Running Out of Food in the Last Year: Not on file    Mary of Food in the Last Year: Not on file   Transportation Needs:     Lack of Transportation (Medical): Not on file    Lack of Transportation (Non-Medical):  Not on file   Physical Activity:     Days of Exercise per Week: Not on file    Minutes of Exercise per Session: Not on file   Stress:     Feeling of Stress : Not on file   Social Connections:     Frequency of Communication with Friends and Family: Not on file    Frequency of Social Gatherings with Friends and Family: Not on file    Attends Sikh Services: Not on file    Active Member of [address] [place] [address] or Organizations: Not on file    Attends Club or Organization Meetings: Not on file    Marital Status: Not on file   Intimate Partner Violence:     Fear of Current or Ex-Partner: Not on file    Emotionally Abused: Not on file    Physically Abused: Not on file    Sexually Abused: Not on file   Housing Stability:     Unable to Pay for Housing in the Last Year: Not on file    Number of [place] in the Last Year: Not on file    Unstable Housing in the Last Year: Not on file ROS:  [x] All negative/unchanged except if checked.  Explain positive(checked items) below:  [] Constitutional  [] Eyes  [] Ear/Nose/Mouth/Throat  [] Respiratory  [] CV  [] GI  []   [] Musculoskeletal  [] Skin/Breast  [] Neurological  [] Endocrine  [] Heme/Lymph  [] Allergic/Immunologic    Explanation:     MEDICATIONS:    Current Facility-Administered Medications:     paliperidone (INVEGA) extended release tablet 6 mg, 6 mg, Oral, Daily, Beto Middleton, APRN - CNP, 6 mg at 05/14/22 0935    paliperidone (INVEGA) extended release tablet 3 mg, 3 mg, Oral, Nightly, Beto Middleton, APRN - CNP, 3 mg at 05/13/22 2052    OXcarbazepine (TRILEPTAL) tablet 300 mg, 300 mg, Oral, BID, Beto Middleton, APRN - CNP, 129 mg at 05/14/22 0935    acetaminophen (TYLENOL) tablet 650 mg, 650 mg, Oral, Q4H PRN, Ray Brunson MD    hydrOXYzine (VISTARIL) capsule 50 mg, 50 mg, Oral, TID PRN, Ray Brunson MD    haloperidol (HALDOL) tablet 5 mg, 5 mg, Oral, Q4H PRN **OR** haloperidol lactate (HALDOL) injection 5 mg, 5 mg, IntraMUSCular, Q4H PRN, Ray Brunson MD    traZODone (DESYREL) tablet 50 mg, 50 mg, Oral, Nightly PRN, Ray Brunson MD    magnesium hydroxide (MILK OF MAGNESIA) 400 MG/5ML suspension 30 mL, 30 mL, Oral, Daily PRN, Ray Brunson MD    aluminum & magnesium hydroxide-simethicone (MAALOX) 200-200-20 MG/5ML suspension 30 mL, 30 mL, Oral, Q6H PRN, Ray Brunson MD      Examination:  BP 99/66   Pulse 72   Temp 97.3 °F (36.3 °C) (Temporal)   Resp 14   Ht 5' 2\" (1.575 m)   SpO2 94%   BMI 21.40 kg/m²   Gait - steady  Medication side effects(SE): denies    Mental Status Examination:    Level of consciousness:  within normal limits   Appearance:  fair grooming and fair hygiene  Behavior/Motor:  no abnormalities noted  Attitude toward examiner:  cooperative  Speech:  spontaneous, normal rate and normal volume   Mood: \" Appropriate and pleasant  Affect: Linear without flight of ideas loose associations  Thought processes: Linear without flight of ideas loose associations  Thought content:Devoid of any auditory visual loose [place] delusions or other perceptual normalities. Denies SI/HI intent or plan appears to be preoccupied and guarded and paranoid  Cognition: Alert oriented time place and person  Concentration intact  Insight improving  Judgement improving    ASSESSMENT:   Patient symptoms are:  [] Well controlled  [x] Improving  [] Worsening  [] No change      Diagnosis:   Principal Problem:    Schizoaffective disorder, bipolar type ([facility]  Active Problems:    Cocaine abuse ([facility]  Resolved Problems:    * No resolved hospital problems. *      LABS:    No results for input(s): WBC, HGB, PLT in the last 72 hours. No results for input(s): NA, K, CL, CO2, BUN, CREATININE, GLUCOSE in the last 72 hours. No results for input(s): BILITOT, ALKPHOS, AST, ALT in the last 72 hours. Lab Results   Component Value Date    LABAMPH NOT DETECTED 05/06/2022    BARBSCNU NOT DETECTED 05/06/2022    LABBENZ NOT DETECTED 05/06/2022    LABMETH NOT DETECTED 05/06/2022    OPIATESCREENURINE NOT DETECTED 05/06/2022    PHENCYCLIDINESCREENURINE NOT DETECTED 05/06/2022    ETOH <10 05/06/2022     Lab Results   Component Value Date    TSH 1.770 03/07/2019     No results found for: LITHIUM  Lab Results   Component Value Date    VALPROATE 57 04/02/2021         Treatment Plan:  Reviewed current Medications with the patient. Risks, benefits, side effects, drug-to-drug interactions and alternatives to treatment were discussed. Collateral information:   CD evaluation  Encourage patient to attend group and other milieu activities.   Discharge planning discussed with the patient and treatment team.    continue Trileptal to 300 mg twice daily  We will increase Invega to 6 mg daily and 3 mg at bedtime    PSYCHOTHERAPY/COUNSELING:  [x] Therapeutic interview  [x] Supportive  [] CBT  [] Ongoing  [] Other    [x] Patient continues to need, on a daily basis, active treatment furnished directly by or requiring the supervision of inpatient psychiatric personnel      Anticipated Length of stay: 3 to 7 days based on stability          Electronically signed by CHARO Retana CNP on 5/14/2022 at 9:36 AM

## 2022-05-14 NOTE — PLAN OF CARE
Problem: Anxiety  Goal: Will report anxiety at manageable levels  Description: INTERVENTIONS:  1. Administer medication as ordered  2. Teach and rehearse alternative coping skills  3. Provide emotional support with 1:1 interaction with staff  5/13/2022 2115 by Marianne Maldonado RN  Outcome: Progressing     Problem: Coping  Goal: Pt/Family able to verbalize concerns and demonstrate effective coping strategies  Description: INTERVENTIONS:  1. Assist patient/family to identify coping skills, available support systems and cultural and spiritual values  2. Provide emotional support, including active listening and acknowledgement of concerns of patient and caregivers  3. Reduce environmental stimuli, as able  4. Instruct patient/family in relaxation techniques, as appropriate  5. Assess for spiritual pain/suffering and initiate Spiritual Care, Psychosocial Clinical Specialist consults as needed  5/13/2022 2115 by Marianne Maldonado RN  Outcome: Progressing     Problem: Decision Making  Goal: Pt/Family able to effectively weigh alternatives and participate in decision making related to treatment and care  Description: INTERVENTIONS:  1. Determine when there are differences between patient's view, family's view, and healthcare provider's view of condition  2. Facilitate patient and family articulation of goals for care  3. Help patient and family identify pros/cons of alternative solutions  4. Provide information as requested by patient/family  5. Respect patient/family right to receive or not to receive information  6. Serve as a liaison between patient and family and health care team  7. Initiate Consults from Ethics, Palliative Care or initiate [facility] as is appropriate  5/13/2022 2115 by Marianne Maldonado RN  Outcome: Progressing     Problem: Confusion  Goal: Confusion, delirium, dementia, or psychosis is improved or at baseline  Description: INTERVENTIONS:  1.  Assess for possible contributors to thought disturbance, including medications, impaired vision or hearing, underlying metabolic abnormalities, dehydration, psychiatric diagnoses, and notify attending LIP  2. [place] high risk fall precautions, as indicated  3. Provide frequent short contacts to provide reality reorientation, refocusing and direction  4. Decrease environmental stimuli, including noise as appropriate  5. Monitor and intervene to maintain adequate nutrition, hydration, elimination, sleep and activity  6. If unable to ensure safety without constant attention obtain sitter and review sitter guidelines with assigned personnel  7. Initiate Psychosocial CNS and Spiritual Care consult, as indicated  5/13/2022 2115 by Yovani Means RN  Outcome: Progressing       Pt is withdrawn to her bed at this time. Pt denies any thoughts of suicide or homicide at this time and no dangerous behavior is noted. Pt denies any visual or auditory hallucinations and no delusional thinking is noted.

## 2022-05-14 NOTE — PROGRESS NOTES
Patient denies SI,HI, or any Hallucinations a this time. She is out on the unit social with peers. She irritable at times regarding wanting discharged. Poor insight for reason here. Was upset regarding issues on the unit. Attends group and takes her meds. Will continue to monitor.

## 2022-05-14 NOTE — GROUP NOTE
Group Therapy Note    Date: 5/14/2022    Group Start Time: 1010  Group End Time: 1213  Group Topic: Psychoeducation    [facility]  [id number] [place], [facility]        Group Therapy Note    Attendees: 18                                                                      Group Therapy Note    Date: 5/14/2022    Type of Group: Psychoeducation    Wellness Binder Information  Module Name:  self esteem   Patient's Goal:  patient will be able to id a mantra or steps to increase ones own self esteem     Notes: Pleasant and sharing in group. Able to participate when prompted. Status After Intervention:  Improved    Participation Level:  Active Listener and Interactive    Participation Quality: Appropriate, Attentive, and Sharing      Speech: normal   Thought Process/Content: Logical      Affective Functioning: Congruent      Mood: euphoric      Level of consciousness:  Alert, Oriented x4, and Attentive      Response to Learning: Able to verbalize/acknowledge new learning, Able to retain information, and Progressing to goal      Endings: None Reported    Modes of Intervention: Education, Support, Socialization, Exploration, and Problem-solving      Discipline Responsible: Psychoeducational Specialist      Signature:  Maria Antonia Sims

## 2022-05-15 PROCEDURE — 1240000000 HC EMOTIONAL WELLNESS R&B

## 2022-05-15 PROCEDURE — 6370000000 HC RX 637 (ALT 250 FOR IP): Performed by: NURSE PRACTITIONER

## 2022-05-15 PROCEDURE — 99231 SBSQ HOSP IP/OBS SF/LOW 25: CPT | Performed by: NURSE PRACTITIONER

## 2022-05-15 RX ADMIN — PALIPERIDONE 3 MG: 3 TABLET, EXTENDED RELEASE ORAL at 22:09

## 2022-05-15 RX ADMIN — OXCARBAZEPINE 300 MG: 300 TABLET, FILM COATED ORAL at 22:09

## 2022-05-15 RX ADMIN — OXCARBAZEPINE 300 MG: 300 TABLET, FILM COATED ORAL at 08:43

## 2022-05-15 RX ADMIN — PALIPERIDONE 6 MG: 6 TABLET, EXTENDED RELEASE ORAL at 08:42

## 2022-05-15 ASSESSMENT — PAIN SCALES - GENERAL: PAINLEVEL_OUTOF10: 0

## 2022-05-15 NOTE — PLAN OF CARE
disturbance, including medications, impaired vision or hearing, underlying metabolic abnormalities, dehydration, psychiatric diagnoses, and notify attending LIP  2. [place] high risk fall precautions, as indicated  3. Provide frequent short contacts to provide reality reorientation, refocusing and direction  4. Decrease environmental stimuli, including noise as appropriate  5. Monitor and intervene to maintain adequate nutrition, hydration, elimination, sleep and activity  6. If unable to ensure safety without constant attention obtain sitter and review sitter guidelines with assigned personnel  7.  Initiate Psychosocial CNS and Spiritual Care consult, as indicated  5/14/2022 2153 by Marimar Lowry RN  Outcome: Progressing

## 2022-05-15 NOTE — PROGRESS NOTES
Has been out of her room most of the shift, watching Tv . Stays to self. Takes medications , appetite is good. adls need improved. Low energy level.

## 2022-05-15 NOTE — PROGRESS NOTES
BEHAVIORAL HEALTH FOLLOW-UP NOTE     5/15/2022     Patient was seen and examined in person, Chart reviewed   Patient's case discussed with staff/team    Chief Complaint: \" I am fine this morning I do not need anything\"    Interim History: Patient seen in her room this morning. She provides little conversation and is not interested in speaking with me. She has very poor limited insight and judgment guarding circumstance of hospitalization need for treatment. She has bizarre affect, is smiling inappropriately with significant underlying irritability this morning. She is dismissive during assessment and offers little conversation.   Review mocha score was a 27 out of 30      Appetite:   [x] Normal/Unchanged  [] Increased  [] Decreased      Sleep:       [x] Normal/Unchanged  [] Fair       [] Poor              Energy:    [x] Normal/Unchanged  [] Increased  [] Decreased        SI [] Present  [x] Absent    HI  []Present  [x] Absent     Aggression:  [] yes  [x] no    Patient is [x] able  [] unable to CONTRACT FOR SAFETY     PAST MEDICAL/PSYCHIATRIC HISTORY:   Past Medical History:   Diagnosis Date    Acute psychosis ([facility] 12/22/2017    Anxiety     Asthma     Bipolar disorder ([facility]     Bronchitis     COPD (chronic obstructive pulmonary disease) (HCC)     Depression     Iron deficiency anemia     PTSD (post-traumatic stress disorder)     Schizo affective schizophrenia ([facility]     Severe episode of recurrent major depressive disorder, without psychotic features ([address] 11/16/2019    Substance abuse ([facility]     quit 7/2011  was [place] user        FAMILY/SOCIAL HISTORY:  Family History   Problem Relation Age of Onset    Diabetes Mother     High Blood Pressure Mother     Kidney Disease Mother     Heart Failure Mother     Diabetes Father     Other Father         mesothelioma    Mental Illness Father         schizophrenia     Social History     Socioeconomic History    Marital status: Single     Spouse name: Not on file    Number of children: 0    Years of education: GED    Highest education level: Not on file   Occupational History    Occupation: unemployed     Comment: attempting to get disability for mental illness   Tobacco Use    Smoking status: Current Every Day Smoker     Packs/day: 0.50     Years: 20.00     Pack years: 10.00     Types: Cigarettes    Smokeless tobacco: Never Used   Vaping Use    Vaping Use: Former   Substance and Sexual Activity    Alcohol use: Yes    Drug use: Yes     Types: Marijuana Otis Nice     Comment: using marijuana daily    Sexual activity: Not Currently   Other Topics Concern    Not on file   Social History Narrative    Not on file     Social Determinants of Health     Financial Resource Strain:     Difficulty of Paying Living Expenses: Not on file   Food Insecurity:     Worried About Running Out of Food in the Last Year: Not on file    Mary of Food in the Last Year: Not on file   Transportation Needs:     Lack of Transportation (Medical): Not on file    Lack of Transportation (Non-Medical):  Not on file   Physical Activity:     Days of Exercise per Week: Not on file    Minutes of Exercise per Session: Not on file   Stress:     Feeling of Stress : Not on file   Social Connections:     Frequency of Communication with Friends and Family: Not on file    Frequency of Social Gatherings with Friends and Family: Not on file    Attends Tenriism Services: Not on file    Active Member of [address] [place] [address] or Organizations: Not on file    Attends Club or Organization Meetings: Not on file    Marital Status: Not on file   Intimate Partner Violence:     Fear of Current or Ex-Partner: Not on file    Emotionally Abused: Not on file    Physically Abused: Not on file    Sexually Abused: Not on file   Housing Stability:     Unable to Pay for Housing in the Last Year: Not on file    Number of [place] in the Last Year: Not on file    Unstable Housing in the Last Year: Not on file ROS:  [x] All negative/unchanged except if checked.  Explain positive(checked items) below:  [] Constitutional  [] Eyes  [] Ear/Nose/Mouth/Throat  [] Respiratory  [] CV  [] GI  []   [] Musculoskeletal  [] Skin/Breast  [] Neurological  [] Endocrine  [] Heme/Lymph  [] Allergic/Immunologic    Explanation:     MEDICATIONS:    Current Facility-Administered Medications:     paliperidone (INVEGA) extended release tablet 6 mg, 6 mg, Oral, Daily, Hailey Konig Dellick, APRN - CNP, 6 mg at 05/15/22 5821    paliperidone (INVEGA) extended release tablet 3 mg, 3 mg, Oral, Nightly, Hailey Konig Dellick, APRN - CNP, 3 mg at 05/14/22 2123    OXcarbazepine (TRILEPTAL) tablet 300 mg, 300 mg, Oral, BID, Hailey Konig Dellick, APRN - CNP, 261 mg at 05/15/22 0843    acetaminophen (TYLENOL) tablet 650 mg, 650 mg, Oral, Q4H PRN, Russ Jason MD    hydrOXYzine (VISTARIL) capsule 50 mg, 50 mg, Oral, TID PRN, Russ Jason MD    haloperidol (HALDOL) tablet 5 mg, 5 mg, Oral, Q4H PRN **OR** haloperidol lactate (HALDOL) injection 5 mg, 5 mg, IntraMUSCular, Q4H PRN, Russ Jason MD    traZODone (DESYREL) tablet 50 mg, 50 mg, Oral, Nightly PRN, Russ Jason MD    magnesium hydroxide (MILK OF MAGNESIA) 400 MG/5ML suspension 30 mL, 30 mL, Oral, Daily PRN, Russ Jason MD    aluminum & magnesium hydroxide-simethicone (MAALOX) 200-200-20 MG/5ML suspension 30 mL, 30 mL, Oral, Q6H PRN, Russ Jason MD      Examination:  BP 99/66   Pulse 82   Temp 98 °F (36.7 °C) (Tympanic)   Resp 14   Ht 5' 2\" (1.575 m)   SpO2 99%   BMI 21.40 kg/m²   Gait - steady  Medication side effects(SE): denies    Mental Status Examination:    Level of consciousness:  within normal limits   Appearance:  fair grooming and fair hygiene  Behavior/Motor:  no abnormalities noted  Attitude toward examiner:  cooperative  Speech:  spontaneous, normal rate and normal volume   Mood: \" Appropriate and pleasant  Affect: Linear without flight of ideas loose associations  Thought processes: Linear without flight of ideas loose associations  Thought content:Devoid of any auditory visual loose [place] delusions or other perceptual normalities. Denies SI/HI intent or plan appears to be preoccupied and guarded and paranoid  Cognition: Alert oriented time place and person  Concentration intact  Insight improving  Judgement improving    ASSESSMENT:   Patient symptoms are:  [] Well controlled  [x] Improving  [] Worsening  [] No change      Diagnosis:   Principal Problem:    Schizoaffective disorder, bipolar type ([facility]  Active Problems:    Cocaine abuse ([facility]  Resolved Problems:    * No resolved hospital problems. *      LABS:    No results for input(s): WBC, HGB, PLT in the last 72 hours. No results for input(s): NA, K, CL, CO2, BUN, CREATININE, GLUCOSE in the last 72 hours. No results for input(s): BILITOT, ALKPHOS, AST, ALT in the last 72 hours. Lab Results   Component Value Date    LABAMPH NOT DETECTED 05/06/2022    BARBSCNU NOT DETECTED 05/06/2022    LABBENZ NOT DETECTED 05/06/2022    LABMETH NOT DETECTED 05/06/2022    OPIATESCREENURINE NOT DETECTED 05/06/2022    PHENCYCLIDINESCREENURINE NOT DETECTED 05/06/2022    ETOH <10 05/06/2022     Lab Results   Component Value Date    TSH 1.770 03/07/2019     No results found for: LITHIUM  Lab Results   Component Value Date    VALPROATE 57 04/02/2021         Treatment Plan:  Reviewed current Medications with the patient. Risks, benefits, side effects, drug-to-drug interactions and alternatives to treatment were discussed. Collateral information:   CD evaluation  Encourage patient to attend group and other milieu activities.   Discharge planning discussed with the patient and treatment team.    continue Trileptal to 300 mg twice daily  We will increase Invega to 6 mg daily and 3 mg at bedtime    PSYCHOTHERAPY/COUNSELING:  [x] Therapeutic interview  [x] Supportive  [] CBT  [] Ongoing  [] Other    [x] Patient continues to need, on a daily basis, active treatment furnished directly by or requiring the supervision of inpatient psychiatric personnel      Anticipated Length of stay: 3 to 7 days based on stability          Electronically signed by CHARO Colbert CNP on 5/15/2022 at 11:18 AM

## 2022-05-15 NOTE — BH NOTE
Patient is evasive, isolative and preoccupied , denies suicidal thoughts , denies hallucinations, denies suicidal ideation. Patient offers minimal conversation. Patient appetite is good. Patient denies thoughts to harm self or others, denies hallucinations. Patient behavior is in control.

## 2022-05-15 NOTE — PLAN OF CARE
Problem: Anxiety  Goal: Will report anxiety at manageable levels  Description: INTERVENTIONS:  1. Administer medication as ordered  2. Teach and rehearse alternative coping skills  3. Provide emotional support with 1:1 interaction with staff  5/15/2022 0945 by Clark Ponce RN  Outcome: Progressing  Flowsheets (Taken 5/15/2022 0915)  Will report anxiety at manageable levels:   Administer medication as ordered   Teach and rehearse alternative coping skills   Provide emotional support with 1:1 interaction with staff  5/15/2022 0546 by Jim Luke RN  Outcome: Progressing  5/14/2022 2153 by Joe Parsons RN  Outcome: Progressing     Problem: Coping  Goal: Pt/Family able to verbalize concerns and demonstrate effective coping strategies  Description: INTERVENTIONS:  1. Assist patient/family to identify coping skills, available support systems and cultural and spiritual values  2. Provide emotional support, including active listening and acknowledgement of concerns of patient and caregivers  3. Reduce environmental stimuli, as able  4. Instruct patient/family in relaxation techniques, as appropriate  5.  Assess for spiritual pain/suffering and initiate Spiritual Care, Psychosocial Clinical Specialist consults as needed  5/15/2022 0945 by Clark Ponce RN  Outcome: Progressing  Flowsheets (Taken 5/15/2022 0915)  Patient/family able to verbalize anxieties, fears, and concerns, and demonstrate effective coping:   Assist patient/family to identify coping skills, available support systems and cultural and spiritual values   Provide emotional support, including active listening and acknowledgement of concerns of patient and caregivers   Reduce environmental stimuli, as able   Instruct patient/family in relaxation techniques, as appropriate   Assess for spiritual pain/suffering and initiate Spiritual Care, Psychosocial Clinical Specialist consults as needed  5/15/2022 0546 by Jim Luke RN  Outcome: Progressing  5/14/2022 2153 by Jaren Julian RN  Outcome: Progressing     Problem: Decision Making  Goal: Pt/Family able to effectively weigh alternatives and participate in decision making related to treatment and care  Description: INTERVENTIONS:  1. Determine when there are differences between patient's view, family's view, and healthcare provider's view of condition  2. Facilitate patient and family articulation of goals for care  3. Help patient and family identify pros/cons of alternative solutions  4. Provide information as requested by patient/family  5. Respect patient/family right to receive or not to receive information  6. Serve as a liaison between patient and family and health care team  7. Initiate Consults from Ethics, Palliative Care or initiate [facility] as is appropriate  5/15/2022 0945 by Tim Irene RN  Outcome: Progressing  Flowsheets (Taken 5/15/2022 0915)  Patient/family able to effectively weigh alternatives and participate in decision making related to treatment and care: Facilitate patient and family articulation of goals for care   Determine when there are differences between patient's view, family's view, and healthcare provider's view of condition   Help patient and family identify pros/cons of alternative solutions   Serve as a liaison between patient and family and health care team   Respect patient/family right to receive or not to receive information   Provide information as requested by patient/family  5/15/2022 0546 by Denis Zavala RN  Outcome: Progressing  5/14/2022 2153 by Jaren Julian RN  Outcome: Progressing     Problem: Confusion  Goal: Confusion, delirium, dementia, or psychosis is improved or at baseline  Description: INTERVENTIONS:  1. Assess for possible contributors to thought disturbance, including medications, impaired vision or hearing, underlying metabolic abnormalities, dehydration, psychiatric diagnoses, and notify attending LIP  2. [place] high risk fall precautions, as indicated  3. Provide frequent short contacts to provide reality reorientation, refocusing and direction  4. Decrease environmental stimuli, including noise as appropriate  5. Monitor and intervene to maintain adequate nutrition, hydration, elimination, sleep and activity  6. If unable to ensure safety without constant attention obtain sitter and review sitter guidelines with assigned personnel  7.  Initiate Psychosocial CNS and Spiritual Care consult, as indicated  5/15/2022 0945 by Donna Keller RN  Outcome: Progressing  Flowsheets (Taken 5/15/2022 0915)  Effect of thought disturbance (confusion, delirium, dementia, or psychosis) are managed with adequate functional status:   Monitor and intervene to maintain adequate nutrition, hydration, elimination, sleep and activity   Provide frequent short contacts to provide reality reorientation, refocusing and direction   [place] high risk fall precautions, as indicated   Assess for contributors to thought disturbance, including medications, impaired vision or hearing, underlying metabolic abnormalities, dehydration, psychiatric diagnoses, notify LIP   Decrease environmental stimuli, including noise as appropriate   If unable to ensure safety without constant attention obtain sitter and review sitter guidelines with assigned personnel  5/15/2022 0546 by Jacques Marques RN  Outcome: Progressing  5/14/2022 2153 by Oralia Swann RN  Outcome: Progressing     Problem: Pain  Goal: Verbalizes/displays adequate comfort level or baseline comfort level  5/15/2022 0945 by Donna Keller RN  Outcome: Progressing  5/15/2022 0546 by Jacques Marques RN  Outcome: Progressing  5/14/2022 2153 by Oralia Swann RN  Outcome: Progressing

## 2022-05-16 PROCEDURE — 99232 SBSQ HOSP IP/OBS MODERATE 35: CPT | Performed by: NURSE PRACTITIONER

## 2022-05-16 PROCEDURE — 1240000000 HC EMOTIONAL WELLNESS R&B

## 2022-05-16 PROCEDURE — 6370000000 HC RX 637 (ALT 250 FOR IP): Performed by: NURSE PRACTITIONER

## 2022-05-16 RX ADMIN — PALIPERIDONE 3 MG: 3 TABLET, EXTENDED RELEASE ORAL at 20:43

## 2022-05-16 RX ADMIN — OXCARBAZEPINE 300 MG: 300 TABLET, FILM COATED ORAL at 09:09

## 2022-05-16 RX ADMIN — OXCARBAZEPINE 300 MG: 300 TABLET, FILM COATED ORAL at 20:43

## 2022-05-16 RX ADMIN — PALIPERIDONE 6 MG: 6 TABLET, EXTENDED RELEASE ORAL at 09:09

## 2022-05-16 NOTE — PROGRESS NOTES
BEHAVIORAL HEALTH FOLLOW-UP NOTE     5/16/2022     Patient was seen and examined in person, Chart reviewed   Patient's case discussed with staff/team    Chief Complaint: \"I have a place at the [address] [place] [address] Rd I do not want to go to the crisis unit    Interim History: Patient seen upon the unit continues to perseverate about not wanting to go to the crisis unit believing that the [address] [place] [address] [address] is only by [facility] and that is where she is going to go live. She is isolative on the unit as not attend groups not socialize with peers started to have some increased mood lability.   Very poor insight and judgment she is paranoid regarding where she is going to live at discharge  Review mocha score was a 27 out of 30      Appetite:   [x] Normal/Unchanged  [] Increased  [] Decreased      Sleep:       [x] Normal/Unchanged  [] Fair       [] Poor              Energy:    [x] Normal/Unchanged  [] Increased  [] Decreased        SI [] Present  [x] Absent    HI  []Present  [x] Absent     Aggression:  [] yes  [x] no    Patient is [x] able  [] unable to CONTRACT FOR SAFETY     PAST MEDICAL/PSYCHIATRIC HISTORY:   Past Medical History:   Diagnosis Date    Acute psychosis ([address] 12/22/2017    Anxiety     Asthma     Bipolar disorder ([address]     Bronchitis     COPD (chronic obstructive pulmonary disease) (HCC)     Depression     Iron deficiency anemia     PTSD (post-traumatic stress disorder)     Schizo affective schizophrenia ([address]     Severe episode of recurrent major depressive disorder, without psychotic features ([address] 11/16/2019    Substance abuse ([facility]     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        FAMILY/SOCIAL HISTORY:  Family History   Problem Relation Age of Onset    Diabetes Mother     High Blood Pressure Mother     Kidney Disease Mother     Heart Failure Mother     Diabetes Father     Other Father         mesothelioma    Mental Illness Father         schizophrenia     Social History     Socioeconomic History    Marital status: Single     Spouse name: Not on file    Number of children: 0    Years of education: GED    Highest education level: Not on file   Occupational History    Occupation: unemployed     Comment: attempting to get disability for mental illness   Tobacco Use    Smoking status: Current Every Day Smoker     Packs/day: 0.50     Years: 20.00     Pack years: 10.00     Types: Cigarettes    Smokeless tobacco: Never Used   Vaping Use    Vaping Use: Former   Substance and Sexual Activity    Alcohol use: Yes    Drug use: Yes     Types: Marijuana Lucas Relic)     Comment: using marijuana daily    Sexual activity: Not Currently   Other Topics Concern    Not on file   Social History Narrative    Not on file     Social Determinants of Health     Financial Resource Strain:     Difficulty of Paying Living Expenses: Not on file   Food Insecurity:     Worried About Running Out of Food in the Last Year: Not on file    Mary of Food in the Last Year: Not on file   Transportation Needs:     Lack of Transportation (Medical): Not on file    Lack of Transportation (Non-Medical):  Not on file   Physical Activity:     Days of Exercise per Week: Not on file    Minutes of Exercise per Session: Not on file   Stress:     Feeling of Stress : Not on file   Social Connections:     Frequency of Communication with Friends and Family: Not on file    Frequency of Social Gatherings with Friends and Family: Not on file    Attends Christian Services: Not on file    Active Member of Clubs or Organizations: Not on file    Attends Club or Organization Meetings: Not on file    Marital Status: Not on file   Intimate Partner Violence:     Fear of Current or Ex-Partner: Not on file    Emotionally Abused: Not on file    Physically Abused: Not on file    Sexually Abused: Not on file   Housing Stability:     Unable to Pay for Housing in the Last Year: Not on file    Number of [place] in the Last Year: Not on file    Unstable Housing in the Last Year: Not on file           ROS:  [x] All negative/unchanged except if checked.  Explain positive(checked items) below:  [] Constitutional  [] Eyes  [] Ear/Nose/Mouth/Throat  [] Respiratory  [] CV  [] GI  []   [] Musculoskeletal  [] Skin/Breast  [] Neurological  [] Endocrine  [] Heme/Lymph  [] Allergic/Immunologic    Explanation:     MEDICATIONS:    Current Facility-Administered Medications:     paliperidone (INVEGA) extended release tablet 6 mg, 6 mg, Oral, Daily, Hailey Konig Dellick, APRN - CNP, 6 mg at 05/16/22 0909    paliperidone (INVEGA) extended release tablet 3 mg, 3 mg, Oral, Nightly, Hailey Konig Dellick, APRN - CNP, 3 mg at 05/15/22 2209    OXcarbazepine (TRILEPTAL) tablet 300 mg, 300 mg, Oral, BID, Hailey Konig Dellick, APRN - CNP, 380 mg at 05/16/22 0909    acetaminophen (TYLENOL) tablet 650 mg, 650 mg, Oral, Q4H PRN, Russ Jason MD    hydrOXYzine (VISTARIL) capsule 50 mg, 50 mg, Oral, TID PRN, Russ Jason MD    haloperidol (HALDOL) tablet 5 mg, 5 mg, Oral, Q4H PRN **OR** haloperidol lactate (HALDOL) injection 5 mg, 5 mg, IntraMUSCular, Q4H PRN, Russ Jason MD    traZODone (DESYREL) tablet 50 mg, 50 mg, Oral, Nightly PRN, Russ Jason MD    magnesium hydroxide (MILK OF MAGNESIA) 400 MG/5ML suspension 30 mL, 30 mL, Oral, Daily PRN, Russ Jason MD    aluminum & magnesium hydroxide-simethicone (MAALOX) 200-200-20 MG/5ML suspension 30 mL, 30 mL, Oral, Q6H PRN, Russ Jason MD      Examination:  /64   Pulse 69   Temp 98.1 °F (36.7 °C)   Resp 14   Ht 5' 2\" (1.575 m)   SpO2 97%   BMI 21.40 kg/m²   Gait - steady  Medication side effects(SE): denies    Mental Status Examination:    Level of consciousness:  within normal limits   Appearance:  fair grooming and fair hygiene  Behavior/Motor:  no abnormalities noted  Attitude toward examiner:  cooperative  Speech:  spontaneous, normal rate and normal volume   Mood: \" Appropriate and pleasant  Affect: Linear without flight of ideas loose associations  Thought processes: Linear without flight of ideas loose associations  Thought content:Devoid of any auditory visual loose [place] delusions or other perceptual normalities. Denies SI/HI intent or plan appears to be preoccupied and guarded and paranoid  Cognition: Alert oriented time place and person  Concentration intact  Insight improving  Judgement improving    ASSESSMENT:   Patient symptoms are:  [] Well controlled  [x] Improving  [] Worsening  [] No change      Diagnosis:   Principal Problem:    Schizoaffective disorder, bipolar type ([facility]  Active Problems:    Cocaine abuse ([facility]  Resolved Problems:    * No resolved hospital problems. *      LABS:    No results for input(s): WBC, HGB, PLT in the last 72 hours. No results for input(s): NA, K, CL, CO2, BUN, CREATININE, GLUCOSE in the last 72 hours. No results for input(s): BILITOT, ALKPHOS, AST, ALT in the last 72 hours. Lab Results   Component Value Date    LABAMPH NOT DETECTED 05/06/2022    BARBSCNU NOT DETECTED 05/06/2022    LABBENZ NOT DETECTED 05/06/2022    LABMETH NOT DETECTED 05/06/2022    OPIATESCREENURINE NOT DETECTED 05/06/2022    PHENCYCLIDINESCREENURINE NOT DETECTED 05/06/2022    ETOH <10 05/06/2022     Lab Results   Component Value Date    TSH 1.770 03/07/2019     No results found for: LITHIUM  Lab Results   Component Value Date    VALPROATE 57 04/02/2021         Treatment Plan:  Reviewed current Medications with the patient. Risks, benefits, side effects, drug-to-drug interactions and alternatives to treatment were discussed. Collateral information:   CD evaluation  Encourage patient to attend group and other milieu activities.   Discharge planning discussed with the patient and treatment team.    Increase Trileptal to 450 mg twice daily  We will increase Invega to 6 mg daily and 3 mg at bedtime  Patient had her Damian Musty 234 mg IM injection on 5/17/2022 she is due for 156 mg IM every 30 days booster on 5/24/2022    PSYCHOTHERAPY/COUNSELING:  [x] Therapeutic interview  [x] Supportive  [] CBT  [] Ongoing  [] Other    [x] Patient continues to need, on a daily basis, active treatment furnished directly by or requiring the supervision of inpatient psychiatric personnel            Behavioral Services                                              Medicare Re-Certification    I certify that the inpatient psychiatric hospital services furnished since the previous certification/re-certification were, and continue to be, medically necessary for;    [x] (1) Treatment which could reasonably be expected to improve the patient's condition,    [] (2) Or for diagnostic study. Estimated length of stay/service 3 to 7 days based on stability    Plan for post-hospital care outpatient psychiatric and counseling services    This patient continues to need, on a daily basis, active treatment furnished directly by or requiring the supervision of inpatient psychiatric personnel.     Electronically signed by CHARO Vazquez CNP on 5/09/1456 at 2:11 PM         Electronically signed by CHARO Vazquez CNP on 8/66/4320 at 10:14 AM

## 2022-05-16 NOTE — PLAN OF CARE
Patient A&Ox3 but confused to situation. Pt denies SI, HI, hallucinations, anxiety and depression. Patient is pleasant and cooperative with a blunted affect that brightens with conversation. Pt is intermittently visible on the unit and is social with [facility]. Pt is med. & meal compliant. Pt with even and unlabored breathing, no signs of acute physical distress noted. Will continue to monitor and offer support as needed. Problem: Anxiety  Goal: Will report anxiety at manageable levels  Description: INTERVENTIONS:  1. Administer medication as ordered  2. Teach and rehearse alternative coping skills  3. Provide emotional support with 1:1 interaction with staff  Outcome: Progressing     Problem: Coping  Goal: Pt/Family able to verbalize concerns and demonstrate effective coping strategies  Description: INTERVENTIONS:  1. Assist patient/family to identify coping skills, available support systems and cultural and spiritual values  2. Provide emotional support, including active listening and acknowledgement of concerns of patient and caregivers  3. Reduce environmental stimuli, as able  4. Instruct patient/family in relaxation techniques, as appropriate  5. Assess for spiritual pain/suffering and initiate Spiritual Care, Psychosocial Clinical Specialist consults as needed  Outcome: Progressing     Problem: Confusion  Goal: Confusion, delirium, dementia, or psychosis is improved or at baseline  Description: INTERVENTIONS:  1. Assess for possible contributors to thought disturbance, including medications, impaired vision or hearing, underlying metabolic abnormalities, dehydration, psychiatric diagnoses, and notify attending LIP  2. [place] high risk fall precautions, as indicated  3. Provide frequent short contacts to provide reality reorientation, refocusing and direction  4. Decrease environmental stimuli, including noise as appropriate  5.  Monitor and intervene to maintain adequate nutrition, hydration, elimination, sleep and activity  6. If unable to ensure safety without constant attention obtain sitter and review sitter guidelines with assigned personnel  7.  Initiate Psychosocial CNS and Spiritual Care consult, as indicated  Outcome: Progressing

## 2022-05-16 NOTE — CARE COORDINATION
Navigator met with patient to discuss the concerns of her outpatient provider regarding her difficulties maintaining stability while residing independently. The patient reported that she planned to return back to the [facility] and that [facility] owns it now, I have a room there\". Patient remains delusional, superficially cooperative. Navigator expressed that her outpatient provider is recommending alternative housing arrangements. Patient expressed familiarity with [facility] and reported that she would consider a possible referral to transitional housing, if she could not return back to [place]'s housing arrangement. The Navigator briefly reviewed her outpatient commitment and the requirements/expectations of her AOT. The patient did express agreeableness to step down to the [facility], while her outpatient provider worked on possible referral for transitional housing. Navigator will follow up with patient's [place]  Arturo Jackson, and discuss the option for transitional housing with the [facility] who would have to approve placement. Navigator will verify with [place] if they plan to continue managing the patient's mental health needs, or if she will require referral to [facility] or [facility] for management of her AOT. Update:    Spoke with FERNANDO. At this time they plan to discuss patient case further regarding her appropriateness for transitional housing placement. Case to be reviewed in [address] [place] [address],  [id number]. Current plan will be for patient to step-down to [facility] when ready for dc.     Electronically signed by JEFRY Diana, NATHANW on 5/16/2022 at 1:04 PM

## 2022-05-16 NOTE — PLAN OF CARE
Problem: Anxiety  Goal: Will report anxiety at manageable levels  Description: INTERVENTIONS:  1. Administer medication as ordered  2. Teach and rehearse alternative coping skills  3. Provide emotional support with 1:1 interaction with staff  5/15/2022 2107 by Carina Burris RN  Outcome: Progressing     Problem: Decision Making  Goal: Pt/Family able to effectively weigh alternatives and participate in decision making related to treatment and care  Description: INTERVENTIONS:  1. Determine when there are differences between patient's view, family's view, and healthcare provider's view of condition  2. Facilitate patient and family articulation of goals for care  3. Help patient and family identify pros/cons of alternative solutions  4. Provide information as requested by patient/family  5. Respect patient/family right to receive or not to receive information  6. Serve as a liaison between patient and family and health care team  7. Initiate Consults from Ethics, Palliative Care or initiate [facility] as is appropriate  5/15/2022 2107 by Carina Burris RN  Outcome: Progressing     Problem: Confusion  Goal: Confusion, delirium, dementia, or psychosis is improved or at baseline  Description: INTERVENTIONS:  1. Assess for possible contributors to thought disturbance, including medications, impaired vision or hearing, underlying metabolic abnormalities, dehydration, psychiatric diagnoses, and notify attending LIP  2. [place] high risk fall precautions, as indicated  3. Provide frequent short contacts to provide reality reorientation, refocusing and direction  4. Decrease environmental stimuli, including noise as appropriate  5. Monitor and intervene to maintain adequate nutrition, hydration, elimination, sleep and activity  6. If unable to ensure safety without constant attention obtain sitter and review sitter guidelines with assigned personnel  7.  Initiate Psychosocial CNS and Spiritual Care consult, as indicated  5/15/2022 2107 by Adria Keller RN  Outcome: Progressing     Problem: Pain  Goal: Verbalizes/displays adequate comfort level or baseline comfort level  5/15/2022 2107 by Adria Keller RN  Outcome: Progressing   Pt continues to be withdrawn to her room. Pt is alert and oriented x 3, flat, depressed, calm and cooperative. Pt denies any suicidal or homicidal ideations and no dangerous behavior is noted. Pt denies any visual or auditory hallucinations and no delusional thinking is noted.

## 2022-05-17 PROCEDURE — 1240000000 HC EMOTIONAL WELLNESS R&B

## 2022-05-17 PROCEDURE — 6370000000 HC RX 637 (ALT 250 FOR IP): Performed by: NURSE PRACTITIONER

## 2022-05-17 PROCEDURE — 99232 SBSQ HOSP IP/OBS MODERATE 35: CPT | Performed by: NURSE PRACTITIONER

## 2022-05-17 RX ORDER — PALIPERIDONE 6 MG/1
6 TABLET, EXTENDED RELEASE ORAL DAILY
Qty: 30 TABLET | Refills: 0 | Status: SHIPPED | OUTPATIENT
Start: 2022-05-18 | End: 2022-06-17

## 2022-05-17 RX ORDER — OXCARBAZEPINE 300 MG/1
300 TABLET, FILM COATED ORAL 2 TIMES DAILY
Qty: 60 TABLET | Refills: 0 | Status: SHIPPED | OUTPATIENT
Start: 2022-05-17 | End: 2022-06-16

## 2022-05-17 RX ORDER — PALIPERIDONE 3 MG/1
3 TABLET, EXTENDED RELEASE ORAL NIGHTLY
Qty: 30 TABLET | Refills: 0 | Status: SHIPPED | OUTPATIENT
Start: 2022-05-17 | End: 2022-06-16

## 2022-05-17 RX ADMIN — OXCARBAZEPINE 300 MG: 300 TABLET, FILM COATED ORAL at 20:37

## 2022-05-17 RX ADMIN — OXCARBAZEPINE 300 MG: 300 TABLET, FILM COATED ORAL at 10:25

## 2022-05-17 RX ADMIN — PALIPERIDONE 6 MG: 6 TABLET, EXTENDED RELEASE ORAL at 10:24

## 2022-05-17 RX ADMIN — PALIPERIDONE 3 MG: 3 TABLET, EXTENDED RELEASE ORAL at 20:37

## 2022-05-17 NOTE — DISCHARGE SUMMARY
DISCHARGE SUMMARY      Patient ID:  Kalina eCdillo  [id number]  74 y.o.  1975    Admit date: 5/6/2022    Discharge date and time: 5/24/2022    Admitting Physician: Ray Brunson MD     Discharge Physician: Dr Staci Medrano MD    Discharge Diagnoses:   Patient Active Problem List   Diagnosis    Obstructive chronic bronchitis with exacerbation ([address]    Schizoaffective disorder, bipolar type ([address]    Intramural leiomyoma of uterus    Iron deficiency anemia    Uterine fibroid    Fibroids, intramural    Bipolar affective disorder, mixed, severe, with psychotic behavior ([address]    Cannabis abuse    COPD (chronic obstructive pulmonary disease) ([address]    Recurrent major depressive disorder, in partial remission ([address]    Tobacco abuse    Depression, major, recurrent ([address]    Severe episode of recurrent major depressive disorder, with psychotic features ([address]    Cocaine abuse ([address]       Admission Condition: poor    Discharged Condition: stable    Admission Circumstance: presented to the ED via [place] [id number] office after being probated by  [facility] who states the patient's not been taking psychiatric medications displaying psychotic symptoms including hallucinations.         PAST MEDICAL/PSYCHIATRIC HISTORY:   Past Medical History:   Diagnosis Date    Acute psychosis ([address] 12/22/2017    Anxiety     Asthma     Bipolar disorder ([address]     Bronchitis     COPD (chronic obstructive pulmonary disease) (HCC)     Depression     Iron deficiency anemia     PTSD (post-traumatic stress disorder)     Schizo affective schizophrenia ([address]     Severe episode of recurrent major depressive disorder, without psychotic features ([address] 11/16/2019    Substance abuse ([address]     quit 7/2011  was [employer] user        FAMILY/SOCIAL HISTORY:  Family History   Problem Relation Age of Onset    Diabetes Mother     High Blood Pressure Mother     Kidney Disease Mother     Heart Failure Mother     Diabetes Father    Bills Other Father         mesothelioma    Mental Illness Father         schizophrenia     Social History     Socioeconomic History    Marital status: Single     Spouse name: Not on file    Number of children: 0    Years of education: GED    Highest education level: Not on file   Occupational History    Occupation: unemployed     Comment: attempting to get disability for mental illness   Tobacco Use    Smoking status: Current Every Day Smoker     Packs/day: 0.50     Years: 20.00     Pack years: 10.00     Types: Cigarettes    Smokeless tobacco: Never Used   Vaping Use    Vaping Use: Former   Substance and Sexual Activity    Alcohol use: Yes    Drug use: Yes     Types: Marijuana Miguel Dearing)     Comment: using marijuana daily    Sexual activity: Not Currently   Other Topics Concern    Not on file   Social History Narrative    Not on file     Social Determinants of Health     Financial Resource Strain:     Difficulty of Paying Living Expenses: Not on file   Food Insecurity:     Worried About Running Out of Food in the Last Year: Not on file    Mary of Food in the Last Year: Not on file   Transportation Needs:     Lack of Transportation (Medical): Not on file    Lack of Transportation (Non-Medical):  Not on file   Physical Activity:     Days of Exercise per Week: Not on file    Minutes of Exercise per Session: Not on file   Stress:     Feeling of Stress : Not on file   Social Connections:     Frequency of Communication with Friends and Family: Not on file    Frequency of Social Gatherings with Friends and Family: Not on file    Attends Taoist Services: Not on file    Active Member of Clubs or Organizations: Not on file    Attends Club or Organization Meetings: Not on file    Marital Status: Not on file   Intimate Partner Violence:     Fear of Current or Ex-Partner: Not on file    Emotionally Abused: Not on file    Physically Abused: Not on file    Sexually Abused: Not on file   Housing Stability:     Unable to Pay for Housing in the Last Year: Not on file    Number of Places Lived in the Last Year: Not on file    Unstable Housing in the Last Year: Not on file       MEDICATIONS:    Current Facility-Administered Medications:     divalproex (DEPAKOTE) DR tablet 250 mg, 250 mg, Oral, 2 times per day, Ayahartie Gagnon, APRN - CNP, 186 mg at 05/24/22 0113    paliperidone (INVEGA) extended release tablet 6 mg, 6 mg, Oral, Nightly, Ayah Clayus, APRN - CNP, 6 mg at 05/23/22 2037    paliperidone palmitate ER (INVEGA SUSTENNA) IM injection 156 mg, 156 mg, IntraMUSCular, Q30 Days, Ayah Gallus, APRN - CNP, 359 mg at 05/24/22 1209    paliperidone (INVEGA) extended release tablet 6 mg, 6 mg, Oral, Daily, Ayah Clayus, APRN - CNP, 6 mg at 05/24/22 0814    acetaminophen (TYLENOL) tablet 650 mg, 650 mg, Oral, Q4H PRN, Pau Oscar MD    hydrOXYzine (VISTARIL) capsule 50 mg, 50 mg, Oral, TID PRN, Pau Oscar MD    haloperidol (HALDOL) tablet 5 mg, 5 mg, Oral, Q4H PRN **OR** haloperidol lactate (HALDOL) injection 5 mg, 5 mg, IntraMUSCular, Q4H PRN, Pau Oscar MD    traZODone (DESYREL) tablet 50 mg, 50 mg, Oral, Nightly PRN, Pau Oscar MD    magnesium hydroxide (MILK OF MAGNESIA) 400 MG/5ML suspension 30 mL, 30 mL, Oral, Daily PRN, Pau Oscar MD    aluminum & magnesium hydroxide-simethicone (MAALOX) 200-200-20 MG/5ML suspension 30 mL, 30 mL, Oral, Q6H PRN, Pau Oscar MD    Examination:  BP (!) 91/52   Pulse 77   Temp 98 °F (36.7 °C) (Oral)   Resp 14   Ht 5' 2\" (1.575 m)   SpO2 99%   BMI 21.40 kg/m²   Gait - steady    HOSPITAL COURSE[de-identified]    Patient was admitted to the unit on 5/6/2022 was closely monitored for psychosis.   She was evaluated and treated with Invega 6 mg daily and 3 mg at bedtime as well as Depakote 250 mg twice daily mg twice daily and initiate [place] injection 234 mg IM once on 5/17/2022 I received her booster injection 156 mg IM every 30 days on 5/24/2022 she will be due for her next injection of 156 mg IM every 30 days on 6/23/2022 . Patient did have a probate hearing while in the court as she had been probated to the hospital by her . Patient was placed on outpatient commitment as a result of her probate hearing.  communicated with  who plan to consider pursuing  guardianship for the patient. Plans for the patient stepped on the crisis unit with a plan to be discharged to a group home. Medical events were insignificant and patient continued to improve on the floor. She started coming out of her room she was attending groups to socializing with peers. She never made any suicidal statements or any suicidal gestures while in the unit. Social workers obtain  collateral information from patient's  who was able to voice any concerns that they had they reported no safety concerns no access to any guns. Patient did have her first her court hearing this morning through telecommunication with the courts and patient was informed that her discharge plan for the courts to discharge the crisis unit. Patient's  will meet her at the crisis unit. Treatment team felt the patient obtain the maximum benefit from her hospitalization She was set up with an outpatient mental health agency for outpatient follow-up services . At the time of discharge patient  did not show impulsive behavior. She was up on the unit she was attending groups and socializing with peers. She vehemently denied any suicidal homicidal ideations intent or plan. She was eating well and sleeping well there are no neurovegetative signs or symptoms of depression she denied any auditory visualizations. There are no overt or covert signs psychosis. She was appreciative of the help that she received here. This patient no longer meets criteria for inpatient hospitalization.       No AVH or paranoid thoughts  No hopeless or worthless feeling  No active SI/HI  Appetite:  [x] Normal  [] Increased  [] Decreased    Sleep:       [x] Normal  [] Fair       [] Poor            Energy:    [x] Normal  [] Increased  [] Decreased     SI [] Present  [x] Absent  HI  []Present  [x] Absent   Aggression:  [] yes  [x] no  Patient is [x] able  [] unable to CONTRACT FOR SAFETY   Medication side effects(SE):  [x] None(Psych. Meds.) [] Other      Mental Status Examination on discharge:    Level of consciousness:  within normal limits   Appearance:  well-appearing  Behavior/Motor:  no abnormalities noted  Attitude toward examiner:  attentive and good eye contact  Speech:  spontaneous, normal rate and normal volume   Mood: \"My mood is good. \"  Affect: Appropriate and pleasant  Thought processes: Linear without flight of ideas loose associations  Thought content: Devoid of any auditory visual hallucinations delusions or other perceptual normalities. Denies SI/HI intent or plan  Cognition:  oriented to person, place, and time   Concentration intact  Memory intact  Insight good   Judgement fair   Fund of Knowledge adequate      ASSESSMENT:  Patient symptoms are:  [x] Well controlled  [x] Improving  [] Worsening  [] No change    Reason for more than one antipsychotic:  [x] N/A  [] 3 Failed Monotherapy attempts (Drugs tried:)  [] Crossover to a new antipsychotic  [] Taper to Monotherapy from Polypharmacy  [] Augmentation of clozapine therapy due to treatment resistance to single therapy    Diagnosis:  Principal Problem:    Schizoaffective disorder, bipolar type ([facility]  Active Problems:    Cocaine abuse ([facility]  Resolved Problems:    * No resolved hospital problems. *      LABS:    No results for input(s): WBC, HGB, PLT in the last 72 hours. No results for input(s): NA, K, CL, CO2, BUN, CREATININE, GLUCOSE in the last 72 hours. No results for input(s): BILITOT, ALKPHOS, AST, ALT in the last 72 hours.   Lab Results   Component Value Date    LABAMPH NOT DETECTED 05/06/2022 BARBSCNU NOT DETECTED 05/06/2022    LABBENZ NOT DETECTED 05/06/2022    LABMETH NOT DETECTED 05/06/2022    OPIATESCREENURINE NOT DETECTED 05/06/2022    PHENCYCLIDINESCREENURINE NOT DETECTED 05/06/2022    ETOH <10 05/06/2022     Lab Results   Component Value Date    TSH 1.770 03/07/2019     No results found for: LITHIUM  Lab Results   Component Value Date    VALPROATE 57 04/02/2021       RISK ASSESSMENT AT DISCHARGE: Low risk for suicide and homicide. Treatment Plan:  Reviewed current Medications with the patient. Education provided on the complaince with treatment. Risks, benefits, side effects, drug-to-drug interactions and alternatives to treatment were discussed. Encourage patient to attend outpatient follow up appointment and therapy. Patient was advised to call the outpatient provider, visit the nearest ED or call 911 if symptoms are not manageable. Patient's family member was contacted prior to the discharge. Medication List      CHANGE how you take these medications    * paliperidone 3 MG extended release tablet  Commonly known as: INVEGA  Take 1 tablet by mouth at bedtime  What changed: You were already taking a medication with the same name, and this prescription was added. Make sure you understand how and when to take each. * paliperidone 6 MG extended release tablet  Commonly known as: INVEGA  Take 1 tablet by mouth daily  What changed:   · medication strength  · how much to take     paliperidone palmitate  MG/ML Hafsa IM injection  Commonly known as: INVEGA SUSTENNA  Inject 156 mg into the muscle every 30 days for 1 dose Next injection is due 6/23/2022  Start taking on: June 23, 2022  What changed: additional instructions         * This list has 2 medication(s) that are the same as other medications prescribed for you. Read the directions carefully, and ask your doctor or other care provider to review them with you.             CONTINUE taking these medications    nicotine 14 MG/24HR  Commonly known as: NICODERM CQ  Place 1 patch onto the skin daily     OXcarbazepine 300 MG tablet  Commonly known as: TRILEPTAL  Take 1 tablet by mouth 2 times daily        STOP taking these medications    albuterol sulfate  (90 Base) MCG/ACT inhaler     citalopram 10 MG tablet  Commonly known as: CELEXA           Where to Get Your Medications      These medications were sent to [facility] [address] [facility] [address]    Phone: [phone]   · OXcarbazepine 300 MG tablet  · paliperidone 3 MG extended release tablet  · paliperidone 6 MG extended release tablet     Information about where to get these medications is not yet available    Ask your nurse or doctor about these medications  · paliperidone palmitate  MG/ML Hafsa IM injection       Patient is counseled if he continues to abuse drugs or alcohol he could act out impulsively causing serious harm to himself or others even though may be unintentional.  He demonstrated understanding of this and has the capacity understand this    Patient is counseled hisr mental health treatment will be difficult to optimize with ongoing use of drugs or alcohol he demonstrate understanding of this as the past understand this     Patient is counseled he must remain compliant with all medications outpatient follow-up ointments    Patient is discharged to the crisis unit in stable condition    TIME SPEND - 35 MINUTES TO COMPLETE THE EVALUATION, DISCHARGE SUMMARY, MEDICATION RECONCILIATION AND FOLLOW UP CARE     Signed:  CHARO Daniel CNP  3/05/4907  1:44 PM

## 2022-05-17 NOTE — BH NOTE
Patient stopped me in the hallway about her going home. Challenging and argumentive. \" I'M GOING HOME! \"  Informed her discharge was canceled related to her behavior about no wanting the crises unit.

## 2022-05-17 NOTE — GROUP NOTE
Group Therapy Note    Date: 5/17/2022    Group Start Time: 1010  Group End Time: 1050  Group Topic: Psychoeducation    [facility] AC[facility] [address]        Group Therapy Note    Attendees: 10       Notes: Active and engaged during discussion on goal planning. Able to identify focus and possible obstacles. Status After Intervention:  Improved    Participation Level:  Active Listener and Interactive    Participation Quality: Appropriate and Attentive      Speech:  normal      Thought Process/Content: Logical      Affective Functioning: Congruent      Mood: euthymic      Level of consciousness:  Alert and Attentive      Response to Learning: Progressing to goal      Endings: None Reported    Modes of Intervention: Education, Support, Socialization and Exploration      Discipline Responsible: Psychoeducational Specialist      Signature:  Antonio Carney [place]

## 2022-05-17 NOTE — PLAN OF CARE
Problem: Anxiety  Goal: Will report anxiety at manageable levels  Description: INTERVENTIONS:  1. Administer medication as ordered  2. Teach and rehearse alternative coping skills  3. Provide emotional support with 1:1 interaction with staff  5/16/2022 2024 by Prosper Valenzuela RN  Outcome: Progressing     Problem: Coping  Goal: Pt/Family able to verbalize concerns and demonstrate effective coping strategies  Description: INTERVENTIONS:  1. Assist patient/family to identify coping skills, available support systems and cultural and spiritual values  2. Provide emotional support, including active listening and acknowledgement of concerns of patient and caregivers  3. Reduce environmental stimuli, as able  4. Instruct patient/family in relaxation techniques, as appropriate  5. Assess for spiritual pain/suffering and initiate Spiritual Care, Psychosocial Clinical Specialist consults as needed  5/16/2022 2024 by Prosper Valenzuela RN  Outcome: Progressing     Problem: Confusion  Goal: Confusion, delirium, dementia, or psychosis is improved or at baseline  Description: INTERVENTIONS:  1. Assess for possible contributors to thought disturbance, including medications, impaired vision or hearing, underlying metabolic abnormalities, dehydration, psychiatric diagnoses, and notify attending LIP  2. [place] high risk fall precautions, as indicated  3. Provide frequent short contacts to provide reality reorientation, refocusing and direction  4. Decrease environmental stimuli, including noise as appropriate  5. Monitor and intervene to maintain adequate nutrition, hydration, elimination, sleep and activity  6. If unable to ensure safety without constant attention obtain sitter and review sitter guidelines with assigned personnel  7.  Initiate Psychosocial CNS and Spiritual Care consult, as indicated  5/16/2022 2024 by Prosper Valenzuela RN  Outcome: Progressing       Pt is withdrawn to her bed, little or no interaction with other patients and staff. Pt denies any suicidal or homicidal thoughts at this time and no dangerous behavior is noted. Pt denies any visual or auditory hallucinations and no delusional thinking is noted.

## 2022-05-17 NOTE — CARE COORDINATION
JERZY reached out to [facility] to schedule patient's follow up appointments [phone]. JERZY left a message requesting a return call.

## 2022-05-17 NOTE — PROGRESS NOTES
CLINICAL PHARMACY NOTE: MEDS TO BEDS    Total # of Prescriptions Filled: 3     The following medications were delivered to the patient:  · Oxcarbazepine 300 mg  · paliperidone ER 3mg  · paliperidone ER 6 mg    Additional Documentation:  Delivered meds to robb @3:35

## 2022-05-17 NOTE — PLAN OF CARE
Problem: Anxiety  Goal: Will report anxiety at manageable levels  Description: INTERVENTIONS:  1. Administer medication as ordered  2. Teach and rehearse alternative coping skills  3. Provide emotional support with 1:1 interaction with staff  Outcome: Not Progressing     Problem: Coping  Goal: Pt/Family able to verbalize concerns and demonstrate effective coping strategies  Description: INTERVENTIONS:  1. Assist patient/family to identify coping skills, available support systems and cultural and spiritual values  2. Provide emotional support, including active listening and acknowledgement of concerns of patient and caregivers  3. Reduce environmental stimuli, as able  4. Instruct patient/family in relaxation techniques, as appropriate  5. Assess for spiritual pain/suffering and initiate Spiritual Care, Psychosocial Clinical Specialist consults as needed  Outcome: Not Progressing         Patient brighter this morning. When informed of discharge to crises unit became irritable and refusing to go. Attempted to explain her AOT, did not want to hear it. Jolene Marques NP present and feels she os not ready. Discharged was canceled. Denies suicidal; and homicidal thoughts. Denies hallucinations.

## 2022-05-18 PROCEDURE — 6370000000 HC RX 637 (ALT 250 FOR IP): Performed by: NURSE PRACTITIONER

## 2022-05-18 PROCEDURE — 99232 SBSQ HOSP IP/OBS MODERATE 35: CPT | Performed by: NURSE PRACTITIONER

## 2022-05-18 PROCEDURE — 1240000000 HC EMOTIONAL WELLNESS R&B

## 2022-05-18 RX ADMIN — OXCARBAZEPINE 300 MG: 300 TABLET, FILM COATED ORAL at 08:42

## 2022-05-18 RX ADMIN — PALIPERIDONE 6 MG: 6 TABLET, EXTENDED RELEASE ORAL at 08:42

## 2022-05-18 RX ADMIN — OXCARBAZEPINE 300 MG: 300 TABLET, FILM COATED ORAL at 20:38

## 2022-05-18 RX ADMIN — PALIPERIDONE 3 MG: 3 TABLET, EXTENDED RELEASE ORAL at 20:38

## 2022-05-18 ASSESSMENT — PAIN SCALES - GENERAL
PAINLEVEL_OUTOF10: 0
PAINLEVEL_OUTOF10: 0

## 2022-05-18 NOTE — CARE COORDINATION
JERZY reached out to [facility] [phone] to schedule patient's follow up appointments. They did not answer and JERZY left a message requesting a return call. Patient scheduled for an appointment with psychiatrist on 5/27/2022 at 11:00.  Patient's therapist is no longer there so will need to be linked with a new one and they will call back with appointment

## 2022-05-18 NOTE — GROUP NOTE
Date: 5/18/2022    Group Start Time: 1010  Group End Time: 1050  Group Topic: Psychoeducation    [facility] BH 1    [facility], [address]                                                                        Group Therapy Note    Date: 5/18/2022  Number of Participants: 9    Type of Group: Psychoeducation    Wellness Binder Information  Module Name:  values   Patient's Goal:  patient will be able to id what is importance to him/her. Notes: pleasant and engaged in group, willing to share when prompted. Status After Intervention:  Improved    Participation Level:  Active Listener and Interactive    Participation Quality: Appropriate, Attentive, and Sharing      Speech:  normal       Thought Process/Content: Logical      Affective Functioning: Congruent      Mood: euthymic      Level of consciousness:  Alert, Oriented x4, and Attentive      Response to Learning: Able to verbalize/acknowledge new learning, Able to retain information, and Progressing to goal      Endings: None Reported    Modes of Intervention: Education, Support, Socialization, Exploration, and Problem-solving      Discipline Responsible: Psychoeducational Specialist      Signature:  Mario Padilla

## 2022-05-18 NOTE — PROGRESS NOTES
Attended morning community meeting. Updated on staffing and daily expectations. Shared goal for the day as to wake up.

## 2022-05-18 NOTE — CARE COORDINATION
Navigator met with patient to discuss the outcome of her probate court hearing. Navigator reviewed the AOT requirements/expectations, the patient verbalized understanding and provided written consent on the AOT acknowledgement form and [facility]. The patient expressed that she is not stepping down to the crisis unit and reports that she is returning back to the [employer]". The patient became irritable, cursed, and dismissive and requested to end the conversation. Patient reports that she does not have a mental illness and that she does not need medication. Navigator de-escalated the patient and explained the process, reiterating the court AOT. Patient admits that she is not engaged in treatment with [facility]. She did express interest in a referral to [facility] to take over her outpatient mental health treatment and states that she wants a new  too. Navigator informed her that this may be an option. Reiterated the plan for her to step down to the crisis unit. Patient became irritable again. Navigator ended the conversation.     Electronically signed by JEFRY Leonard LSW on 5/18/2022 at 1:06 PM

## 2022-05-18 NOTE — CARE COORDINATION
Navigator placed phone call to patient  Bradenaneudy Cass, discussed if [facility] has determined if they are still able to manage the patient's mental health treatment and notified that she will be coming out of the hospital on an AOT. Eulalia Rubio reports that she has to confirm with administration if they would like patient to transfer to a provider for more mental health support. Eulalia Rubio expressed concerns that patient refuses to engage in treatment with them and feels that patient would benefit from the transfer. Informed of the plan for patient to step-down to [facility], as a part of her AOT process. Eulalia Rubio reports she will call the Navigator back shortly to notify of administration's decision. Navigator to meet with patient shortly to review court documents and reiterate the step-down process to [facility].     Electronically signed by JEFRY Bruno, ARIS on 5/18/2022 at 10:06 AM

## 2022-05-18 NOTE — PLAN OF CARE
Problem: Anxiety  Goal: Will report anxiety at manageable levels  Description: INTERVENTIONS:  1. Administer medication as ordered  2. Teach and rehearse alternative coping skills  3. Provide emotional support with 1:1 interaction with staff  5/17/2022 2040 by Rosario Roberts RN  Outcome: Progressing     Problem: Coping  Goal: Pt/Family able to verbalize concerns and demonstrate effective coping strategies  Description: INTERVENTIONS:  1. Assist patient/family to identify coping skills, available support systems and cultural and spiritual values  2. Provide emotional support, including active listening and acknowledgement of concerns of patient and caregivers  3. Reduce environmental stimuli, as able  4. Instruct patient/family in relaxation techniques, as appropriate  5. Assess for spiritual pain/suffering and initiate Spiritual Care, Psychosocial Clinical Specialist consults as needed  5/17/2022 2040 by Rosario Roberts RN  Outcome: Progressing     Problem: Decision Making  Goal: Pt/Family able to effectively weigh alternatives and participate in decision making related to treatment and care  Description: INTERVENTIONS:  1. Determine when there are differences between patient's view, family's view, and healthcare provider's view of condition  2. Facilitate patient and family articulation of goals for care  3. Help patient and family identify pros/cons of alternative solutions  4. Provide information as requested by patient/family  5. Respect patient/family right to receive or not to receive information  6. Serve as a liaison between patient and family and health care team  7. Initiate Consults from Ethics, Palliative Care or initiate [facility] as is appropriate  Outcome: Progressing       Pt is up on the unit but withdrawn to herself. Pt is alert and oriented x 3, angry and refusing to have a conversation with this nurse. Pt states \"I was sapose to go home and Im no going to crisis\".  Pt refused any further conversation. No dangerous behavior is noted and patient does not appear to be in any distress.

## 2022-05-18 NOTE — PROGRESS NOTES
BEHAVIORAL HEALTH FOLLOW-UP NOTE     5/18/2022     Patient was seen and examined in person, Chart reviewed   Patient's case discussed with staff/team    Chief Complaint: \"I have a place at the [address] [place] [address] I do not want to go to the crisis unit    Interim History: Patient had agreed to stepdown the crisis unit with the patient Navigator. When I began speaking with patient about her discharge she became strongly agitated yelling and calling me by my first name telling me that she has a place of the [facility] and that she is going home to the [address] [place] [address] and not going to the crisis unit.   She has no insight or judgment she is delusional becoming anxious recently agitated does not socialize with peers or attend groups    Appetite:   [x] Normal/Unchanged  [] Increased  [] Decreased      Sleep:       [x] Normal/Unchanged  [] Fair       [] Poor              Energy:    [x] Normal/Unchanged  [] Increased  [] Decreased        SI [] Present  [x] Absent    HI  []Present  [x] Absent     Aggression:  [] yes  [x] no    Patient is [x] able  [] unable to CONTRACT FOR SAFETY     PAST MEDICAL/PSYCHIATRIC HISTORY:   Past Medical History:   Diagnosis Date    Acute psychosis ([facility] 12/22/2017    Anxiety     Asthma     Bipolar disorder ([facility]     Bronchitis     COPD (chronic obstructive pulmonary disease) (HCC)     Depression     Iron deficiency anemia     PTSD (post-traumatic stress disorder)     Schizo affective schizophrenia ([facility]     Severe episode of recurrent major depressive disorder, without psychotic features ([facility] 11/16/2019    Substance abuse ([facility]     quit 7/2011  was [employer] user        FAMILY/SOCIAL HISTORY:  Family History   Problem Relation Age of Onset    Diabetes Mother     High Blood Pressure Mother     Kidney Disease Mother     Heart Failure Mother     Diabetes Father     Other Father         mesothelioma    Mental Illness Father         schizophrenia     Social History     Socioeconomic History    Marital status: Single     Spouse name: Not on file    Number of children: 0    Years of education: GED    Highest education level: Not on file   Occupational History    Occupation: unemployed     Comment: attempting to get disability for mental illness   Tobacco Use    Smoking status: Current Every Day Smoker     Packs/day: 0.50     Years: 20.00     Pack years: 10.00     Types: Cigarettes    Smokeless tobacco: Never Used   Vaping Use    Vaping Use: Former   Substance and Sexual Activity    Alcohol use: Yes    Drug use: Yes     Types: Marijuana Miriam Kales)     Comment: using marijuana daily    Sexual activity: Not Currently   Other Topics Concern    Not on file   Social History Narrative    Not on file     Social Determinants of Health     Financial Resource Strain:     Difficulty of Paying Living Expenses: Not on file   Food Insecurity:     Worried About Running Out of Food in the Last Year: Not on file    Mary of Food in the Last Year: Not on file   Transportation Needs:     Lack of Transportation (Medical): Not on file    Lack of Transportation (Non-Medical):  Not on file   Physical Activity:     Days of Exercise per Week: Not on file    Minutes of Exercise per Session: Not on file   Stress:     Feeling of Stress : Not on file   Social Connections:     Frequency of Communication with Friends and Family: Not on file    Frequency of Social Gatherings with Friends and Family: Not on file    Attends Confucianist Services: Not on file    Active Member of Clubs or Organizations: Not on file    Attends Club or Organization Meetings: Not on file    Marital Status: Not on file   Intimate Partner Violence:     Fear of Current or Ex-Partner: Not on file    Emotionally Abused: Not on file    Physically Abused: Not on file    Sexually Abused: Not on file   Housing Stability:     Unable to Pay for Housing in the Last Year: Not on file    Number of [place] in the Last Year: Not on file    Unstable Housing in the Last Year: Not on file           ROS:  [x] All negative/unchanged except if checked.  Explain positive(checked items) below:  [] Constitutional  [] Eyes  [] Ear/Nose/Mouth/Throat  [] Respiratory  [] CV  [] GI  []   [] Musculoskeletal  [] Skin/Breast  [] Neurological  [] Endocrine  [] Heme/Lymph  [] Allergic/Immunologic    Explanation:     MEDICATIONS:    Current Facility-Administered Medications:     [START ON 5/24/2022] paliperidone palmitate ER (INVEGA SUSTENNA) IM injection 156 mg, 156 mg, IntraMUSCular, Q30 Days, Betzy Middleton APRN - CNP    paliperidone (INVEGA) extended release tablet 6 mg, 6 mg, Oral, Daily, Magaly Zavala APRN - CNP, 6 mg at 05/18/22 5562    paliperidone (INVEGA) extended release tablet 3 mg, 3 mg, Oral, Nightly, Tonya Middleton, APRN - CNP, 3 mg at 05/17/22 2037    OXcarbazepine (TRILEPTAL) tablet 300 mg, 300 mg, Oral, BID, Tonya Middleton, APRN - CNP, 301 mg at 05/18/22 0842    acetaminophen (TYLENOL) tablet 650 mg, 650 mg, Oral, Q4H PRN, Edith Ahumada, MD    hydrOXYzine (VISTARIL) capsule 50 mg, 50 mg, Oral, TID PRN, Edith Ahumada, MD    haloperidol (HALDOL) tablet 5 mg, 5 mg, Oral, Q4H PRN **OR** haloperidol lactate (HALDOL) injection 5 mg, 5 mg, IntraMUSCular, Q4H PRN, Edith Ahumada, MD    traZODone (DESYREL) tablet 50 mg, 50 mg, Oral, Nightly PRN, Edith Ahumada, MD    magnesium hydroxide (MILK OF MAGNESIA) 400 MG/5ML suspension 30 mL, 30 mL, Oral, Daily PRN, Edith Ahumada, MD    aluminum & magnesium hydroxide-simethicone (MAALOX) 200-200-20 MG/5ML suspension 30 mL, 30 mL, Oral, Q6H PRN, Edith Ahumada, MD      Examination:  BP (!) 117/58   Pulse 71   Temp 96.7 °F (35.9 °C) (Oral)   Resp 14   Ht 5' 2\" (1.575 m)   SpO2 99%   BMI 21.40 kg/m²   Gait - steady  Medication side effects(SE): denies    Mental Status Examination:    Level of consciousness:  within normal limits   Appearance:  fair grooming and fair hygiene  Behavior/Motor:  no abnormalities noted  Attitude toward examiner:  cooperative  Speech:  spontaneous, normal rate and normal volume   Mood: \" Appropriate and pleasant  Affect: Linear without flight of ideas loose associations  Thought processes: Linear without flight of ideas loose associations  Thought content:Devoid of any auditory visual loose Nations delusions or other perceptual normalities. Denies SI/HI intent or plan appears to be preoccupied and guarded and paranoid  Cognition: Alert oriented time place and person  Concentration intact  Insight improving  Judgement improving    ASSESSMENT:   Patient symptoms are:  [] Well controlled  [x] Improving  [] Worsening  [] No change      Diagnosis:   Principal Problem:    Schizoaffective disorder, bipolar type ([facility]  Active Problems:    Cocaine abuse ([facility]  Resolved Problems:    * No resolved hospital problems. *      LABS:    No results for input(s): WBC, HGB, PLT in the last 72 hours. No results for input(s): NA, K, CL, CO2, BUN, CREATININE, GLUCOSE in the last 72 hours. No results for input(s): BILITOT, ALKPHOS, AST, ALT in the last 72 hours. Lab Results   Component Value Date    LABAMPH NOT DETECTED 05/06/2022    BARBSCNU NOT DETECTED 05/06/2022    LABBENZ NOT DETECTED 05/06/2022    LABMETH NOT DETECTED 05/06/2022    OPIATESCREENURINE NOT DETECTED 05/06/2022    PHENCYCLIDINESCREENURINE NOT DETECTED 05/06/2022    ETOH <10 05/06/2022     Lab Results   Component Value Date    TSH 1.770 03/07/2019     No results found for: LITHIUM  Lab Results   Component Value Date    VALPROATE 57 04/02/2021         Treatment Plan:  Reviewed current Medications with the patient. Risks, benefits, side effects, drug-to-drug interactions and alternatives to treatment were discussed. Collateral information:   CD evaluation  Encourage patient to attend group and other milieu activities.   Discharge planning discussed with the patient and treatment team.    Increase Trileptal to 450 mg twice daily  We will increase Invega to 6 mg daily and 3 mg at bedtime  Patient had her Erla Pickard 234 mg IM injection on 5/17/2022 she is due for 156 mg IM every 30 days booster on 5/24/2022    PSYCHOTHERAPY/COUNSELING:  [x] Therapeutic interview  [x] Supportive  [] CBT  [] Ongoing  [] Other    [x] Patient continues to need, on a daily basis, active treatment furnished directly by or requiring the supervision of inpatient psychiatric personnel            Behavioral Services                                              Medicare Re-Certification    I certify that the inpatient psychiatric hospital services furnished since the previous certification/re-certification were, and continue to be, medically necessary for;    [x] (1) Treatment which could reasonably be expected to improve the patient's condition,    [] (2) Or for diagnostic study. Estimated length of stay/service 3 to 7 days based on stability    Plan for post-hospital care outpatient psychiatric and counseling services    This patient continues to need, on a daily basis, active treatment furnished directly by or requiring the supervision of inpatient psychiatric personnel.     Electronically signed by CHARO Patel CNP on 4/99/4986 at 2:46 PM         Electronically signed by CHARO Patel CNP on 9/98/1085 at 2:46 PM

## 2022-05-18 NOTE — PROGRESS NOTES
BEHAVIORAL HEALTH FOLLOW-UP NOTE     5/18/2022     Patient was seen and examined in person, Chart reviewed   Patient's case discussed with staff/team    Chief Complaint: \"I am signing paperwork for my suite at the [facility]\"     Interim History: I saw patient in her room this morning she is extremely irritable and labile yelling becomes agitated refusing to comply with court recommendations of stepping on the crisis unit she has no insight or judgment regarding circumstance or hospitalization need for treatment in her living environment at home. Per case management her house is in deplorable conditions and she is likely not able to return back to that house. At one point she had agreed to stepdown the crisis unit with a plan for more permanent group home placement in the community however she has recently become focused on having a suite at the [facility] believing that she has paperwork that she is to fill out to go live at the [facility] and believing this is run by her outpatient mental health clinic.   She is agitated yelling difficult to redirect at times.       Appetite:   [x] Normal/Unchanged  [] Increased  [] Decreased      Sleep:       [x] Normal/Unchanged  [] Fair       [] Poor              Energy:    [x] Normal/Unchanged  [] Increased  [] Decreased        SI [] Present  [x] Absent    HI  []Present  [x] Absent     Aggression:  [] yes  [x] no    Patient is [x] able  [] unable to CONTRACT FOR SAFETY     PAST MEDICAL/PSYCHIATRIC HISTORY:   Past Medical History:   Diagnosis Date    Acute psychosis ([facility] 12/22/2017    Anxiety     Asthma     Bipolar disorder ([facility]     Bronchitis     COPD (chronic obstructive pulmonary disease) (HCC)     Depression     Iron deficiency anemia     PTSD (post-traumatic stress disorder)     Schizo affective schizophrenia ([facility]     Severe episode of recurrent major depressive disorder, without psychotic features ([facility] 11/16/2019    Substance abuse ([facility]     quit 7/2011  was marajuanna user        FAMILY/SOCIAL HISTORY:  Family History   Problem Relation Age of Onset    Diabetes Mother     High Blood Pressure Mother     Kidney Disease Mother     Heart Failure Mother     Diabetes Father     Other Father         mesothelioma    Mental Illness Father         schizophrenia     Social History     Socioeconomic History    Marital status: Single     Spouse name: Not on file    Number of children: 0    Years of education: GED    Highest education level: Not on file   Occupational History    Occupation: unemployed     Comment: attempting to get disability for mental illness   Tobacco Use    Smoking status: Current Every Day Smoker     Packs/day: 0.50     Years: 20.00     Pack years: 10.00     Types: Cigarettes    Smokeless tobacco: Never Used   Vaping Use    Vaping Use: Former   Substance and Sexual Activity    Alcohol use: Yes    Drug use: Yes     Types: Marijuana Britt Dinning)     Comment: using marijuana daily    Sexual activity: Not Currently   Other Topics Concern    Not on file   Social History Narrative    Not on file     Social Determinants of Health     Financial Resource Strain:     Difficulty of Paying Living Expenses: Not on file   Food Insecurity:     Worried About Running Out of Food in the Last Year: Not on file    Mary of Food in the Last Year: Not on file   Transportation Needs:     Lack of Transportation (Medical): Not on file    Lack of Transportation (Non-Medical):  Not on file   Physical Activity:     Days of Exercise per Week: Not on file    Minutes of Exercise per Session: Not on file   Stress:     Feeling of Stress : Not on file   Social Connections:     Frequency of Communication with Friends and Family: Not on file    Frequency of Social Gatherings with Friends and Family: Not on file    Attends Cheondoism Services: Not on file    Active Member of Clubs or Organizations: Not on file    Attends Club or Organization Meetings: Not on file    Marital Status: Not on file   Intimate Partner Violence:     Fear of Current or Ex-Partner: Not on file    Emotionally Abused: Not on file    Physically Abused: Not on file    Sexually Abused: Not on file   Housing Stability:     Unable to Pay for Housing in the Last Year: Not on file    Number of [place] in the Last Year: Not on file    Unstable Housing in the Last Year: Not on file           ROS:  [x] All negative/unchanged except if checked.  Explain positive(checked items) below:  [] Constitutional  [] Eyes  [] Ear/Nose/Mouth/Throat  [] Respiratory  [] CV  [] GI  []   [] Musculoskeletal  [] Skin/Breast  [] Neurological  [] Endocrine  [] Heme/Lymph  [] Allergic/Immunologic    Explanation:     MEDICATIONS:    Current Facility-Administered Medications:     [START ON 5/24/2022] paliperidone palmitate ER (INVEGA SUSTENNA) IM injection 156 mg, 156 mg, IntraMUSCular, Q30 Days, CHARO Elam - CNP    paliperidone (INVEGA) extended release tablet 6 mg, 6 mg, Oral, Daily, Gleda Kanner, APRN - CNP, 6 mg at 05/18/22 8324    paliperidone (INVEGA) extended release tablet 3 mg, 3 mg, Oral, Nightly, Pearl Middleton APRN - CNP, 3 mg at 05/17/22 2037    OXcarbazepine (TRILEPTAL) tablet 300 mg, 300 mg, Oral, BID, Pearl Middleton APRN - CNP, 724 mg at 05/18/22 0842    acetaminophen (TYLENOL) tablet 650 mg, 650 mg, Oral, Q4H PRN, Sonia Smallwood MD    hydrOXYzine (VISTARIL) capsule 50 mg, 50 mg, Oral, TID PRN, Sonia Smallwood MD    haloperidol (HALDOL) tablet 5 mg, 5 mg, Oral, Q4H PRN **OR** haloperidol lactate (HALDOL) injection 5 mg, 5 mg, IntraMUSCular, Q4H PRN, Sonia Smallwood MD    traZODone (DESYREL) tablet 50 mg, 50 mg, Oral, Nightly PRN, Sonia Smallwood MD    magnesium hydroxide (MILK OF MAGNESIA) 400 MG/5ML suspension 30 mL, 30 mL, Oral, Daily PRN, Sonia Smallwood MD    aluminum & magnesium hydroxide-simethicone (MAALOX) 200-200-20 MG/5ML suspension 30 mL, 30 mL, Oral, Q6H Russ HERNANDEZ MD      Examination:  BP (!) 117/58   Pulse 71   Temp 96.7 °F (35.9 °C) (Oral)   Resp 14   Ht 5' 2\" (1.575 m)   SpO2 99%   BMI 21.40 kg/m²   Gait - steady  Medication side effects(SE): denies    Mental Status Examination:    Level of consciousness:  within normal limits   Appearance:  fair grooming and fair hygiene  Behavior/Motor:  no abnormalities noted  Attitude toward examiner:  cooperative  Speech:  spontaneous, normal rate and normal volume   Mood: \" Appropriate and pleasant  Affect: Linear without flight of ideas loose associations  Thought processes: Linear without flight of ideas loose associations  Thought content:Devoid of any auditory visual loose Nations delusions or other perceptual normalities. Denies SI/HI intent or plan appears to be preoccupied and guarded and paranoid  Cognition: Alert oriented time place and person  Concentration intact  Insight improving  Judgement improving    ASSESSMENT:   Patient symptoms are:  [] Well controlled  [x] Improving  [] Worsening  [] No change      Diagnosis:   Principal Problem:    Schizoaffective disorder, bipolar type ([facility]  Active Problems:    Cocaine abuse ([facility]  Resolved Problems:    * No resolved hospital problems. *      LABS:    No results for input(s): WBC, HGB, PLT in the last 72 hours. No results for input(s): NA, K, CL, CO2, BUN, CREATININE, GLUCOSE in the last 72 hours. No results for input(s): BILITOT, ALKPHOS, AST, ALT in the last 72 hours.   Lab Results   Component Value Date    LABAMPH NOT DETECTED 05/06/2022    BARBSCNU NOT DETECTED 05/06/2022    LABBENZ NOT DETECTED 05/06/2022    LABMETH NOT DETECTED 05/06/2022    OPIATESCREENURINE NOT DETECTED 05/06/2022    PHENCYCLIDINESCREENURINE NOT DETECTED 05/06/2022    ETOH <10 05/06/2022     Lab Results   Component Value Date    TSH 1.770 03/07/2019     No results found for: LITHIUM  Lab Results   Component Value Date    VALPROATE 57 04/02/2021         Treatment Plan:  Reviewed current Medications with the patient. Risks, benefits, side effects, drug-to-drug interactions and alternatives to treatment were discussed. Collateral information:   CD evaluation  Encourage patient to attend group and other milieu activities. Discharge planning discussed with the patient and treatment team.    Increase Trileptal to 450 mg twice daily  We will increase Invega to 6 mg daily and 3 mg at bedtime  Patient had her Ermias Barraza 234 mg IM injection on 5/17/2022 she is due for 156 mg IM every 30 days booster on 5/24/2022    PSYCHOTHERAPY/COUNSELING:  [x] Therapeutic interview  [x] Supportive  [] CBT  [] Ongoing  [] Other    [x] Patient continues to need, on a daily basis, active treatment furnished directly by or requiring the supervision of inpatient psychiatric personnel            Behavioral Services                                              Medicare Re-Certification    I certify that the inpatient psychiatric hospital services furnished since the previous certification/re-certification were, and continue to be, medically necessary for;    [x] (1) Treatment which could reasonably be expected to improve the patient's condition,    [] (2) Or for diagnostic study. Estimated length of stay/service 3 to 7 days based on stability    Plan for post-hospital care outpatient psychiatric and counseling services    This patient continues to need, on a daily basis, active treatment furnished directly by or requiring the supervision of inpatient psychiatric personnel.     Electronically signed by CHARO Sanders CNP on 1/77/4939 at 2:45 PM         Electronically signed by CHARO Sanders CNP on 6/15/7750 at 2:45 PM

## 2022-05-19 PROCEDURE — 99232 SBSQ HOSP IP/OBS MODERATE 35: CPT | Performed by: NURSE PRACTITIONER

## 2022-05-19 PROCEDURE — 1240000000 HC EMOTIONAL WELLNESS R&B

## 2022-05-19 PROCEDURE — 6370000000 HC RX 637 (ALT 250 FOR IP): Performed by: NURSE PRACTITIONER

## 2022-05-19 RX ORDER — PALIPERIDONE 6 MG/1
6 TABLET, EXTENDED RELEASE ORAL NIGHTLY
Status: DISCONTINUED | OUTPATIENT
Start: 2022-05-19 | End: 2022-05-24 | Stop reason: HOSPADM

## 2022-05-19 RX ADMIN — PALIPERIDONE 6 MG: 6 TABLET, EXTENDED RELEASE ORAL at 09:00

## 2022-05-19 RX ADMIN — PALIPERIDONE 6 MG: 6 TABLET, EXTENDED RELEASE ORAL at 20:32

## 2022-05-19 RX ADMIN — OXCARBAZEPINE 300 MG: 300 TABLET, FILM COATED ORAL at 09:00

## 2022-05-19 RX ADMIN — OXCARBAZEPINE 300 MG: 300 TABLET, FILM COATED ORAL at 20:32

## 2022-05-19 ASSESSMENT — PAIN SCALES - GENERAL
PAINLEVEL_OUTOF10: 0

## 2022-05-19 NOTE — BH NOTE
Pt denies suicidal / homicidal ideations. Pt denies hallucinations. Pt is without distress. Has a flat affect. Pt is discharge focused but is in control. '  Will follow and monitor.

## 2022-05-19 NOTE — CARE COORDINATION
Navigator spoke with [place]  Bree Gay., she is available today to meet with patient & Navigator. Meeting will be at 2:30 PM. Navigator will keep treatment team updated.     Electronically signed by JEFRY Diana, ARIS on 5/19/2022 at 1:49 PM

## 2022-05-19 NOTE — CARE COORDINATION
Navigator discussed patient case in [address] [place] [address], [address]. [place] will continue to manage the outpatient mental health treatment and will conduct a referral to an alternate provider if patient continues to resist treatment. They will assist in reiterating the plan for patient to step-down to the crisis unit. Navigator will schedule a meeting with patient and  Emmy Grady, to discuss the plan for step-down. While at [place] [address], a referral for [facility] will be initiated and referral for transitional housing [facility] or ) will be explored. If patient continues to not comply, [place] will explore applying for guardianship. Navigator will meet with patient today.     Electronically signed by JEFRY Gudino LSW on 5/19/2022 at 11:19 AM

## 2022-05-19 NOTE — CARE COORDINATION
Navigator and patient [place]  Juan Rowell., met with patient to discuss her AOT expectations. Navigator also challenged patient's delusion regarding [facility] owning the [facility] and informed her that this establishment will not and is not connected to her housing placement. [facility] was available to reiterate and confirm. Patient immediately became upset about this challenge and reported that she is done with the conversation and returning \"home\". Navigator informed her that the plan will be for her to step down to the crisis unit with referral to [employer]. Patient continues to demonstrate impaired insight/judgement. Navigator to discuss with Soha HINOJOSA regarding a possible court order to the [facility].     Electronically signed by JEFRY Mcadams, LSW on 5/19/2022 at 3:39 PM [employer]

## 2022-05-19 NOTE — PLAN OF CARE
Problem: Anxiety  Goal: Will report anxiety at manageable levels  Description: INTERVENTIONS:  1. Administer medication as ordered  2. Teach and rehearse alternative coping skills  3. Provide emotional support with 1:1 interaction with staff  5/18/2022 2053 by Pam Branch RN  Outcome: Progressing     Problem: Coping  Goal: Pt/Family able to verbalize concerns and demonstrate effective coping strategies  Description: INTERVENTIONS:  1. Assist patient/family to identify coping skills, available support systems and cultural and spiritual values  2. Provide emotional support, including active listening and acknowledgement of concerns of patient and caregivers  3. Reduce environmental stimuli, as able  4. Instruct patient/family in relaxation techniques, as appropriate  5. Assess for spiritual pain/suffering and initiate Spiritual Care, Psychosocial Clinical Specialist consults as needed  5/18/2022 2053 by Pam Branch RN  Outcome: Progressing     Patient has been withdrawn to her room. Calm and cooperative during conversation. Affect is flat. Mood is anxious. Focused on discharge. Denies any depression or anxiety. Denies suicidal/homicidal ideations and hallucinations at this time. Purposeful rounding continued.

## 2022-05-19 NOTE — PLAN OF CARE
Pt denies suicidal / homicidal ideations. Pt denies hallucinations. Pt is cooperative at this time. Has a flat affect. Will follow and monitor.

## 2022-05-19 NOTE — PROGRESS NOTES
BEHAVIORAL HEALTH FOLLOW-UP NOTE     5/19/2022     Patient was seen and examined in person, Chart reviewed   Patient's case discussed with staff/team    Chief Complaint: I am not going to know crisis unit my home is at the [facility] it is run by [employer]\"     Interim History:  Patient up on the unit she refuses to consider stepping on the crisis unit she continues going back to her actual apartment believing that she actually lives at [facility] the [facility] in [facility] believing that it is run by [employer]. She refused to consider any other option for her discharge planning. She was irritable easily agitated unable to be reasoned with.   She does not attend groups does not socialize with peers she shows no insight or judgment guarding circumstance hospitalization need for treatment    Appetite:   [x] Normal/Unchanged  [] Increased  [] Decreased      Sleep:       [x] Normal/Unchanged  [] Fair       [] Poor              Energy:    [x] Normal/Unchanged  [] Increased  [] Decreased        SI [] Present  [x] Absent    HI  []Present  [x] Absent     Aggression:  [] yes  [x] no    Patient is [x] able  [] unable to CONTRACT FOR SAFETY     PAST MEDICAL/PSYCHIATRIC HISTORY:   Past Medical History:   Diagnosis Date    Acute psychosis ([address] 12/22/2017    Anxiety     Asthma     Bipolar disorder ([address]     Bronchitis     [facility] disease) ([facility])     Depression     Iron deficiency anemia     PTSD (post-traumatic stress disorder)     Schizo affective schizophrenia ([address]     Severe episode of recurrent major depressive disorder, without psychotic features ([address] 11/16/2019    Substance abuse ([facility]     quit 7/2011  was Maudine Adin user        FAMILY/SOCIAL HISTORY:  Family History   Problem Relation Age of Onset    Diabetes Mother     High Blood Pressure Mother     Kidney Disease Mother     Heart Failure Mother     Diabetes Father    Josue Lindsay Other Father mesothelioma    Mental Illness Father         schizophrenia     Social History     Socioeconomic History    Marital status: Single     Spouse name: Not on file    Number of children: 0    Years of education: GED    Highest education level: Not on file   Occupational History    Occupation: unemployed     Comment: attempting to get disability for mental illness   Tobacco Use    Smoking status: Current Every Day Smoker     Packs/day: 0.50     Years: 20.00     Pack years: 10.00     Types: Cigarettes    Smokeless tobacco: Never Used   Vaping Use    Vaping Use: Former   Substance and Sexual Activity    Alcohol use: Yes    Drug use: Yes     Types: Marijuana Esteban Lanier     Comment: using marijuana daily    Sexual activity: Not Currently   Other Topics Concern    Not on file   Social History Narrative    Not on file     Social Determinants of Health     Financial Resource Strain:     Difficulty of Paying Living Expenses: Not on file   Food Insecurity:     Worried About Running Out of Food in the Last Year: Not on file    Mary of Food in the Last Year: Not on file   Transportation Needs:     Lack of Transportation (Medical): Not on file    Lack of Transportation (Non-Medical):  Not on file   Physical Activity:     Days of Exercise per Week: Not on file    Minutes of Exercise per Session: Not on file   Stress:     Feeling of Stress : Not on file   Social Connections:     Frequency of Communication with Friends and Family: Not on file    Frequency of Social Gatherings with Friends and Family: Not on file    Attends Quaker Services: Not on file    Active Member of Clubs or Organizations: Not on file    Attends Club or Organization Meetings: Not on file    Marital Status: Not on file   Intimate Partner Violence:     Fear of Current or Ex-Partner: Not on file    Emotionally Abused: Not on file    Physically Abused: Not on file    Sexually Abused: Not on file   Housing Stability:     Unable to Pay for Housing in the Last Year: Not on file    Number of Places Lived in the Last Year: Not on file    Unstable Housing in the Last Year: Not on file           ROS:  [x] All negative/unchanged except if checked.  Explain positive(checked items) below:  [] Constitutional  [] Eyes  [] Ear/Nose/Mouth/Throat  [] Respiratory  [] CV  [] GI  []   [] Musculoskeletal  [] Skin/Breast  [] Neurological  [] Endocrine  [] Heme/Lymph  [] Allergic/Immunologic    Explanation:     MEDICATIONS:    Current Facility-Administered Medications:     [START ON 5/24/2022] paliperidone palmitate ER (INVEGA SUSTENNA) IM injection 156 mg, 156 mg, IntraMUSCular, Q30 Days, Betzy B Tiffanyk, APRN - CNP    paliperidone (INVEGA) extended release tablet 6 mg, 6 mg, Oral, Daily, Francisco Plater Dellick, APRN - CNP, 6 mg at 05/19/22 0900    paliperidone (INVEGA) extended release tablet 3 mg, 3 mg, Oral, Nightly, Francisco Plater Dellick, APRN - CNP, 3 mg at 05/18/22 2038    OXcarbazepine (TRILEPTAL) tablet 300 mg, 300 mg, Oral, BID, Francisco Plater Dellick, APRN - CNP, 297 mg at 05/19/22 0900    acetaminophen (TYLENOL) tablet 650 mg, 650 mg, Oral, Q4H PRN, Jacques Browning MD    hydrOXYzine (VISTARIL) capsule 50 mg, 50 mg, Oral, TID PRN, Jacques Browning MD    haloperidol (HALDOL) tablet 5 mg, 5 mg, Oral, Q4H PRN **OR** haloperidol lactate (HALDOL) injection 5 mg, 5 mg, IntraMUSCular, Q4H PRN, Jacques Browning MD    traZODone (DESYREL) tablet 50 mg, 50 mg, Oral, Nightly PRN, Jacques Browning MD    magnesium hydroxide (MILK OF MAGNESIA) 400 MG/5ML suspension 30 mL, 30 mL, Oral, Daily PRN, Jacques Browning MD    aluminum & magnesium hydroxide-simethicone (MAALOX) 200-200-20 MG/5ML suspension 30 mL, 30 mL, Oral, Q6H PRN, Jacques Browning MD      Examination:  BP (!) 104/57   Pulse 74   Temp 98 °F (36.7 °C) (Temporal)   Resp 14   Ht 5' 2\" (1.575 m)   SpO2 99%   BMI 21.40 kg/m²   Gait - steady  Medication side effects(SE): denies    Mental Status Examination: Level of consciousness:  within normal limits   Appearance:  fair grooming and fair hygiene  Behavior/Motor:  no abnormalities noted  Attitude toward examiner:  cooperative  Speech:  spontaneous, normal rate and normal volume   Mood: \" Appropriate and pleasant  Affect: Linear without flight of ideas loose associations  Thought processes: Linear without flight of ideas loose associations  Thought content:Devoid of any auditory visual loose Nations delusions or other perceptual normalities. Denies SI/HI intent or plan appears to be preoccupied and guarded and paranoid  Cognition: Alert oriented time place and person  Concentration intact  Insight improving  Judgement improving    ASSESSMENT:   Patient symptoms are:  [] Well controlled  [x] Improving  [] Worsening  [] No change      Diagnosis:   Principal Problem:    Schizoaffective disorder, bipolar type ([facility]  Active Problems:    Cocaine abuse ([facility]  Resolved Problems:    * No resolved hospital problems. *      LABS:    No results for input(s): WBC, HGB, PLT in the last 72 hours. No results for input(s): NA, K, CL, CO2, BUN, CREATININE, GLUCOSE in the last 72 hours. No results for input(s): BILITOT, ALKPHOS, AST, ALT in the last 72 hours. Lab Results   Component Value Date    LABAMPH NOT DETECTED 05/06/2022    BARBSCNU NOT DETECTED 05/06/2022    LABBENZ NOT DETECTED 05/06/2022    LABMETH NOT DETECTED 05/06/2022    OPIATESCREENURINE NOT DETECTED 05/06/2022    PHENCYCLIDINESCREENURINE NOT DETECTED 05/06/2022    ETOH <10 05/06/2022     Lab Results   Component Value Date    TSH 1.770 03/07/2019     No results found for: LITHIUM  Lab Results   Component Value Date    VALPROATE 57 04/02/2021         Treatment Plan:  Reviewed current Medications with the patient. Risks, benefits, side effects, drug-to-drug interactions and alternatives to treatment were discussed.   Collateral information:   CD evaluation  Encourage patient to attend group and other milieu activities. Discharge planning discussed with the patient and treatment team.    Increase Trileptal to 450 mg twice daily  increase Invega to 6 mg daily and 3 mg at bedtime  Patient had her Leonor Carmen 234 mg IM injection on 5/17/2022 she is due for 156 mg IM every 30 days booster on 5/24/2022    PSYCHOTHERAPY/COUNSELING:  [x] Therapeutic interview  [x] Supportive  [] CBT  [] Ongoing  [] Other    [x] Patient continues to need, on a daily basis, active treatment furnished directly by or requiring the supervision of inpatient psychiatric personnel            Behavioral Services                                              Medicare Re-Certification    I certify that the inpatient psychiatric hospital services furnished since the previous certification/re-certification were, and continue to be, medically necessary for;    [x] (1) Treatment which could reasonably be expected to improve the patient's condition,    [] (2) Or for diagnostic study. Estimated length of stay/service 3 to 7 days based on stability    Plan for post-hospital care outpatient psychiatric and counseling services    This patient continues to need, on a daily basis, active treatment furnished directly by or requiring the supervision of inpatient psychiatric personnel.     Electronically signed by CHARO Sim CNP on 4/11/5557 at 11:27 AM         Electronically signed by CHARO Sim CNP on 4/61/7235 at 11:27 AM

## 2022-05-19 NOTE — PROGRESS NOTES
Attended morning community meeting. Updated on staffing and daily expectations. Shared goal for the day as to go home and go with the flow.

## 2022-05-20 PROCEDURE — 6370000000 HC RX 637 (ALT 250 FOR IP): Performed by: NURSE PRACTITIONER

## 2022-05-20 PROCEDURE — 1240000000 HC EMOTIONAL WELLNESS R&B

## 2022-05-20 PROCEDURE — 99232 SBSQ HOSP IP/OBS MODERATE 35: CPT | Performed by: NURSE PRACTITIONER

## 2022-05-20 RX ORDER — DIVALPROEX SODIUM 250 MG/1
250 TABLET, DELAYED RELEASE ORAL EVERY 12 HOURS SCHEDULED
Status: DISCONTINUED | OUTPATIENT
Start: 2022-05-20 | End: 2022-05-24 | Stop reason: HOSPADM

## 2022-05-20 RX ADMIN — PALIPERIDONE 6 MG: 6 TABLET, EXTENDED RELEASE ORAL at 09:32

## 2022-05-20 RX ADMIN — OXCARBAZEPINE 300 MG: 300 TABLET, FILM COATED ORAL at 09:32

## 2022-05-20 RX ADMIN — DIVALPROEX SODIUM 250 MG: 250 TABLET, DELAYED RELEASE ORAL at 20:19

## 2022-05-20 RX ADMIN — PALIPERIDONE 6 MG: 6 TABLET, EXTENDED RELEASE ORAL at 20:19

## 2022-05-20 RX ADMIN — DIVALPROEX SODIUM 250 MG: 250 TABLET, DELAYED RELEASE ORAL at 10:01

## 2022-05-20 ASSESSMENT — PAIN SCALES - GENERAL
PAINLEVEL_OUTOF10: 0
PAINLEVEL_OUTOF10: 0

## 2022-05-20 NOTE — PROGRESS NOTES
BEHAVIORAL HEALTH FOLLOW-UP NOTE     5/20/2022     Patient was seen and examined in person, Chart reviewed   Patient's case discussed with staff/team    Chief Complaint:\" I have a sweet reserve for me at the [address] [place] [address] Rd that is where I am going\"     Interim History:  Patient seen in treatment team.  She continues to ruminate about the [facility] believing that is where she is going to live on discharge. She refuses to consider any other living arrangement other than this hotel that she believes is run by her outpatient psychiatric facility and she believes are arranging for her to have a suite there. She even called today with her nurse told her that not [facility] and services and then she also asked that she had a sweet reserve there and they confirmed that she did not.   She has no insight or judgment regarding circumstance hospitalization need for treatment remains delusional as acting on those delusions do these delusions she is not participating her discharge planning          Appetite:   [x] Normal/Unchanged  [] Increased  [] Decreased      Sleep:       [x] Normal/Unchanged  [] Fair       [] Poor              Energy:    [x] Normal/Unchanged  [] Increased  [] Decreased        SI [] Present  [x] Absent    HI  []Present  [x] Absent     Aggression:  [] yes  [x] no    Patient is [x] able  [] unable to CONTRACT FOR SAFETY     PAST MEDICAL/PSYCHIATRIC HISTORY:   Past Medical History:   Diagnosis Date    Acute psychosis ([facility] 12/22/2017    Anxiety     Asthma     Bipolar disorder ([facility]     Bronchitis     COPD (chronic obstructive pulmonary disease) ([facility])     Depression     Iron deficiency anemia     PTSD (post-traumatic stress disorder)     Schizo affective schizophrenia ([address]     Severe episode of recurrent major depressive disorder, without psychotic features ([address] 11/16/2019    Substance abuse ([facility]     quit 7/2011  was Ferd Erica user        FAMILY/SOCIAL HISTORY:  Family History   Problem Relation Age of Onset    Diabetes Mother     High Blood Pressure Mother     Kidney Disease Mother     Heart Failure Mother     Diabetes Father     Other Father         mesothelioma    Mental Illness Father         schizophrenia     Social History     Socioeconomic History    Marital status: Single     Spouse name: Not on file    Number of children: 0    Years of education: GED    Highest education level: Not on file   Occupational History    Occupation: unemployed     Comment: attempting to get disability for mental illness   Tobacco Use    Smoking status: Current Every Day Smoker     Packs/day: 0.50     Years: 20.00     Pack years: 10.00     Types: Cigarettes    Smokeless tobacco: Never Used   Vaping Use    Vaping Use: Former   Substance and Sexual Activity    Alcohol use: Yes    Drug use: Yes     Types: Marijuana [place])     Comment: using marijuana daily    Sexual activity: Not Currently   Other Topics Concern    Not on file   Social History Narrative    Not on file     Social Determinants of Health     Financial Resource Strain:     Difficulty of Paying Living Expenses: Not on file   Food Insecurity:     Worried About Running Out of Food in the Last Year: Not on file    Mary of Food in the Last Year: Not on file   Transportation Needs:     Lack of Transportation (Medical): Not on file    Lack of Transportation (Non-Medical):  Not on file   Physical Activity:     Days of Exercise per Week: Not on file    Minutes of Exercise per Session: Not on file   Stress:     Feeling of Stress : Not on file   Social Connections:     Frequency of Communication with Friends and Family: Not on file    Frequency of Social Gatherings with Friends and Family: Not on file    Attends Restorationist Services: Not on file    Active Member of Clubs or Organizations: Not on file    Attends Club or Organization Meetings: Not on file    Marital Status: Not on file   Intimate Partner Violence:     Fear of Current or Ex-Partner: Not on file    Emotionally Abused: Not on file    Physically Abused: Not on file    Sexually Abused: Not on file   Housing Stability:     Unable to Pay for Housing in the Last Year: Not on file    Number of Places Lived in the Last Year: Not on file    Unstable Housing in the Last Year: Not on file           ROS:  [x] All negative/unchanged except if checked.  Explain positive(checked items) below:  [] Constitutional  [] Eyes  [] Ear/Nose/Mouth/Throat  [] Respiratory  [] CV  [] GI  []   [] Musculoskeletal  [] Skin/Breast  [] Neurological  [] Endocrine  [] Heme/Lymph  [] Allergic/Immunologic    Explanation:     MEDICATIONS:    Current Facility-Administered Medications:     divalproex (DEPAKOTE) DR tablet 250 mg, 250 mg, Oral, 2 times per day, CHARO Dan CNP    paliperidone (INVEGA) extended release tablet 6 mg, 6 mg, Oral, Nightly, CHARO Young - CNP, 6 mg at 05/19/22 2032    [START ON 5/24/2022] paliperidone palmitate ER (INVEGA SUSTENNA) IM injection 156 mg, 156 mg, IntraMUSCular, Q30 Days, CHARO Elam CNP    paliperidone (INVEGA) extended release tablet 6 mg, 6 mg, Oral, Daily, CHARO Young - CNP, 6 mg at 05/20/22 0932    acetaminophen (TYLENOL) tablet 650 mg, 650 mg, Oral, Q4H PRN, Sabrina Bryant MD    hydrOXYzine (VISTARIL) capsule 50 mg, 50 mg, Oral, TID PRN, Sabrina Bryant MD    haloperidol (HALDOL) tablet 5 mg, 5 mg, Oral, Q4H PRN **OR** haloperidol lactate (HALDOL) injection 5 mg, 5 mg, IntraMUSCular, Q4H PRN, Sabrina Bryant MD    traZODone (DESYREL) tablet 50 mg, 50 mg, Oral, Nightly PRN, Sabrina Bryant MD    magnesium hydroxide (MILK OF MAGNESIA) 400 MG/5ML suspension 30 mL, 30 mL, Oral, Daily PRN, Sabrina Bryant MD    aluminum & magnesium hydroxide-simethicone (MAALOX) 200-200-20 MG/5ML suspension 30 mL, 30 mL, Oral, Q6H PRN, Sabrina Bryant MD      Examination:  BP 93/66   Pulse 77   Temp 98 °F (36.7 °C)   Resp 14 Ht 5' 2\" (1.575 m)   SpO2 99%   BMI 21.40 kg/m²   Gait - steady  Medication side effects(SE): denies    Mental Status Examination:    Level of consciousness:  within normal limits   Appearance:  fair grooming and fair hygiene  Behavior/Motor:  no abnormalities noted  Attitude toward examiner:  cooperative  Speech:  spontaneous, normal rate and normal volume   Mood: \" Appropriate and pleasant  Affect: Linear without flight of ideas loose associations  Thought processes: Linear without flight of ideas loose associations  Thought content:Devoid of any auditory visual loose [place] delusions or other perceptual normalities. Denies SI/HI intent or plan appears to be preoccupied and guarded and paranoid  Cognition: Alert oriented time place and person  Concentration intact  Insight improving  Judgement improving    ASSESSMENT:   Patient symptoms are:  [] Well controlled  [x] Improving  [] Worsening  [] No change      Diagnosis:   Principal Problem:    [facility]oaffective disorder, [facility] ([facility]  Active Problems:    Cocaine abuse ([facility]  Resolved Problems:    * No resolved hospital problems. *      LABS:    No results for input(s): WBC, HGB, PLT in the last 72 hours. No results for input(s): NA, K, CL, CO2, BUN, CREATININE, GLUCOSE in the last 72 hours. No results for input(s): BILITOT, ALKPHOS, AST, ALT in the last 72 hours. Lab Results   Component Value Date    LABAMPH NOT DETECTED 05/06/2022    BARBSCNU NOT DETECTED 05/06/2022    LABBENZ NOT DETECTED 05/06/2022    LABMETH NOT DETECTED 05/06/2022    OPIATESCREENURINE NOT DETECTED 05/06/2022    PHENCYCLIDINESCREENURINE NOT DETECTED 05/06/2022    ETOH <10 05/06/2022     Lab Results   Component Value Date    TSH 1.770 03/07/2019     No results found for: LITHIUM  Lab Results   Component Value Date    VALPROATE 57 04/02/2021         Treatment Plan:  Reviewed current Medications with the patient.    Risks, benefits, side effects, drug-to-drug interactions and alternatives to treatment were discussed. Collateral information:   CD evaluation  Encourage patient to attend group and other milieu activities. Discharge planning discussed with the patient and treatment team.    I we will discontinue Trileptal and start Depakote 250 mg twice daily for patient's irritability  increase Invega to 6 mg daily and 3 mg at bedtime  Patient had her Damian Musty 234 mg IM injection on 5/17/2022 she is due for 156 mg IM every 30 days booster on 5/24/2022    PSYCHOTHERAPY/COUNSELING:  [x] Therapeutic interview  [x] Supportive  [] CBT  [] Ongoing  [] Other    [x] Patient continues to need, on a daily basis, active treatment furnished directly by or requiring the supervision of inpatient psychiatric personnel            Behavioral Services                                              Medicare Re-Certification    I certify that the inpatient psychiatric hospital services furnished since the previous certification/re-certification were, and continue to be, medically necessary for;    [x] (1) Treatment which could reasonably be expected to improve the patient's condition,    [] (2) Or for diagnostic study. Estimated length of stay/service 3 to 7 days based on stability    Plan for post-hospital care outpatient psychiatric and counseling services    This patient continues to need, on a daily basis, active treatment furnished directly by or requiring the supervision of inpatient psychiatric personnel.     Electronically signed by CHARO Muñoz CNP on 0/27/3281 at 11:46 AM         Electronically signed by CHARO Muñoz CNP on 7/46/2503 at 11:46 AM

## 2022-05-20 NOTE — PLAN OF CARE
Problem: Anxiety  Goal: Will report anxiety at manageable levels  Description: INTERVENTIONS:  1. Administer medication as ordered  2. Teach and rehearse alternative coping skills  3. Provide emotional support with 1:1 interaction with staff  Outcome: Progressing  Flowsheets (Taken 5/20/2022 1212)  Will report anxiety at manageable levels:   Administer medication as ordered   Provide emotional support with 1:1 interaction with staff   Teach and rehearse alternative coping skills     Problem: Coping  Goal: Pt/Family able to verbalize concerns and demonstrate effective coping strategies  Description: INTERVENTIONS:  1. Assist patient/family to identify coping skills, available support systems and cultural and spiritual values  2. Provide emotional support, including active listening and acknowledgement of concerns of patient and caregivers  3. Reduce environmental stimuli, as able  4. Instruct patient/family in relaxation techniques, as appropriate  5. Assess for spiritual pain/suffering and initiate Spiritual Care, Psychosocial Clinical Specialist consults as needed  Outcome: Progressing  Flowsheets (Taken 5/20/2022 1212)  Patient/family able to verbalize anxieties, fears, and concerns, and demonstrate effective coping:   Instruct patient/family in relaxation techniques, as appropriate   Assist patient/family to identify coping skills, available support systems and cultural and spiritual values   Provide emotional support, including active listening and acknowledgement of concerns of patient and caregivers   Reduce environmental stimuli, as able     Problem: Decision Making  Goal: Pt/Family able to effectively weigh alternatives and participate in decision making related to treatment and care  Description: INTERVENTIONS:  1. Determine when there are differences between patient's view, family's view, and healthcare provider's view of condition  2. Facilitate patient and family articulation of goals for care  3.  Help patient and family identify pros/cons of alternative solutions  4. Provide information as requested by patient/family  5. Respect patient/family right to receive or not to receive information  6. Serve as a liaison between patient and family and health care team  7. Initiate Consults from Ethics, Palliative Care or initiate [address] [place] [address] as is appropriate  Outcome: Progressing  Flowsheets (Taken 5/20/2022 1212)  Patient/family able to effectively weigh alternatives and participate in decision making related to treatment and care: Facilitate patient and family articulation of goals for care   Respect patient/family right to receive or not to receive information   Provide information as requested by patient/family     Problem: Confusion  Goal: Confusion, delirium, dementia, or psychosis is improved or at baseline  Description: INTERVENTIONS:  1. Assess for possible contributors to thought disturbance, including medications, impaired vision or hearing, underlying metabolic abnormalities, dehydration, psychiatric diagnoses, and notify attending LIP  2. [place] high risk fall precautions, as indicated  3. Provide frequent short contacts to provide reality reorientation, refocusing and direction  4. Decrease environmental stimuli, including noise as appropriate  5. Monitor and intervene to maintain adequate nutrition, hydration, elimination, sleep and activity  6. If unable to ensure safety without constant attention obtain sitter and review sitter guidelines with assigned personnel  7.  Initiate Psychosocial CNS and Spiritual Care consult, as indicated  Outcome: Progressing  Flowsheets (Taken 5/20/2022 1212)  Effect of thought disturbance (confusion, delirium, dementia, or psychosis) are managed with adequate functional status: If unable to ensure safety without constant attention obtain sitter and review sitter guidelines with assigned personnel     Problem: Pain  Goal: Verbalizes/displays adequate comfort level or baseline comfort level  Outcome: Progressing  Pt  continues to believe she lives at the [address] Pt has poor insight and judgement, Pt is quiet and isolative to self, does not attend groups. Reports sleeping well. Pt oriented to person time and place.

## 2022-05-20 NOTE — PROGRESS NOTES
[id number] [facility] Interdisciplinary Treatment Plan Note     Review Date & Time: 05/20/22 0900    Patient was in treatment team.    Estimated Length of Stay Update:  3-7 days   Estimated Discharge Date Update: 3-7 days    EDUCATION:   Learner Progress Toward Treatment Goals: Reviewed results and recommendations of this team and Reviewed group plan and strategies    Method: Small group    Outcome: Verbalized understanding    PATIENT GOALS: Get out of here    PLAN/TREATMENT RECOMMENDATIONS UPDATE: Follow recommended treatment plan, take medications as prescribed and attend unit groups.      GOALS UPDATE:  Time frame for Short-Term Goals: 3-7 days      Lynn Tan RN

## 2022-05-20 NOTE — PROGRESS NOTES
Patient was isolative to room most of evening,but when out on unit stays to self. Denies SI,HI or AV hallucinations. Denies depressin or anxiety. Patient affect flat,sad,blunt. Appetite good. Sleeping with no struggles. Verbalizes was suppose to be discharged today and was going to go to a hotel down [facility]. Was noted that patient meet with navigator from [place] and pt. Became upset and reported done with conversation and returning home. Compliant with medications . Attended group. Q 15 minute rounds continue.

## 2022-05-20 NOTE — PLAN OF CARE
Problem: Anxiety  Goal: Will report anxiety at manageable levels  Description: INTERVENTIONS:  1. Administer medication as ordered  2. Teach and rehearse alternative coping skills  3. Provide emotional support with 1:1 interaction with staff  5/20/2022 1901 by Wilman Oliveira RN  Outcome: Progressing  5/20/2022 1236 by Wilman Oliveira RN  Outcome: Progressing  Flowsheets (Taken 5/20/2022 1212)  Will report anxiety at manageable levels:   Administer medication as ordered   Provide emotional support with 1:1 interaction with staff   Teach and rehearse alternative coping skills     Problem: Coping  Goal: Pt/Family able to verbalize concerns and demonstrate effective coping strategies  Description: INTERVENTIONS:  1. Assist patient/family to identify coping skills, available support systems and cultural and spiritual values  2. Provide emotional support, including active listening and acknowledgement of concerns of patient and caregivers  3. Reduce environmental stimuli, as able  4. Instruct patient/family in relaxation techniques, as appropriate  5. Assess for spiritual pain/suffering and initiate Spiritual Care, Psychosocial Clinical Specialist consults as needed  5/20/2022 1901 by Wilman Oliveira RN  Outcome: Progressing  5/20/2022 1236 by Wilman Oliveira RN  Outcome: Progressing  Flowsheets (Taken 5/20/2022 1212)  Patient/family able to verbalize anxieties, fears, and concerns, and demonstrate effective coping:   Instruct patient/family in relaxation techniques, as appropriate   Assist patient/family to identify coping skills, available support systems and cultural and spiritual values   Provide emotional support, including active listening and acknowledgement of concerns of patient and caregivers   Reduce environmental stimuli, as able     Problem: Decision Making  Goal: Pt/Family able to effectively weigh alternatives and participate in decision making related to treatment and care  Description: INTERVENTIONS:  1. Determine when there are differences between patient's view, family's view, and healthcare provider's view of condition  2. Facilitate patient and family articulation of goals for care  3. Help patient and family identify pros/cons of alternative solutions  4. Provide information as requested by patient/family  5. Respect patient/family right to receive or not to receive information  6. Serve as a liaison between patient and family and health care team  7. Initiate Consults from Ethics, Palliative Care or initiate [address] [facility] as is appropriate  5/20/2022 1901 by Parveen Roach RN  Outcome: Progressing  5/20/2022 1236 by Parveen Roach RN  Outcome: Progressing  Flowsheets (Taken 5/20/2022 1212)  Patient/family able to effectively weigh alternatives and participate in decision making related to treatment and care: Facilitate patient and family articulation of goals for care   Respect patient/family right to receive or not to receive information   Provide information as requested by patient/family     Problem: Confusion  Goal: Confusion, delirium, dementia, or psychosis is improved or at baseline  Description: INTERVENTIONS:  1. Assess for possible contributors to thought disturbance, including medications, impaired vision or hearing, underlying metabolic abnormalities, dehydration, psychiatric diagnoses, and notify attending LIP  2. [place] high risk fall precautions, as indicated  3. Provide frequent short contacts to provide reality reorientation, refocusing and direction  4. Decrease environmental stimuli, including noise as appropriate  5. Monitor and intervene to maintain adequate nutrition, hydration, elimination, sleep and activity  6. If unable to ensure safety without constant attention obtain sitter and review sitter guidelines with assigned personnel  7.  Initiate Psychosocial CNS and Spiritual Care consult, as indicated  5/20/2022 1901 by Parveen Roach RN  Outcome: Progressing  5/20/2022 1236 by Hilda Ferguson, RN  Outcome: Progressing  Flowsheets (Taken 5/20/2022 1212)  Effect of thought disturbance (confusion, delirium, dementia, or psychosis) are managed with adequate functional status: If unable to ensure safety without constant attention obtain sitter and review sitter guidelines with assigned personnel     Problem: Pain  Goal: Verbalizes/displays adequate comfort level or baseline comfort level  5/20/2022 1901 by Hilda Ferguson, RN  Outcome: Progressing  5/20/2022 1236 by Hilda Ferguson RN  Outcome: Progressing

## 2022-05-20 NOTE — GROUP NOTE
Date: 5/20/2022    Group Start Time: 1010  Group End Time: 2331  Group Topic: Psychoeducation    [facility]    Lindsay Alvarado, [address]                                                                        Group Therapy Note    Date: 5/20/2022  Number of Participants: 12    Type of Group: Psychoeducation    Wellness Binder Information  Module Name:  id of stressors     Patient's Justin Pereira will be able to id daily stressors and physical effects to stress. Notes:  Willing to share and id stressors and simple steps to coping. Status After Intervention:  Improved    Participation Level:  Active Listener and Interactive    Participation Quality: Appropriate, Attentive, and Sharing      Speech:  normal       Thought Process/Content: Logical      Affective Functioning: Congruent      Mood: euthymic      Level of consciousness:  Alert, Oriented x4, and Attentive      Response to Learning: Able to verbalize/acknowledge new learning, Able to retain information, and Progressing to goal      Endings: None Reported    Modes of Intervention: Education, Support, Socialization, and Exploration      Discipline Responsible: Psychoeducational Specialist      Signature:  Leah Canales

## 2022-05-20 NOTE — PROGRESS NOTES
[facility] 61 with patient asking if they were connected with [place] and they stated they were not, Patient also asked if she had a suite reserved there and they confirmed she did not, Pt still continues to say she will go downtown at discharge to the [address] and when asked how she will pay she states cash. Pt was asked if she would consider the crisis unit and she declined. Pt encouraged to think of where she will go at discharge.

## 2022-05-21 PROCEDURE — 6370000000 HC RX 637 (ALT 250 FOR IP): Performed by: NURSE PRACTITIONER

## 2022-05-21 PROCEDURE — 1240000000 HC EMOTIONAL WELLNESS R&B

## 2022-05-21 PROCEDURE — 99232 SBSQ HOSP IP/OBS MODERATE 35: CPT | Performed by: NURSE PRACTITIONER

## 2022-05-21 RX ADMIN — PALIPERIDONE 6 MG: 6 TABLET, EXTENDED RELEASE ORAL at 08:42

## 2022-05-21 RX ADMIN — DIVALPROEX SODIUM 250 MG: 250 TABLET, DELAYED RELEASE ORAL at 08:42

## 2022-05-21 RX ADMIN — DIVALPROEX SODIUM 250 MG: 250 TABLET, DELAYED RELEASE ORAL at 20:54

## 2022-05-21 RX ADMIN — PALIPERIDONE 6 MG: 6 TABLET, EXTENDED RELEASE ORAL at 20:54

## 2022-05-21 ASSESSMENT — PAIN SCALES - GENERAL
PAINLEVEL_OUTOF10: 0
PAINLEVEL_OUTOF10: 0

## 2022-05-21 NOTE — PLAN OF CARE
Problem: Anxiety  Goal: Will report anxiety at manageable levels  Description: INTERVENTIONS:  1. Administer medication as ordered  2. Teach and rehearse alternative coping skills  3. Provide emotional support with 1:1 interaction with staff  Outcome: Progressing  Flowsheets (Taken 5/21/2022 1401)  Will report anxiety at manageable levels:   Provide emotional support with 1:1 interaction with staff   Teach and rehearse alternative coping skills   Administer medication as ordered     Problem: Coping  Goal: Pt/Family able to verbalize concerns and demonstrate effective coping strategies  Description: INTERVENTIONS:  1. Assist patient/family to identify coping skills, available support systems and cultural and spiritual values  2. Provide emotional support, including active listening and acknowledgement of concerns of patient and caregivers  3. Reduce environmental stimuli, as able  4. Instruct patient/family in relaxation techniques, as appropriate  5. Assess for spiritual pain/suffering and initiate Spiritual Care, Psychosocial Clinical Specialist consults as needed  Outcome: Progressing  Flowsheets (Taken 5/21/2022 1401)  Patient/family able to verbalize anxieties, fears, and concerns, and demonstrate effective coping:   Instruct patient/family in relaxation techniques, as appropriate   Reduce environmental stimuli, as able   Assist patient/family to identify coping skills, available support systems and cultural and spiritual values   Provide emotional support, including active listening and acknowledgement of concerns of patient and caregivers   Assess for spiritual pain/suffering and initiate Spiritual Care, Psychosocial Clinical Specialist consults as needed     Problem: Decision Making  Goal: Pt/Family able to effectively weigh alternatives and participate in decision making related to treatment and care  Description: INTERVENTIONS:  1.  Determine when there are differences between patient's view, family's view, and healthcare provider's view of condition  2. Facilitate patient and family articulation of goals for care  3. Help patient and family identify pros/cons of alternative solutions  4. Provide information as requested by patient/family  5. Respect patient/family right to receive or not to receive information  6. Serve as a liaison between patient and family and health care team  7. Initiate Consults from Ethics, Palliative Care or initiate [facility] as is appropriate  Outcome: Progressing     Problem: Confusion  Goal: Confusion, delirium, dementia, or psychosis is improved or at baseline  Description: INTERVENTIONS:  1. Assess for possible contributors to thought disturbance, including medications, impaired vision or hearing, underlying metabolic abnormalities, dehydration, psychiatric diagnoses, and notify attending LIP  2. [place] high risk fall precautions, as indicated  3. Provide frequent short contacts to provide reality reorientation, refocusing and direction  4. Decrease environmental stimuli, including noise as appropriate  5. Monitor and intervene to maintain adequate nutrition, hydration, elimination, sleep and activity  6. If unable to ensure safety without constant attention obtain sitter and review sitter guidelines with assigned personnel  7.  Initiate Psychosocial CNS and Spiritual Care consult, as indicated  Outcome: Progressing     Problem: Pain  Goal: Verbalizes/displays adequate comfort level or baseline comfort level  Outcome: Progressing

## 2022-05-21 NOTE — GROUP NOTE
Group Therapy Note    Date: 5/21/2022    Group Start Time: 1000  Group End Time: 4277  Group Topic: Psychoeducation    [facility] [address]        Group Therapy Note    Attendees: 8         Notes: Minimal participation during discussion on healthy boundaries. Accepting of handout and pleasant with encouragement. Status After Intervention:  Improved    Participation Level:  Active Listener    Participation Quality: Appropriate and Attentive      Speech:  normal      Thought Process/Content: Logical      Affective Functioning: Congruent      Mood: euthymic      Level of consciousness:  Alert and Attentive      Response to Learning: Progressing to goal      Endings: None Reported    Modes of Intervention: Education, Support, Socialization and Exploration      Discipline Responsible: Psychoeducational Specialist      Signature:  Ema Clark, [address]

## 2022-05-21 NOTE — PROGRESS NOTES
BEHAVIORAL HEALTH FOLLOW-UP NOTE     5/21/2022     Patient was seen and examined in person, Chart reviewed   Patient's case discussed with staff/team    Chief Complaint:\"The [facility] said my home is there\"     Interim History:  I saw patient in her room this morning. She continues to ruminate about the \"[facility]\" believing that is where she lives. Despite the fact that she called yesterday with the nurse who verified that she does not in fact have a suite there that they are not remembering services. She continues to act on her delusions believing that is where she lives and refusing to discharge anywhere else.   She shows no insight or judgment she is delusional does seem less agitated with the Depakote        Appetite:   [x] Normal/Unchanged  [] Increased  [] Decreased      Sleep:       [x] Normal/Unchanged  [] Fair       [] Poor              Energy:    [x] Normal/Unchanged  [] Increased  [] Decreased        SI [] Present  [x] Absent    HI  []Present  [x] Absent     Aggression:  [] yes  [x] no    Patient is [x] able  [] unable to CONTRACT FOR SAFETY     PAST MEDICAL/PSYCHIATRIC HISTORY:   Past Medical History:   Diagnosis Date    [facility] psychosis ([facility] 12/22/2017    Anxiety     Asthma     Bipolar disorder ([facility]     Bronchitis     COPD (chronic obstructive pulmonary disease) (HCC)     Depression     Iron deficiency anemia     PTSD (post-traumatic stress disorder)     Schizo affective schizophrenia ([facility]     Severe episode of recurrent major depressive disorder, without psychotic features ([facility] 11/16/2019    [facility]     quit 7/2011  was Maudine Adin user        FAMILY/SOCIAL HISTORY:  Family History   Problem Relation Age of Onset    Diabetes Mother     High Blood Pressure Mother     Kidney Disease Mother     Heart Failure Mother     Diabetes Father     Other Father         mesothelioma    Mental Illness Father         schizophrenia     Social History     Socioeconomic History    Marital status: Single     Spouse name: Not on file    Number of children: 0    Years of education: GED    Highest education level: Not on file   Occupational History    Occupation: unemployed     Comment: attempting to get disability for mental illness   Tobacco Use    Smoking status: Current Every Day Smoker     Packs/day: 0.50     Years: 20.00     Pack years: 10.00     Types: Cigarettes    Smokeless tobacco: Never Used   Vaping Use    Vaping Use: Former   Substance and Sexual Activity    Alcohol use: Yes    Drug use: Yes     Types: Marijuana [place])     Comment: using marijuana daily    Sexual activity: Not Currently   Other Topics Concern    Not on file   Social History Narrative    Not on file     Social Determinants of Health     Financial Resource Strain:     Difficulty of Paying Living Expenses: Not on file   Food Insecurity:     Worried About Running Out of Food in the Last Year: Not on file    Mary of Food in the Last Year: Not on file   Transportation Needs:     Lack of Transportation (Medical): Not on file    Lack of Transportation (Non-Medical):  Not on file   Physical Activity:     Days of Exercise per Week: Not on file    Minutes of Exercise per Session: Not on file   Stress:     Feeling of Stress : Not on file   Social Connections:     Frequency of Communication with Friends and Family: Not on file    Frequency of Social Gatherings with Friends and Family: Not on file    Attends Orthodox Services: Not on file    Active Member of Clubs or Organizations: Not on file    Attends Club or Organization Meetings: Not on file    Marital Status: Not on file   Intimate Partner Violence:     Fear of Current or Ex-Partner: Not on file    Emotionally Abused: Not on file    Physically Abused: Not on file    Sexually Abused: Not on file   Housing Stability:     Unable to Pay for Housing in the Last Year: Not on file    Number of [place] in the Last Year: Not on file    hygiene  Behavior/Motor:  no abnormalities noted  Attitude toward examiner:  cooperative  Speech:  spontaneous, normal rate and normal volume   Mood: \" Appropriate and pleasant  Affect: Linear without flight of ideas loose associations  Thought processes: Linear without flight of ideas loose associations  Thought content:Devoid of any auditory visual loose [place] delusions or other perceptual normalities. Denies SI/HI intent or plan appears to be preoccupied and guarded and paranoid  Cognition: Alert oriented time place and person  Concentration intact  Insight improving  Judgement improving    ASSESSMENT:   Patient symptoms are:  [] Well controlled  [x] Improving  [] Worsening  [] No change      Diagnosis:   Principal Problem:    Schizoaffective disorder, bipolar type ([facility]  Active Problems:    [facility]oc[facility] ([facility]  Resolved Problems:    * No resolved hospital problems. *      LABS:    No results for input(s): WBC, HGB, PLT in the last 72 hours. No results for input(s): NA, K, CL, CO2, BUN, CREATININE, GLUCOSE in the last 72 hours. No results for input(s): BILITOT, ALKPHOS, AST, ALT in the last 72 hours. Lab Results   Component Value Date    LABAMPH NOT DETECTED 05/06/2022    BARBSCNU NOT DETECTED 05/06/2022    LABBENZ NOT DETECTED 05/06/2022    LABMETH NOT DETECTED 05/06/2022    OPIATESCREENURINE NOT DETECTED 05/06/2022    PHENCYCLIDINESCREENURINE NOT DETECTED 05/06/2022    ETOH <10 05/06/2022     Lab Results   Component Value Date    TSH 1.770 03/07/2019     No results found for: LITHIUM  Lab Results   Component Value Date    VALPROATE 57 04/02/2021         Treatment Plan:  Reviewed current Medications with the patient. Risks, benefits, side effects, drug-to-drug interactions and alternatives to treatment were discussed. Collateral information:   CD evaluation  Encourage patient to attend group and other milieu activities.   Discharge planning discussed with the patient and treatment team.    Continue Depakote 250 mg twice daily for patient's irritability  increase Invega to 6 mg daily and 6 mg at bedtime  Patient had her Elwanddudley Kaytter 234 mg IM injection on 5/17/2022 she is due for 156 mg IM every 30 days booster on 5/24/2022    PSYCHOTHERAPY/COUNSELING:  [x] Therapeutic interview  [x] Supportive  [] CBT  [] Ongoing  [] Other    [x] Patient continues to need, on a daily basis, active treatment furnished directly by or requiring the supervision of inpatient psychiatric personnel            Behavioral Services                                              Medicare Re-Certification    I certify that the inpatient psychiatric hospital services furnished since the previous certification/re-certification were, and continue to be, medically necessary for;    [x] (1) Treatment which could reasonably be expected to improve the patient's condition,    [] (2) Or for diagnostic study. Estimated length of stay/service 3 to 7 days based on stability    Plan for post-hospital care outpatient psychiatric and counseling services    This patient continues to need, on a daily basis, active treatment furnished directly by or requiring the supervision of inpatient psychiatric personnel.     Electronically signed by CHARO Parekh CNP on 2/15/3128 at 10:50 AM         Electronically signed by CHARO Parekh CNP on 0/40/1627 at 10:50 AM

## 2022-05-21 NOTE — PLAN OF CARE
Problem: Anxiety  Goal: Will report anxiety at manageable levels  Description: INTERVENTIONS:  1. Administer medication as ordered  2. Teach and rehearse alternative coping skills  3. Provide emotional support with 1:1 interaction with staff  5/20/2022 2134 by Sotero Mayo RN  Outcome: Progressing     Problem: Coping  Goal: Pt/Family able to verbalize concerns and demonstrate effective coping strategies  Description: INTERVENTIONS:  1. Assist patient/family to identify coping skills, available support systems and cultural and spiritual values  2. Provide emotional support, including active listening and acknowledgement of concerns of patient and caregivers  3. Reduce environmental stimuli, as able  4. Instruct patient/family in relaxation techniques, as appropriate  5. Assess for spiritual pain/suffering and initiate Spiritual Care, Psychosocial Clinical Specialist consults as needed  5/20/2022 2134 by Sotero Mayo RN  Outcome: Progressing     Patient has been withdrawn to her room. Did come out for snack. Calm and cooperative during assessment. Affect is flat, sad, and depressed. Mood is congruent-but patient denies any depression or anxiety. Responses were minimal. Denies suicidal/homicidal ideations and hallucinations at this time. Purposeful rounding continued.

## 2022-05-21 NOTE — PROGRESS NOTES
Patient is flat, evasive and guarded. Patient denies thoughts to harm self or others. Patient denies hallucinations. Patient is compliant with medications, does not attend groups, does not interact with peers.  Appetite good

## 2022-05-22 PROCEDURE — 1240000000 HC EMOTIONAL WELLNESS R&B

## 2022-05-22 PROCEDURE — 99232 SBSQ HOSP IP/OBS MODERATE 35: CPT | Performed by: NURSE PRACTITIONER

## 2022-05-22 PROCEDURE — 6370000000 HC RX 637 (ALT 250 FOR IP): Performed by: NURSE PRACTITIONER

## 2022-05-22 RX ADMIN — DIVALPROEX SODIUM 250 MG: 250 TABLET, DELAYED RELEASE ORAL at 21:52

## 2022-05-22 RX ADMIN — DIVALPROEX SODIUM 250 MG: 250 TABLET, DELAYED RELEASE ORAL at 10:10

## 2022-05-22 RX ADMIN — PALIPERIDONE 6 MG: 6 TABLET, EXTENDED RELEASE ORAL at 10:10

## 2022-05-22 RX ADMIN — PALIPERIDONE 6 MG: 6 TABLET, EXTENDED RELEASE ORAL at 21:52

## 2022-05-22 ASSESSMENT — PAIN SCALES - GENERAL: PAINLEVEL_OUTOF10: 0

## 2022-05-22 NOTE — PLAN OF CARE
Problem: Anxiety  Goal: Will report anxiety at manageable levels  Description: INTERVENTIONS:  1. Administer medication as ordered  2. Teach and rehearse alternative coping skills  3. Provide emotional support with 1:1 interaction with staff  Outcome: Progressing  Flowsheets (Taken 5/22/2022 1016)  Will report anxiety at manageable levels:   Administer medication as ordered   Teach and rehearse alternative coping skills   Provide emotional support with 1:1 interaction with staff     Problem: Coping  Goal: Pt/Family able to verbalize concerns and demonstrate effective coping strategies  Description: INTERVENTIONS:  1. Assist patient/family to identify coping skills, available support systems and cultural and spiritual values  2. Provide emotional support, including active listening and acknowledgement of concerns of patient and caregivers  3. Reduce environmental stimuli, as able  4. Instruct patient/family in relaxation techniques, as appropriate  5. Assess for spiritual pain/suffering and initiate Spiritual Care, Psychosocial Clinical Specialist consults as needed  Outcome: Progressing     Problem: Decision Making  Goal: Pt/Family able to effectively weigh alternatives and participate in decision making related to treatment and care  Description: INTERVENTIONS:  1. Determine when there are differences between patient's view, family's view, and healthcare provider's view of condition  2. Facilitate patient and family articulation of goals for care  3. Help patient and family identify pros/cons of alternative solutions  4. Provide information as requested by patient/family  5. Respect patient/family right to receive or not to receive information  6. Serve as a liaison between patient and family and health care team  7.  Initiate Consults from Ethics, Palliative Care or initiate [address] as is appropriate  Outcome: Progressing     Problem: Confusion  Goal: Confusion, delirium, dementia, or psychosis is improved or at baseline  Description: INTERVENTIONS:  1. Assess for possible contributors to thought disturbance, including medications, impaired vision or hearing, underlying metabolic abnormalities, dehydration, psychiatric diagnoses, and notify attending LIP  2. [place] high risk fall precautions, as indicated  3. Provide frequent short contacts to provide reality reorientation, refocusing and direction  4. Decrease environmental stimuli, including noise as appropriate  5. Monitor and intervene to maintain adequate nutrition, hydration, elimination, sleep and activity  6. If unable to ensure safety without constant attention obtain sitter and review sitter guidelines with assigned personnel  7.  Initiate Psychosocial CNS and Spiritual Care consult, as indicated  Outcome: Progressing     Problem: Pain  Goal: Verbalizes/displays adequate comfort level or baseline comfort level  Outcome: Progressing

## 2022-05-22 NOTE — PROGRESS NOTES
Patient is compliant with medications, does not interact with peers in milieu. Patient is aloof and walks slowly around the unit. Patient is guarded and suspicious with staff. Patient is preoccupied but denies hallucinations. Patient denies suicidal thoughts, denies homicidal ideation.  Patient appetite is good

## 2022-05-22 NOTE — PROGRESS NOTES
BEHAVIORAL HEALTH FOLLOW-UP NOTE     5/22/2022     Patient was seen and examined in person, Chart reviewed   Patient's case discussed with staff/team    Chief Complaint:\" I am going to the [facility]\"     Interim History:  Patient seen in her room she continues to act on her delusions of believing that she is supposed to live in the [address] [place] [address] Rd.   She denies SI/HI intent or plan she continues to be delusional and act on the delusion she has no insight or judgment    Appetite:   [x] Normal/Unchanged  [] Increased  [] Decreased      Sleep:       [x] Normal/Unchanged  [] Fair       [] Poor              Energy:    [x] Normal/Unchanged  [] Increased  [] Decreased        SI [] Present  [x] Absent    HI  []Present  [x] Absent     Aggression:  [] yes  [x] no    Patient is [x] able  [] unable to CONTRACT FOR SAFETY     PAST MEDICAL/PSYCHIATRIC HISTORY:   Past Medical History:   Diagnosis Date    [facility] psychosis ([facility] 12/22/2017    Anxiety     Asthma     Bipolar disorder ([facility]     Bronchitis     COPD (chronic obstructive pulmonary disease) ([facility]     Depression     Iron deficiency anemia     PTSD (post-traumatic stress disorder)     Schizo affective schizophrenia ([facility]     Severe episode of recurrent major depressive disorder, without psychotic features ([facility] 11/16/2019    Substance abuse ([facility]     quit 7/2011  was marajuanna user        FAMILY/SOCIAL HISTORY:  Family History   Problem Relation Age of Onset    Diabetes Mother     High Blood Pressure Mother     Kidney Disease Mother     Heart Failure Mother     Diabetes Father     Other Father         mesothelioma    Mental Illness Father         schizophrenia     Social History     Socioeconomic History    Marital status: Single     Spouse name: Not on file    Number of children: 0    Years of education: GED    Highest education level: Not on file   Occupational History    Occupation: unemployed     Comment: attempting to get disability for mental illness Tobacco Use    Smoking status: Current Every Day Smoker     Packs/day: 0.50     Years: 20.00     Pack years: 10.00     Types: Cigarettes    Smokeless tobacco: Never Used   Vaping Use    Vaping Use: Former   Substance and Sexual Activity    Alcohol use: Yes    Drug use: Yes     Types: Marijuana Miguel Willis)     Comment: using marijuana daily    Sexual activity: Not Currently   Other Topics Concern    Not on file   Social History Narrative    Not on file     Social Determinants of Health     Financial Resource Strain:     Difficulty of Paying Living Expenses: Not on file   Food Insecurity:     Worried About Running Out of Food in the Last Year: Not on file    Mary of Food in the Last Year: Not on file   Transportation Needs:     Lack of Transportation (Medical): Not on file    Lack of Transportation (Non-Medical): Not on file   Physical Activity:     Days of Exercise per Week: Not on file    Minutes of Exercise per Session: Not on file   Stress:     Feeling of Stress : Not on file   Social Connections:     Frequency of Communication with Friends and Family: Not on file    Frequency of Social Gatherings with Friends and Family: Not on file    Attends Christianity Services: Not on file    Active Member of [address] [place] [address] [employer] or Organizations: Not on file    Attends Club or Organization Meetings: Not on file    Marital Status: Not on file   Intimate Partner Violence:     Fear of Current or Ex-Partner: Not on file    Emotionally Abused: Not on file    Physically Abused: Not on file    Sexually Abused: Not on file   Housing Stability:     Unable to Pay for Housing in the Last Year: Not on file    Number of [place] in the Last Year: Not on file    Unstable Housing in the Last Year: Not on file           ROS:  [x] All negative/unchanged except if checked.  Explain positive(checked items) below:  [] Constitutional  [] Eyes  [] Ear/Nose/Mouth/Throat  [] Respiratory  [] CV  [] GI  []   [] Musculoskeletal  [] Skin/Breast  [] Neurological  [] Endocrine  [] Heme/Lymph  [] Allergic/Immunologic    Explanation:     MEDICATIONS:    Current Facility-Administered Medications:     divalproex (DEPAKOTE) DR tablet 250 mg, 250 mg, Oral, 2 times per day, Gleda Kanner, APRN - CNP, 311 mg at 05/21/22 2054    paliperidone (INVEGA) extended release tablet 6 mg, 6 mg, Oral, Nightly, CHARO Serra CNP, 6 mg at 05/21/22 2054    [START ON 5/24/2022] paliperidone palmitate ER (INVEGA SUSTENNA) IM injection 156 mg, 156 mg, IntraMUSCular, Q30 Days, CHARO Elam CNP    paliperidone (INVEGA) extended release tablet 6 mg, 6 mg, Oral, Daily, CHARO Serra CNP, 6 mg at 05/21/22 9212    acetaminophen (TYLENOL) tablet 650 mg, 650 mg, Oral, Q4H PRN, Sonia Smallwood MD    hydrOXYzine (VISTARIL) capsule 50 mg, 50 mg, Oral, TID PRN, Sonia Smallwood MD    haloperidol (HALDOL) tablet 5 mg, 5 mg, Oral, Q4H PRN **OR** haloperidol lactate (HALDOL) injection 5 mg, 5 mg, IntraMUSCular, Q4H PRN, Sonia Smallwood MD    traZODone (DESYREL) tablet 50 mg, 50 mg, Oral, Nightly PRN, Sonia Smallwood MD    magnesium hydroxide (MILK OF MAGNESIA) 400 MG/5ML suspension 30 mL, 30 mL, Oral, Daily PRN, Sonia Smallwood MD    aluminum & magnesium hydroxide-simethicone (MAALOX) 200-200-20 MG/5ML suspension 30 mL, 30 mL, Oral, Q6H PRN, Sonia Smallwood MD      Examination:  BP 96/65   Pulse 73   Temp 98.5 °F (36.9 °C)   Resp 16   Ht 5' 2\" (1.575 m)   SpO2 96%   BMI 21.40 kg/m²   Gait - steady  Medication side effects(SE): denies    Mental Status Examination:    Level of consciousness:  within normal limits   Appearance:  fair grooming and fair hygiene  Behavior/Motor:  no abnormalities noted  Attitude toward examiner:  cooperative  Speech:  spontaneous, normal rate and normal volume   Mood: \" Appropriate and pleasant  Affect: Linear without flight of ideas loose associations  Thought processes: Linear without flight of ideas loose associations  Thought content:Devoid of any auditory visual loose [place] delusions or other perceptual normalities. Denies SI/HI intent or plan appears to be preoccupied and guarded and paranoid  Cognition: Alert oriented time place and person  Concentration intact  Insight improving  Judgement improving    ASSESSMENT:   Patient symptoms are:  [] Well controlled  [x] Improving  [] Worsening  [] No change      Diagnosis:   Principal Problem:    Schizoaffective disorder, bipolar type ([facility]  Active Problems:    Cocaine abuse ([facility]  Resolved Problems:    * No resolved hospital problems. *      LABS:    No results for input(s): WBC, HGB, PLT in the last 72 hours. No results for input(s): NA, K, CL, CO2, BUN, CREATININE, GLUCOSE in the last 72 hours. No results for input(s): BILITOT, ALKPHOS, AST, ALT in the last 72 hours. Lab Results   Component Value Date    LABAMPH NOT DETECTED 05/06/2022    BARBSCNU NOT DETECTED 05/06/2022    LABBENZ NOT DETECTED 05/06/2022    LABMETH NOT DETECTED 05/06/2022    OPIATESCREENURINE NOT DETECTED 05/06/2022    PHENCYCLIDINESCREENURINE NOT DETECTED 05/06/2022    ETOH <10 05/06/2022     Lab Results   Component Value Date    TSH 1.770 03/07/2019     No results found for: LITHIUM  Lab Results   Component Value Date    VALPROATE 57 04/02/2021         Treatment Plan:  Reviewed current Medications with the patient. Risks, benefits, side effects, drug-to-drug interactions and alternatives to treatment were discussed. Collateral information:   CD evaluation  Encourage patient to attend group and other milieu activities.   Discharge planning discussed with the patient and treatment team.    Continue Depakote 250 mg twice daily for patient's irritability  increase Invega to 6 mg daily and 6 mg at bedtime  Patient had her Zigmund Morales 234 mg IM injection on 5/17/2022 she is due for 156 mg IM every 30 days booster on 5/24/2022    PSYCHOTHERAPY/COUNSELING:  [x] Therapeutic interview  [x] Supportive  [] CBT  [] Ongoing  [] Other    [x] Patient continues to need, on a daily basis, active treatment furnished directly by or requiring the supervision of inpatient psychiatric personnel            Behavioral Services                                              Medicare Re-Certification    I certify that the inpatient psychiatric hospital services furnished since the previous certification/re-certification were, and continue to be, medically necessary for;    [x] (1) Treatment which could reasonably be expected to improve the patient's condition,    [] (2) Or for diagnostic study. Estimated length of stay/service 3 to 7 days based on stability    Plan for post-hospital care outpatient psychiatric and counseling services    This patient continues to need, on a daily basis, active treatment furnished directly by or requiring the supervision of inpatient psychiatric personnel.     Electronically signed by CHARO Daniel CNP on 3/98/9911 at 9:05 AM         Electronically signed by CHARO Daniel CNP on 9/47/9333 at 9:05 AM

## 2022-05-22 NOTE — PROGRESS NOTES
Patient isolative to room. Poor eye contact. Affect flat,sad evasive. Denies SI,HI or AV hallucinations. No anxiety or depression. Appetite is good. Sleep is no struggle. Verbalizes here for mental evaluation because her  ([facility]) wants to put her away. Did not comment when ask why she thought that. Verbalizes no change since admission. Compliant with medications. ! Group noted attended. Q 15 minute safety rounds continue.

## 2022-05-23 PROCEDURE — 99232 SBSQ HOSP IP/OBS MODERATE 35: CPT | Performed by: NURSE PRACTITIONER

## 2022-05-23 PROCEDURE — 6370000000 HC RX 637 (ALT 250 FOR IP): Performed by: NURSE PRACTITIONER

## 2022-05-23 PROCEDURE — 1240000000 HC EMOTIONAL WELLNESS R&B

## 2022-05-23 RX ADMIN — DIVALPROEX SODIUM 250 MG: 250 TABLET, DELAYED RELEASE ORAL at 20:37

## 2022-05-23 RX ADMIN — DIVALPROEX SODIUM 250 MG: 250 TABLET, DELAYED RELEASE ORAL at 08:31

## 2022-05-23 RX ADMIN — PALIPERIDONE 6 MG: 6 TABLET, EXTENDED RELEASE ORAL at 20:37

## 2022-05-23 RX ADMIN — PALIPERIDONE 6 MG: 6 TABLET, EXTENDED RELEASE ORAL at 08:31

## 2022-05-23 ASSESSMENT — PAIN SCALES - GENERAL
PAINLEVEL_OUTOF10: 0
PAINLEVEL_OUTOF10: 0

## 2022-05-23 NOTE — CARE COORDINATION
Navigator met with patient and provided her the court notification regarding her [employer] hearing on 5/24/2022 and provided the handbook. Patient verbalized understanding.  Patient reports that she is \"firing\" her  Clarisa Rowley    Electronically signed by JEFRY Zimmerman, LSW on 5/23/2022 at 4:05 PM

## 2022-05-23 NOTE — CARE COORDINATION
Received notification from probate court. Patient will be on the [employer] docket for tomorrow 5/24/22 @ 9:30 AM via video, Navigator will facilitate. Usama reports they are unable to transport to the [facility], they recommended patient Hannah Morales [phone] transports patient. If Aunt is unable to transport, patient would have to be transported via [facility]. Waiting to confirm if  will meet the patient at the [facility] upon arrival to help ensure compliance.     Updated assigned SW.    Electronically signed by JEFRY Tay, NATHANW on 5/23/2022 at 2:21 PM

## 2022-05-23 NOTE — PROGRESS NOTES
Patient isolative to room. Does not interact with peers on milieu. Verbalizes that she would rather stay in her room where peaceful and able to relax. Affect flat,sad evasive. Poor eye contact. Denies SI,HI or AV hallucinations. No anxiety or depression. No insight into admission. Appetite is good. Sleeping good,no struggles. Ask patient about discharge to,Crisis Unit and right away verbalizes, \"I'm suppose to go home. \"  Ask why the struggle with going to Crisis Unit, states not a problem ,just that she has a home. Compliant with medications. No groups noted attended. Q 15 minute rounds continue.

## 2022-05-23 NOTE — CARE COORDINATION
Navigator spoke with [address] [place] [address] Cirilo Redd., requested patient to be placed on the docket for 5/24/22. Sent communication to [place] [id number]., to organize transport for a dc straight to [facility] start court possibly tomorrow, if treatment team is agreeable.     Electronically signed by JEFRY Garcia, LSW on 5/23/2022 at 9:29 AM

## 2022-05-23 NOTE — BH NOTE
Pt is stable, quiet, keeps to herself. Pt denies suicidal / homicidal ideations. Pt denies hallucinations. Pt is cooperative, negative distress at this time. Cristina Kenney in to discuss discharge plan with pt. presently. Will follow and monitor.

## 2022-05-23 NOTE — PLAN OF CARE
Pt is without distress at this time. Pt denies suicidal / homicidal ideations. Pt denies hallucinations. Pt is cooperative though can be flat in affect and express poverty pf content with speech. Will follow and monitor.

## 2022-05-23 NOTE — BH NOTE
[facility] Interdisciplinary Treatment Plan Note     Review Date & Time: 5-23-22  9:30    Patient  in treatment team.    Estimated Length of Stay Update:  1-3 days  Estimated Discharge Date Update: 1-3 days    EDUCATION:   Learner Progress Toward Treatment Goals: Reviewed results and recommendations of this team and Reviewed group plan and strategies    Method: Small group    Outcome: Verbalized understanding    PATIENT GOALS: Pt does not report a goal during first morning assessment by this RN. PLAN/TREATMENT RECOMMENDATIONS UPDATE: Continue to monitor pt progress, planning for discharge tomorrow (5-23)    GOALS UPDATE:  Time frame for Short-Term Goals: Daily re assessment.        Ivania Jules, RN

## 2022-05-23 NOTE — PROGRESS NOTES
BEHAVIORAL HEALTH FOLLOW-UP NOTE     5/23/2022     Patient was seen and examined in person, Chart reviewed   Patient's case discussed with staff/team    Chief Complaint:\" I am supposed to go to my home at the [employer]\"     Interim History:  Patient seen in her room. She continues to ruminate about her [place] [facility]. She has no insight or judgment regarding circumstance of her hospitalization or need for treatment she was she is only supposed to live in a hotel despite calling there and being told that she does not have a room reserve nor is the hotel room by [facility]. She remains fixed on this delusion.   And refuses to go to the crisis unit    Appetite:   [x] Normal/Unchanged  [] Increased  [] Decreased      Sleep:       [x] Normal/Unchanged  [] Fair       [] Poor              Energy:    [x] Normal/Unchanged  [] Increased  [] Decreased        SI [] Present  [x] Absent    HI  []Present  [x] Absent     Aggression:  [] yes  [x] no    Patient is [x] able  [] unable to CONTRACT FOR SAFETY     PAST MEDICAL/PSYCHIATRIC HISTORY:   Past Medical History:   Diagnosis Date    Acute psychosis ([facility] 12/22/2017    Anxiety     Asthma     Bipolar disorder ([facility]     Bronchitis     COPD [facility]chronic obstructive pulmonary disease) ([facility])     Depression     Iron deficiency anemia     PTSD (post-traumatic stress disorder)     Schizo affective schizophrenia ([facility]     Severe episode of recurrent major depressive disorder, without psychotic features ([address] 11/16/2019    Substance abuse ([facility]     quit 7/2011  was Onalee Dakotah user        FAMILY/SOCIAL HISTORY:  Family History   Problem Relation Age of Onset    Diabetes Mother     High Blood Pressure Mother     Kidney Disease Mother     Heart Failure Mother     Diabetes Father     Other Father         mesothelioma    Mental Illness Father         schizophrenia     Social History     Socioeconomic History    Marital status: Single     Spouse name: Not on file    Number of children: 0    Years of education: GED    Highest education level: Not on file   Occupational History    Occupation: unemployed     Comment: attempting to get disability for mental illness   Tobacco Use    Smoking status: Current Every Day Smoker     Packs/day: 0.50     Years: 20.00     Pack years: 10.00     Types: Cigarettes    Smokeless tobacco: Never Used   Vaping Use    Vaping Use: Former   Substance and Sexual Activity    Alcohol use: Yes    Drug use: Yes     Types: Marijuana Dauna Other)     Comment: using marijuana daily    Sexual activity: Not Currently   Other Topics Concern    Not on file   Social History Narrative    Not on file     Social Determinants of Health     Financial Resource Strain:     Difficulty of Paying Living Expenses: Not on file   Food Insecurity:     Worried About Running Out of Food in the Last Year: Not on file    Mary of Food in the Last Year: Not on file   Transportation Needs:     Lack of Transportation (Medical): Not on file    Lack of Transportation (Non-Medical):  Not on file   Physical Activity:     Days of Exercise per Week: Not on file    Minutes of Exercise per Session: Not on file   Stress:     Feeling of Stress : Not on file   Social Connections:     Frequency of Communication with Friends and Family: Not on file    Frequency of Social Gatherings with Friends and Family: Not on file    Attends Temple Services: Not on file    Active Member of [address] [place] [address] or Organizations: Not on file    Attends Club or Organization Meetings: Not on file    Marital Status: Not on file   Intimate Partner Violence:     Fear of Current or Ex-Partner: Not on file    Emotionally Abused: Not on file    Physically Abused: Not on file    Sexually Abused: Not on file   Housing Stability:     Unable to Pay for Housing in the Last Year: Not on file    Number of [place] in the Last Year: Not on file    Unstable Housing in the Last Year: Not on file ROS:  [x] All negative/unchanged except if checked.  Explain positive(checked items) below:  [] Constitutional  [] Eyes  [] Ear/Nose/Mouth/Throat  [] Respiratory  [] CV  [] GI  []   [] Musculoskeletal  [] Skin/Breast  [] Neurological  [] Endocrine  [] Heme/Lymph  [] Allergic/Immunologic    Explanation:     MEDICATIONS:    Current Facility-Administered Medications:     divalproex (DEPAKOTE) DR tablet 250 mg, 250 mg, Oral, 2 times per day, CHARO Li - CNP, 612 mg at 05/23/22 0831    paliperidone (INVEGA) extended release tablet 6 mg, 6 mg, Oral, Nightly, Kirt Blood [place], APRN - CNP, 6 mg at 05/22/22 2152    [START ON 5/24/2022] paliperidone palmitate ER (INVEGA SUSTENNA) IM injection 156 mg, 156 mg, IntraMUSCular, Q30 Days, Betzy Middleton APRN - CNP    paliperidone (INVEGA) extended release tablet 6 mg, 6 mg, Oral, Daily, Kirt Blood [place], APRN - CNP, 6 mg at 05/23/22 0831    acetaminophen (TYLENOL) tablet 650 mg, 650 mg, Oral, Q4H PRN, Joe Cruz MD    hydrOXYzine (VISTARIL) capsule 50 mg, 50 mg, Oral, TID PRN, Joe Cruz MD    haloperidol (HALDOL) tablet 5 mg, 5 mg, Oral, Q4H PRN **OR** haloperidol lactate (HALDOL) injection 5 mg, 5 mg, IntraMUSCular, Q4H PRN, Joe Cruz MD    traZODone (DESYREL) tablet 50 mg, 50 mg, Oral, Nightly PRN, Joe Cruz MD    magnesium hydroxide (MILK OF MAGNESIA) 400 MG/5ML suspension 30 mL, 30 mL, Oral, Daily PRN, Joe Cruz MD    aluminum & magnesium hydroxide-simethicone (MAALOX) 200-200-20 MG/5ML suspension 30 mL, 30 mL, Oral, Q6H PRN, Joe Cruz MD      Examination:  BP 97/66   Pulse 93   Temp 98.6 °F (37 °C)   Resp 14   Ht 5' 2\" (1.575 m)   SpO2 99%   BMI 21.40 kg/m²   Gait - steady  Medication side effects(SE): denies    Mental Status Examination:    Level of consciousness:  within normal limits   Appearance:  fair grooming and fair hygiene  Behavior/Motor:  no abnormalities noted  Attitude toward examiner:  cooperative  Speech:  spontaneous, normal rate and normal volume   Mood: \" Appropriate and pleasant  Affect: Linear without flight of ideas loose associations  Thought processes: Linear without flight of ideas loose associations  Thought content:Devoid of any auditory visual loose [place] delusions or other perceptual normalities. Denies SI/HI intent or plan appears to be preoccupied and guarded and paranoid  Cognition: Alert oriented time place and person  Concentration intact  Insight improving  Judgement improving    ASSESSMENT:   Patient symptoms are:  [] Well controlled  [x] Improving  [] Worsening  [] No change      Diagnosis:   Principal Problem:    Schizoaffective disorder, bipolar type ([facility]  Active Problems:    Cocaine abuse ([facility]  Resolved Problems:    * No resolved hospital problems. *      LABS:    No results for input(s): WBC, HGB, PLT in the last 72 hours. No results for input(s): NA, K, CL, CO2, BUN, CREATININE, GLUCOSE in the last 72 hours. No results for input(s): BILITOT, ALKPHOS, AST, ALT in the last 72 hours. Lab Results   Component Value Date    LABAMPH NOT DETECTED 05/06/2022    BARBSCNU NOT DETECTED 05/06/2022    LABBENZ NOT DETECTED 05/06/2022    LABMETH NOT DETECTED 05/06/2022    OPIATESCREENURINE NOT DETECTED 05/06/2022    PHENCYCLIDINESCREENURINE NOT DETECTED 05/06/2022    ETOH <10 05/06/2022     Lab Results   Component Value Date    TSH 1.770 03/07/2019     No results found for: LITHIUM  Lab Results   Component Value Date    VALPROATE 57 04/02/2021         Treatment Plan:  Reviewed current Medications with the patient. Risks, benefits, side effects, drug-to-drug interactions and alternatives to treatment were discussed. Collateral information:   CD evaluation  Encourage patient to attend group and other milieu activities.   Discharge planning discussed with the patient and treatment team.    Continue Depakote 250 mg twice daily for patient's irritability  increase Invega to 6 mg daily and 6 mg at bedtime  Patient had her Clara Silk 234 mg IM injection on 5/17/2022 she is due for 156 mg IM every 30 days booster on 5/24/2022    PSYCHOTHERAPY/COUNSELING:  [x] Therapeutic interview  [x] Supportive  [] CBT  [] Ongoing  [] Other    [x] Patient continues to need, on a daily basis, active treatment furnished directly by or requiring the supervision of inpatient psychiatric personnel            Behavioral Services                                              Medicare Re-Certification    I certify that the inpatient psychiatric hospital services furnished since the previous certification/re-certification were, and continue to be, medically necessary for;    [x] (1) Treatment which could reasonably be expected to improve the patient's condition,    [] (2) Or for diagnostic study. Estimated length of stay/service 3 to 7 days based on stability    Plan for post-hospital care outpatient psychiatric and counseling services    This patient continues to need, on a daily basis, active treatment furnished directly by or requiring the supervision of inpatient psychiatric personnel.     Electronically signed by CHARO Lemon CNP on 7/39/1669 at 12:03 PM         Electronically signed by CHARO Lemon CNP on 5/08/0368 at 12:03 PM

## 2022-05-24 VITALS
RESPIRATION RATE: 14 BRPM | DIASTOLIC BLOOD PRESSURE: 52 MMHG | TEMPERATURE: 98 F | SYSTOLIC BLOOD PRESSURE: 91 MMHG | BODY MASS INDEX: 21.4 KG/M2 | OXYGEN SATURATION: 99 % | HEART RATE: 77 BPM | HEIGHT: 62 IN

## 2022-05-24 PROCEDURE — 99239 HOSP IP/OBS DSCHRG MGMT >30: CPT | Performed by: NURSE PRACTITIONER

## 2022-05-24 PROCEDURE — 6370000000 HC RX 637 (ALT 250 FOR IP): Performed by: NURSE PRACTITIONER

## 2022-05-24 RX ADMIN — DIVALPROEX SODIUM 250 MG: 250 TABLET, DELAYED RELEASE ORAL at 08:14

## 2022-05-24 RX ADMIN — PALIPERIDONE 6 MG: 6 TABLET, EXTENDED RELEASE ORAL at 08:14

## 2022-05-24 ASSESSMENT — PAIN SCALES - GENERAL
PAINLEVEL_OUTOF10: 0
PAINLEVEL_OUTOF10: 0

## 2022-05-24 NOTE — BH NOTE
[facility]  Discharge Note    Pt discharged with followings belongings:   Dental Appliances: None  Vision - Corrective Lenses: None  Hearing Aid: None  Jewelry: None  Body Piercings Removed: N/A  Clothing: Belt,Shirt,Pants,Slippers,Other (Comment) (1 head scarf)  Other Valuables: [address] sent home with pt or returned to patient. Patient education on aftercare instructions: yes . Patient verbalize understanding of AVS: yes. Status EXAM upon discharge:  Mental Status and Behavioral Exam  Normal: No  Level of Assistance: Independent/Self  Facial Expression: Flat,Sad  Affect: Constricted  Level of Consciousness: Alert  Frequency of Checks: 4 times per hour, close  Mood:Normal: No  Mood: Depressed,Sad  Motor Activity:Normal: No  Motor Activity: Decreased  Eye Contact: Fair  Observed Behavior: Withdrawn,Cooperative  Sexual Misconduct History: Past - no  Involved In Any Sexual Misconduct With Others? : No  History of Sexually Inappropriate Behavior When Previously Hospitalized?: No  Uncontrollable/Compulsive Masturbation?: No  Difficulty Controlling Sexual Impulses?: No  Preception: [place] to person,[place] to time,M[place] to place,[place] to situation  Attention:Normal: No  Attention: Distractible  Thought Processes: Circumstantial  Thought Content:Normal: No  Thought Content: Preoccupations  Depression Symptoms: Isolative  Anxiety Symptoms: Generalized  Ksenia Symptoms: No problems reported or observed.   Hallucinations: None  Delusions: Yes  Delusions: Persecutory  Memory:Normal: Yes  Memory: Poor recent  Insight and Judgment: No  Insight and Judgment: Poor insight      Metabolic Screening:    Lab Results   Component Value Date    LABA1C 5.9 (H) 03/28/2021       Lab Results   Component Value Date    CHOL 190 03/28/2021    CHOL 235 (H) 09/20/2019     Lab Results   Component Value Date    TRIG 80 03/28/2021    TRIG 65 09/20/2019     Lab Results   Component Value Date    HDL 68 03/28/2021    HDL 59 09/20/2019     No components found for: [facility]  Lab Results   Component Value Date    LABVLDL 16 03/28/2021    LABVLDL 13 09/20/2019       Aristeo Gudino RN

## 2022-05-24 NOTE — BH NOTE
Pt report called to [facility] unit. Awaiting Taxi arrival for transportation.

## 2022-05-24 NOTE — CARE COORDINATION
Navigator attended [employer] with the patient. The patient was cooperative, attentive, and responded to the [facility] appropriately. Patient was challenged on her delusion regarding living at the [address] [place] [address]., reinforced that patient resides in [address] [facility] housing. Patient was able to verbalize that this is accurate.  reinforced the patient stepping down and has ordered it as a part of her treatment plan and outpatient commitment. Patient will be ordered to the [facility] until her next hearing date 6/14/22. It will be discussed at that time if patient is ready to return back to her independent housing. Patient verbalized understanding however had a degree of irritableness when CSU was reinforced to her. Plan will be for patient to step-down to the [facility] today. Notified SW ok to submit referral. Navigator will coordinate with CSU to contact Navigator once patient arrives to their facility. Will forward [address] to the [facility], once received.     Electronically signed by JEFRY Posada, ARIS on 5/24/2022 at 10:54 AM

## 2022-05-24 NOTE — PLAN OF CARE
Pt is stable and without immediate distress presently. Pt denies suicidal / homicidal ideations. Pt denies Hallucinations. Pt has a flat affect, appears sad and worried. Pt has poverty of content / speech. Will follow and monitor.

## 2022-05-24 NOTE — PLAN OF CARE
Patient denies suicidal ideation, homicidal ideations and AVH. Patient denies anxiety and depression. Patient is flat, sad and depressed with poverty of content. Presents calm and cooperative during assessment. Patient is isolative to room and does not appear to be social with peers. Medications taken without issue. No complaints or concerns verbalized at this time. No unit problems reported. Will continue to observe and support. Problem: Anxiety  Goal: Will report anxiety at manageable levels  Description: INTERVENTIONS:  1. Administer medication as ordered  2. Teach and rehearse alternative coping skills  3. Provide emotional support with 1:1 interaction with staff  Outcome: Paige Cha (Taken 5/23/2022 1107 by Aristeo Gudino RN)  Will report anxiety at manageable levels: Administer medication as ordered     Problem: Coping  Goal: Pt/Family able to verbalize concerns and demonstrate effective coping strategies  Description: INTERVENTIONS:  1. Assist patient/family to identify coping skills, available support systems and cultural and spiritual values  2. Provide emotional support, including active listening and acknowledgement of concerns of patient and caregivers  3. Reduce environmental stimuli, as able  4. Instruct patient/family in relaxation techniques, as appropriate  5. Assess for spiritual pain/suffering and initiate Spiritual Care, Psychosocial Clinical Specialist consults as needed  5/23/2022 2049 by Stephenie De Paz RN  Outcome: Progressing     Problem: Decision Making  Goal: Pt/Family able to effectively weigh alternatives and participate in decision making related to treatment and care  Description: INTERVENTIONS:  1. Determine when there are differences between patient's view, family's view, and healthcare provider's view of condition  2. Facilitate patient and family articulation of goals for care  3. Help patient and family identify pros/cons of alternative solutions  4.  Provide information as requested by patient/family  5. Respect patient/family right to receive or not to receive information  6. Serve as a liaison between patient and family and health care team  7.  Initiate Consults from Ethics, Palliative Care or initiate [facility] as is appropriate  5/23/2022 2049 by Terry Floyd RN  Outcome: Progressing

## 2022-05-24 NOTE — CARE COORDINATION
Submitted level of care notification to [facility], probate court, & [place].     Electronically signed by JEFRY Reyes, NATHANW on 5/24/2022 at 5:23 PM

## 2022-06-27 NOTE — BH NOTE
Resting quietly in bed with eyes closed q 15minute checks continued throughout shift Writer left message for patient to call office and schedule pre-procedure Covid-19  screening and informing patient that screening must be completed to have procedure. Number given.

## 2023-02-14 NOTE — PROGRESS NOTES
BEHAVIORAL HEALTH FOLLOW-UP NOTE     7/12/2021     Patient was seen and examined in person, Chart reviewed   Patient's case discussed with staff/team    Chief Complaint: \" I am suicidal.\"    Interim History:   Patient is up on the unit today she socializing with them and another peer she continues to look flat and depressed and continues to state that she is having suicidal ideations. She states that she is trying to spend more time out of her room and in the milieu.   She denies any auditory visualizations    Appetite:   [x] Normal/Unchanged  [] Increased  [] Decreased      Sleep:       [x] Normal/Unchanged  [] Fair       [] Poor              Energy:    [x] Normal/Unchanged  [] Increased  [] Decreased        SI [x] Present  [] Absent    HI  []Present  [x] Absent     Aggression:  [] yes  [x] no    Patient is [x] able  [] unable to CONTRACT FOR SAFETY     PAST MEDICAL/PSYCHIATRIC HISTORY:   Past Medical History:   Diagnosis Date    Acute psychosis ([facility] 12/22/2017    Anxiety     Asthma     Bipolar disorder ([facility]     Bronchitis     COPD (chronic obstructive pulmonary disease) (H[facility])     Depression     Iron deficiency anemia     PTSD (post-traumatic stress disorder)     [facility]     Severe episode of recurrent major depressive disorder, without psychotic features ([facility] 11/16/2019    Substance abuse ([facility]     quit 7/2011  was Gita Bekah user        FAMILY/SOCIAL HISTORY:  Family History   Problem Relation Age of Onset    Diabetes Mother     High Blood Pressure Mother     Kidney Disease Mother     Heart Failure Mother     Diabetes Father     Other Father         mesothelioma    Mental Illness Father         schizophrenia     Social History     Socioeconomic History    Marital status: Single     Spouse name: Not on file    Number of children: 0    Years of education: GED    Highest education level: Not on file   Occupational History    Occupation: unemployed     Comment: attempting to get disability for mental illness   Tobacco Use    Smoking status: Current Every Day Smoker     Packs/day: 0.50     Years: 20.00     Pack years: 10.00     Types: Cigarettes    Smokeless tobacco: Never Used   Vaping Use    Vaping Use: Former   Substance and Sexual Activity    Alcohol use: Yes    Drug use: Yes     Types: Marijuana     Comment: using marijuana daily    Sexual activity: Not Currently   Other Topics Concern    Not on file   Social History Narrative    Not on file     Social Determinants of Health     Financial Resource Strain:     Difficulty of Paying Living Expenses:    Food Insecurity:     Worried About Running Out of Food in the Last Year:     [address] in the Last Year:    Transportation Needs:     Lack of Transportation (Medical):  Lack of Transportation (Non-Medical):    Physical Activity:     Days of Exercise per Week:     Minutes of Exercise per Session:    Stress:     Feeling of Stress :    Social Connections:     Frequency of Communication with Friends and Family:     Frequency of Social Gatherings with Friends and Family:     Attends Yarsanism Services:     Active Member of Clubs or Organizations:     Attends Club or Organization Meetings:     Marital Status:    Intimate Partner Violence:     Fear of Current or Ex-Partner:     Emotionally Abused:     Physically Abused:     Sexually Abused:            ROS:  [x] All negative/unchanged except if checked.  Explain positive(checked items) below:  [] Constitutional  [] Eyes  [] Ear/Nose/Mouth/Throat  [] Respiratory  [] CV  [] GI  []   [] Musculoskeletal  [] Skin/Breast  [] Neurological  [] Endocrine  [] Heme/Lymph  [] Allergic/Immunologic    Explanation:     MEDICATIONS:    Current Facility-Administered Medications:     OXcarbazepine (TRILEPTAL) tablet 450 mg, 450 mg, Oral, BID, Gildardo Peabody Dellick, APRN - CNP, 508 mg at 07/11/21 2126    traZODone (DESYREL) tablet 50 mg, 50 mg, Oral, Nightly, Jose E Porras * No resolved hospital problems. *      LABS:    No results for input(s): WBC, HGB, PLT in the last 72 hours. No results for input(s): NA, K, CL, CO2, BUN, CREATININE, GLUCOSE in the last 72 hours. No results for input(s): BILITOT, ALKPHOS, AST, ALT in the last 72 hours. Lab Results   Component Value Date    LABAMPH NOT DETECTED 07/02/2021    BARBSCNU NOT DETECTED 07/02/2021    LABBENZ NOT DETECTED 07/02/2021    LABMETH NOT DETECTED 07/02/2021    OPIATESCREENURINE NOT DETECTED 07/02/2021    PHENCYCLIDINESCREENURINE NOT DETECTED 07/02/2021    ETOH <10 07/02/2021     Lab Results   Component Value Date    TSH 1.770 03/07/2019     No results found for: LITHIUM  Lab Results   Component Value Date    VALPROATE 57 04/02/2021           Treatment Plan:  The patient's diagnosis, treatment plan, medication management were formulated after patient was seen directly by the attending physician and myself and all relevant documentation was reviewed. Reviewed current Medications with the patient. Risk, benefit, side effects, possible outcomes of the medication and alternatives discussed with the patient and the patient demonstrated understanding. The patient was also educated that the outcome of treatment will depend on the medication compliance as directed by the prescribers along with regular follow-up, compliance with the labs and other work-up, as clinically indicated. Continue Trileptal 450 mg twice daily  Continue Invega 3 mg twice daily for psychotic feature          Collateral information: Followed by social work  CD evaluation  Encourage patient to attend group and other milieu activities.   Discharge planning discussed with the patient and treatment team.    PSYCHOTHERAPY/COUNSELING:  [x] Therapeutic interview  [x] Supportive  [] CBT  [] Ongoing  [] Other    [x] Patient continues to need, on a daily basis, active treatment furnished directly by or requiring the supervision of inpatient psychiatric personnel      Anticipated Length of stay: 3 to 5 days based on stability     NOTE: This report was transcribed using voice recognition software. Every effort was made to ensure accuracy; however, inadvertent computerized transcription errors may be present.     Electronically signed by CHARO Brasher CNP on 0/56/4109 at 9:21 AM on the discharge service for the patient. I have reviewed and made amendments to the documentation where necessary.

## 2025-05-29 ENCOUNTER — HOSPITAL ENCOUNTER (OUTPATIENT)
Dept: GENERAL RADIOLOGY | Age: 50
Discharge: HOME | End: 2025-05-31
Payer: COMMERCIAL

## 2025-05-29 DIAGNOSIS — R05.9 COUGH, UNSPECIFIED TYPE: ICD-10-CM

## 2025-05-29 DIAGNOSIS — J44.1 COPD EXACERBATION (HCC): ICD-10-CM

## 2025-05-29 PROCEDURE — 71046 X-RAY EXAM CHEST 2 VIEWS: CPT

## 2025-06-23 ENCOUNTER — OFFICE VISIT (OUTPATIENT)
Age: 50
End: 2025-06-23
Payer: COMMERCIAL

## 2025-06-23 VITALS
RESPIRATION RATE: 17 BRPM | HEART RATE: 75 BPM | TEMPERATURE: 97.4 F | DIASTOLIC BLOOD PRESSURE: 61 MMHG | OXYGEN SATURATION: 98 % | SYSTOLIC BLOOD PRESSURE: 104 MMHG

## 2025-06-23 DIAGNOSIS — Z72.0 TOBACCO USE: Primary | ICD-10-CM

## 2025-06-23 DIAGNOSIS — J45.40 MODERATE PERSISTENT ASTHMA WITHOUT COMPLICATION: ICD-10-CM

## 2025-06-23 DIAGNOSIS — J44.9 CHRONIC OBSTRUCTIVE PULMONARY DISEASE, UNSPECIFIED COPD TYPE (HCC): ICD-10-CM

## 2025-06-23 DIAGNOSIS — Z59.00 HOMELESS: ICD-10-CM

## 2025-06-23 DIAGNOSIS — F19.91 HISTORY OF DRUG USE: ICD-10-CM

## 2025-06-23 PROCEDURE — 4004F PT TOBACCO SCREEN RCVD TLK: CPT | Performed by: NURSE PRACTITIONER

## 2025-06-23 PROCEDURE — G8427 DOCREV CUR MEDS BY ELIG CLIN: HCPCS | Performed by: NURSE PRACTITIONER

## 2025-06-23 PROCEDURE — 3017F COLORECTAL CA SCREEN DOC REV: CPT | Performed by: NURSE PRACTITIONER

## 2025-06-23 PROCEDURE — 3023F SPIROM DOC REV: CPT | Performed by: NURSE PRACTITIONER

## 2025-06-23 PROCEDURE — 99204 OFFICE O/P NEW MOD 45 MIN: CPT | Performed by: NURSE PRACTITIONER

## 2025-06-23 PROCEDURE — 99203 OFFICE O/P NEW LOW 30 MIN: CPT | Performed by: NURSE PRACTITIONER

## 2025-06-23 PROCEDURE — 99406 BEHAV CHNG SMOKING 3-10 MIN: CPT | Performed by: NURSE PRACTITIONER

## 2025-06-23 PROCEDURE — G8421 BMI NOT CALCULATED: HCPCS | Performed by: NURSE PRACTITIONER

## 2025-06-23 RX ORDER — ALBUTEROL SULFATE 90 UG/1
2 INHALANT RESPIRATORY (INHALATION) EVERY 6 HOURS PRN
Qty: 1 EACH | Refills: 4 | Status: SHIPPED | OUTPATIENT
Start: 2025-06-23

## 2025-06-23 RX ORDER — ALBUTEROL SULFATE 0.83 MG/ML
2.5 SOLUTION RESPIRATORY (INHALATION) EVERY 6 HOURS PRN
Qty: 900 ML | Refills: 1 | Status: SHIPPED | OUTPATIENT
Start: 2025-06-23 | End: 2026-06-23

## 2025-06-23 RX ORDER — ALBUTEROL SULFATE 90 UG/1
2 INHALANT RESPIRATORY (INHALATION) EVERY 6 HOURS PRN
COMMUNITY
Start: 2025-06-14 | End: 2025-06-23 | Stop reason: SDUPTHER

## 2025-06-23 RX ORDER — UMECLIDINIUM 62.5 UG/1
1 AEROSOL, POWDER ORAL DAILY
COMMUNITY
Start: 2025-06-06

## 2025-06-23 SDOH — ECONOMIC STABILITY - HOUSING INSECURITY: HOMELESSNESS UNSPECIFIED: Z59.00

## 2025-06-23 NOTE — PROGRESS NOTES
[facility]  Department of Pulmonary, Critical Care and Sleep Medicine  Dr. Luna, Dr. Whitman, Dr. Quijano, Dr. Vinson, CHARO Benavides    Pulmonary & Critical Care Office Note - Follow up      Assessment/Plan     Problem List Items Addressed This Visit          Respiratory    COPD (chronic obstructive pulmonary disease) (HCC)    Relevant Medications    INCRUSE ELLIPTA 62.5 MCG/ACT inhaler    albuterol (PROVENTIL) (2.5 MG/3ML) 0.083% nebulizer solution    albuterol sulfate HFA (PROVENTIL;VENTOLIN;PROAIR) 108 (90 Base) MCG/ACT inhaler     Other Visit Diagnoses         Tobacco use    -  Primary    Relevant Orders    Full PFT Study With Bronchodilator    CT LUNG CANCER SCREENING (INITIAL/ANNUAL)      Moderate persistent asthma without complication        Relevant Medications    INCRUSE ELLIPTA 62.5 MCG/ACT inhaler    albuterol (PROVENTIL) (2.5 MG/3ML) 0.083% nebulizer solution    albuterol sulfate HFA (PROVENTIL;VENTOLIN;PROAIR) 108 (90 Base) MCG/ACT inhaler      History of drug use          Homeless              History of Present Illness  The patient is a 49-year-old female who presents for evaluation of asthma, COPD, and chronic bronchitis.    She was referred to our care by the nurse practitioner at [place] due to respiratory issues. She has a history of asthma since childhood and was recently diagnosed with chronic bronchitis following an x-ray examination. She reports wheezing and coughing associated with her bronchitis. She has not previously consulted a pulmonologist.    She maintains an active lifestyle, walking approximately one mile daily, and reports satisfactory sleep quality. She reports no presence of pets at home and no swelling in her ankles or feet. She is currently on Incruse, which she finds beneficial, and uses a rescue inhaler once or twice daily. She does not own a nebulizer but has found hospital-administered breathing treatments helpful in the

## 2025-06-25 ENCOUNTER — TELEPHONE (OUTPATIENT)
Age: 50
End: 2025-06-25

## 2025-07-10 NOTE — PLAN OF CARE
Problem: Altered Mood, Psychotic Behavior:  Goal: Able to verbalize reality based thinking  Description: Able to verbalize reality based thinking  6/30/2020 1737 by Ritika Sanchez RN  Outcome: Ongoing     Problem: Altered Mood, Psychotic Behavior:  Goal: Ability to interact with others will improve  Description: Ability to interact with others will improve  6/30/2020 1737 by Ritika Sanchez RN  Outcome: Ongoing   Pt withdrawn to her room but did come out on the unit for dinner. Is quiet and keeps to herself. Does not socialize with peers. Affect is sad and she states that she does feel depressed but wants to go home. Denies any harmful ideations and hallucinations. Voicing no delusions at this time. 10-Jul-2025 21:23